# Patient Record
Sex: MALE | Race: BLACK OR AFRICAN AMERICAN | Employment: OTHER | URBAN - METROPOLITAN AREA
[De-identification: names, ages, dates, MRNs, and addresses within clinical notes are randomized per-mention and may not be internally consistent; named-entity substitution may affect disease eponyms.]

---

## 2017-10-07 ENCOUNTER — OP HISTORICAL/CONVERTED ENCOUNTER (OUTPATIENT)
Dept: OTHER | Age: 82
End: 2017-10-07

## 2018-01-13 ENCOUNTER — ED HISTORICAL/CONVERTED ENCOUNTER (OUTPATIENT)
Dept: OTHER | Age: 83
End: 2018-01-13

## 2018-07-06 ENCOUNTER — ED HISTORICAL/CONVERTED ENCOUNTER (OUTPATIENT)
Dept: OTHER | Age: 83
End: 2018-07-06

## 2018-09-24 ENCOUNTER — ED HISTORICAL/CONVERTED ENCOUNTER (OUTPATIENT)
Dept: OTHER | Age: 83
End: 2018-09-24

## 2019-10-24 ENCOUNTER — ED HISTORICAL/CONVERTED ENCOUNTER (OUTPATIENT)
Dept: OTHER | Age: 84
End: 2019-10-24

## 2021-07-16 ENCOUNTER — APPOINTMENT (OUTPATIENT)
Dept: GENERAL RADIOLOGY | Age: 86
End: 2021-07-16
Attending: EMERGENCY MEDICINE
Payer: MEDICARE

## 2021-07-16 ENCOUNTER — HOSPITAL ENCOUNTER (EMERGENCY)
Age: 86
Discharge: HOME | End: 2021-07-17
Attending: EMERGENCY MEDICINE
Payer: MEDICARE

## 2021-07-16 VITALS
BODY MASS INDEX: 23.32 KG/M2 | OXYGEN SATURATION: 99 % | RESPIRATION RATE: 19 BRPM | DIASTOLIC BLOOD PRESSURE: 78 MMHG | TEMPERATURE: 98.4 F | WEIGHT: 140 LBS | SYSTOLIC BLOOD PRESSURE: 143 MMHG | HEIGHT: 65 IN | HEART RATE: 95 BPM

## 2021-07-16 DIAGNOSIS — R79.89 PRERENAL AZOTEMIA: ICD-10-CM

## 2021-07-16 DIAGNOSIS — T67.5XXA HEAT EXHAUSTION, INITIAL ENCOUNTER: Primary | ICD-10-CM

## 2021-07-16 DIAGNOSIS — E86.0 DEHYDRATION DETERMINED BY EXAMINATION: ICD-10-CM

## 2021-07-16 LAB
ANION GAP SERPL CALC-SCNC: 4 MMOL/L (ref 5–15)
APPEARANCE UR: ABNORMAL
BASOPHILS # BLD: 0 K/UL (ref 0–0.1)
BASOPHILS NFR BLD: 0 % (ref 0–1)
BILIRUB UR QL: NEGATIVE
BUN SERPL-MCNC: 24 MG/DL (ref 6–20)
BUN/CREAT SERPL: 21 (ref 12–20)
CA-I BLD-MCNC: 9.1 MG/DL (ref 8.5–10.1)
CHLORIDE SERPL-SCNC: 109 MMOL/L (ref 97–108)
CO2 SERPL-SCNC: 24 MMOL/L (ref 21–32)
COLOR UR: ABNORMAL
CREAT SERPL-MCNC: 1.14 MG/DL (ref 0.7–1.3)
DIFFERENTIAL METHOD BLD: ABNORMAL
EOSINOPHIL # BLD: 0 K/UL (ref 0–0.4)
EOSINOPHIL NFR BLD: 1 % (ref 0–7)
ERYTHROCYTE [DISTWIDTH] IN BLOOD BY AUTOMATED COUNT: 14.6 % (ref 11.5–14.5)
GLUCOSE SERPL-MCNC: 101 MG/DL (ref 65–100)
GLUCOSE UR STRIP.AUTO-MCNC: NEGATIVE MG/DL
HCT VFR BLD AUTO: 39.3 % (ref 36.6–50.3)
HGB BLD-MCNC: 12.6 G/DL (ref 12.1–17)
HGB UR QL STRIP: NEGATIVE
IMM GRANULOCYTES # BLD AUTO: 0 K/UL (ref 0–0.04)
IMM GRANULOCYTES NFR BLD AUTO: 1 % (ref 0–0.5)
KETONES UR QL STRIP.AUTO: NEGATIVE MG/DL
LACTATE SERPL-SCNC: 1.9 MMOL/L (ref 0.4–2)
LEUKOCYTE ESTERASE UR QL STRIP.AUTO: NEGATIVE
LYMPHOCYTES # BLD: 0.9 K/UL (ref 0.8–3.5)
LYMPHOCYTES NFR BLD: 11 % (ref 12–49)
MAGNESIUM SERPL-MCNC: 2.2 MG/DL (ref 1.6–2.4)
MCH RBC QN AUTO: 31.2 PG (ref 26–34)
MCHC RBC AUTO-ENTMCNC: 32.1 G/DL (ref 30–36.5)
MCV RBC AUTO: 97.3 FL (ref 80–99)
MONOCYTES # BLD: 0.5 K/UL (ref 0–1)
MONOCYTES NFR BLD: 7 % (ref 5–13)
NEUTS SEG # BLD: 6.5 K/UL (ref 1.8–8)
NEUTS SEG NFR BLD: 80 % (ref 32–75)
NITRITE UR QL STRIP.AUTO: NEGATIVE
NRBC # BLD: 0 K/UL (ref 0–0.01)
NRBC BLD-RTO: 0 PER 100 WBC
PH UR STRIP: 5 [PH] (ref 5–8)
PLATELET # BLD AUTO: 184 K/UL (ref 150–400)
PMV BLD AUTO: 11 FL (ref 8.9–12.9)
POTASSIUM SERPL-SCNC: 4.5 MMOL/L (ref 3.5–5.1)
PROT UR STRIP-MCNC: NEGATIVE MG/DL
RBC # BLD AUTO: 4.04 M/UL (ref 4.1–5.7)
SODIUM SERPL-SCNC: 137 MMOL/L (ref 136–145)
SP GR UR REFRACTOMETRY: 1.01 (ref 1–1.03)
TROPONIN I SERPL-MCNC: <0.05 NG/ML
UROBILINOGEN UR QL STRIP.AUTO: 0.1 EU/DL (ref 0.1–1)
WBC # BLD AUTO: 8.1 K/UL (ref 4.1–11.1)

## 2021-07-16 PROCEDURE — 81003 URINALYSIS AUTO W/O SCOPE: CPT

## 2021-07-16 PROCEDURE — 99284 EMERGENCY DEPT VISIT MOD MDM: CPT

## 2021-07-16 PROCEDURE — 83735 ASSAY OF MAGNESIUM: CPT

## 2021-07-16 PROCEDURE — 74011250636 HC RX REV CODE- 250/636: Performed by: EMERGENCY MEDICINE

## 2021-07-16 PROCEDURE — 84484 ASSAY OF TROPONIN QUANT: CPT

## 2021-07-16 PROCEDURE — 85025 COMPLETE CBC W/AUTO DIFF WBC: CPT

## 2021-07-16 PROCEDURE — 83605 ASSAY OF LACTIC ACID: CPT

## 2021-07-16 PROCEDURE — 71045 X-RAY EXAM CHEST 1 VIEW: CPT

## 2021-07-16 PROCEDURE — 36415 COLL VENOUS BLD VENIPUNCTURE: CPT

## 2021-07-16 PROCEDURE — 80048 BASIC METABOLIC PNL TOTAL CA: CPT

## 2021-07-16 PROCEDURE — 87040 BLOOD CULTURE FOR BACTERIA: CPT

## 2021-07-16 RX ADMIN — SODIUM CHLORIDE 1000 ML: 9 INJECTION, SOLUTION INTRAVENOUS at 21:04

## 2021-07-17 NOTE — ED PROVIDER NOTES
EMERGENCY DEPARTMENT HISTORY AND PHYSICAL EXAM        Date: 7/16/2021  Patient Name: Pedro Hernadez    History of Presenting Illness     Chief Complaint   Patient presents with    Shortness of Breath       11:07 PM    History Provided By: Patient, Patient's Daughter and EMS    HPI: Pedro Hernadez, 80 y.o. 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  History per those sitting with him and EMS is that the assisted living where he resides as a hallway that is not as well at condition as inside the apartment met the patient and several of his girlfriends\" were sitting in the hallway talking for several hours. At one point the patient was noted to become lightheaded and unresponsive without any witnessed seizure activity. Patient was helped to the floor and EMS was called. Upon EMS arrival patient was alert but seems confused and they stated that the hallway space they were and was very very warm because the EMS started to sweat. Once the patient was moved to the air conditioned ambulance he quickly aroused return to GCS of 15 and appeared per local friends to be back to baseline. Review of systems finds patient denying any prior symptoms including negative chest pain, nausea, vomiting, delvin syncope, falls, viral illness, urinary symptoms, cough, production of purulent sputum, medication noncompliance, EtOH intoxication. History per patient and caregiver/daughter is that the patient drinks very little to no water during the day and that the seniors in his building often sits and is poorly air conditioned hot hallway which has been even warm than usual due to the heat way. Patient confirms that he has had his Covid vaccinations.     PCP: Hernan Ruano MD    Current Outpatient Medications Medication Sig Dispense Refill    lisinopril (PRINIVIL, ZESTRIL) 20 mg tablet Take 20 mg by mouth daily.  atorvastatin (LIPITOR) 10 mg tablet Take 10 mg by mouth nightly.  PATRICIO ASPIRIN PO Take 81 mg by mouth daily.  multivitamin, tx-iron-ca-min (THERA-M W/ IRON) 9 mg iron-400 mcg tab tablet Take 1 Tab by mouth daily.  insulin detemir (LEVEMIR FLEXTOUCH) 100 unit/mL (3 mL) inpn 0.35 mL by SubCUTAneous route nightly. 35 Units by SubCUTAneous route at night 45 mL 3    glipiZIDE (GLUCOTROL) 5 mg tablet Take 1 Tab by mouth every morning. 90 Tab 3    NIFEdipine XL (PROCARDIA-XL) 60 mg CR tablet Take 60 mg by mouth daily. Indications: HYPERTENSION      gabapentin (NEURONTIN) 800 mg tablet Take  by mouth three (3) times daily.  ibuprofen (MOTRIN) 600 mg tablet Take 600 mg by mouth every eight (8) hours as needed.  rosuvastatin (CRESTOR) 10 mg tablet Take 10 mg by mouth daily. Indications: HYPERCHOLESTEROLEMIA      hydrochlorothiazide (HYDRODIURIL) 25 mg tablet Take 25 mg by mouth daily. Indications: HYPERTENSION      saw palmetto 500 mg cap Take 450 mg by mouth daily.            Past History     Past Medical History:  Past Medical History:   Diagnosis Date    Anemia     Arthritis     Diabetes ([facility] [address]     Hypercholesterolemia     Hypertension     Single kidney     Cr normal    Throat cancer ([facility] [address] 2008    s/p laryngectomy       Past Surgical History:  Past Surgical History:   Procedure Laterality Date    HX LAP CHOLECYSTECTOMY  12    By Dr. Darwin Olivares states does not know reason       Family History:  Family History   Family history unknown: Yes       Social History:  Social History     Tobacco Use    Smoking status: Former Smoker     Packs/day: 1.00     Years: 57.00     Pack years: 57.00     Quit date: 2012     Years since quittin.0    Smokeless tobacco: Never Used   Substance Use Topics    Alcohol use: Yes     Alcohol/week: 17.5 standard drinks     Types: 21 Shots of liquor per week    Drug use: No       Allergies:  No Known Allergies    Review of Systems   Review of Systems   Constitutional: Positive for fatigue. Negative for appetite change, chills, diaphoresis and fever. HENT: Negative for congestion, sore throat and trouble swallowing. Eyes: Negative for visual disturbance. Respiratory: Negative for cough and shortness of breath. Cardiovascular: Negative for chest pain and palpitations. Gastrointestinal: Negative for abdominal pain, diarrhea, nausea and vomiting. Endocrine: Negative for polydipsia, polyphagia and polyuria. Genitourinary: Negative for dysuria, frequency, hematuria and urgency. Musculoskeletal: Negative for gait problem and neck pain. Skin: Negative for rash. Neurological: Positive for syncope. Negative for dizziness, light-headedness and headaches. Psychiatric/Behavioral: Negative. Physical Exam   Physical Exam  Vitals and nursing note reviewed. Constitutional:       General: He is not in acute distress. Appearance: He is well-developed. He is not ill-appearing. Comments: Well-developed thin -American male looks fatigued but interactive and smiling. Family member at bedside GCS of 15 ANO x3   HENT:      Head: Normocephalic and atraumatic. Nose: Nose normal.      Mouth/Throat:      Pharynx: No posterior oropharyngeal erythema. Comments: Oral mucosa is tacky; Trach site is patent and moist.  Eyes:      General: Vision grossly intact. Extraocular Movements: Extraocular movements intact. Conjunctiva/sclera: Conjunctivae normal.      Pupils: Pupils are equal, round, and reactive to light. Neck:      Vascular: No JVD. Cardiovascular:      Rate and Rhythm: Normal rate and regular rhythm. Pulses: Normal pulses. Carotid pulses are 2+ on the right side and 2+ on the left side.        Radial pulses are 2+ on the right side and 2+ on the left side. Femoral pulses are 2+ on the right side and 2+ on the left side. Popliteal pulses are 2+ on the right side and 2+ on the left side. Dorsalis pedis pulses are 2+ on the right side and 2+ on the left side. Posterior tibial pulses are 2+ on the right side and 2+ on the left side. Heart sounds: Normal heart sounds. Pulmonary:      Effort: Pulmonary effort is normal.      Breath sounds: Normal breath sounds and air entry. No wheezing or rhonchi. Abdominal:      General: Bowel sounds are normal.      Palpations: Abdomen is soft. Tenderness: There is no abdominal tenderness. There is no guarding or rebound. Musculoskeletal:         General: No swelling or deformity. Right shoulder: No swelling. Normal range of motion. Cervical back: Neck supple. Right lower leg: No edema. Left lower leg: No edema. Skin:     General: Skin is warm and dry. Capillary Refill: Capillary refill takes less than 2 seconds. Findings: No signs of injury or rash. Neurological:      General: No focal deficit present. Mental Status: He is alert and oriented to person, place, and time. Psychiatric:         Mood and Affect: Mood normal.         Behavior: Behavior normal. Behavior is cooperative. Diagnostic Study Results     Labs -     No results found for this or any previous visit (from the past 48 hour(s)). CXR Results  (Last 48 hours)    None          Medical Decision Making and ED Course     I have also reviewed the vital signs, available nursing notes, past medical history, past surgical history, family history and social history. Vital Signs - Reviewed the patient's vital signs. No data found. Records Reviewed: Nursing Notes and Ambulance Run Sheet as available.     Medical Decision Making/Diff Dx:  Differential diagnosis: Heat exhaustion, heat illness, heat syncope, dehydration, electrolyte abnormalities, , pneumonia, orthostatic hypotension,    Adorable 77-year-old -American male presents after prolonged heat exposure with an episode of syncope syncope/near syncope that is likely heat induced based on environmental descriptions per EMS and patient family. We will however do screening labs with x-ray and assess for other potential etiologies patient. Patient looks clinically dehydrated so we will give IV fluids as well as the fact that his urine is quite dark in color. Will disposition per results    ED course/Re-evaluation/Consultations/Other   Patient continues to be quite interactive and pleasant and does appear to feel stronger after IV fluid boluses and eating. Patient is adamant that he does not wish to be admitted so will discharge home with strong warnings related to p.o. intake and precautions to take during the current heat wave. Patient and family acknowledged understanding       Procedures     PROCEDURES:  Procedures  Mundo Harper MD  N/A    Disposition     Discharged    DISCHARGE PLAN:  1. Discharge Medication List as of 7/17/2021 12:15 AM        2. Current Discharge Medication List      CONTINUE these medications which have NOT CHANGED    Details   lisinopril (PRINIVIL, ZESTRIL) 20 mg tablet Take 20 mg by mouth daily. Associated Diagnoses: Type II diabetes mellitus, uncontrolled ([address] Essential hypertension with goal blood pressure less than 140/90      atorvastatin (LIPITOR) 10 mg tablet Take 10 mg by mouth nightly. Associated Diagnoses: Type II diabetes mellitus, uncontrolled ([address] Essential hypertension with goal blood pressure less than 140/90      PATRICIO ASPIRIN PO Take 81 mg by mouth daily. Associated Diagnoses: Type II diabetes mellitus, uncontrolled ([address] Essential hypertension with goal blood pressure less than 140/90      multivitamin, tx-iron-ca-min (THERA-M W/ IRON) 9 mg iron-400 mcg tab tablet Take 1 Tab by mouth daily.     Associated Diagnoses: Type II diabetes mellitus, uncontrolled ([facility] [address] Essential hypertension with goal blood pressure less than 140/90      insulin detemir (LEVEMIR FLEXTOUCH) 100 unit/mL (3 mL) inpn 0.35 mL by SubCUTAneous route nightly. 35 Units by SubCUTAneous route at night  Qty: 45 mL, Refills: 3    Associated Diagnoses: Type II diabetes mellitus, uncontrolled ([facility] [address] Essential hypertension with goal blood pressure less than 140/90      glipiZIDE (GLUCOTROL) 5 mg tablet Take 1 Tab by mouth every morning. Qty: 90 Tab, Refills: 3    Associated Diagnoses: [facility], [facility] Essential hypertension with goal blood pressure less than 140/90      NIFEdipine XL (PROCARDIA-XL) 60 mg CR tablet Take 60 mg by mouth daily. Indications: HYPERTENSION      gabapentin (NEURONTIN) 800 mg tablet Take  by mouth three (3) times daily. ibuprofen (MOTRIN) 600 mg tablet Take 600 mg by mouth every eight (8) hours as needed. rosuvastatin (CRESTOR) 10 mg tablet Take 10 mg by mouth daily. Indications: HYPERCHOLESTEROLEMIA      hydrochlorothiazide (HYDRODIURIL) 25 mg tablet Take 25 mg by mouth daily. Indications: HYPERTENSION      saw palmetto 500 mg cap Take 450 mg by mouth daily. 3.   Follow-up Information    None       4. Return to ED if worse     Diagnosis     Clinical impression:   1. Heat exhaustion, initial encounter    2. Dehydration determined by examination    3.  Prerenal azotemia

## 2021-07-17 NOTE — DISCHARGE INSTRUCTIONS
1. Increase your water intake to keep your urine completely clear in color. It is important to avoid blood pressure dropping too low when you take your blood pressure medications as well as your risk for fainting/falls. A fall while you are on your blood thinners could result in bleeding on your brain. 2. Do not sit outside in the hallway when temperature is above 80 degrees in order to avoid heat exhaustion,heat illness or fainting. 3. Please eat 3 meals per day to avoid becoming confused or weak during the heat which could also result in a fall and head injury. 4. Return to the ED as needed or if you become concerned.

## 2021-07-23 LAB
BACTERIA SPEC CULT: NORMAL
SPECIAL REQUESTS,SREQ: NORMAL

## 2022-01-13 ENCOUNTER — HOSPITAL ENCOUNTER (EMERGENCY)
Age: 87
Discharge: HOME | End: 2022-01-13
Attending: EMERGENCY MEDICINE
Payer: MEDICARE

## 2022-01-13 ENCOUNTER — APPOINTMENT (OUTPATIENT)
Dept: GENERAL RADIOLOGY | Age: 87
End: 2022-01-13
Attending: EMERGENCY MEDICINE
Payer: MEDICARE

## 2022-01-13 VITALS
BODY MASS INDEX: 23.32 KG/M2 | WEIGHT: 140 LBS | TEMPERATURE: 98.4 F | DIASTOLIC BLOOD PRESSURE: 72 MMHG | HEART RATE: 72 BPM | SYSTOLIC BLOOD PRESSURE: 152 MMHG | OXYGEN SATURATION: 100 % | RESPIRATION RATE: 13 BRPM | HEIGHT: 65 IN

## 2022-01-13 DIAGNOSIS — R53.83 FATIGUE, UNSPECIFIED TYPE: Primary | ICD-10-CM

## 2022-01-13 LAB
ALBUMIN SERPL-MCNC: 3.6 G/DL (ref 3.5–5)
ALBUMIN/GLOB SERPL: 0.8 {RATIO} (ref 1.1–2.2)
ALP SERPL-CCNC: 105 U/L (ref 45–117)
ALT SERPL-CCNC: 31 U/L (ref 12–78)
ANION GAP SERPL CALC-SCNC: 5 MMOL/L (ref 5–15)
APPEARANCE UR: CLEAR
AST SERPL W P-5'-P-CCNC: 28 U/L (ref 15–37)
ATRIAL RATE: 69 BPM
BACTERIA URNS QL MICRO: NEGATIVE /HPF
BASOPHILS # BLD: 0 K/UL (ref 0–0.1)
BASOPHILS NFR BLD: 0 % (ref 0–1)
BILIRUB SERPL-MCNC: 0.6 MG/DL (ref 0.2–1)
BILIRUB UR QL: NEGATIVE
BUN SERPL-MCNC: 26 MG/DL (ref 6–20)
BUN/CREAT SERPL: 20 (ref 12–20)
CA-I BLD-MCNC: 9.8 MG/DL (ref 8.5–10.1)
CALCULATED R AXIS, ECG10: -38 DEGREES
CALCULATED T AXIS, ECG11: 2 DEGREES
CHLORIDE SERPL-SCNC: 106 MMOL/L (ref 97–108)
CK SERPL-CCNC: 185 NG/ML (ref 39–308)
CO2 SERPL-SCNC: 26 MMOL/L (ref 21–32)
COLOR UR: ABNORMAL
COVID-19 RAPID TEST, COVR: NOT DETECTED
CREAT SERPL-MCNC: 1.27 MG/DL (ref 0.7–1.3)
DIAGNOSIS, 93000: NORMAL
DIFFERENTIAL METHOD BLD: ABNORMAL
EOSINOPHIL # BLD: 0 K/UL (ref 0–0.4)
EOSINOPHIL NFR BLD: 0 % (ref 0–7)
ERYTHROCYTE [DISTWIDTH] IN BLOOD BY AUTOMATED COUNT: 14.5 % (ref 11.5–14.5)
GLOBULIN SER CALC-MCNC: 4.3 G/DL (ref 2–4)
GLUCOSE SERPL-MCNC: 152 MG/DL (ref 65–100)
GLUCOSE UR STRIP.AUTO-MCNC: 50 MG/DL
HCT VFR BLD AUTO: 40.6 % (ref 36.6–50.3)
HGB BLD-MCNC: 13.1 G/DL (ref 12.1–17)
HGB UR QL STRIP: NEGATIVE
IMM GRANULOCYTES # BLD AUTO: 0.1 K/UL (ref 0–0.04)
IMM GRANULOCYTES NFR BLD AUTO: 1 % (ref 0–0.5)
KETONES UR QL STRIP.AUTO: 5 MG/DL
LEUKOCYTE ESTERASE UR QL STRIP.AUTO: NEGATIVE
LYMPHOCYTES # BLD: 1.1 K/UL (ref 0.8–3.5)
LYMPHOCYTES NFR BLD: 10 % (ref 12–49)
MCH RBC QN AUTO: 31.1 PG (ref 26–34)
MCHC RBC AUTO-ENTMCNC: 32.3 G/DL (ref 30–36.5)
MCV RBC AUTO: 96.4 FL (ref 80–99)
MONOCYTES # BLD: 0.7 K/UL (ref 0–1)
MONOCYTES NFR BLD: 7 % (ref 5–13)
MUCOUS THREADS URNS QL MICRO: ABNORMAL /LPF
NEUTS SEG # BLD: 8.4 K/UL (ref 1.8–8)
NEUTS SEG NFR BLD: 82 % (ref 32–75)
NITRITE UR QL STRIP.AUTO: NEGATIVE
NRBC # BLD: 0 K/UL (ref 0–0.01)
NRBC BLD-RTO: 0 PER 100 WBC
P-R INTERVAL, ECG05: 250 MS
PH UR STRIP: 5 [PH] (ref 5–8)
PLATELET # BLD AUTO: 163 K/UL (ref 150–400)
PMV BLD AUTO: 11.4 FL (ref 8.9–12.9)
POTASSIUM SERPL-SCNC: 4.9 MMOL/L (ref 3.5–5.1)
PROT SERPL-MCNC: 7.9 G/DL (ref 6.4–8.2)
PROT UR STRIP-MCNC: NEGATIVE MG/DL
Q-T INTERVAL, ECG07: 456 MS
QRS DURATION, ECG06: 150 MS
QTC CALCULATION (BEZET), ECG08: 478 MS
RBC # BLD AUTO: 4.21 M/UL (ref 4.1–5.7)
RBC #/AREA URNS HPF: ABNORMAL /HPF (ref 0–5)
SODIUM SERPL-SCNC: 137 MMOL/L (ref 136–145)
SP GR UR REFRACTOMETRY: 1.02 (ref 1–1.03)
SPECIMEN SOURCE: NORMAL
TROPONIN-HIGH SENSITIVITY: 73 NG/L (ref 0–76)
TROPONIN-HIGH SENSITIVITY: 79 NG/L (ref 0–76)
UROBILINOGEN UR QL STRIP.AUTO: 0.1 EU/DL (ref 0.1–1)
VENTRICULAR RATE, ECG03: 66 BPM
WBC # BLD AUTO: 10.2 K/UL (ref 4.1–11.1)
WBC URNS QL MICRO: ABNORMAL /HPF (ref 0–4)

## 2022-01-13 PROCEDURE — 87635 SARS-COV-2 COVID-19 AMP PRB: CPT

## 2022-01-13 PROCEDURE — 80053 COMPREHEN METABOLIC PANEL: CPT

## 2022-01-13 PROCEDURE — 93005 ELECTROCARDIOGRAM TRACING: CPT

## 2022-01-13 PROCEDURE — 71045 X-RAY EXAM CHEST 1 VIEW: CPT

## 2022-01-13 PROCEDURE — 36415 COLL VENOUS BLD VENIPUNCTURE: CPT

## 2022-01-13 PROCEDURE — 85025 COMPLETE CBC W/AUTO DIFF WBC: CPT

## 2022-01-13 PROCEDURE — 99285 EMERGENCY DEPT VISIT HI MDM: CPT

## 2022-01-13 PROCEDURE — 81001 URINALYSIS AUTO W/SCOPE: CPT

## 2022-01-13 PROCEDURE — 82550 ASSAY OF CK (CPK): CPT

## 2022-01-13 PROCEDURE — 84484 ASSAY OF TROPONIN QUANT: CPT

## 2022-01-13 NOTE — ED PROVIDER NOTES
EMERGENCY DEPARTMENT HISTORY AND PHYSICAL EXAM      Date: 1/13/2022  Patient Name: Soham John      History of Presenting Illness     Chief Complaint   Patient presents with    Fatigue    Hypotension       History Provided By: Patient and Patient's Daughter    HPI: Soham John, 80 y.o. male with a past medical history significant diabetes and hypertension presents to the ED with cc of weak and sitting up side in the hallway. Patient was sweaty and weak and was not able to get up. He denies any pain just overall weakness. Per EMS patient's blood pressure was noted to be low. Is that he is feeling much better now and denies any fever, chills, chest pain, shortness of breath rash, diarrhea, headache, and sweats. No treatment prior to arrival    There are no other complaints, changes, or physical findings at this time. PCP: Dori Sears MD    Current Outpatient Medications   Medication Sig Dispense Refill    lisinopril (PRINIVIL, ZESTRIL) 20 mg tablet Take 20 mg by mouth daily.  atorvastatin (LIPITOR) 10 mg tablet Take 10 mg by mouth nightly.  PATRICIO ASPIRIN PO Take 81 mg by mouth daily.  multivitamin, tx-iron-ca-min (THERA-M W/ IRON) 9 mg iron-400 mcg tab tablet Take 1 Tab by mouth daily.  insulin detemir (LEVEMIR FLEXTOUCH) 100 unit/mL (3 mL) inpn 0.35 mL by SubCUTAneous route nightly. 35 Units by SubCUTAneous route at night 45 mL 3    glipiZIDE (GLUCOTROL) 5 mg tablet Take 1 Tab by mouth every morning. 90 Tab 3    NIFEdipine XL (PROCARDIA-XL) 60 mg CR tablet Take 60 mg by mouth daily. Indications: HYPERTENSION      gabapentin (NEURONTIN) 800 mg tablet Take  by mouth three (3) times daily.  ibuprofen (MOTRIN) 600 mg tablet Take 600 mg by mouth every eight (8) hours as needed.  rosuvastatin (CRESTOR) 10 mg tablet Take 10 mg by mouth daily.     Indications: HYPERCHOLESTEROLEMIA      hydrochlorothiazide (HYDRODIURIL) 25 mg tablet Take 25 mg by mouth daily.    Indications: HYPERTENSION      saw palmetto 500 mg cap Take 450 mg by mouth daily. Past History     Past Medical History:  Past Medical History:   Diagnosis Date    Anemia     Arthritis     [facility] ([facility] [address]     Hypercholesterolemia     Hypertension     Single kidney     Cr normal    Throat cancer ([facility] [address] 2008    s/p laryngectomy       Past Surgical History:  Past Surgical History:   Procedure Laterality Date    HX LAP CHOLECYSTECTOMY  12    By Dr. Patrick Gamboa states does not know reason       Family History:  Family History   Family history unknown: Yes       Social History:  Social History     Tobacco Use    Smoking status: Former Smoker     Packs/day: 1.00     Years: 57.00     Pack years: 57.00     Quit date: 2012     Years since quittin.4    Smokeless tobacco: Never Used   Substance Use Topics    Alcohol use: Yes     Alcohol/week: 17.5 standard drinks     Types: 21 Shots of liquor per week    Drug use: No       Allergies:  No Known Allergies      Review of Systems     Review of Systems   Constitutional: Positive for fatigue. Negative for appetite change, chills and fever. HENT: Negative. Negative for congestion and sinus pain. Eyes: Negative. Negative for pain and visual disturbance. Respiratory: Negative. Negative for chest tightness and shortness of breath. Cardiovascular: Negative. Negative for chest pain. Gastrointestinal: Negative. Negative for abdominal pain, diarrhea, nausea and vomiting. Genitourinary: Negative. Negative for difficulty urinating. No discharge   Musculoskeletal: Negative. Negative for arthralgias. Skin: Negative. Negative for rash. Neurological: Positive for weakness. Negative for headaches. Hematological: Negative. Psychiatric/Behavioral: Negative. Negative for agitation. The patient is not nervous/anxious.     All other systems reviewed and are negative. Physical Exam     Physical Exam  Vitals and nursing note reviewed. Constitutional:       General: He is not in acute distress. Appearance: He is well-developed. HENT:      Head: Normocephalic and atraumatic. Nose: Nose normal.      Mouth/Throat:      Mouth: Mucous membranes are moist.      Pharynx: Oropharynx is clear. No oropharyngeal exudate. Eyes:      General:         Right eye: No discharge. Left eye: No discharge. Conjunctiva/sclera: Conjunctivae normal.      Pupils: Pupils are equal, round, and reactive to light. Cardiovascular:      Rate and Rhythm: Normal rate and regular rhythm. Chest Wall: PMI is not displaced. No thrill. Heart sounds: Normal heart sounds. No murmur heard. No friction rub. No gallop. Comments: Pacer in left upper chest  Pulmonary:      Effort: Pulmonary effort is normal. No respiratory distress. Breath sounds: Normal breath sounds. No wheezing or rales. Chest:      Chest wall: No tenderness. Abdominal:      General: Bowel sounds are normal. There is no distension. Palpations: Abdomen is soft. There is no mass. Tenderness: There is no abdominal tenderness. There is no guarding or rebound. Musculoskeletal:         General: Normal range of motion. Cervical back: Normal range of motion and neck supple. Lymphadenopathy:      Cervical: No cervical adenopathy. Skin:     General: Skin is warm and dry. Capillary Refill: Capillary refill takes less than 2 seconds. Findings: No erythema or rash. Neurological:      Mental Status: He is alert and oriented to person, place, and time. Cranial Nerves: No cranial nerve deficit.       Coordination: Coordination normal.   Psychiatric:         Mood and Affect: Mood normal.         Behavior: Behavior normal.         Lab and Diagnostic Study Results     Labs -     Recent Results (from the past 12 hour(s))   EKG, 12 LEAD, INITIAL    Collection Time: 01/13/22  3:11 PM   Result Value Ref Range    Ventricular Rate 66 BPM    Atrial Rate 69 BPM    P-R Interval 250 ms    QRS Duration 150 ms    Q-T Interval 456 ms    QTC Calculation (Bezet) 478 ms    Calculated R Axis -38 degrees    Calculated T Axis 2 degrees    Diagnosis       Atrial-paced rhythm with prolonged AV conduction  Left axis deviation  Right bundle branch block  Minimal voltage criteria for LVH, may be normal variant  Abnormal ECG  No previous ECGs available  Confirmed by Be Patricia ([id number]) on 1/13/2022 3:25:49 PM     COVID-19 RAPID TEST    Collection Time: 01/13/22  3:16 PM   Result Value Ref Range    Specimen source Nasopharyngeal      COVID-19 rapid test Not Detected Not Detected     CBC WITH AUTOMATED DIFF    Collection Time: 01/13/22  3:23 PM   Result Value Ref Range    WBC 10.2 4.1 - 11.1 K/uL    RBC 4.21 4.10 - 5.70 M/uL    HGB 13.1 12.1 - 17.0 g/dL    HCT 40.6 36.6 - 50.3 %    MCV 96.4 80.0 - 99.0 FL    MCH 31.1 26.0 - 34.0 PG    MCHC 32.3 30.0 - 36.5 g/dL    RDW 14.5 11.5 - 14.5 %    PLATELET 007 146 - 148 K/uL    MPV 11.4 8.9 - 12.9 FL    NRBC 0.0 0.0  WBC    ABSOLUTE NRBC 0.00 0.00 - 0.01 K/uL    NEUTROPHILS 82 (H) 32 - 75 %    LYMPHOCYTES 10 (L) 12 - 49 %    MONOCYTES 7 5 - 13 %    EOSINOPHILS 0 0 - 7 %    BASOPHILS 0 0 - 1 %    IMMATURE GRANULOCYTES 1 (H) 0 - 0.5 %    ABS. NEUTROPHILS 8.4 (H) 1.8 - 8.0 K/UL    ABS. LYMPHOCYTES 1.1 0.8 - 3.5 K/UL    ABS. MONOCYTES 0.7 0.0 - 1.0 K/UL    ABS. EOSINOPHILS 0.0 0.0 - 0.4 K/UL    ABS. BASOPHILS 0.0 0.0 - 0.1 K/UL    ABS. IMM.  GRANS. 0.1 (H) 0.00 - 0.04 K/UL    DF AUTOMATED     METABOLIC PANEL, COMPREHENSIVE    Collection Time: 01/13/22  3:23 PM   Result Value Ref Range    Sodium 137 136 - 145 mmol/L    Potassium 4.9 3.5 - 5.1 mmol/L    Chloride 106 97 - 108 mmol/L    CO2 26 21 - 32 mmol/L    Anion gap 5 5 - 15 mmol/L    Glucose 152 (H) 65 - 100 mg/dL    BUN 26 (H) 6 - 20 mg/dL    Creatinine 1.27 0.70 - 1.30 mg/dL    BUN/Creatinine ratio 20 12 - 20      GFR est AA >60 >60 ml/min/1.73m2    GFR est non-AA 54 (L) >60 ml/min/1.73m2    Calcium 9.8 8.5 - 10.1 mg/dL    Bilirubin, total 0.6 0.2 - 1.0 mg/dL    AST (SGOT) 28 15 - 37 U/L    ALT (SGPT) 31 12 - 78 U/L    Alk. phosphatase 105 45 - 117 U/L    Protein, total 7.9 6.4 - 8.2 g/dL    Albumin 3.6 3.5 - 5.0 g/dL    Globulin 4.3 (H) 2.0 - 4.0 g/dL    A-G Ratio 0.8 (L) 1.1 - 2.2     CK W/ REFLX CKMB    Collection Time: 01/13/22  3:23 PM   Result Value Ref Range    .0 39 - 308 ng/mL   TROPONIN-HIGH SENSITIVITY    Collection Time: 01/13/22  3:23 PM   Result Value Ref Range    Troponin-High Sensitivity 73 0 - 76 ng/L   TROPONIN-HIGH SENSITIVITY    Collection Time: 01/13/22  5:17 PM   Result Value Ref Range    Troponin-High Sensitivity 79 (HH) 0 - 76 ng/L   URINALYSIS W/MICROSCOPIC    Collection Time: 01/13/22  7:29 PM   Result Value Ref Range    Color Yellow/Straw      Appearance Clear Clear      Specific gravity 1.018 1.003 - 1.030      pH (UA) 5.0 5.0 - 8.0      Protein Negative Negative mg/dL    Glucose 50 (A) Negative mg/dL    Ketone 5 (A) Negative mg/dL    Bilirubin Negative Negative      Blood Negative Negative      Urobilinogen 0.1 0.1 - 1.0 EU/dL    Nitrites Negative Negative      Leukocyte Esterase Negative Negative      WBC 0-4 0 - 4 /hpf    RBC 5-10 0 - 5 /hpf    Bacteria Negative Negative /hpf    Mucus Trace /lpf       Radiologic Studies -   [unfilled]  CT Results  (Last 48 hours)    None        CXR Results  (Last 48 hours)               01/13/22 1514  XR CHEST PORT Final result    Impression:  The cardiomediastinal silhouette is appropriate for age, technique,   and lung expansion. Pulmonary vasculature is not congested. The lungs are   essentially clear. No effusion or pneumothorax is seen.            Narrative:  1 view comparison July 16                 Medical Decision Making and ED Course   - I am the first and primary provider for this patient AND AM THE PRIMARY PROVIDER OF RECORD. - I reviewed the vital signs, available nursing notes, past medical history, past surgical history, family history and social history. - Initial assessment performed. The patients presenting problems have been discussed, and the staff are in agreement with the care plan formulated and outlined with them. I have encouraged them to ask questions as they arise throughout their visit. Vital Signs-Reviewed the patient's vital signs. Patient Vitals for the past 12 hrs:   Temp Pulse Resp BP SpO2   01/13/22 2214 -- 72 13 (!) 152/72 100 %   01/13/22 2108 -- 80 22 (!) 153/70 99 %   01/13/22 1930 -- 91 26 (!) 193/71 100 %   01/13/22 1648 98.4 °F (36.9 °C) 66 -- (!) 145/75 100 %   01/13/22 1648 -- -- -- -- 100 %   01/13/22 1452 (!) 96.5 °F (35.8 °C) 71 18 (!) 168/80 100 %       EKG interpretation: (Preliminary): Performed at 1511, and read at 1514  Ventricular rate 66 bpm, parable 250 ms, QRS duration of 80 ms, QTC 4 and 70 ms per interpretation: Atrial paced rhythm with prolonged AV. Left axis deviation. Right bundle branch block. Abnormal EKG. Records Reviewed: Nursing Notes and Old Medical Records    The patient presents with weakness with a differential diagnosis of dehydration, electrolyte abnormality, UTI, COVID, ACS, arrhythmia. ED Course:              Provider Notes (Medical Decision Making):   80year-old male feeling weak and tired today. Labs are not conclusive for anything acute at this point time. He says he feels much better and his family member at the bedside said he looks better and would like to take him home. I have encouraged him to follow-up with the PCP in the next 24 hours. I have also explained that the risks of going home despite not having an answer for his questions and symptoms earlier today.   MDM           Consultations:       Consultations: - NONE        Procedures and Critical Care       Performed by: Anirudh Romero MD  PROCEDURES:  Procedures Disposition     Disposition: Condition stable    Discharged    Remove if not discharged  DISCHARGE PLAN:  1. Current Discharge Medication List      CONTINUE these medications which have NOT CHANGED    Details   lisinopril (PRINIVIL, ZESTRIL) 20 mg tablet Take 20 mg by mouth daily. Associated Diagnoses: Type II diabetes mellitus, uncontrolled ([facility] [address] Essential hypertension with goal blood pressure less than 140/90      atorvastatin (LIPITOR) 10 mg tablet Take 10 mg by mouth nightly. Associated Diagnoses: [facility] mellitus, [facility] Essential hypertension with goal blood pressure less than 140/90      PATRICIO ASPIRIN PO Take 81 mg by mouth daily. Associated Diagnoses: Type II diabetes mellitus, uncontrolled ([facility] Essential hypertension with goal blood pressure less than 140/90      multivitamin, tx-iron-ca-min (THERA-M W/ IRON) 9 mg iron-400 mcg tab tablet Take 1 Tab by mouth daily. Associated Diagnoses: Type II diabetes mellitus, uncontrolled ([facility] Essential hypertension with goal blood pressure less than 140/90      insulin detemir (LEVEMIR FLEXTOUCH) 100 unit/mL (3 mL) inpn 0.35 mL by SubCUTAneous route nightly. 35 Units by SubCUTAneous route at night  Qty: 45 mL, Refills: 3    Associated Diagnoses: Type II diabetes mellitus, uncontrolled ([facility] Essential hypertension with goal blood pressure less than 140/90      glipiZIDE (GLUCOTROL) 5 mg tablet Take 1 Tab by mouth every morning. Qty: 90 Tab, Refills: 3    Associated Diagnoses: Type II diabetes mellitus, uncontrolled ([facility] Essential hypertension with goal blood pressure less than 140/90      NIFEdipine XL (PROCARDIA-XL) 60 mg CR tablet Take 60 mg by mouth daily. Indications: HYPERTENSION      gabapentin (NEURONTIN) 800 mg tablet Take  by mouth three (3) times daily. ibuprofen (MOTRIN) 600 mg tablet Take 600 mg by mouth every eight (8) hours as needed.         rosuvastatin (CRESTOR) 10 mg tablet Take 10 mg by mouth daily. Indications: HYPERCHOLESTEROLEMIA      hydrochlorothiazide (HYDRODIURIL) 25 mg tablet Take 25 mg by mouth daily. Indications: HYPERTENSION      saw palmetto 500 mg cap Take 450 mg by mouth daily. 2.   Follow-up Information     Follow up With Specialties Details Why Contact Info    Cynthia Lakhani MD Internal Medicine   [address] [place] [address]  [phone]          3. Return to ED if worse   4. Current Discharge Medication List          Diagnosis     Clinical Impression:   1. Fatigue, unspecified type        Attestations:    Leatha Dai MD    Please note that this dictation was completed with [employer], the computer voice recognition software. Quite often unanticipated grammatical, syntax, homophones, and other interpretive errors are inadvertently transcribed by the computer software. Please disregard these errors. Please excuse any errors that have escaped final proofreading. Thank you.

## 2022-01-13 NOTE — ED TRIAGE NOTES
Pt sitting in hallway of apt was very weak and sweating. EMS found pt to be hypotensive with BP in 80s. Pt refused EMS transport so niece brought him to ER. BP improved during triage.

## 2022-05-02 ENCOUNTER — APPOINTMENT (OUTPATIENT)
Dept: GENERAL RADIOLOGY | Age: 87
End: 2022-05-02
Attending: EMERGENCY MEDICINE
Payer: MEDICARE

## 2022-05-02 ENCOUNTER — HOSPITAL ENCOUNTER (OUTPATIENT)
Age: 87
Setting detail: OBSERVATION
Discharge: HOME | End: 2022-05-04
Attending: EMERGENCY MEDICINE | Admitting: INTERNAL MEDICINE
Payer: MEDICARE

## 2022-05-02 ENCOUNTER — APPOINTMENT (OUTPATIENT)
Dept: CT IMAGING | Age: 87
End: 2022-05-02
Attending: EMERGENCY MEDICINE
Payer: MEDICARE

## 2022-05-02 DIAGNOSIS — R56.9 SEIZURE-LIKE ACTIVITY (HCC): Primary | ICD-10-CM

## 2022-05-02 LAB
ALBUMIN SERPL-MCNC: 3.9 G/DL (ref 3.5–5)
ALBUMIN/GLOB SERPL: 1.1 {RATIO} (ref 1.1–2.2)
ALP SERPL-CCNC: 111 U/L (ref 45–117)
ALT SERPL-CCNC: 32 U/L (ref 12–78)
ANION GAP SERPL CALC-SCNC: 4 MMOL/L (ref 5–15)
AST SERPL W P-5'-P-CCNC: 26 U/L (ref 15–37)
ATRIAL RATE: 58 BPM
ATRIAL RATE: 96 BPM
BASOPHILS # BLD: 0 K/UL (ref 0–0.1)
BASOPHILS NFR BLD: 0 % (ref 0–1)
BILIRUB SERPL-MCNC: 0.7 MG/DL (ref 0.2–1)
BNP SERPL-MCNC: 58 PG/ML
BUN SERPL-MCNC: 27 MG/DL (ref 6–20)
BUN/CREAT SERPL: 24 (ref 12–20)
CA-I BLD-MCNC: 9.4 MG/DL (ref 8.5–10.1)
CALCULATED P AXIS, ECG09: 63 DEGREES
CALCULATED R AXIS, ECG10: -20 DEGREES
CALCULATED R AXIS, ECG10: -37 DEGREES
CALCULATED T AXIS, ECG11: -3 DEGREES
CALCULATED T AXIS, ECG11: 1 DEGREES
CHLORIDE SERPL-SCNC: 107 MMOL/L (ref 97–108)
CK SERPL-CCNC: 295 U/L (ref 39–308)
CO2 SERPL-SCNC: 29 MMOL/L (ref 21–32)
CREAT SERPL-MCNC: 1.13 MG/DL (ref 0.7–1.3)
DIAGNOSIS, 93000: NORMAL
DIAGNOSIS, 93000: NORMAL
DIFFERENTIAL METHOD BLD: ABNORMAL
EOSINOPHIL # BLD: 0.1 K/UL (ref 0–0.4)
EOSINOPHIL NFR BLD: 2 % (ref 0–7)
ERYTHROCYTE [DISTWIDTH] IN BLOOD BY AUTOMATED COUNT: 13.7 % (ref 11.5–14.5)
GLOBULIN SER CALC-MCNC: 3.5 G/DL (ref 2–4)
GLUCOSE BLD STRIP.AUTO-MCNC: 100 MG/DL (ref 65–117)
GLUCOSE BLD STRIP.AUTO-MCNC: 103 MG/DL (ref 65–117)
GLUCOSE SERPL-MCNC: 129 MG/DL (ref 65–100)
HCT VFR BLD AUTO: 36.2 % (ref 36.6–50.3)
HGB BLD-MCNC: 11.9 G/DL (ref 12.1–17)
IMM GRANULOCYTES # BLD AUTO: 0 K/UL (ref 0–0.04)
IMM GRANULOCYTES NFR BLD AUTO: 0 % (ref 0–0.5)
INR PPP: 1 (ref 0.9–1.1)
LYMPHOCYTES # BLD: 2.7 K/UL (ref 0.8–3.5)
LYMPHOCYTES NFR BLD: 48 % (ref 12–49)
MCH RBC QN AUTO: 31.6 PG (ref 26–34)
MCHC RBC AUTO-ENTMCNC: 32.9 G/DL (ref 30–36.5)
MCV RBC AUTO: 96 FL (ref 80–99)
MONOCYTES # BLD: 0.6 K/UL (ref 0–1)
MONOCYTES NFR BLD: 10 % (ref 5–13)
NEUTS SEG # BLD: 2.3 K/UL (ref 1.8–8)
NEUTS SEG NFR BLD: 40 % (ref 32–75)
NRBC # BLD: 0 K/UL (ref 0–0.01)
NRBC BLD-RTO: 0 PER 100 WBC
P-R INTERVAL, ECG05: 416 MS
PERFORMED BY, TECHID: NORMAL
PERFORMED BY, TECHID: NORMAL
PLATELET # BLD AUTO: 185 K/UL (ref 150–400)
PMV BLD AUTO: 10.9 FL (ref 8.9–12.9)
POTASSIUM SERPL-SCNC: 4.5 MMOL/L (ref 3.5–5.1)
PROT SERPL-MCNC: 7.4 G/DL (ref 6.4–8.2)
PROTHROMBIN TIME: 13.3 SEC (ref 11.9–14.6)
Q-T INTERVAL, ECG07: 362 MS
Q-T INTERVAL, ECG07: 430 MS
QRS DURATION, ECG06: 142 MS
QRS DURATION, ECG06: 162 MS
QTC CALCULATION (BEZET), ECG08: 422 MS
QTC CALCULATION (BEZET), ECG08: 471 MS
RBC # BLD AUTO: 3.77 M/UL (ref 4.1–5.7)
SODIUM SERPL-SCNC: 140 MMOL/L (ref 136–145)
TROPONIN-HIGH SENSITIVITY: 12 NG/L (ref 0–76)
TSH SERPL DL<=0.05 MIU/L-ACNC: 3.09 UIU/ML (ref 0.36–3.74)
VENTRICULAR RATE, ECG03: 102 BPM
VENTRICULAR RATE, ECG03: 58 BPM
WBC # BLD AUTO: 5.7 K/UL (ref 4.1–11.1)

## 2022-05-02 PROCEDURE — 82962 GLUCOSE BLOOD TEST: CPT

## 2022-05-02 PROCEDURE — 93005 ELECTROCARDIOGRAM TRACING: CPT

## 2022-05-02 PROCEDURE — 65270000029 HC RM PRIVATE

## 2022-05-02 PROCEDURE — 84443 ASSAY THYROID STIM HORMONE: CPT

## 2022-05-02 PROCEDURE — 74011250637 HC RX REV CODE- 250/637: Performed by: INTERNAL MEDICINE

## 2022-05-02 PROCEDURE — 83036 HEMOGLOBIN GLYCOSYLATED A1C: CPT

## 2022-05-02 PROCEDURE — 36415 COLL VENOUS BLD VENIPUNCTURE: CPT

## 2022-05-02 PROCEDURE — 83880 ASSAY OF NATRIURETIC PEPTIDE: CPT

## 2022-05-02 PROCEDURE — 96374 THER/PROPH/DIAG INJ IV PUSH: CPT

## 2022-05-02 PROCEDURE — 74011250636 HC RX REV CODE- 250/636: Performed by: INTERNAL MEDICINE

## 2022-05-02 PROCEDURE — 82550 ASSAY OF CK (CPK): CPT

## 2022-05-02 PROCEDURE — 84146 ASSAY OF PROLACTIN: CPT

## 2022-05-02 PROCEDURE — 99285 EMERGENCY DEPT VISIT HI MDM: CPT

## 2022-05-02 PROCEDURE — 87086 URINE CULTURE/COLONY COUNT: CPT

## 2022-05-02 PROCEDURE — 70450 CT HEAD/BRAIN W/O DYE: CPT

## 2022-05-02 PROCEDURE — 80177 DRUG SCRN QUAN LEVETIRACETAM: CPT

## 2022-05-02 PROCEDURE — 85610 PROTHROMBIN TIME: CPT

## 2022-05-02 PROCEDURE — 80053 COMPREHEN METABOLIC PANEL: CPT

## 2022-05-02 PROCEDURE — 84484 ASSAY OF TROPONIN QUANT: CPT

## 2022-05-02 PROCEDURE — 74011250637 HC RX REV CODE- 250/637: Performed by: EMERGENCY MEDICINE

## 2022-05-02 PROCEDURE — 74011636637 HC RX REV CODE- 636/637: Performed by: INTERNAL MEDICINE

## 2022-05-02 PROCEDURE — 87040 BLOOD CULTURE FOR BACTERIA: CPT

## 2022-05-02 PROCEDURE — 71045 X-RAY EXAM CHEST 1 VIEW: CPT

## 2022-05-02 PROCEDURE — 74011250636 HC RX REV CODE- 250/636: Performed by: EMERGENCY MEDICINE

## 2022-05-02 PROCEDURE — 85025 COMPLETE CBC W/AUTO DIFF WBC: CPT

## 2022-05-02 RX ORDER — LEVETIRACETAM 500 MG/5ML
1000 INJECTION, SOLUTION, CONCENTRATE INTRAVENOUS EVERY 12 HOURS
Status: DISCONTINUED | OUTPATIENT
Start: 2022-05-02 | End: 2022-05-04

## 2022-05-02 RX ORDER — ATORVASTATIN CALCIUM 10 MG/1
10 TABLET, FILM COATED ORAL
Status: DISCONTINUED | OUTPATIENT
Start: 2022-05-02 | End: 2022-05-04 | Stop reason: HOSPADM

## 2022-05-02 RX ORDER — INSULIN GLARGINE 100 [IU]/ML
35 INJECTION, SOLUTION SUBCUTANEOUS
Status: DISCONTINUED | OUTPATIENT
Start: 2022-05-02 | End: 2022-05-04 | Stop reason: HOSPADM

## 2022-05-02 RX ORDER — ASPIRIN 81 MG/1
81 TABLET ORAL DAILY
Status: DISCONTINUED | OUTPATIENT
Start: 2022-05-03 | End: 2022-05-04 | Stop reason: HOSPADM

## 2022-05-02 RX ORDER — LISINOPRIL 20 MG/1
20 TABLET ORAL DAILY
Status: DISCONTINUED | OUTPATIENT
Start: 2022-05-03 | End: 2022-05-04 | Stop reason: HOSPADM

## 2022-05-02 RX ORDER — DEXTROSE MONOHYDRATE 100 MG/ML
0-250 INJECTION, SOLUTION INTRAVENOUS AS NEEDED
Status: DISCONTINUED | OUTPATIENT
Start: 2022-05-02 | End: 2022-05-04 | Stop reason: HOSPADM

## 2022-05-02 RX ORDER — MAGNESIUM SULFATE 100 %
4 CRYSTALS MISCELLANEOUS AS NEEDED
Status: DISCONTINUED | OUTPATIENT
Start: 2022-05-02 | End: 2022-05-04 | Stop reason: HOSPADM

## 2022-05-02 RX ORDER — ASPIRIN 325 MG
325 TABLET ORAL ONCE
Status: COMPLETED | OUTPATIENT
Start: 2022-05-02 | End: 2022-05-02

## 2022-05-02 RX ORDER — NIFEDIPINE 60 MG/1
60 TABLET, EXTENDED RELEASE ORAL DAILY
Status: DISCONTINUED | OUTPATIENT
Start: 2022-05-03 | End: 2022-05-04 | Stop reason: HOSPADM

## 2022-05-02 RX ORDER — GABAPENTIN 400 MG/1
800 CAPSULE ORAL 3 TIMES DAILY
Status: DISCONTINUED | OUTPATIENT
Start: 2022-05-02 | End: 2022-05-04 | Stop reason: HOSPADM

## 2022-05-02 RX ORDER — INSULIN LISPRO 100 [IU]/ML
INJECTION, SOLUTION INTRAVENOUS; SUBCUTANEOUS
Status: DISCONTINUED | OUTPATIENT
Start: 2022-05-02 | End: 2022-05-04 | Stop reason: HOSPADM

## 2022-05-02 RX ADMIN — ATORVASTATIN CALCIUM 10 MG: 10 TABLET, FILM COATED ORAL at 22:37

## 2022-05-02 RX ADMIN — GABAPENTIN 800 MG: 400 CAPSULE ORAL at 22:37

## 2022-05-02 RX ADMIN — SODIUM CHLORIDE 500 ML: 9 INJECTION, SOLUTION INTRAVENOUS at 23:42

## 2022-05-02 RX ADMIN — INSULIN GLARGINE 35 UNITS: 100 INJECTION, SOLUTION SUBCUTANEOUS at 22:37

## 2022-05-02 RX ADMIN — LEVETIRACETAM 1000 MG: 100 INJECTION INTRAVENOUS at 16:12

## 2022-05-02 RX ADMIN — SODIUM CHLORIDE 1000 ML: 9 INJECTION, SOLUTION INTRAVENOUS at 16:13

## 2022-05-02 RX ADMIN — ASPIRIN 325 MG ORAL TABLET 325 MG: 325 PILL ORAL at 16:12

## 2022-05-02 NOTE — ED NOTES
Please contact [address] [place] [address] [facility] at [phone] or [phone] when moved to room

## 2022-05-02 NOTE — H&P
History and Physical (Inpatient)    Patient:    Lizandro Salazar   80 y.o. Chief Complaint:   Chief Complaint   Patient presents with    Syncope         History of Present Illness: This is an 61-year-old black male with history of laryngeal cancer status post laryngectomy, diabetes, who presented with transient loss of consciousness. He was at his sister's house sitting on the kitchen chair and briefly became unresponsive. Sister called out his name and by the time she went to get a cold rag bolus for head he had fallen from the seat. There was no tonic-clonic activity or incontinence. She called the rescue squad and [employer] these rescue squad came it was back to his baseline. They thought process took about 4 minutes by the time he became transiently unresponsive and he fell. He denies any headache chest pain nausea vomiting diarrhea constipation. Denies any weakness. He recently saw his cardiologist and had his pacemaker checked. ROS: Denies chronic headache blurry vision URI symptoms. Denies any chest pain shortness of breath abdominal pain nausea vomiting diarrhea constipation. He moves his bowel. No incontinence no urinary symptoms of discharge urgency frequency no leg swelling easy bruising. Sleeps okay appetite is okay he is not losing weight. No history of seizure. History:  Past Medical History:   Diagnosis Date    Anemia     Arthritis     Diabetes ([facility] [address] [facility]     Hypercholesterolemia     Hypertension     Single kidney     Cr normal    Throat cancer ([facility] [address] [facility]     s/p laryngectomy     Past surgical history  Status post laryngectomy for throat cancer  Status post pacemaker placement    Social history single has 4 children total 1 . His sister is the caregiver. Ex-smoker does not drink alcohol. Family History   Family history unknown:  Yes       Allergies:  No Known Allergies    Current Medications:    Current Facility-Administered Medications:     levETIRAcetam (KEPPRA) injection 1,000 mg, 1,000 mg, IntraVENous, Q12H, Anne Marte MD, 1,000 mg at 05/02/22 1612    atorvastatin (LIPITOR) tablet 10 mg, 10 mg, Oral, QHS, Cecy Hill MD    . PHARMACY TO SUBSTITUTE PER PROTOCOL (Reordered from: PATRICIO ASPIRIN PO), , , Per Protocol, Cecy Hill MD    . PHARMACY TO SUBSTITUTE PER PROTOCOL (Reordered from: gabapentin (NEURONTIN) 800 mg tablet), , , Per Protocol, Cecy DAVILA MD    [START ON 5/3/2022] lisinopriL (PRINIVIL, ZESTRIL) tablet 20 mg, 20 mg, Oral, DAILY, Jihan Huynh MD    [START ON 5/3/2022] multivitamin, tx-iron-ca-min (THERA-M w/ IRON) tablet 1 Tablet, 1 Tablet, Oral, DAILY, Jihan Meza MD    [START ON 5/3/2022] NIFEdipine ER (PROCARDIA XL) tablet 60 mg, 60 mg, Oral, DAILY, Cecy Hill MD    . PHARMACY TO SUBSTITUTE PER PROTOCOL (Reordered from: insulin detemir (LEVEMIR FLEXTOUCH) 100 unit/mL (3 mL) inpn), , , Per Protocol, Cecy Hill MD    Current Outpatient Medications:     lisinopril (PRINIVIL, ZESTRIL) 20 mg tablet, Take 20 mg by mouth daily. , Disp: , Rfl:     atorvastatin (LIPITOR) 10 mg tablet, Take 10 mg by mouth nightly., Disp: , Rfl:     PATRICIO ASPIRIN PO, Take 81 mg by mouth daily. , Disp: , Rfl:     multivitamin, tx-iron-ca-min (THERA-M W/ IRON) 9 mg iron-400 mcg tab tablet, Take 1 Tab by mouth daily. , Disp: , Rfl:     insulin detemir (LEVEMIR FLEXTOUCH) 100 unit/mL (3 mL) inpn, 0.35 mL by SubCUTAneous route nightly. 35 Units by SubCUTAneous route at night, Disp: 45 mL, Rfl: 3    glipiZIDE (GLUCOTROL) 5 mg tablet, Take 1 Tab by mouth every morning., Disp: 90 Tab, Rfl: 3    NIFEdipine XL (PROCARDIA-XL) 60 mg CR tablet, Take 60 mg by mouth daily. Indications: HYPERTENSION, Disp: , Rfl:     gabapentin (NEURONTIN) 800 mg tablet, Take  by mouth three (3) times daily. , Disp: , Rfl:     ibuprofen (MOTRIN) 600 mg tablet, Take 600 mg by mouth every eight (8) hours as needed. , Disp: , Rfl:     hydrochlorothiazide (HYDRODIURIL) 25 mg tablet, Take 25 mg by mouth daily. Indications: HYPERTENSION, Disp: , Rfl:     saw palmetto 500 mg cap, Take 450 mg by mouth daily. , Disp: , Rfl:        Physical Exam:  Visit Vitals  /68   Pulse 60   Temp 97.5 °F (36.4 °C)   Resp 18   Ht 5' 5\" (1.651 m)   Wt 63.5 kg (139 lb 15.9 oz)   SpO2 99%   BMI 23.30 kg/m²     On examination he is an elderly black male comfortably no distress. HEENT normocephalic atraumatic pupils reactive anicteric sclera neck with tracheostomy hole  Lungs are clear bilaterally transmitted scattered rhonchi  Heart S1-S2 regular no murmur gallop  Abdomen soft nontender nondistended positive bowel sounds  Lower extremities without any cyanosis or edema there is bunion deformity bilaterally there is onychomycosis. CNS alert and oriented follows command moves extremities no facial droop motor strength symmetrical sensation intact. Speech at baseline with speaking device    Impression this is an 26-year-old black male with history of throat cancer status post laryngectomy status post pacemaker placement recently had pacemaker check presented with transient loss of consciousness admitted for further care. Findings/Diagnosis: Transient loss of consciousness differential diagnoses include seizure versus cardiogenic syncope versus symptomatic hypoglycemia  #2 hypertension  #3.   Diabetes        Laboratory:      Recent Results (from the past 24 hour(s))   EKG, 12 LEAD, INITIAL    Collection Time: 05/02/22  1:26 PM   Result Value Ref Range    Ventricular Rate 58 BPM    Atrial Rate 58 BPM    P-R Interval 416 ms    QRS Duration 162 ms    Q-T Interval 430 ms    QTC Calculation (Bezet) 422 ms    Calculated P Axis 63 degrees    Calculated R Axis -20 degrees    Calculated T Axis 1 degrees    Diagnosis       Atrial-paced rhythm with prolonged AV conduction with Premature atrial   complexes  Right bundle branch block  Septal infarct , age undetermined  Abnormal ECG  No previous ECGs available  Confirmed by [facility] Jeana PAINTER ([id number]) on 5/2/2022 3:43:44 PM     CBC WITH AUTOMATED DIFF    Collection Time: 05/02/22  1:35 PM   Result Value Ref Range    WBC 5.7 4.1 - 11.1 K/uL    RBC 3.77 (L) 4.10 - 5.70 M/uL    HGB 11.9 (L) 12.1 - 17.0 g/dL    HCT 36.2 (L) 36.6 - 50.3 %    MCV 96.0 80.0 - 99.0 FL    MCH 31.6 26.0 - 34.0 PG    MCHC 32.9 30.0 - 36.5 g/dL    RDW 13.7 11.5 - 14.5 %    PLATELET 340 322 - 494 K/uL    MPV 10.9 8.9 - 12.9 FL    NRBC 0.0 0.0  WBC    ABSOLUTE NRBC 0.00 0.00 - 0.01 K/uL    NEUTROPHILS 40 32 - 75 %    LYMPHOCYTES 48 12 - 49 %    MONOCYTES 10 5 - 13 %    EOSINOPHILS 2 0 - 7 %    BASOPHILS 0 0 - 1 %    IMMATURE GRANULOCYTES 0 0 - 0.5 %    ABS. NEUTROPHILS 2.3 1.8 - 8.0 K/UL    ABS. LYMPHOCYTES 2.7 0.8 - 3.5 K/UL    ABS. MONOCYTES 0.6 0.0 - 1.0 K/UL    ABS. EOSINOPHILS 0.1 0.0 - 0.4 K/UL    ABS. BASOPHILS 0.0 0.0 - 0.1 K/UL    ABS. IMM. GRANS. 0.0 0.00 - 0.04 K/UL    DF AUTOMATED     METABOLIC PANEL, COMPREHENSIVE    Collection Time: 05/02/22  1:35 PM   Result Value Ref Range    Sodium 140 136 - 145 mmol/L    Potassium 4.5 3.5 - 5.1 mmol/L    Chloride 107 97 - 108 mmol/L    CO2 29 21 - 32 mmol/L    Anion gap 4 (L) 5 - 15 mmol/L    Glucose 129 (H) 65 - 100 mg/dL    BUN 27 (H) 6 - 20 mg/dL    Creatinine 1.13 0.70 - 1.30 mg/dL    BUN/Creatinine ratio 24 (H) 12 - 20      GFR est AA >60 >60 ml/min/1.73m2    GFR est non-AA >60 >60 ml/min/1.73m2    Calcium 9.4 8.5 - 10.1 mg/dL    Bilirubin, total 0.7 0.2 - 1.0 mg/dL    AST (SGOT) 26 15 - 37 U/L    ALT (SGPT) 32 12 - 78 U/L    Alk.  phosphatase 111 45 - 117 U/L    Protein, total 7.4 6.4 - 8.2 g/dL    Albumin 3.9 3.5 - 5.0 g/dL    Globulin 3.5 2.0 - 4.0 g/dL    A-G Ratio 1.1 1.1 - 2.2     TROPONIN-HIGH SENSITIVITY    Collection Time: 05/02/22  1:35 PM   Result Value Ref Range    Troponin-High Sensitivity 12 0 - 76 ng/L   NT-PRO BNP    Collection Time: 05/02/22  1:35 PM   Result Value Ref Range    NT pro-BNP 58 <450 pg/mL   PROTHROMBIN TIME + INR    Collection Time: 05/02/22  1:35 PM   Result Value Ref Range    Prothrombin time 13.3 11.9 - 14.6 sec    INR 1.0 0.9 - 1.1     GLUCOSE, POC    Collection Time: 05/02/22  2:16 PM   Result Value Ref Range    Glucose (POC) 103 65 - 117 mg/dL    Performed by [facility]    EKG, 12 LEAD, SUBSEQUENT    Collection Time: 05/02/22  2:42 PM   Result Value Ref Range    Ventricular Rate 102 BPM    Atrial Rate 96 BPM    QRS Duration 142 ms    Q-T Interval 362 ms    QTC Calculation (Bezet) 471 ms    Calculated R Axis -37 degrees    Calculated T Axis -3 degrees    Diagnosis       Atrial-paced rhythm  Left axis deviation  Right bundle branch block  Abnormal ECG  When compared with ECG of 02-MAY-2022 13:26, (Unconfirmed)  Wide QRS rhythm has replaced Electronic atrial pacemaker  Vent. rate has increased BY  44 BPM  Confirmed by [facility], Jeana 85 ([id number]) on 5/2/2022 3:47:21 PM     CK    Collection Time: 05/02/22  3:52 PM   Result Value Ref Range     39 - 308 U/L   TSH 3RD GENERATION    Collection Time: 05/02/22  3:52 PM   Result Value Ref Range    TSH 3.09 0.36 - 3.74 uIU/mL       XR CHEST PORT   Final Result   No acute pulmonary process. CT CODE NEURO HEAD WO CONTRAST   Final Result   Mild ventriculomegaly. Chronic lacunar infarcts right basal ganglia. No intra or extra-axial hemorrhage or mass. Report has been communicated to Dr. Darius Marinelli in the emergency room. Plan of Care/Planned Procedure: Admit to the hospital.  Observe. Neurochecks. Consult neurology. EEG. Patient has been loaded with Keppra. I will withhold Glucotrol for now Accu-Chek testing. If his A1c is normal to severely low it might be because of hypoglycemia. In any case we will discontinue the Glucotrol. DVT prophylaxis.   Discussed with sister and neurologist.

## 2022-05-02 NOTE — PROGRESS NOTES
Spiritual Care Assessment/Progress Note  [facility]      NAME: Charmayne Modest      MRN: [id number]  AGE: 80 y.o.  SEX: male  Episcopal Affiliation: Zoroastrian   Language: English     5/2/2022     Total Time (in minutes): 30     Spiritual Assessment begun in [address] [place] [address] DEPT through conversation with:         [x]Patient        [] Family    [] Friend(s)        Reason for Consult: Crisis, Stemi,Initial visit     Spiritual beliefs: (Please include comment if needed)     [] Identifies with a beatriz tradition:         [] Supported by a beatriz community:            [] Claims no spiritual orientation:           [] Seeking spiritual identity:                [x] Adheres to an individual form of spirituality:           [] Not able to assess:                           Identified resources for coping:      [] Prayer                               [] Music                  [] Guided Imagery     [] Family/friends                 [] Pet visits     [] Devotional reading                         [x] Unknown     [] Other:                                              Interventions offered during this visit: (See comments for more details)    Patient Interventions: Affirmation of beatriz,Affirmation of emotions/emotional suffering,Catharsis/review of pertinent events in supportive environment,Coping skills reviewed/reinforced,Crisis, Stemi,Iconic (affirming the presence of God/Higher Power)           Plan of Care:     [] Support spiritual and/or cultural needs    [] Support AMD and/or advance care planning process      [] Support grieving process   [] Coordinate Rites and/or Rituals    [] Coordination with community clergy   [] No spiritual needs identified at this time   [] Detailed Plan of Care below (See Comments)  [] Make referral to Music Therapy  [] Make referral to Pet Therapy     [] Make referral to Addiction services  [] Make referral to [facility]  [] Make referral to Spiritual Care Partner  [] No future visits requested        [x] Contact Spiritual Care for further referrals     Comments: The purpose of the visit was in response to a STEMI alert and a spiritual assessment on the patient. The patient was not able respond verbally, but his response was through eye contact. He affirmed feeling comforted by the staff by nodding his head. He acknowledged the presence and care from the  by smiling. The  provided the ministry of presence and the comfort of spiritual care. [facility] Jeanette Castaneda.    can be reached by calling the  at [facility]  [phone]

## 2022-05-02 NOTE — PROGRESS NOTES
Reason for Admission:   Seizure                     RUR Score:   N/A                  Plan for utilizing home health: Not at this time         PCP: First and Last name:  Linda Dale MD     Name of Practice:    Are you a current patient: Yes/No:    Approximate date of last visit: February    Can you participate in a virtual visit with your PCP:                     Current Advanced Directive/Advance Care Plan: Prior      Healthcare Decision Maker:   Click here to complete AskforTask including selection of the AskforTask Relationship (ie \"Primary\")           Toyb Philippe, [phone]                  Transition of Care Plan:                    Met f/f with Pt and his Niece, Pt Niece answered the questions. Pt Niece stated that Pt lives alone but that he lives in a Senior Apartment Complex. Pt Niece stated no HH, Pt has a walker and Pt is independent with ADl. Pt Niece stated that Pt uses [employer]. Pt Niece stated that family will give Pt a ride home when he is D//C from the hospital.    CM Dispo: Home with no needs at this time.

## 2022-05-02 NOTE — CONSULTS
NEURO CONSULT      REASON FOR ADMISSION:  Acute mental status changes  History of seizures  Other medical problems      HISTORY:  Mr. Vaishnavi Theodore is 80years old with history of esophageal cancer for which he had surgery, specifically laryngectomy, no history of seizures who is consulted to neurology for having had acute mental status changes with nonresponsiveness according to his niece. His niece was in the room when I came to see the patient. The niece states that the patient had a blank stare and was not responsive verbally. Eventually patient slumped to the floor and was still nonresponsive. EMS was called and patient was brought to the ER. In the ER, patient had a head CT without contrast that is showing chronic lacunar infarcts in the basal ganglia as well as ventriculomegaly. Patient is still in the ER. He is on Keppra 500 mg p.o. twice daily.       ROS:    General:                     No fever, no chills, no sweats, no generalized weakness, no weight loss/gain,                                       No loss of appetite   Eyes:                           No blurred vision, no eye pain, no loss of vision, no double vision  ENT:                            rhinorrhea, no pharyngitis   Respiratory:               No cough, no sputum production, no SOB, no LAMB, no wheezing, no pleuritic pain   Cardiology:                No chest pain, no palpitations, no orthopnea, no PND, no edema, no syncope   Gastrointestinal:       No abdominal pain , no N/V, no diarrhea, no dysphagia, no constipation, no bleeding   Genitourinary:           frequency, no urgency, no dysuria, no hematuria, no incontinence   Muskuloskeletal :      No arthralgia, no myalgia, no back pain  Hematology:              No easy bruising, no nose or gum bleeding, no lymphadenopathy   Dermatological:         No rash, no ulceration, no pruritis, no color change / jaundice  Endocrine:                 hot flashes or polydipsia   Neurological: No headache, no dizziness, no confusion, no focal weakness, no paresthesia,                                      No Speech difficulties, no memory loss, no gait difficulty  Psychological:          No neelings of anxiety, no depression, no agitation      NEURO EXAM:    Mental status: Awake, oriented to day, month, year, not aphasic. Patient uses voicebox. Cranial nerves: Cranial nerve exam is intact    Motor exam: Motor exam is intact    Sensory exam: Sensory exam is intact    Coordination: Coordination is intact    Gait and Station: Gait was not ambulated    ASSESSMENT:  Possible seizure given patient's history of seizures. His niece is not sure whether patient has been compliant with the antiseizure medications. PLAN:  Seizure precautions  Keppra level stat  Prolactin stat   CK  Continue Keppra  EEG      ALLERGIES:    No Known Allergies    MEDS:      Current Facility-Administered Medications:     levETIRAcetam (KEPPRA) injection 1,000 mg, 1,000 mg, IntraVENous, Q12H, Anne Marte MD    sodium chloride 0.9 % bolus infusion 1,000 mL, 1,000 mL, IntraVENous, ONCE, Anne Marte MD    aspirin tablet 325 mg, 325 mg, Oral, ONCE, Anne Marte MD    Current Outpatient Medications:     lisinopril (PRINIVIL, ZESTRIL) 20 mg tablet, Take 20 mg by mouth daily. , Disp: , Rfl:     atorvastatin (LIPITOR) 10 mg tablet, Take 10 mg by mouth nightly., Disp: , Rfl:     PATRICIO ASPIRIN PO, Take 81 mg by mouth daily. , Disp: , Rfl:     multivitamin, tx-iron-ca-min (THERA-M W/ IRON) 9 mg iron-400 mcg tab tablet, Take 1 Tab by mouth daily. , Disp: , Rfl:     insulin detemir (LEVEMIR FLEXTOUCH) 100 unit/mL (3 mL) inpn, 0.35 mL by SubCUTAneous route nightly. 35 Units by SubCUTAneous route at night, Disp: 45 mL, Rfl: 3    glipiZIDE (GLUCOTROL) 5 mg tablet, Take 1 Tab by mouth every morning., Disp: 90 Tab, Rfl: 3    NIFEdipine XL (PROCARDIA-XL) 60 mg CR tablet, Take 60 mg by mouth daily.     Indications: HYPERTENSION, Disp: , Rfl:     gabapentin (NEURONTIN) 800 mg tablet, Take  by mouth three (3) times daily. , Disp: , Rfl:     ibuprofen (MOTRIN) 600 mg tablet, Take 600 mg by mouth every eight (8) hours as needed. , Disp: , Rfl:     rosuvastatin (CRESTOR) 10 mg tablet, Take 10 mg by mouth daily. Indications: HYPERCHOLESTEROLEMIA, Disp: , Rfl:     hydrochlorothiazide (HYDRODIURIL) 25 mg tablet, Take 25 mg by mouth daily. Indications: HYPERTENSION, Disp: , Rfl:     saw palmetto 500 mg cap, Take 450 mg by mouth daily. , Disp: , Rfl:     LABS:  Recent Results (from the past 24 hour(s))   CBC WITH AUTOMATED DIFF    Collection Time: 05/02/22  1:35 PM   Result Value Ref Range    WBC 5.7 4.1 - 11.1 K/uL    RBC 3.77 (L) 4.10 - 5.70 M/uL    HGB 11.9 (L) 12.1 - 17.0 g/dL    HCT 36.2 (L) 36.6 - 50.3 %    MCV 96.0 80.0 - 99.0 FL    MCH 31.6 26.0 - 34.0 PG    MCHC 32.9 30.0 - 36.5 g/dL    RDW 13.7 11.5 - 14.5 %    PLATELET 768 653 - 371 K/uL    MPV 10.9 8.9 - 12.9 FL    NRBC 0.0 0.0  WBC    ABSOLUTE NRBC 0.00 0.00 - 0.01 K/uL    NEUTROPHILS 40 32 - 75 %    LYMPHOCYTES 48 12 - 49 %    MONOCYTES 10 5 - 13 %    EOSINOPHILS 2 0 - 7 %    BASOPHILS 0 0 - 1 %    IMMATURE GRANULOCYTES 0 0 - 0.5 %    ABS. NEUTROPHILS 2.3 1.8 - 8.0 K/UL    ABS. LYMPHOCYTES 2.7 0.8 - 3.5 K/UL    ABS. MONOCYTES 0.6 0.0 - 1.0 K/UL    ABS. EOSINOPHILS 0.1 0.0 - 0.4 K/UL    ABS. BASOPHILS 0.0 0.0 - 0.1 K/UL    ABS. IMM.  GRANS. 0.0 0.00 - 0.04 K/UL    DF AUTOMATED     METABOLIC PANEL, COMPREHENSIVE    Collection Time: 05/02/22  1:35 PM   Result Value Ref Range    Sodium 140 136 - 145 mmol/L    Potassium 4.5 3.5 - 5.1 mmol/L    Chloride 107 97 - 108 mmol/L    CO2 29 21 - 32 mmol/L    Anion gap 4 (L) 5 - 15 mmol/L    Glucose 129 (H) 65 - 100 mg/dL    BUN 27 (H) 6 - 20 mg/dL    Creatinine 1.13 0.70 - 1.30 mg/dL    BUN/Creatinine ratio 24 (H) 12 - 20      GFR est AA >60 >60 ml/min/1.73m2    GFR est non-AA >60 >60 ml/min/1.73m2    Calcium 9.4 8.5 - 10.1 mg/dL Bilirubin, total 0.7 0.2 - 1.0 mg/dL    AST (SGOT) 26 15 - 37 U/L    ALT (SGPT) 32 12 - 78 U/L    Alk. phosphatase 111 45 - 117 U/L    Protein, total 7.4 6.4 - 8.2 g/dL    Albumin 3.9 3.5 - 5.0 g/dL    Globulin 3.5 2.0 - 4.0 g/dL    A-G Ratio 1.1 1.1 - 2.2     TROPONIN-HIGH SENSITIVITY    Collection Time: 05/02/22  1:35 PM   Result Value Ref Range    Troponin-High Sensitivity 12 0 - 76 ng/L   NT-PRO BNP    Collection Time: 05/02/22  1:35 PM   Result Value Ref Range    NT pro-BNP 58 <450 pg/mL   PROTHROMBIN TIME + INR    Collection Time: 05/02/22  1:35 PM   Result Value Ref Range    Prothrombin time 13.3 11.9 - 14.6 sec    INR 1.0 0.9 - 1.1     GLUCOSE, POC    Collection Time: 05/02/22  2:16 PM   Result Value Ref Range    Glucose (POC) 103 65 - 117 mg/dL    Performed by Natasha Magana        Visit Vitals  /68   Pulse 60   Temp 97.5 °F (36.4 °C)   Resp 18   Ht 5' 5\" (1.651 m)   Wt 63.5 kg (139 lb 15.9 oz)   SpO2 99%   BMI 23.30 kg/m²       Imaging:  CT CODE NEURO HEAD WO CONTRAST   Final Result   Mild ventriculomegaly. Chronic lacunar infarcts right basal ganglia. No intra or extra-axial hemorrhage or mass. Report has been communicated to Dr. Samuel Zacarias in the emergency room.                XR CHEST PORT    (Results Pending)

## 2022-05-02 NOTE — ED PROVIDER NOTES
EMERGENCY DEPARTMENT HISTORY AND PHYSICAL EXAM      Date: 5/2/2022  Patient Name: Bob Cifuentes      History of Presenting Illness     Chief Complaint   Patient presents with    Syncope       History Provided By: Patient    HPI: Bob Cifuentes, 80 y.o. male with a past medical history significant for throat CA s/p laryngectomy presents to the ED with cc of syncope. EMS called for syncope. Pt A&Ox4 at that time. EKG tachycardic, however had another ?syncope with seizure-like activity of some shaking and was altered after. EKG then read STEMI so brought in as STEMI alert. Upon arrival pt becoming more awake. Unable to communicate using device at this time but nodding head yes and no, following commands. Nodding head \"yes\" to \"do you feel okay. \" Denying focal weakness, numbness, tingling, recent illness such as vomiting, diarrhea or fever. There are no other complaints, changes, or physical findings at this time. PCP: Luciano Veras MD    Current Facility-Administered Medications   Medication Dose Route Frequency Provider Last Rate Last Admin    levETIRAcetam (KEPPRA) injection 1,000 mg  1,000 mg IntraVENous Q12H Anne aMrte MD        sodium chloride 0.9 % bolus infusion 1,000 mL  1,000 mL IntraVENous ONCE Anne Marte MD         Current Outpatient Medications   Medication Sig Dispense Refill    lisinopril (PRINIVIL, ZESTRIL) 20 mg tablet Take 20 mg by mouth daily.  atorvastatin (LIPITOR) 10 mg tablet Take 10 mg by mouth nightly.  PATRICIO ASPIRIN PO Take 81 mg by mouth daily.  multivitamin, tx-iron-ca-min (THERA-M W/ IRON) 9 mg iron-400 mcg tab tablet Take 1 Tab by mouth daily.  insulin detemir (LEVEMIR FLEXTOUCH) 100 unit/mL (3 mL) inpn 0.35 mL by SubCUTAneous route nightly. 35 Units by SubCUTAneous route at night 45 mL 3    glipiZIDE (GLUCOTROL) 5 mg tablet Take 1 Tab by mouth every morning.  90 Tab 3    NIFEdipine XL (PROCARDIA-XL) 60 mg CR tablet Take 60 mg by mouth daily.    Indications: HYPERTENSION      gabapentin (NEURONTIN) 800 mg tablet Take  by mouth three (3) times daily.  ibuprofen (MOTRIN) 600 mg tablet Take 600 mg by mouth every eight (8) hours as needed.  rosuvastatin (CRESTOR) 10 mg tablet Take 10 mg by mouth daily. Indications: HYPERCHOLESTEROLEMIA      hydrochlorothiazide (HYDRODIURIL) 25 mg tablet Take 25 mg by mouth daily. Indications: HYPERTENSION      saw palmetto 500 mg cap Take 450 mg by mouth daily. Past History     Past Medical History:  Past Medical History:   Diagnosis Date    Anemia     Arthritis     Diabetes ([facility] [address]     Hypercholesterolemia     Hypertension     Single kidney     Cr normal    Throat cancer ([facility] [address]     s/p laryngectomy       Past Surgical History:  Past Surgical History:   Procedure Laterality Date    HX LAP CHOLECYSTECTOMY  12    By Dr. Rusty Xie states does not know reason       Family History:  Family History   Family history unknown: Yes       Social History:  Social History     Tobacco Use    Smoking status: Former Smoker     Packs/day: 1.00     Years: 57.00     Pack years: 57.00     Quit date: 2012     Years since quittin.7    Smokeless tobacco: Never Used   Substance Use Topics    Alcohol use: Yes     Alcohol/week: 17.5 standard drinks     Types: 21 Shots of liquor per week    Drug use: No       Allergies:  No Known Allergies      Review of Systems   Constitutional: Negative except as in HPI. Eyes: Negative except as in HPI.  ENT: Negative except as in HPI. Cardiovascular: Negative except as in HPI. Respiratory: Negative except as in HPI. Gastrointestinal: Negative except as in HPI. Genitourinary: Negative except as in HPI. Musculoskeletal: Negative except as in HPI. Integumentary: Negative except as in HPI. Neurological: Negative except as in HPI. Psychiatric: Negative except as in HPI.   Endocrine: Negative except as in HPI. Hematologic/Lymphatic: Negative except as in HPI. Allergic/Immunologic: Negative except as in HPI. Physical Exam   Constitutional: Awake and alert, interactive, NAD  Eyes: PERRL, no injection or scleral icterus, no discharge  HEENT: NCAT, neck supple, MMM, no oropharyngeal exudates  CV: RRR, no m/r/g  Respiratory: CTAB, no r/r/w  GI: Abd soft, nondistended, nontender  : Deferred  MSK: FROM, no joint effusions or edema  Skin: No rashes  Neuro: Symmetric facies, 5/5 strength throughout. No dysmetria. SILT distally. Unable to assess speech, however symmetric palate elevation. Lab and Diagnostic Study Results     Labs -   No results found for this or any previous visit (from the past 12 hour(s)). Radiologic Studies -   [unfilled]  CT Results  (Last 48 hours)    None        CXR Results  (Last 48 hours)    None          Medical Decision Making and ED Course   - I am the first and primary provider for this patient AND AM THE PRIMARY PROVIDER OF RECORD. - I reviewed the vital signs, available nursing notes, past medical history, past surgical history, family history and social history. - Initial assessment performed. The patients presenting problems have been discussed, and the staff are in agreement with the care plan formulated and outlined with them. I have encouraged them to ask questions as they arise throughout their visit. Vital Signs-Reviewed the patient's vital signs. Patient Vitals for the past 12 hrs:   Temp Pulse Resp BP SpO2   05/02/22 1331 97.5 °F (36.4 °C) 60 18 114/68 99 %           Provider Notes (Medical Decision Making):   86M w/seizure-like activity. Seizure vs syncope. EKG no STEMI, nonischemic. CT Head no bleeding on my read, significant ex vacuo hydrocephalus suspicious for prior CVAs, suspect seizure given pt back to baseline and EKG nonischemic. Will give IVF and load w/keppra.     ED Course:       ED Course as of 05/02/22 1437   Mon May 02, 2022   1408 R basal ganglia infarct chronic per neuroradiologist Dr. Crist Opitz, seen on prior CT in 2016, no acute CVA. [YA]   12 SOC Dr. Jorgito Waite noted possible subtle L facial droop, but otherwise negative. Recommends holding off on Keppra until EEG but does recommend admission for MRI and further workup. [YA]   5822 Niece at bedside, does have subtle L facial droop  now. Pt did not eat today but has otherwise been well. Keppra on medication list, suspect seizure, will load with keppra and admit for MRI tomorrow. [YA]   1 CT CODE NEURO HEAD WO CONTRAST    IMPRESSION  Mild ventriculomegaly. Chronic lacunar infarcts right basal ganglia.     No intra or extra-axial hemorrhage or mass.     Report has been communicated to Dr. Steele in the emergency room.    [YA]   [id number] Admitted to Dr. Charu Beverly. [YA]   8202 Spoke to Dr. Ky Davila, neurologist on call, will follow. [YA]      ED Course User Index  [YA] Qing Ying MD         Consultations:     [facility] Dr. Jorgito Watie  Radiologist Dr. Mary Beverly  Neurologist Dr. Ky Davila. Disposition     Disposition: Admitted to Floor Medical Floor the case was discussed with the admitting physician Dr. Charu Beverly. Admitted      Diagnosis     Clinical Impression:   1. Seizure-like activity ([facility] [address]        Attestations:     Zaid Hunt MD

## 2022-05-02 NOTE — ED TRIAGE NOTES
Pt arrives by EMS from home. Pt niece reports syncopal episode. EMS arrived and HR went from 110 to 40s/50s in a ventricular rhythm. EMS reports no meds given. Rosanna BRIGGS requested stroke alert be called as well as EMS reported possible seizure like activity with disorientation.

## 2022-05-03 LAB
ATRIAL RATE: 105 BPM
CALCULATED R AXIS, ECG10: -55 DEGREES
CALCULATED T AXIS, ECG11: 34 DEGREES
DIAGNOSIS, 93000: NORMAL
EST. AVERAGE GLUCOSE BLD GHB EST-MCNC: 126 MG/DL
GLUCOSE BLD STRIP.AUTO-MCNC: 156 MG/DL (ref 65–117)
GLUCOSE BLD STRIP.AUTO-MCNC: 185 MG/DL (ref 65–117)
GLUCOSE BLD STRIP.AUTO-MCNC: 256 MG/DL (ref 65–117)
GLUCOSE BLD STRIP.AUTO-MCNC: 90 MG/DL (ref 65–117)
HBA1C MFR BLD: 6 % (ref 4–5.6)
PERFORMED BY, TECHID: ABNORMAL
PERFORMED BY, TECHID: NORMAL
PROLACTIN SERPL-MCNC: 20.8 NG/ML
Q-T INTERVAL, ECG07: 352 MS
QRS DURATION, ECG06: 140 MS
QTC CALCULATION (BEZET), ECG08: 476 MS
TROPONIN-HIGH SENSITIVITY: 16 NG/L (ref 0–76)
VENTRICULAR RATE, ECG03: 110 BPM

## 2022-05-03 PROCEDURE — 95816 EEG AWAKE AND DROWSY: CPT | Performed by: INTERNAL MEDICINE

## 2022-05-03 PROCEDURE — 74011636637 HC RX REV CODE- 636/637: Performed by: INTERNAL MEDICINE

## 2022-05-03 PROCEDURE — 82962 GLUCOSE BLOOD TEST: CPT

## 2022-05-03 PROCEDURE — 74011250637 HC RX REV CODE- 250/637: Performed by: INTERNAL MEDICINE

## 2022-05-03 PROCEDURE — 65270000029 HC RM PRIVATE

## 2022-05-03 PROCEDURE — 74011250636 HC RX REV CODE- 250/636: Performed by: EMERGENCY MEDICINE

## 2022-05-03 PROCEDURE — 96376 TX/PRO/DX INJ SAME DRUG ADON: CPT

## 2022-05-03 RX ADMIN — INSULIN LISPRO 2 UNITS: 100 INJECTION, SOLUTION INTRAVENOUS; SUBCUTANEOUS at 17:02

## 2022-05-03 RX ADMIN — GABAPENTIN 800 MG: 400 CAPSULE ORAL at 15:39

## 2022-05-03 RX ADMIN — LEVETIRACETAM 1000 MG: 100 INJECTION INTRAVENOUS at 15:35

## 2022-05-03 RX ADMIN — ASPIRIN 81 MG: 81 TABLET, COATED ORAL at 09:15

## 2022-05-03 RX ADMIN — LEVETIRACETAM 1000 MG: 100 INJECTION INTRAVENOUS at 02:32

## 2022-05-03 RX ADMIN — INSULIN GLARGINE 35 UNITS: 100 INJECTION, SOLUTION SUBCUTANEOUS at 21:20

## 2022-05-03 RX ADMIN — INSULIN LISPRO 2 UNITS: 100 INJECTION, SOLUTION INTRAVENOUS; SUBCUTANEOUS at 09:15

## 2022-05-03 RX ADMIN — GABAPENTIN 800 MG: 400 CAPSULE ORAL at 09:15

## 2022-05-03 RX ADMIN — NIFEDIPINE 60 MG: 60 TABLET, FILM COATED, EXTENDED RELEASE ORAL at 09:15

## 2022-05-03 RX ADMIN — GABAPENTIN 800 MG: 400 CAPSULE ORAL at 21:19

## 2022-05-03 RX ADMIN — LISINOPRIL 20 MG: 20 TABLET ORAL at 09:15

## 2022-05-03 RX ADMIN — ATORVASTATIN CALCIUM 10 MG: 10 TABLET, FILM COATED ORAL at 21:19

## 2022-05-03 RX ADMIN — MULTIPLE VITAMINS W/ MINERALS TAB 1 TABLET: TAB at 09:15

## 2022-05-03 NOTE — ED NOTES
Called Dr. Triston Emerson to inform him of pt HR changes from tachycardic to AV paced rhythm back to tachycardia. Repeated EKG. Orders received.

## 2022-05-03 NOTE — PROGRESS NOTES
Notified patient being worked up for possible CVA. Patient passed swallow screen by nsg and per chart review patient is s/p laryngectomy therefore anatomically there is no connection between pharynx and airway no concerns for aspiration unless fistula is a concern. No ST needs identified at this time.

## 2022-05-03 NOTE — ED NOTES
TRANSFER - OUT REPORT:    Verbal report given to Joel RN on Jyotsna Loges  being transferred to [facility] for routine progression of care       Report consisted of patients Situation, Background, Assessment and   Recommendations(SBAR). Information from the following report(s) SBAR, Kardex, ED Summary, [facility] and Recent Results was reviewed with the receiving nurse. Lines:   Peripheral IV 05/02/22 Posterior;Right Forearm (Active)        Opportunity for questions and clarification was provided.       Patient transported with:   Monitor  Registered Nurse

## 2022-05-03 NOTE — ED NOTES
Assumed care of pt. Pt is alert and oriented with no signs of respiratory distress. Pt is on cardiac monitor, continuous pulse ox, and BP cuff.

## 2022-05-03 NOTE — PROGRESS NOTES
CM met f/f with Pt and his Niece, discussed D/C planning, discussed Acute Rehab. Pt Niece stated that she needs to talk with her Uncle and she will get with the CM.

## 2022-05-03 NOTE — ED NOTES
Gave the patient his afternoon medications and got him cleaned up and his bed linen changed. Will continue to monitor and treat the patient.

## 2022-05-03 NOTE — PROGRESS NOTES
PROGRESS NOTE      Subjective: No further seizure. Comfortable. No distress. Visit Vitals  BP (!) 183/93   Pulse 63   Temp 97.4 °F (36.3 °C)   Resp 15   Ht 5' 5\" (1.651 m)   Wt 63.5 kg (139 lb 15.9 oz)   SpO2 100%   BMI 23.30 kg/m²       Current Facility-Administered Medications:     levETIRAcetam (KEPPRA) injection 1,000 mg, 1,000 mg, IntraVENous, Q12H, Anne Marte MD, 1,000 mg at 05/03/22 0232    atorvastatin (LIPITOR) tablet 10 mg, 10 mg, Oral, QHS, Jihan Meza MD, 10 mg at 05/02/22 2237    aspirin delayed-release tablet 81 mg, 81 mg, Oral, DAILY, Jihan Cloud MD, 81 mg at 05/03/22 0915    gabapentin (NEURONTIN) capsule 800 mg, 800 mg, Oral, TID, Chin DAVILA MD, 800 mg at 05/03/22 0915    lisinopriL (PRINIVIL, ZESTRIL) tablet 20 mg, 20 mg, Oral, DAILY, Jihan Cloud MD, 20 mg at 05/03/22 0915    multivitamin, tx-iron-ca-min (THERA-M w/ IRON) tablet 1 Tablet, 1 Tablet, Oral, DAILY, Chin DAVILA MD, 1 Tablet at 05/03/22 0915    NIFEdipine ER (PROCARDIA XL) tablet 60 mg, 60 mg, Oral, DAILY, Jihan Cloud MD, 60 mg at 05/03/22 0915    insulin glargine (LANTUS) injection 35 Units, 35 Units, SubCUTAneous, QHS, Jihan Meza MD, 35 Units at 05/02/22 2237    glucose chewable tablet 16 g, 4 Tablet, Oral, PRN, Chin DAVILA MD    dextrose 10% infusion 0-250 mL, 0-250 mL, IntraVENous, PRN, Jihan Cloud MD    glucagon (GLUCAGEN) injection 1 mg, 1 mg, IntraMUSCular, PRN, Chin Geller MD    insulin lispro (HUMALOG) injection, , SubCUTAneous, TIDAC, Chin DAVILA MD, 2 Units at 05/03/22 0915    Current Outpatient Medications:     lisinopril (PRINIVIL, ZESTRIL) 20 mg tablet, Take 20 mg by mouth daily. , Disp: , Rfl:     atorvastatin (LIPITOR) 10 mg tablet, Take 10 mg by mouth nightly., Disp: , Rfl:     PATRICIO ASPIRIN PO, Take 81 mg by mouth daily. , Disp: , Rfl:     multivitamin, tx-iron-ca-min (THERA-M W/ IRON) 9 mg iron-400 mcg tab tablet, Take 1 Tab by mouth daily. , Disp: , Rfl:     insulin detemir (LEVEMIR FLEXTOUCH) 100 unit/mL (3 mL) inpn, 0.35 mL by SubCUTAneous route nightly. 35 Units by SubCUTAneous route at night, Disp: 45 mL, Rfl: 3    glipiZIDE (GLUCOTROL) 5 mg tablet, Take 1 Tab by mouth every morning., Disp: 90 Tab, Rfl: 3    NIFEdipine XL (PROCARDIA-XL) 60 mg CR tablet, Take 60 mg by mouth daily. Indications: HYPERTENSION, Disp: , Rfl:     gabapentin (NEURONTIN) 800 mg tablet, Take  by mouth three (3) times daily. , Disp: , Rfl:     ibuprofen (MOTRIN) 600 mg tablet, Take 600 mg by mouth every eight (8) hours as needed. , Disp: , Rfl:     hydrochlorothiazide (HYDRODIURIL) 25 mg tablet, Take 25 mg by mouth daily. Indications: HYPERTENSION, Disp: , Rfl:     saw palmetto 500 mg cap, Take 450 mg by mouth daily.   , Disp: , Rfl:   Recent Results (from the past 24 hour(s))   EKG, 12 LEAD, INITIAL    Collection Time: 05/02/22  1:26 PM   Result Value Ref Range    Ventricular Rate 58 BPM    Atrial Rate 58 BPM    P-R Interval 416 ms    QRS Duration 162 ms    Q-T Interval 430 ms    QTC Calculation (Bezet) 422 ms    Calculated P Axis 63 degrees    Calculated R Axis -20 degrees    Calculated T Axis 1 degrees    Diagnosis       Atrial-paced rhythm with prolonged AV conduction with Premature atrial   complexes  Right bundle branch block  Septal infarct , age undetermined  Abnormal ECG  No previous ECGs available  Confirmed by [facility] ([id number] on 5/2/2022 3:43:44 PM     CBC WITH AUTOMATED DIFF    Collection Time: 05/02/22  1:35 PM   Result Value Ref Range    WBC 5.7 4.1 - 11.1 K/uL    RBC 3.77 (L) 4.10 - 5.70 M/uL    HGB 11.9 (L) 12.1 - 17.0 g/dL    HCT 36.2 (L) 36.6 - 50.3 %    MCV 96.0 80.0 - 99.0 FL    MCH 31.6 26.0 - 34.0 PG    MCHC 32.9 30.0 - 36.5 g/dL    RDW 13.7 11.5 - 14.5 %    PLATELET 325 119 - 730 K/uL    MPV 10.9 8.9 - 12.9 FL    NRBC 0.0 0.0  WBC    ABSOLUTE NRBC 0.00 0.00 - 0.01 K/uL    NEUTROPHILS 40 32 - 75 %    LYMPHOCYTES 48 12 - 49 %    MONOCYTES 10 5 - 13 %    EOSINOPHILS 2 0 - 7 %    BASOPHILS 0 0 - 1 %    IMMATURE GRANULOCYTES 0 0 - 0.5 %    ABS. NEUTROPHILS 2.3 1.8 - 8.0 K/UL    ABS. LYMPHOCYTES 2.7 0.8 - 3.5 K/UL    ABS. MONOCYTES 0.6 0.0 - 1.0 K/UL    ABS. EOSINOPHILS 0.1 0.0 - 0.4 K/UL    ABS. BASOPHILS 0.0 0.0 - 0.1 K/UL    ABS. IMM. GRANS. 0.0 0.00 - 0.04 K/UL    DF AUTOMATED     METABOLIC PANEL, COMPREHENSIVE    Collection Time: 05/02/22  1:35 PM   Result Value Ref Range    Sodium 140 136 - 145 mmol/L    Potassium 4.5 3.5 - 5.1 mmol/L    Chloride 107 97 - 108 mmol/L    CO2 29 21 - 32 mmol/L    Anion gap 4 (L) 5 - 15 mmol/L    Glucose 129 (H) 65 - 100 mg/dL    BUN 27 (H) 6 - 20 mg/dL    Creatinine 1.13 0.70 - 1.30 mg/dL    BUN/Creatinine ratio 24 (H) 12 - 20      GFR est AA >60 >60 ml/min/1.73m2    GFR est non-AA >60 >60 ml/min/1.73m2    Calcium 9.4 8.5 - 10.1 mg/dL    Bilirubin, total 0.7 0.2 - 1.0 mg/dL    AST (SGOT) 26 15 - 37 U/L    ALT (SGPT) 32 12 - 78 U/L    Alk.  phosphatase 111 45 - 117 U/L    Protein, total 7.4 6.4 - 8.2 g/dL    Albumin 3.9 3.5 - 5.0 g/dL    Globulin 3.5 2.0 - 4.0 g/dL    A-G Ratio 1.1 1.1 - 2.2     TROPONIN-HIGH SENSITIVITY    Collection Time: 05/02/22  1:35 PM   Result Value Ref Range    Troponin-High Sensitivity 12 0 - 76 ng/L   NT-PRO BNP    Collection Time: 05/02/22  1:35 PM   Result Value Ref Range    NT pro-BNP 58 <450 pg/mL   PROTHROMBIN TIME + INR    Collection Time: 05/02/22  1:35 PM   Result Value Ref Range    Prothrombin time 13.3 11.9 - 14.6 sec    INR 1.0 0.9 - 1.1     GLUCOSE, POC    Collection Time: 05/02/22  2:16 PM   Result Value Ref Range    Glucose (POC) 103 65 - 117 mg/dL    Performed by Rip Kaur    EKG, 12 LEAD, SUBSEQUENT    Collection Time: 05/02/22  2:42 PM   Result Value Ref Range    Ventricular Rate 102 BPM    Atrial Rate 96 BPM    QRS Duration 142 ms    Q-T Interval 362 ms    QTC Calculation (Bezet) 471 ms Calculated R Axis -37 degrees    Calculated T Axis -3 degrees    Diagnosis       Atrial-paced rhythm  Left axis deviation  Right bundle branch block  Abnormal ECG  When compared with ECG of 02-MAY-2022 13:26, (Unconfirmed)  Wide QRS rhythm has replaced Electronic atrial pacemaker  Vent. rate has increased BY  44 BPM  Confirmed by [facility] Jeana PAINTER ([id number]) on 5/2/2022 3:47:21 PM     CK    Collection Time: 05/02/22  3:52 PM   Result Value Ref Range     39 - 308 U/L   TSH 3RD GENERATION    Collection Time: 05/02/22  3:52 PM   Result Value Ref Range    TSH 3.09 0.36 - 3.74 uIU/mL   GLUCOSE, POC    Collection Time: 05/02/22  5:38 PM   Result Value Ref Range    Glucose (POC) 100 65 - 117 mg/dL    Performed by Anuja Dorantes (ED Tech)    TROPONIN-HIGH SENSITIVITY    Collection Time: 05/02/22 11:44 PM   Result Value Ref Range    Troponin-High Sensitivity 16 0 - 76 ng/L   GLUCOSE, POC    Collection Time: 05/03/22  7:48 AM   Result Value Ref Range    Glucose (POC) 185 (H) 65 - 117 mg/dL    Performed by Karuna Babe      XR CHEST PORT   Final Result   No acute pulmonary process. CT CODE NEURO HEAD WO CONTRAST   Final Result   Mild ventriculomegaly. Chronic lacunar infarcts right basal ganglia. No intra or extra-axial hemorrhage or mass. Report has been communicated to Dr. Enzo Saenz in the emergency room. HEENT: Normocephalic atraumatic anicteric sclera  Neck: Tracheostomy hole  Lungs: Scanty transmitted sound  Heart: Regular  Abdomen: Soft nontender nondistended positive bowel sounds  Lower Extremities: No edema, bunion deformity  CNS: Alert and oriented follows command moves extremities  Psych: Cooperative     Assessment: New onset seizure  Hypertension  Status post laryngectomy for laryngeal cancer  Diabetes          Plan: EEG. Discussed with neurologist.   Control blood pressure. Discharge planning in the next 24 hours.

## 2022-05-03 NOTE — ED NOTES
Took over care for this patient and got bedside report. Patient is quietly resting at the time.  Will continue to monitor and treat the patient

## 2022-05-04 ENCOUNTER — HOSPITAL ENCOUNTER (OUTPATIENT)
Age: 87
Setting detail: OBSERVATION
Discharge: HOME HEALTH | End: 2022-05-11
Attending: STUDENT IN AN ORGANIZED HEALTH CARE EDUCATION/TRAINING PROGRAM | Admitting: INTERNAL MEDICINE
Payer: MEDICARE

## 2022-05-04 VITALS
HEART RATE: 91 BPM | RESPIRATION RATE: 18 BRPM | WEIGHT: 139.99 LBS | HEIGHT: 65 IN | TEMPERATURE: 100.2 F | OXYGEN SATURATION: 94 % | SYSTOLIC BLOOD PRESSURE: 107 MMHG | BODY MASS INDEX: 23.32 KG/M2 | DIASTOLIC BLOOD PRESSURE: 57 MMHG

## 2022-05-04 DIAGNOSIS — R53.1 GENERALIZED WEAKNESS: Primary | ICD-10-CM

## 2022-05-04 PROBLEM — R40.20 LOSS OF CONSCIOUSNESS (HCC): Status: ACTIVE | Noted: 2022-05-04

## 2022-05-04 LAB
ANION GAP SERPL CALC-SCNC: 1 MMOL/L (ref 5–15)
BACTERIA SPEC CULT: NORMAL
BASOPHILS # BLD: 0 K/UL (ref 0–0.1)
BASOPHILS NFR BLD: 0 % (ref 0–1)
BUN SERPL-MCNC: 26 MG/DL (ref 6–20)
BUN/CREAT SERPL: 20 (ref 12–20)
CA-I BLD-MCNC: 8.8 MG/DL (ref 8.5–10.1)
CHLORIDE SERPL-SCNC: 108 MMOL/L (ref 97–108)
CO2 SERPL-SCNC: 28 MMOL/L (ref 21–32)
CREAT SERPL-MCNC: 1.3 MG/DL (ref 0.7–1.3)
DIFFERENTIAL METHOD BLD: ABNORMAL
EOSINOPHIL # BLD: 0 K/UL (ref 0–0.4)
EOSINOPHIL NFR BLD: 0 % (ref 0–7)
ERYTHROCYTE [DISTWIDTH] IN BLOOD BY AUTOMATED COUNT: 13.6 % (ref 11.5–14.5)
GLUCOSE BLD STRIP.AUTO-MCNC: 230 MG/DL (ref 65–117)
GLUCOSE BLD STRIP.AUTO-MCNC: 70 MG/DL (ref 65–117)
GLUCOSE SERPL-MCNC: 83 MG/DL (ref 65–100)
HCT VFR BLD AUTO: 35.2 % (ref 36.6–50.3)
HGB BLD-MCNC: 11.6 G/DL (ref 12.1–17)
IMM GRANULOCYTES # BLD AUTO: 0.1 K/UL (ref 0–0.04)
IMM GRANULOCYTES NFR BLD AUTO: 0 % (ref 0–0.5)
LYMPHOCYTES # BLD: 1.3 K/UL (ref 0.8–3.5)
LYMPHOCYTES NFR BLD: 10 % (ref 12–49)
MCH RBC QN AUTO: 31.6 PG (ref 26–34)
MCHC RBC AUTO-ENTMCNC: 33 G/DL (ref 30–36.5)
MCV RBC AUTO: 95.9 FL (ref 80–99)
MONOCYTES # BLD: 0.7 K/UL (ref 0–1)
MONOCYTES NFR BLD: 6 % (ref 5–13)
NEUTS SEG # BLD: 10.9 K/UL (ref 1.8–8)
NEUTS SEG NFR BLD: 84 % (ref 32–75)
NRBC # BLD: 0 K/UL (ref 0–0.01)
NRBC BLD-RTO: 0 PER 100 WBC
PERFORMED BY, TECHID: ABNORMAL
PERFORMED BY, TECHID: NORMAL
PLATELET # BLD AUTO: 167 K/UL (ref 150–400)
PMV BLD AUTO: 10.9 FL (ref 8.9–12.9)
POTASSIUM SERPL-SCNC: 4.3 MMOL/L (ref 3.5–5.1)
RBC # BLD AUTO: 3.67 M/UL (ref 4.1–5.7)
SODIUM SERPL-SCNC: 137 MMOL/L (ref 136–145)
SPECIAL REQUESTS,SREQ: NORMAL
WBC # BLD AUTO: 13 K/UL (ref 4.1–11.1)

## 2022-05-04 PROCEDURE — 99285 EMERGENCY DEPT VISIT HI MDM: CPT

## 2022-05-04 PROCEDURE — 36415 COLL VENOUS BLD VENIPUNCTURE: CPT

## 2022-05-04 PROCEDURE — 74011636637 HC RX REV CODE- 636/637: Performed by: INTERNAL MEDICINE

## 2022-05-04 PROCEDURE — 96376 TX/PRO/DX INJ SAME DRUG ADON: CPT

## 2022-05-04 PROCEDURE — G0378 HOSPITAL OBSERVATION PER HR: HCPCS

## 2022-05-04 PROCEDURE — 80048 BASIC METABOLIC PNL TOTAL CA: CPT

## 2022-05-04 PROCEDURE — 85025 COMPLETE CBC W/AUTO DIFF WBC: CPT

## 2022-05-04 PROCEDURE — 74011250636 HC RX REV CODE- 250/636: Performed by: EMERGENCY MEDICINE

## 2022-05-04 PROCEDURE — 74011250637 HC RX REV CODE- 250/637: Performed by: INTERNAL MEDICINE

## 2022-05-04 PROCEDURE — 65270000029 HC RM PRIVATE

## 2022-05-04 PROCEDURE — 82962 GLUCOSE BLOOD TEST: CPT

## 2022-05-04 RX ORDER — LEVETIRACETAM 1000 MG/1
1000 TABLET ORAL 2 TIMES DAILY
Qty: 60 TABLET | Refills: 1 | Status: SHIPPED | OUTPATIENT
Start: 2022-05-04 | End: 2022-06-03

## 2022-05-04 RX ORDER — LEVETIRACETAM 250 MG/1
1000 TABLET ORAL 2 TIMES DAILY
Status: DISCONTINUED | OUTPATIENT
Start: 2022-05-04 | End: 2022-05-04 | Stop reason: HOSPADM

## 2022-05-04 RX ADMIN — LEVETIRACETAM 1000 MG: 100 INJECTION INTRAVENOUS at 00:31

## 2022-05-04 RX ADMIN — LEVETIRACETAM 1000 MG: 250 TABLET, FILM COATED ORAL at 12:45

## 2022-05-04 RX ADMIN — NIFEDIPINE 60 MG: 60 TABLET, FILM COATED, EXTENDED RELEASE ORAL at 08:28

## 2022-05-04 RX ADMIN — INSULIN LISPRO 4 UNITS: 100 INJECTION, SOLUTION INTRAVENOUS; SUBCUTANEOUS at 12:45

## 2022-05-04 RX ADMIN — ASPIRIN 81 MG: 81 TABLET, COATED ORAL at 08:28

## 2022-05-04 RX ADMIN — LISINOPRIL 20 MG: 20 TABLET ORAL at 08:28

## 2022-05-04 RX ADMIN — GABAPENTIN 800 MG: 400 CAPSULE ORAL at 08:28

## 2022-05-04 RX ADMIN — MULTIPLE VITAMINS W/ MINERALS TAB 1 TABLET: TAB at 08:28

## 2022-05-04 NOTE — PROGRESS NOTES
NEURO PROGRESS NOTE        SUBJECTIVE:   Seizure  Mental status changes      EXAM:  Awake, responds verbally though minimally  Nodded when asked whether he knew where he was.   No seizures reported      ASSESSMENT/PLAN:  Current work-up and treatment continues    ALLERGIES:    No Known Allergies    MEDS:      Current Facility-Administered Medications:     levETIRAcetam (KEPPRA) injection 1,000 mg, 1,000 mg, IntraVENous, Q12H, Anne Marte MD, 1,000 mg at 05/04/22 0031    atorvastatin (LIPITOR) tablet 10 mg, 10 mg, Oral, QHS, Jihan Gill MD, 10 mg at 05/03/22 2119    aspirin delayed-release tablet 81 mg, 81 mg, Oral, DAILY, Jihan Gill MD, 81 mg at 05/03/22 0915    gabapentin (NEURONTIN) capsule 800 mg, 800 mg, Oral, TID, Francesca DAVILA MD, 800 mg at 05/03/22 2119    lisinopriL (PRINIVIL, ZESTRIL) tablet 20 mg, 20 mg, Oral, DAILY, Jihan Gill MD, 20 mg at 05/03/22 0915    multivitamin, tx-iron-ca-min (THERA-M w/ IRON) tablet 1 Tablet, 1 Tablet, Oral, DAILY, Francesca DAVILA MD, 1 Tablet at 05/03/22 0915    NIFEdipine ER (PROCARDIA XL) tablet 60 mg, 60 mg, Oral, DAILY, Jihan Gill MD, 60 mg at 05/03/22 0915    insulin glargine (LANTUS) injection 35 Units, 35 Units, SubCUTAneous, QHS, Jihan Meza MD, 35 Units at 05/03/22 2120    glucose chewable tablet 16 g, 4 Tablet, Oral, PRN, Francesca DAVILA MD    dextrose 10% infusion 0-250 mL, 0-250 mL, IntraVENous, PRN, Jihan Gill MD    glucagon (GLUCAGEN) injection 1 mg, 1 mg, IntraMUSCular, PRN, Francesca Huizar MD    insulin lispro (HUMALOG) injection, , SubCUTAneous, TIDAC, Francesca Huizar MD, 2 Units at 05/03/22 5818    LABS:  Recent Results (from the past 24 hour(s))   GLUCOSE, POC    Collection Time: 05/03/22  1:16 PM   Result Value Ref Range    Glucose (POC) 90 65 - 117 mg/dL    Performed by Calin, POC    Collection Time: 05/03/22  4:57 PM   Result Value Ref Range    Glucose (POC) 156 (H) 65 - 117 mg/dL    Performed by Durene Goodell    GLUCOSE, POC    Collection Time: 05/03/22  9:18 PM   Result Value Ref Range    Glucose (POC) 256 (H) 65 - 117 mg/dL    Performed by Kirti Cruz    GLUCOSE, POC    Collection Time: 05/04/22  7:37 AM   Result Value Ref Range    Glucose (POC) 70 65 - 117 mg/dL    Performed by [address] [place] [address]        Visit Vitals  BP (!) 150/69 (BP 1 Location: Right upper arm, BP Patient Position: At rest;Semi fowlers)   Pulse 79   Temp 98.8 °F (37.1 °C)   Resp 18   Ht 5' 5\" (1.651 m)   Wt 63.5 kg (139 lb 15.9 oz)   SpO2 95%   BMI 23.30 kg/m²       Imaging:  XR CHEST PORT   Final Result   No acute pulmonary process. CT CODE NEURO HEAD WO CONTRAST   Final Result   Mild ventriculomegaly. Chronic lacunar infarcts right basal ganglia. No intra or extra-axial hemorrhage or mass. Report has been communicated to Dr. Jamil Amaya in the emergency room.

## 2022-05-04 NOTE — PROGRESS NOTES
Problem: Pressure Injury - Risk of  Goal: *Prevention of pressure injury  Description: Document Jabier Scale and appropriate interventions in the flowsheet. Outcome: Progressing Towards Goal  Note: Pressure Injury Interventions:  Sensory Interventions: Assess changes in LOC,Keep linens dry and wrinkle-free,Minimize linen layers,Monitor skin under medical devices    Moisture Interventions: Absorbent underpads,Check for incontinence Q2 hours and as needed,Internal/External urinary devices,Minimize layers    Activity Interventions: Pressure redistribution bed/mattress(bed type),PT/OT evaluation    Mobility Interventions: PT/OT evaluation,Pressure redistribution bed/mattress (bed type)    Nutrition Interventions: Document food/fluid/supplement intake,Offer support with meals,snacks and hydration    Friction and Shear Interventions: Apply protective barrier, creams and emollients,Minimize layers                Problem: Patient Education: Go to Patient Education Activity  Goal: Patient/Family Education  Outcome: Progressing Towards Goal     Problem: Falls - Risk of  Goal: *Absence of Falls  Description: Document Mikaela Fall Risk and appropriate interventions in the flowsheet.   Outcome: Progressing Towards Goal  Note: Fall Risk Interventions:  Mobility Interventions: Bed/chair exit alarm,Patient to call before getting OOB,PT Consult for mobility concerns,PT Consult for assist device competence         Medication Interventions: Bed/chair exit alarm,Patient to call before getting OOB,Teach patient to arise slowly    Elimination Interventions: Call light in reach,Patient to call for help with toileting needs,Bed/chair exit alarm              Problem: Patient Education: Go to Patient Education Activity  Goal: Patient/Family Education  Outcome: Progressing Towards Goal

## 2022-05-04 NOTE — PROGRESS NOTES
4145: Chart reviewed. Per MD note, EEG ordered and neuro consulted. CM will f/u with patient's niece regarding discharge plan. CM will continue to follow patient and recs of medical team.     3422: Discharge summary/order noted. Per nursing note, discharge to be held until after lunch to monitor heart rate. 1100: CM met with patient for DCP in regards to previous CM note mentioning IRF. CM confirmed with patient his niece, Alanna Aparicio [phone] or [phone] helps him with decision making and will transport him home. CM asked patient if he was considering IRF/SNF placement. Patient verified with this CM he was not and wishes to discharge home self-care. 1250: CM met with patient to provide:  Medicare Outpatient Observation Notice (MOON)/ [place] Outpatient Observation Notice (Patricia Moura) provided to patient/representative with verbal explanation of the notice. Time allotted for questions regarding the notice. Patient /representative provided a completed copy of the MOON/VOON notice. Copy placed on bedside chart. 1400: CM notified of CODE 40  Virtual reviewer communicated change to CM, which reflect outpatient observation order written prior to Written discharge order. Code 44 delivered and given to patient. CM met with the patient and provided to the patient the printed information about her outpatient observation status. The patient was given the flyer entitled, \"Medicare Outpatient Observation: Information & notification. \" All questions were answered, no additional discharge needs identified at this time and patient expects to return to their (home, assisted living facility, relatives home, etc.) after discharge today. Copy provided to patient or sent secure email, secure fax , or certified mail to patient's loved one per their request.    Per nurse's contact with MD, patient may discharge today as planned. Patient to discharge home self-care.  Chelsea, patient's niece is picking her uncle up in approximately one hour. Discharge plan of care/case management plan validated with provider discharge order.     Discharge Checklist Complete

## 2022-05-04 NOTE — PROGRESS NOTES
Primary Nurse Sangita Whitten, CHUY and second RN performed a dual skin assessment on this patient. Dry skin was noted on the back but no open areas. Pt does have trach site and healed scarring on abdomen and pelvic area.

## 2022-05-04 NOTE — PROGRESS NOTES
Comprehensive Nutrition Assessment    Type and Reason for Visit: Positive nutrition screen (MST2)    Nutrition Recommendations/Plan:   Continue current diet  Monitor and record intakes and Bms and document in I/Os     Malnutrition Assessment:  Malnutrition Status:  No malnutrition (05/04/22 1248)    Context:  Acute illness     Findings of the 6 clinical characteristics of malnutrition:   Energy Intake:  No significant decrease in energy intake  Weight Loss:  No significant weight loss     Body Fat Loss:  No significant body fat loss,     Muscle Mass Loss:  No significant muscle mass loss,    Fluid Accumulation:  No significant fluid accumulation,     Strength:  Not performed     Nutrition Assessment:    (P) Admitted for seizure, d/c held for elevated HR. MST2 for ?wt loss. Intakes good, at baseline, >50% of meals. No nutrition concerns. Labs: Na 140, K 4.5, BUN 27, Creat 1.13, Gluc 129, Alb 3.9. Meds: atorvastatin, insulin glargine, insulin lispro, MVI with iron. Nutrition Related Findings:    (P) Nourished per NFPE. No n/v, d/c, or problems chewing/swallowing. No edema. Last BM 5/4. Wound Type: (P) None    Current Nutrition Intake & Therapies:  Average Meal Intake: (P) 51-75%  Average Supplement Intake: (P) None ordered  ADULT DIET Regular; 3 carb choices (45 gm/meal)    Anthropometric Measures:  Height: (P) 5' 5\" (165.1 cm)  Ideal Body Weight (IBW): (P) 136 lbs ((P) 62 kg)  Current Body Wt:  (P) 63.5 kg (139 lb 15.9 oz), (P) 102.9 % IBW.  (P) Not specified  Current BMI (kg/m2): (P) 23.3  BMI Category: (P) Normal weight (BMI 22.0-24.9) age over 72    Estimated Daily Nutrient Needs:  Energy Requirements Based On: (P) Kcal/kg  Weight Used for Energy Requirements: (P) Current  Energy (kcal/day): (P) 1588kcal (25kcal/kg)  Weight Used for Protein Requirements: (P) Current  Protein (g/day): (P) 64g (1g/kg)  Method Used for Fluid Requirements: (P) 1 ml/kcal  Fluid (ml/day): (P) 1588mL (1mL/kcal)    Nutrition Diagnosis:   (P) No nutrition diagnosis at this time     Nutrition Interventions:   Food and/or Nutrient Delivery: (P) Continue current diet  Nutrition Education/Counseling: (P) Education not indicated  Coordination of Nutrition Care: (P) Continue to monitor while inpatient    Nutrition Monitoring and Evaluation:   Behavioral-Environmental Outcomes: (P) None identified  Food/Nutrient Intake Outcomes: (P) Food and nutrient intake  Physical Signs/Symptoms Outcomes: (P) Meal time behavior,Weight    Discharge Planning:    (P) Too soon to determine    Angelo Encinas RD  Contact: [phone]

## 2022-05-04 NOTE — PROGRESS NOTES
Problem: Pressure Injury - Risk of  Goal: *Prevention of pressure injury  Description: Document Jabier Scale and appropriate interventions in the flowsheet. Outcome: Progressing Towards Goal  Note: Pressure Injury Interventions:  Sensory Interventions: Assess changes in LOC    Moisture Interventions: Absorbent underpads    Activity Interventions: PT/OT evaluation    Mobility Interventions: PT/OT evaluation    Nutrition Interventions: Document food/fluid/supplement intake,Offer support with meals,snacks and hydration                     Problem: Falls - Risk of  Goal: *Absence of Falls  Description: Document Mikaela Fall Risk and appropriate interventions in the flowsheet.   Outcome: Progressing Towards Goal  Note: Fall Risk Interventions:  Mobility Interventions: Patient to call before getting OOB,PT Consult for mobility concerns         Medication Interventions: Teach patient to arise slowly,Patient to call before getting OOB      Problem: Patient Education: Go to Patient Education Activity  Goal: Patient/Family Education  Outcome: Progressing Towards Goal

## 2022-05-04 NOTE — ED TRIAGE NOTES
Brought in by EMS Patients wife states that he was going in and out of LOC, Patient denies any complaints at this time.

## 2022-05-04 NOTE — PROGRESS NOTES
1170 - Call from tele that pt was ST at 130. Pt is asymptomatic.   0935 - Pt HR is still in the 130's. MD notified. Stated to not discharge pt yet and wait until after lunch to see if HR returns to baseline and call MD back. 1008 - Pt rate returned to 80's. Will continue to monitor. 1213 - Pt HR back in the 120's, asymptomatic.   1352 - OK to d/c per MD, aware of tachycardia.

## 2022-05-04 NOTE — DISCHARGE SUMMARY
Discharge Summary     Mariza Bond     Admit Date:   5/2/2022  1:25 PM  Discharge Date:   5/4/2022  Discharge Condition:    Improved, stable  Discharge Diagnosis  Problem List Items Addressed This Visit     None      Visit Diagnoses     Seizure-like activity ([address]    -  Primary    Relevant Medications    levETIRAcetam 1,000 mg tablet      New onset seizure   Hypertension   Status post laryngectomy for laryngeal cancer   Diabetes       Hospital Stay  Narrative of Hospital Course: See H&P for full details  This is an 51-year-old black male with history of diabetes status post laryngectomy for laryngeal cancer presented with brief loss of consciousness admitted for further care. Patient was seen by neurologist and recommendations were followed. He was loaded on Keppra no further seizure activity in the hospital.  He is doing well with blood pressure elevated but not better. He is Glucotrol was discontinued. He had his hemoglobin A1c is 6 which is good control for his diabetes today is hemodynamically stable will be discharged home to follow-up as planned. Consultants:  Neurology with      Surgeries/procedures Performed:  CT head. EEG results pending         Discharge Plan:    Home or Self Care     Hospital/Incidental Findings Requiring Follow Up:     Patient Instructions:       Diet: DIET ADULT     Activity: As tolerated  For number of days (if applicable): Other Instructions:      Provider Follow-Up:   Follow-up with me and neurologist as scheduled  Follow-up Appointments   Procedures    FOLLOW UP VISIT Appointment in: One Week     Standing Status:   Standing     Number of Occurrences:   1     Order Specific Question:   Appointment in     Answer: One Week        Significant Diagnostic Studies:     XR CHEST PORT   Final Result   No acute pulmonary process. CT CODE NEURO HEAD WO CONTRAST   Final Result   Mild ventriculomegaly. Chronic lacunar infarcts right basal ganglia. No intra or extra-axial hemorrhage or mass. Report has been communicated to Dr. Rosana Meigs in the emergency room. Recent Results (from the past 24 hour(s))   GLUCOSE, POC    Collection Time: 05/03/22  1:16 PM   Result Value Ref Range    Glucose (POC) 90 65 - 117 mg/dL    Performed by [place], POC    Collection Time: 05/03/22  4:57 PM   Result Value Ref Range    Glucose (POC) 156 (H) 65 - 117 mg/dL    Performed by [place], POC    Collection Time: 05/03/22  9:18 PM   Result Value Ref Range    Glucose (POC) 256 (H) 65 - 117 mg/dL    [facility] by [facility]    GLUCOSE, POC    Collection Time: 05/04/22  7:37 AM   Result Value Ref Range    Glucose (POC) 70 65 - 117 mg/dL    Performed by Trini Burch        Discharge Medications:  Current Discharge Medication List      START taking these medications    Details   levETIRAcetam 1,000 mg tablet Take 1 Tablet by mouth two (2) times a day for 30 days. Qty: 60 Tablet, Refills: 1  Start date: 5/4/2022, End date: 6/3/2022         CONTINUE these medications which have NOT CHANGED    Details   lisinopril (PRINIVIL, ZESTRIL) 20 mg tablet Take 20 mg by mouth daily. Associated Diagnoses: Type II diabetes mellitus, uncontrolled; Essential hypertension with goal blood pressure less than 140/90      atorvastatin (LIPITOR) 10 mg tablet Take 10 mg by mouth nightly. Associated Diagnoses: Type II diabetes mellitus, uncontrolled; Essential hypertension with goal blood pressure less than 140/90      PATRICIO ASPIRIN PO Take 81 mg by mouth daily. Associated Diagnoses: Type II diabetes mellitus, uncontrolled; Essential hypertension with goal blood pressure less than 140/90      multivitamin, tx-iron-ca-min (THERA-M W/ IRON) 9 mg iron-400 mcg tab tablet Take 1 Tab by mouth daily.     Associated Diagnoses: Type II diabetes mellitus, uncontrolled; Essential hypertension with goal blood pressure less than 140/90 insulin detemir (LEVEMIR FLEXTOUCH) 100 unit/mL (3 mL) inpn 0.35 mL by SubCUTAneous route nightly. 35 Units by SubCUTAneous route at night  Qty: 45 mL, Refills: 3    Associated Diagnoses: Type II diabetes mellitus, uncontrolled; Essential hypertension with goal blood pressure less than 140/90      NIFEdipine XL (PROCARDIA-XL) 60 mg CR tablet Take 60 mg by mouth daily. Indications: HYPERTENSION      gabapentin (NEURONTIN) 800 mg tablet Take  by mouth three (3) times daily. hydrochlorothiazide (HYDRODIURIL) 25 mg tablet Take 25 mg by mouth daily. Indications: HYPERTENSION      saw palmetto 500 mg cap Take 450 mg by mouth daily.            STOP taking these medications       glipiZIDE (GLUCOTROL) 5 mg tablet Comments:   Reason for Stopping:         ibuprofen (MOTRIN) 600 mg tablet Comments:   Reason for Stopping:                Time Spent on Discharge:

## 2022-05-04 NOTE — DISCHARGE INSTRUCTIONS
Patient Education        Seizure: Care Instructions  Overview     Seizures are caused by abnormal patterns of electrical signals in the brain. They are different for each person. Seizures can affect movement, speech, vision, or awareness. Some people have only slight shaking of a hand and do not pass out. Other people may pass out and have shaking of the whole body. Some people appear to stare into space. They are awake, but they can't respond normally. Later, they may not remember what happened. You may need tests to identify the type and cause of the seizures. A seizure may occur only once, or you may have them more than one time. Taking medicines as directed and following up with your doctor may help keep you from having more seizures. The doctor has checked you carefully, but problems can develop later. If you notice any problems or new symptoms, get medical treatment right away. Follow-up care is a key part of your treatment and safety. Be sure to make and go to all appointments, and call your doctor if you are having problems. It's also a good idea to know your test results and keep a list of the medicines you take. How can you care for yourself at home? · Be safe with medicines. Take your medicines exactly as prescribed. Call your doctor if you think you are having a problem with your medicine. · Do not do any activity that could be dangerous to you or others until your doctor says it is safe to do so. For example, do not drive a car, operate machinery, swim, or climb ladders. · Be sure that anyone treating you for any health problem knows that you have had a seizure and what medicines you are taking for it. · Identify and avoid things that may make you more likely to have a seizure. These may include lack of sleep, alcohol or drug use, stress, or not eating. · If possible, take a shower instead of a bath. Having a seizure while in a bath can increase the risk of drowning.   When should you call for help?   Call 911 anytime you think you may need emergency care. For example, call if:    · You have another seizure.     · You have new symptoms, such as trouble walking, speaking, or thinking clearly. Call your doctor now or seek immediate medical care if:    · You are not acting normally. Watch closely for changes in your health, and be sure to contact your doctor if you have any problems. Where can you learn more? Go to http://www.gray.com/  Enter M769 in the search box to learn more about \"Seizure: Care Instructions. \"  Current as of: December 13, 2021               Content Version: 13.2  © 2197-3886 [employer]. Care instructions adapted under license by [employer] (which disclaims liability or warranty for this information). If you have questions about a medical condition or this instruction, always ask your healthcare professional. [address] any warranty or liability for your use of this information.

## 2022-05-05 LAB
GLUCOSE BLD STRIP.AUTO-MCNC: 249 MG/DL (ref 65–117)
GLUCOSE BLD STRIP.AUTO-MCNC: 302 MG/DL (ref 65–117)
GLUCOSE BLD STRIP.AUTO-MCNC: 374 MG/DL (ref 65–117)
GLUCOSE BLD STRIP.AUTO-MCNC: 95 MG/DL (ref 65–117)
PERFORMED BY, TECHID: ABNORMAL
PERFORMED BY, TECHID: NORMAL

## 2022-05-05 PROCEDURE — 96372 THER/PROPH/DIAG INJ SC/IM: CPT

## 2022-05-05 PROCEDURE — 82962 GLUCOSE BLOOD TEST: CPT

## 2022-05-05 PROCEDURE — 74011636637 HC RX REV CODE- 636/637: Performed by: INTERNAL MEDICINE

## 2022-05-05 PROCEDURE — 74011250637 HC RX REV CODE- 250/637: Performed by: INTERNAL MEDICINE

## 2022-05-05 PROCEDURE — 65270000029 HC RM PRIVATE

## 2022-05-05 PROCEDURE — 74011250636 HC RX REV CODE- 250/636: Performed by: INTERNAL MEDICINE

## 2022-05-05 RX ORDER — INSULIN GLARGINE 100 [IU]/ML
35 INJECTION, SOLUTION SUBCUTANEOUS
Status: DISCONTINUED | OUTPATIENT
Start: 2022-05-05 | End: 2022-05-11 | Stop reason: HOSPADM

## 2022-05-05 RX ORDER — INSULIN LISPRO 100 [IU]/ML
INJECTION, SOLUTION INTRAVENOUS; SUBCUTANEOUS
Status: DISCONTINUED | OUTPATIENT
Start: 2022-05-05 | End: 2022-05-11 | Stop reason: HOSPADM

## 2022-05-05 RX ORDER — DEXTROSE MONOHYDRATE 100 MG/ML
0-250 INJECTION, SOLUTION INTRAVENOUS AS NEEDED
Status: DISCONTINUED | OUTPATIENT
Start: 2022-05-05 | End: 2022-05-11 | Stop reason: HOSPADM

## 2022-05-05 RX ORDER — LISINOPRIL 10 MG/1
20 TABLET ORAL DAILY
Status: DISCONTINUED | OUTPATIENT
Start: 2022-05-05 | End: 2022-05-08

## 2022-05-05 RX ORDER — GUAIFENESIN 100 MG/5ML
81 LIQUID (ML) ORAL DAILY
Status: DISCONTINUED | OUTPATIENT
Start: 2022-05-05 | End: 2022-05-06

## 2022-05-05 RX ORDER — GABAPENTIN 400 MG/1
800 CAPSULE ORAL 3 TIMES DAILY
Status: DISCONTINUED | OUTPATIENT
Start: 2022-05-05 | End: 2022-05-05 | Stop reason: SDUPTHER

## 2022-05-05 RX ORDER — DEXTROSE MONOHYDRATE 100 MG/ML
0-250 INJECTION, SOLUTION INTRAVENOUS AS NEEDED
Status: DISCONTINUED | OUTPATIENT
Start: 2022-05-05 | End: 2022-05-05 | Stop reason: SDUPTHER

## 2022-05-05 RX ORDER — LEVETIRACETAM 500 MG/1
1000 TABLET ORAL 2 TIMES DAILY
Status: DISCONTINUED | OUTPATIENT
Start: 2022-05-05 | End: 2022-05-11 | Stop reason: HOSPADM

## 2022-05-05 RX ORDER — GABAPENTIN 300 MG/1
300 CAPSULE ORAL 3 TIMES DAILY
Status: DISCONTINUED | OUTPATIENT
Start: 2022-05-05 | End: 2022-05-05

## 2022-05-05 RX ORDER — MAGNESIUM SULFATE 100 %
4 CRYSTALS MISCELLANEOUS AS NEEDED
Status: DISCONTINUED | OUTPATIENT
Start: 2022-05-05 | End: 2022-05-05 | Stop reason: SDUPTHER

## 2022-05-05 RX ORDER — NIFEDIPINE 60 MG/1
60 TABLET, EXTENDED RELEASE ORAL DAILY
Status: DISCONTINUED | OUTPATIENT
Start: 2022-05-05 | End: 2022-05-10

## 2022-05-05 RX ORDER — INSULIN LISPRO 100 [IU]/ML
INJECTION, SOLUTION INTRAVENOUS; SUBCUTANEOUS
Status: DISCONTINUED | OUTPATIENT
Start: 2022-05-05 | End: 2022-05-05 | Stop reason: SDUPTHER

## 2022-05-05 RX ORDER — HEPARIN SODIUM 5000 [USP'U]/ML
5000 INJECTION, SOLUTION INTRAVENOUS; SUBCUTANEOUS EVERY 12 HOURS
Status: DISCONTINUED | OUTPATIENT
Start: 2022-05-05 | End: 2022-05-11 | Stop reason: HOSPADM

## 2022-05-05 RX ORDER — MAGNESIUM SULFATE 100 %
4 CRYSTALS MISCELLANEOUS AS NEEDED
Status: DISCONTINUED | OUTPATIENT
Start: 2022-05-05 | End: 2022-05-11 | Stop reason: HOSPADM

## 2022-05-05 RX ORDER — LEVETIRACETAM 500 MG/5ML
1000 INJECTION, SOLUTION, CONCENTRATE INTRAVENOUS EVERY 12 HOURS
Status: DISCONTINUED | OUTPATIENT
Start: 2022-05-05 | End: 2022-05-05

## 2022-05-05 RX ORDER — ATORVASTATIN CALCIUM 10 MG/1
10 TABLET, FILM COATED ORAL
Status: DISCONTINUED | OUTPATIENT
Start: 2022-05-05 | End: 2022-05-11 | Stop reason: HOSPADM

## 2022-05-05 RX ADMIN — HEPARIN SODIUM 5000 UNITS: 5000 INJECTION INTRAVENOUS; SUBCUTANEOUS at 12:57

## 2022-05-05 RX ADMIN — SODIUM CHLORIDE 500 ML: 9 INJECTION, SOLUTION INTRAVENOUS at 10:27

## 2022-05-05 RX ADMIN — INSULIN LISPRO 4 UNITS: 100 INJECTION, SOLUTION INTRAVENOUS; SUBCUTANEOUS at 17:50

## 2022-05-05 RX ADMIN — INSULIN LISPRO 10 UNITS: 100 INJECTION, SOLUTION INTRAVENOUS; SUBCUTANEOUS at 12:57

## 2022-05-05 RX ADMIN — LEVETIRACETAM 1000 MG: 500 TABLET, FILM COATED ORAL at 22:48

## 2022-05-05 RX ADMIN — ASPIRIN 81 MG 81 MG: 81 TABLET ORAL at 10:28

## 2022-05-05 RX ADMIN — NIFEDIPINE 60 MG: 60 TABLET, FILM COATED, EXTENDED RELEASE ORAL at 10:28

## 2022-05-05 RX ADMIN — HEPARIN SODIUM 5000 UNITS: 5000 INJECTION INTRAVENOUS; SUBCUTANEOUS at 22:48

## 2022-05-05 RX ADMIN — Medication 1 TABLET: at 10:27

## 2022-05-05 RX ADMIN — ATORVASTATIN CALCIUM 10 MG: 10 TABLET, FILM COATED ORAL at 22:48

## 2022-05-05 RX ADMIN — LEVETIRACETAM 1000 MG: 500 TABLET, FILM COATED ORAL at 10:28

## 2022-05-05 RX ADMIN — INSULIN GLARGINE 35 UNITS: 100 INJECTION, SOLUTION SUBCUTANEOUS at 22:47

## 2022-05-05 NOTE — PROGRESS NOTES
Reason for Admission:  Generalized Weakness                     RUR Score:  12%                   Plan for utilizing home health:Declined          PCP: First and Last name:  Duong Wu MD     Name of Practice:    Are you a current patient: Yes/No:    Approximate date of last visit:    Can you participate in a virtual visit with your PCP:                     Current Advanced Directive/Advance Care Plan: Full Code      Healthcare Decision Maker:   Click here to complete [address] including selection of the Healthcare Decision Maker Relationship (ie \"Primary\")             Primary Decision Maker: Leann Muniz - Other Relative - [phone]                  Transition of Care Plan:                    CM discussed discharge planning with patient and caregiver at bedside. They request patient go to [facility] at discharge. MD came in during assessmen and agreed with family choice. Referral sent.

## 2022-05-05 NOTE — H&P
History and Physical (Inpatient)    Patient:    Jyotsna Martines   80 y.o. Chief Complaint:   Weakness difficulty walking      History of Present Illness: This is an 77-year-old black male recently discharged from the hospital following seizure episode discharged same day got home had difficulty walking feeling weak had to be brought back. There is no delvin pain no further seizure nausea vomiting. ROS: Significant for weakness. History:  Past Medical History:   Diagnosis Date    Anemia     Arthritis     Diabetes [facility]     Hypercholesterolemia     Hypertension     Single kidney     Cr normal    Throat cancer ([facility] [address]     s/p laryngectomy       Past surgical history  Status post laryngectomy for throat cancer. Status post pacemaker placement. Social history. Single with 4 children. 1 . Sister is the caregiver. Is a neck smoker but does not drink. Social Connections:     Frequency of Communication with Friends and Family: Not on file    Frequency of Social Gatherings with Friends and Family: Not on file    Attends Shinto Services: Not on file    Active Member of Clubs or Organizations: Not on file    Attends Club or Organization Meetings: Not on file    Marital Status: Not on file     Family History   Family history unknown:  Yes       Allergies:  No Known Allergies    Current Medications:    Current Facility-Administered Medications:     insulin glargine (LANTUS) injection 35 Units, 35 Units, SubCUTAneous, QHS, Jihan Meza MD    gabapentin (NEURONTIN) capsule 800 mg, 800 mg, Oral, TID, Cecy DAVILA MD    atorvastatin (LIPITOR) tablet 10 mg, 10 mg, Oral, QHS, Jihan Meza MD    aspirin chewable tablet 81 mg, 81 mg, Oral, DAILY, Jihan Huynh MD, 81 mg at 22 1028    [Held by provider] lisinopriL (PRINIVIL, ZESTRIL) tablet 20 mg, 20 mg, Oral, DAILY, Cecy Hill MD    multivitamin, tx-iron-ca-min (THERA-M w/ IRON) tablet 1 Tablet, 1 Tablet, Oral, DAILY, Kevin DAVILA MD, 1 Tablet at 05/05/22 1027    NIFEdipine ER (PROCARDIA XL) tablet 60 mg, 60 mg, Oral, DAILY, Adonisnita Im, Jihan DAVILA MD, 60 mg at 05/05/22 1028    insulin lispro (HUMALOG) injection, , SubCUTAneous, TIDAC, Mojgan Im, Jihan DAVILA MD    glucose chewable tablet 16 g, 4 Tablet, Oral, PRN, Kevin Vizcaino MD    glucagon (GLUCAGEN) injection 1 mg, 1 mg, IntraMUSCular, PRN, Kevin DAVILA MD    dextrose 10% infusion 0-250 mL, 0-250 mL, IntraVENous, PRN, Kevin Vizcaino MD    levETIRAcetam (KEPPRA) tablet 1,000 mg, 1,000 mg, Oral, BID, Kevin DAVILA MD, 1,000 mg at 05/05/22 1028       Physical Exam:  Visit Vitals  BP 99/60 (BP 1 Location: Left upper arm)   Pulse 67   Temp 98.4 °F (36.9 °C)   Resp 18   Ht 5' 5\" (1.651 m)   Wt 63.5 kg (139 lb 15.9 oz)   SpO2 100%   BMI 23.30 kg/m²     Examination is an elderly black male lying in bed comfortably no respiratory distress. Looks weak. HEENT normocephalic atraumatic, anicteric sclera  Neck tracheostomy hole in place  Lungs no coarse crackles or wheeze  Heart S1-S2 regular  Abdomen soft nontender nondistended positive bowel sounds  Lower extremities without any cyanosis or edema  CNS alert and oriented follows command, moves extremities against gravity. Motor strength symmetrical  Psych cooperative    Findings/Diagnosis: Weakness ambulatory dysfunction  #2 recent new onset seizure  #3.   Diabetes  #4 hypertension  #5 status post laryngectomy for laryngeal cancer      Laboratory:      Recent Results (from the past 24 hour(s))   GLUCOSE, POC    Collection Time: 05/04/22 12:15 PM   Result Value Ref Range    Glucose (POC) 230 (H) 65 - 117 mg/dL    Performed by Lala Rosenberg    CBC WITH AUTOMATED DIFF    Collection Time: 05/04/22 10:00 PM   Result Value Ref Range    WBC 13.0 (H) 4.1 - 11.1 K/uL    RBC 3.67 (L) 4.10 - 5.70 M/uL    HGB 11.6 (L) 12.1 - 17.0 g/dL    HCT 35.2 (L) 36.6 - 50.3 %    MCV 95.9 80.0 - 99.0 FL    MCH 31.6 26.0 - 34.0 PG    MCHC 33.0 30.0 - 36.5 g/dL    RDW 13.6 11.5 - 14.5 %    PLATELET 230 260 - 254 K/uL    MPV 10.9 8.9 - 12.9 FL    NRBC 0.0 0.0  WBC    ABSOLUTE NRBC 0.00 0.00 - 0.01 K/uL    NEUTROPHILS 84 (H) 32 - 75 %    LYMPHOCYTES 10 (L) 12 - 49 %    MONOCYTES 6 5 - 13 %    EOSINOPHILS 0 0 - 7 %    BASOPHILS 0 0 - 1 %    IMMATURE GRANULOCYTES 0 0 - 0.5 %    ABS. NEUTROPHILS 10.9 (H) 1.8 - 8.0 K/UL    ABS. LYMPHOCYTES 1.3 0.8 - 3.5 K/UL    ABS. MONOCYTES 0.7 0.0 - 1.0 K/UL    ABS. EOSINOPHILS 0.0 0.0 - 0.4 K/UL    ABS. BASOPHILS 0.0 0.0 - 0.1 K/UL    ABS. IMM. GRANS. 0.1 (H) 0.00 - 0.04 K/UL    DF AUTOMATED     METABOLIC PANEL, BASIC    Collection Time: 05/04/22 10:00 PM   Result Value Ref Range    Sodium 137 136 - 145 mmol/L    Potassium 4.3 3.5 - 5.1 mmol/L    Chloride 108 97 - 108 mmol/L    CO2 28 21 - 32 mmol/L    Anion gap 1 (L) 5 - 15 mmol/L    Glucose 83 65 - 100 mg/dL    BUN 26 (H) 6 - 20 mg/dL    Creatinine 1.30 0.70 - 1.30 mg/dL    BUN/Creatinine ratio 20 12 - 20      GFR est AA >60 >60 ml/min/1.73m2    GFR est non-AA 52 (L) >60 ml/min/1.73m2    Calcium 8.8 8.5 - 10.1 mg/dL   GLUCOSE, POC    Collection Time: 05/05/22  7:41 AM   Result Value Ref Range    Glucose (POC) 95 65 - 117 mg/dL    Performed by Kishor Alvarenga        No orders to display       Plan of Care/Planned Procedure: Plan admit to the hospital.  Family wants to go to rehab. We will get physical therapy outpatient therapy. Give IV fluids. Diet and continuation of his routine medications. Discussed at length with caregiver and  . DVT prophylaxis. Accu-Chek testing.

## 2022-05-05 NOTE — ED PROVIDER NOTES
EMERGENCY DEPARTMENT HISTORY AND PHYSICAL EXAM      Date: 5/4/2022  Patient Name: Ella Montelongo    History of Presenting Illness     Chief Complaint   Patient presents with    Other     Patient denies of any complants. Wife states he was having LOC       History Provided By: Patient and Patient's niece    HPI: Ella Montelongo, 80 y.o. male with a past medical history significant Laryngeal carcinoma, newly diagnosed seizure disorder, diabetes presents to the ED with cc of generalized weakness, possible seizures again this afternoon. Patient was admitted 3 days ago, for generalized weakness near syncopal episodes, was worked up with possible seizure disorder, initiated on Keppra, discharged home today. On arrival home patient's niece and caretaker noted that patient severely weak, could not get out of ambulance stretcher, difficulty ambulating which is not at patient's baseline. Additionally felt the patient had another 2 \"seizures\" which consisted of staring off into space for several seconds at a time. Currently patient awake alert, at baseline. Patient unable to ambulate due to generalized weakness. There are no other complaints, changes, or physical findings at this time.     PCP: Chloe Pretty MD    Current Facility-Administered Medications on File Prior to Encounter   Medication Dose Route Frequency Provider Last Rate Last Admin    [DISCONTINUED] levETIRAcetam (KEPPRA) tablet 1,000 mg  1,000 mg Oral BID Chloe DAVILA MD   1,000 mg at 05/04/22 1245    [DISCONTINUED] levETIRAcetam (KEPPRA) injection 1,000 mg  1,000 mg IntraVENous Q12H Anne Marte MD   1,000 mg at 05/04/22 0031    [DISCONTINUED] atorvastatin (LIPITOR) tablet 10 mg  10 mg Oral QHS Chloe DAVILA MD   10 mg at 05/03/22 2119    [DISCONTINUED] aspirin delayed-release tablet 81 mg  81 mg Oral DAILY Chloe DAVILA MD   81 mg at 05/04/22 0828    [DISCONTINUED] gabapentin (NEURONTIN) capsule 800 mg  800 mg Oral TID Jaskaran DAVILA MD   800 mg at 05/04/22 4674    [DISCONTINUED] lisinopriL (PRINIVIL, ZESTRIL) tablet 20 mg  20 mg Oral DAILY Jaskaran DAVILA MD   20 mg at 05/04/22 0828    [DISCONTINUED] multivitamin, tx-iron-ca-min (THERA-M w/ IRON) tablet 1 Tablet  1 Tablet Oral DAILY Jaskaran Fagan MD   1 Tablet at 05/04/22 0828    [DISCONTINUED] NIFEdipine ER (PROCARDIA XL) tablet 60 mg  60 mg Oral DAILY Jaskaran DAVILA MD   60 mg at 05/04/22 0828    [DISCONTINUED] insulin glargine (LANTUS) injection 35 Units  35 Units SubCUTAneous QHS Jaskaran DAVILA MD   35 Units at 05/03/22 2120    [DISCONTINUED] glucose chewable tablet 16 g  4 Tablet Oral PRN Jaskaran Fagan MD        [DISCONTINUED] dextrose 10% infusion 0-250 mL  0-250 mL IntraVENous PRN Jaskaran Fagan MD        [DISCONTINUED] glucagon (GLUCAGEN) injection 1 mg  1 mg IntraMUSCular PRN Jaskaran Fagan MD        [DISCONTINUED] insulin lispro (HUMALOG) injection   SubCUTAneous TIDAC Jaskaran DAVILA MD   4 Units at 05/04/22 1245     Current Outpatient Medications on File Prior to Encounter   Medication Sig Dispense Refill    levETIRAcetam 1,000 mg tablet Take 1 Tablet by mouth two (2) times a day for 30 days. 60 Tablet 1    lisinopril (PRINIVIL, ZESTRIL) 20 mg tablet Take 20 mg by mouth daily.  atorvastatin (LIPITOR) 10 mg tablet Take 10 mg by mouth nightly.  PATRICIO ASPIRIN PO Take 81 mg by mouth daily.  multivitamin, tx-iron-ca-min (THERA-M W/ IRON) 9 mg iron-400 mcg tab tablet Take 1 Tab by mouth daily.  insulin detemir (LEVEMIR FLEXTOUCH) 100 unit/mL (3 mL) inpn 0.35 mL by SubCUTAneous route nightly. 35 Units by SubCUTAneous route at night 45 mL 3    [DISCONTINUED] glipiZIDE (GLUCOTROL) 5 mg tablet Take 1 Tab by mouth every morning. 90 Tab 3    NIFEdipine XL (PROCARDIA-XL) 60 mg CR tablet Take 60 mg by mouth daily.     Indications: HYPERTENSION      gabapentin (NEURONTIN) 800 mg tablet Take  by mouth three (3) times daily.  hydrochlorothiazide (HYDRODIURIL) 25 mg tablet Take 25 mg by mouth daily. Indications: HYPERTENSION      saw palmetto 500 mg cap Take 450 mg by mouth daily.  [DISCONTINUED] ibuprofen (MOTRIN) 600 mg tablet Take 600 mg by mouth every eight (8) hours as needed. Past History     Past Medical History:  Past Medical History:   Diagnosis Date    Anemia     Arthritis     Diabetes ([address] [address]     Hypercholesterolemia     Hypertension     Single kidney     Cr normal    Throat cancer ([address] 2008    s/p laryngectomy       Past Surgical History:  Past Surgical History:   Procedure Laterality Date    HX LAP CHOLECYSTECTOMY  12    By Dr. Chetan Wade states does not know reason       Family History:  Family History   Family history unknown: Yes       Social History:  Social History     Tobacco Use    Smoking status: Former Smoker     Packs/day: 1.00     Years: 57.00     Pack years: 57.00     Quit date: 2012     Years since quittin.7    Smokeless tobacco: Never Used   Substance Use Topics    Alcohol use: Yes     Alcohol/week: 17.5 standard drinks     Types: 21 Shots of liquor per week    Drug use: No       Allergies:  No Known Allergies      Review of Systems     Review of Systems   Constitutional: Positive for fatigue. Negative for activity change and appetite change. HENT: Negative for congestion. Respiratory: Negative for cough and shortness of breath. Cardiovascular: Negative for chest pain. Musculoskeletal: Negative for back pain. Neurological: Positive for weakness and light-headedness. Negative for dizziness and seizures. Psychiatric/Behavioral: Negative for agitation and confusion. Physical Exam     Physical Exam  Vitals and nursing note reviewed. Constitutional:       Appearance: Normal appearance. He is normal weight.    HENT:      Head: Normocephalic and atraumatic. Nose: Nose normal.   Eyes:      Extraocular Movements: Extraocular movements intact. Pupils: Pupils are equal, round, and reactive to light. Cardiovascular:      Rate and Rhythm: Normal rate and regular rhythm. Heart sounds: Normal heart sounds. Pulmonary:      Effort: Pulmonary effort is normal.   Abdominal:      General: Abdomen is flat. Palpations: Abdomen is soft. Musculoskeletal:         General: No swelling or tenderness. Normal range of motion. Cervical back: Neck supple. Skin:     General: Skin is warm and dry. Neurological:      General: No focal deficit present. Mental Status: He is alert and oriented to person, place, and time. Motor: Weakness present. Comments: General weakness, no focal weakness in any extremity noted. Diagnostic Study Results     Labs -     Recent Results (from the past 12 hour(s))   GLUCOSE, POC    Collection Time: 05/04/22 12:15 PM   Result Value Ref Range    Glucose (POC) 230 (H) 65 - 117 mg/dL    Performed by Trini Burch        Radiologic Studies -   @lastxrresult@  CT Results  (Last 48 hours)    None        CXR Results  (Last 48 hours)    None            Medical Decision Making   I am the first provider for this patient. I reviewed the vital signs, available nursing notes, past medical history, past surgical history, family history and social history. Vital Signs-Reviewed the patient's vital signs. Patient Vitals for the past 12 hrs:   Temp Pulse Resp BP SpO2   05/04/22 1851 98.4 °F (36.9 °C) 75 20 130/71 96 %       Records Reviewed: Nursing Notes and Old Medical Records    The patient presents with generalized weakness with a differential diagnosis of deconditioning, electrolyte abnormality, seizure disorder.       Provider Notes (Medical Decision Making):     MDM     70-year-old male, history of diabetes, laryngeal carcinoma, newly diagnosed seizure disorder presents emergency department from home after being discharged today. Family concerned that patient is having significant generalized weakness, difficulty ambulating, which is new from baseline. On my exam patient awake alert, no distress, nonfocal neurological exam, patient having difficulty standing up and ambulating due to generalized weakness. Will recall patient's PMD Dr. Adele Mann and discuss case. ED Course:   Initial assessment performed. The patients presenting problems have been discussed, and they are in agreement with the care plan formulated and outlined with them. I have encouraged them to ask questions as they arise throughout their visit. ED Course as of 05/04/22 2047   Wed May 04, 2022   1953 Spoke with Dr. Kei Christianson, states that patient able to ambulate, did not feel the patient required inpatient rehab, can set patient up with outpatient rehab at home. Will recontact patient's niece and discuss options with her. [PZ]   2047 Discussed with patient's niece, concerned that patient having severe difficulty ambulating, cannot care for self at home, would prefer the patient be referred to inpatient rehab. I recontacted Dr. Kei Christianson agrees to admit patient for rehab evaluation and placement [PZ]      ED Course User Index  [PZ] Louise Price MD         PROCEDURES  Procedures         PLAN:  1. Current Discharge Medication List        2. Follow-up Information    None       Return to ED if worse     Diagnosis     Clinical Impression:   1.  Generalized weakness

## 2022-05-05 NOTE — CONSULTS
Consult    NAME: Ibeth Hernandez   :  1935   MRN:  [id number]     Date/Time:  2022 2:26 PM    Patient PCP: Francesca Huizar MD  ________________________________________________________________________     Assessment:   Primary cardiologist: [facility]  Hayley Cifuentes M.D.)    PROBLEM LIST:  1. Patient presents for evaluation of \"loss of consciousness\"  2. Status post permanent pacemaker ([address])  3. Hypertension  4. Dyslipidemia  5. Type 2 diabetes    6. Former smoker  7. History of throat cancer  8. Status post laryngectomy for throat cancer  9. Seizure disorder  10. Leukocytosis  11. Anemia        []        High complexity decision making was performed        Subjective:   CHIEF COMPLAINT:     HISTORY OF PRESENT ILLNESS:     Is a 51-year-old -American male with no known coronary disease presents for evaluation of possible seizure. The patient recently became established with our practice. Previously he was seen by another area cardiologist.  There is no history of previous myocardial infarction, or congestive heart failure. The patient does have a history of permanent pacemaker implantation. The patient became established with our office 1 week ago. He also had his [address] pacemaker interrogated. It was found to be functioning properly, and his battery life is expected to last over 10 years. The patient apparently was treated at this facility recently for seizure activity. He was treated and released. Subsequently he was readmitted. Because of the question of chest pain cardiology is consulted. At the time of my evaluation the patient denies any cardiovascular complaints. Specifically, he denies chest pain, pressure or tightness. Further, there is no shortness of breath or dyspnea on exertion. His coronary disease risk factors include hypertension, dyslipidemia, diabetes, and former smoker.   There is no family history of premature coronary artery dise      Past Medical History:   Diagnosis Date    Anemia     Arthritis     [facility]     Hypercholesterolemia     Hypertension     Single kidney     Cr normal    Throat cancer ([facility] 2008    s/p laryngectomy      Past Surgical History:   Procedure Laterality Date    HX LAP CHOLECYSTECTOMY  12    By Dr. Adarsh Alves states does not know reason     No Known Allergies   Meds:  See below  Social History     Tobacco Use    Smoking status: Former Smoker     Packs/day: 1.00     Years: 57.00     Pack years: 57.00     Quit date: 2012     Years since quittin.7    Smokeless tobacco: Never Used   Substance Use Topics    Alcohol use: Yes     Alcohol/week: 17.5 standard drinks     Types: 21 Shots of liquor per week      Family History   Family history unknown: Yes       REVIEW OF SYSTEMS:    As above, otherwise incomplete database. Objective:      Physical Exam:    Last 24hrs VS reviewed since prior progress note. Most recent are:    Visit Vitals  BP 99/60 (BP 1 Location: Left upper arm)   Pulse 67   Temp 98.4 °F (36.9 °C)   Resp 18   Ht 5' 5\" (1.651 m)   Wt 63.5 kg (139 lb 15.9 oz)   SpO2 100%   BMI 23.30 kg/m²     No intake or output data in the 24 hours ending 22 1426     General Appearance: Well developed, in no acute respiratory distress. Ears/Nose/Mouth/Throat: Atraumatic, normocephalic, PERRL. Neck: Supple. Without thyromegaly, or lymphadenopathy. Status post laryngectomy. Chest: Lungs clear to auscultation bilaterally. Cardiovascular: JVP is not elevated, there is a left subclavian pacing device noted, PMI is not palpable, normal intensity S1 and S2, without S3. Abdomen: Soft, non-tender, non-tender, bowel sounds present. Extremities: No edema bilaterally. Skin: Warm and dry. Neuro: CN II-XII grossly intact, without gross motor/sensory deficit.     Data:      Telemetry:    EKG:  []  No new EKG for review  No orders to display        Prior to Admission medications    Medication Sig Start Date End Date Taking? Authorizing Provider   SITagliptin (Januvia) 25 mg tablet Take 25 mg by mouth daily. Yes Provider, Historical   levETIRAcetam 1,000 mg tablet Take 1 Tablet by mouth two (2) times a day for 30 days. 5/4/22 6/3/22  Justus Castanon MD   lisinopril (PRINIVIL, ZESTRIL) 20 mg tablet Take 20 mg by mouth daily. Provider, Historical   atorvastatin (LIPITOR) 10 mg tablet Take 10 mg by mouth nightly. Provider, Historical   PATRICIO ASPIRIN PO Take 81 mg by mouth daily. Provider, Historical   multivitamin, tx-iron-ca-min (THERA-M W/ IRON) 9 mg iron-400 mcg tab tablet Take 1 Tab by mouth daily. Provider, Historical   insulin detemir (LEVEMIR FLEXTOUCH) 100 unit/mL (3 mL) inpn 0.35 mL by SubCUTAneous route nightly. 35 Units by SubCUTAneous route at night  Patient taking differently: 10 Units by SubCUTAneous route nightly. 35 Units by SubCUTAneous route at night 6/17/16   Kandi Mcginnis MD   NIFEdipine XL (PROCARDIA-XL) 60 mg CR tablet Take 60 mg by mouth daily. Indications: HYPERTENSION    Provider, Historical   hydrochlorothiazide (HYDRODIURIL) 25 mg tablet Take 25 mg by mouth daily. Indications: HYPERTENSION    Provider, Historical   saw palmetto 500 mg cap Take 450 mg by mouth daily.       Provider, Historical       Recent Results (from the past 24 hour(s))   CBC WITH AUTOMATED DIFF    Collection Time: 05/04/22 10:00 PM   Result Value Ref Range    WBC 13.0 (H) 4.1 - 11.1 K/uL    RBC 3.67 (L) 4.10 - 5.70 M/uL    HGB 11.6 (L) 12.1 - 17.0 g/dL    HCT 35.2 (L) 36.6 - 50.3 %    MCV 95.9 80.0 - 99.0 FL    MCH 31.6 26.0 - 34.0 PG    MCHC 33.0 30.0 - 36.5 g/dL    RDW 13.6 11.5 - 14.5 %    PLATELET 175 631 - 881 K/uL    MPV 10.9 8.9 - 12.9 FL    NRBC 0.0 0.0  WBC    ABSOLUTE NRBC 0.00 0.00 - 0.01 K/uL    NEUTROPHILS 84 (H) 32 - 75 %    LYMPHOCYTES 10 (L) 12 - 49 %    MONOCYTES 6 5 - 13 %    EOSINOPHILS 0 0 - 7 %    BASOPHILS 0 0 - 1 %    IMMATURE GRANULOCYTES 0 0 - 0.5 %    ABS. NEUTROPHILS 10.9 (H) 1.8 - 8.0 K/UL    ABS. LYMPHOCYTES 1.3 0.8 - 3.5 K/UL    ABS. MONOCYTES 0.7 0.0 - 1.0 K/UL    ABS. EOSINOPHILS 0.0 0.0 - 0.4 K/UL    ABS. BASOPHILS 0.0 0.0 - 0.1 K/UL    ABS. IMM. GRANS. 0.1 (H) 0.00 - 0.04 K/UL    DF AUTOMATED     METABOLIC PANEL, BASIC    Collection Time: 05/04/22 10:00 PM   Result Value Ref Range    Sodium 137 136 - 145 mmol/L    Potassium 4.3 3.5 - 5.1 mmol/L    Chloride 108 97 - 108 mmol/L    CO2 28 21 - 32 mmol/L    Anion gap 1 (L) 5 - 15 mmol/L    Glucose 83 65 - 100 mg/dL    BUN 26 (H) 6 - 20 mg/dL    Creatinine 1.30 0.70 - 1.30 mg/dL    BUN/Creatinine ratio 20 12 - 20      GFR est AA >60 >60 ml/min/1.73m2    GFR est non-AA 52 (L) >60 ml/min/1.73m2    Calcium 8.8 8.5 - 10.1 mg/dL   GLUCOSE, POC    Collection Time: 05/05/22  7:41 AM   Result Value Ref Range    Glucose (POC) 95 65 - 117 mg/dL    Performed by Tammy Nam, POC    Collection Time: 05/05/22 11:14 AM   Result Value Ref Range    Glucose (POC) 374 (H) 65 - 117 mg/dL    Performed by [address] [place] [address]:   1. Continue telemetry monitor  2. Continue to monitor serum electrolytes, renal function  3. Obtain complete 2D echocardiogram  4. Continue current cardiovascular medications including aspirin, atorvastatin, heparin, insulin, and nifedipine  5.   Further recommendations depending on above results, and clinical course   Aimee Alcocer MD

## 2022-05-05 NOTE — ED NOTES
TRANSFER - OUT REPORT:    Verbal report given to [address] [place] [address] on Ana Laura Olguin  being transferred to [facility] routine progression of care       Report consisted of patients Situation, Background, Assessment and   Recommendations(SBAR). Information from the following report(s) SBAR, ED Summary and MAR was reviewed with the receiving nurse. Lines:   Peripheral IV 05/04/22 Anterior;Left;Proximal Forearm (Active)        Opportunity for questions and clarification was provided.       Patient transported with:   [employer]

## 2022-05-05 NOTE — ROUTINE PROCESS
Patient admit skin assessment done by Jus Robin. And Eliazar OMER. The patient has laryngeal cancer and no trach but small hole and the device to help him talk. He is A & 0 x 4 not c/o pain and able to take food and water by mouth. His sugar at 10p was 83. He does have a small healed scab on his right knee.

## 2022-05-06 LAB
GLUCOSE BLD STRIP.AUTO-MCNC: 161 MG/DL (ref 65–117)
GLUCOSE BLD STRIP.AUTO-MCNC: 216 MG/DL (ref 65–117)
GLUCOSE BLD STRIP.AUTO-MCNC: 317 MG/DL (ref 65–117)
GLUCOSE BLD STRIP.AUTO-MCNC: 81 MG/DL (ref 65–117)
LEVETIRACETAM SERPL-MCNC: 41.3 UG/ML (ref 10–40)
PERFORMED BY, TECHID: ABNORMAL
PERFORMED BY, TECHID: NORMAL

## 2022-05-06 PROCEDURE — 96372 THER/PROPH/DIAG INJ SC/IM: CPT

## 2022-05-06 PROCEDURE — 97530 THERAPEUTIC ACTIVITIES: CPT

## 2022-05-06 PROCEDURE — 74011636637 HC RX REV CODE- 636/637: Performed by: INTERNAL MEDICINE

## 2022-05-06 PROCEDURE — 74011250636 HC RX REV CODE- 250/636: Performed by: INTERNAL MEDICINE

## 2022-05-06 PROCEDURE — 74011250637 HC RX REV CODE- 250/637: Performed by: INTERNAL MEDICINE

## 2022-05-06 PROCEDURE — 96375 TX/PRO/DX INJ NEW DRUG ADDON: CPT

## 2022-05-06 PROCEDURE — 97161 PT EVAL LOW COMPLEX 20 MIN: CPT

## 2022-05-06 PROCEDURE — 65270000029 HC RM PRIVATE

## 2022-05-06 PROCEDURE — 96374 THER/PROPH/DIAG INJ IV PUSH: CPT

## 2022-05-06 PROCEDURE — 97165 OT EVAL LOW COMPLEX 30 MIN: CPT

## 2022-05-06 PROCEDURE — 82962 GLUCOSE BLOOD TEST: CPT

## 2022-05-06 RX ORDER — ACETAMINOPHEN 325 MG/1
650 TABLET ORAL
Status: DISCONTINUED | OUTPATIENT
Start: 2022-05-06 | End: 2022-05-11 | Stop reason: HOSPADM

## 2022-05-06 RX ADMIN — HEPARIN SODIUM 5000 UNITS: 5000 INJECTION INTRAVENOUS; SUBCUTANEOUS at 11:09

## 2022-05-06 RX ADMIN — NIFEDIPINE 60 MG: 60 TABLET, FILM COATED, EXTENDED RELEASE ORAL at 09:53

## 2022-05-06 RX ADMIN — METHYLPREDNISOLONE SODIUM SUCCINATE 40 MG: 40 INJECTION, POWDER, FOR SOLUTION INTRAMUSCULAR; INTRAVENOUS at 21:56

## 2022-05-06 RX ADMIN — INSULIN LISPRO 4 UNITS: 100 INJECTION, SOLUTION INTRAVENOUS; SUBCUTANEOUS at 17:07

## 2022-05-06 RX ADMIN — ACETAMINOPHEN 650 MG: 325 TABLET ORAL at 17:07

## 2022-05-06 RX ADMIN — ACETAMINOPHEN 650 MG: 325 TABLET ORAL at 11:10

## 2022-05-06 RX ADMIN — LEVETIRACETAM 1000 MG: 500 TABLET, FILM COATED ORAL at 09:53

## 2022-05-06 RX ADMIN — Medication 1 TABLET: at 09:53

## 2022-05-06 RX ADMIN — INSULIN LISPRO 2 UNITS: 100 INJECTION, SOLUTION INTRAVENOUS; SUBCUTANEOUS at 11:48

## 2022-05-06 RX ADMIN — HEPARIN SODIUM 5000 UNITS: 5000 INJECTION INTRAVENOUS; SUBCUTANEOUS at 23:58

## 2022-05-06 RX ADMIN — ACETAMINOPHEN 650 MG: 325 TABLET ORAL at 22:02

## 2022-05-06 RX ADMIN — LEVETIRACETAM 1000 MG: 500 TABLET, FILM COATED ORAL at 20:49

## 2022-05-06 RX ADMIN — ATORVASTATIN CALCIUM 10 MG: 10 TABLET, FILM COATED ORAL at 20:49

## 2022-05-06 RX ADMIN — INSULIN GLARGINE 35 UNITS: 100 INJECTION, SOLUTION SUBCUTANEOUS at 20:48

## 2022-05-06 NOTE — PROGRESS NOTES
Progress Note      5/6/2022 6:21 AM  NAME: Yuly Loera   MRN:  [id number]   Admit Diagnosis: Generalized weakness [R53.1]      Problem List:   1. Syncope  2. Status post permanent pacemaker ([address])  3. Hypertension  4. Dyslipidemia  5. Type 2 diabetes     6. Former smoker  7. History of throat cancer  8. Status post laryngectomy for throat cancer  9. Seizure disorder  10. Leukocytosis  11. Anemia       Subjective: The patient is seen and examined in room 580. There were no acute cardiovascular events reported overnight. Currently, he denies any cardiovascular complaints. Specifically, he denies any chest pain, pressure or tightness. The patient had his permanent pacemaker interrogated in our office on April 27, 2022. The device was found to be functioning properly. The battery life is expected to be 10-1/2 years. Medications Personally Reviewed:    Current Facility-Administered Medications   Medication Dose Route Frequency    insulin glargine (LANTUS) injection 35 Units  35 Units SubCUTAneous QHS    atorvastatin (LIPITOR) tablet 10 mg  10 mg Oral QHS    aspirin chewable tablet 81 mg  81 mg Oral DAILY    [Held by provider] lisinopriL (PRINIVIL, ZESTRIL) tablet 20 mg  20 mg Oral DAILY    multivitamin, tx-iron-ca-min (THERA-M w/ IRON) tablet 1 Tablet  1 Tablet Oral DAILY    NIFEdipine ER (PROCARDIA XL) tablet 60 mg  60 mg Oral DAILY    levETIRAcetam (KEPPRA) tablet 1,000 mg  1,000 mg Oral BID    heparin (porcine) injection 5,000 Units  5,000 Units SubCUTAneous Q12H    glucose chewable tablet 16 g  4 Tablet Oral PRN    dextrose 10% infusion 0-250 mL  0-250 mL IntraVENous PRN    glucagon (GLUCAGEN) injection 1 mg  1 mg IntraMUSCular PRN    insulin lispro (HUMALOG) injection   SubCUTAneous TIDAC           Objective:      Physical Exam:  Essentially unchanged  Last 24hrs VS reviewed since prior progress note.  Most recent are:    Visit Vitals  BP (!) 104/54   Pulse 82   Temp 98.5 °F (36.9 °C)   Resp 20   Ht 5' 5\" (1.651 m)   Wt 63.5 kg (139 lb 15.9 oz)   SpO2 98%   BMI 23.30 kg/m²     No intake or output data in the 24 hours ending 05/06/22 0621       Data Review    Telemetry: Not applicable    EKG:   []  No new EKG for review    Lab Data Personally Reviewed:    Recent Labs     05/04/22  2200   WBC 13.0*   HGB 11.6*   HCT 35.2*        No results for input(s): INR, PTP, APTT, INREXT in the last 72 hours. Recent Labs     05/04/22  2200      K 4.3      CO2 28   BUN 26*   CREA 1.30   GLU 83   CA 8.8     No results for input(s): CPK, CKNDX, TROIQ in the last 72 hours. No lab exists for component: CPKMB  Lab Results   Component Value Date/Time    Cholesterol, total 126 09/09/2016 10:38 AM    HDL Cholesterol 70 09/09/2016 10:38 AM    LDL, calculated 45 09/09/2016 10:38 AM    Triglyceride 56 09/09/2016 10:38 AM       No results for input(s): AP, TBIL, TP, ALB, GLOB, GGT, AML, LPSE in the last 72 hours. No lab exists for component: SGOT, GPT, AMYP, HLPSE  No results for input(s): PH, PCO2, PO2 in the last 72 hours. Assessment/Plan:   1. From cardiovascular standpoint the patient may be discharged home  2. Continue to monitor serum electrolytes, and renal function  3. Continue current cardiovascular medications including aspirin, atorvastatin, heparin, insulin, and nifedipine  4. No further cardiac testing indicated at this time  5. Will sign off, but remain available as needed    6.   The patient is to follow-up in our office within 1 to 2 weeks after discharge     Isela Dailey MD

## 2022-05-06 NOTE — PROGRESS NOTES
PHYSICAL THERAPY EVALUATION  Patient: Chetna Mckay (10 y.o. male)  Date: 5/6/2022  Primary Diagnosis: Generalized weakness [R53.1]        Precautions: falls       ASSESSMENT  This is a 79 y/o male who was recently discharged from the hospital following seizure episode discharged same day, got home had difficulty walking with c/o weakness had to be brought back , admitted on 5/6/22 for generalized weakness. Pt with PMH of Anemia, Arthritis, Diabetes ([facility] [address] Hypercholesterolemia, Hypertension, Single kidney, Throat cancer ([facility] [address] 2008,    s/p laryngectomy, uses electrolarynx device for speech. Pt received semi-supine in bed , agreeable for PT/OT eval/tx . Pt is A& O x 4 ,  per pt report , he lives alone in a apartment on 1st floor story home with 0 steps to enter ,   was IND with ADL's and mod I for functional transfers/mobility with SPC prior to admission . Other DME owned: Rw, shower chair     Based on the objective data described below, the patient presents with c/o pain at R wrist - 8/10 ,  decreased strength , 3+/5 grossly in  b/l LE , balance deficits , generalized weakness , decreased activity tolerance and increased need for assist with functional mobility ( needs SBA for rolling from side to side  , CGA for supine >sit transfers with additional time, fair  static sitting balance , Cynthia for sit <>stand and bed<>chair , good  static  standing balance with support, is able to ambulate - 36' with RW, Cynthia with narrow LATOSHA,  slow malena , and decreased step length  b/l Le ) . Pt would benefit from continued skilled PT services to address current impairments and improve overall IND and safety with  functional transfers/mobility. Recommend d/c to IRF when medically appropriate . Current Level of Function Impacting Discharge (mobility/balance): Pt requires Cynthia for transfers/mobility    Functional Outcome Measure:   The patient scored 18 on the AM PAC basic mobility outcome measure which is indicative of medium complexity. Other factors to consider for discharge: severity of deficits, time since onset, lives alone          PLAN :  Recommendations and Planned Interventions: bed mobility training, transfer training, gait training, therapeutic exercises, neuromuscular re-education, patient and family training/education, and therapeutic activities      Frequency/Duration: Patient will be followed by physical therapy:  3-5x/week to address goals. Recommendation for discharge: (in order for the patient to meet his/her long term goals)  [address]     This discharge recommendation:  Has been made in collaboration with the attending provider and/or case management    IF patient discharges home will need the following DME: none         SUBJECTIVE:   Patient stated  My R wrist hurts     OBJECTIVE DATA SUMMARY:   HISTORY:    Past Medical History:   Diagnosis Date    Anemia     Arthritis     Diabetes ([facility] [address]     Hypercholesterolemia     Hypertension     Single kidney     Cr normal    Throat cancer ([facility] [address] 2008    s/p laryngectomy     Past Surgical History:   Procedure Laterality Date    HX LAP CHOLECYSTECTOMY  12-19-12    By Dr. Ciera Acuna states does not know reason       Personal factors and/or comorbidities impacting plan of care:     Home Situation  Home Environment: Apartment  # Steps to Enter: 0  Wheelchair Ramp: No  One/Two Story Residence: One story  Living Alone: Yes  Support Systems: Other Family Member(s)  Patient Expects to be Discharged to[de-identified] Rehab hospital/unit acute  Current DME Used/Available at Home: [address] [facility], straight,Walker, rolling,Shower chair    PLOF: Pt IND for ADLS/IADLS, mod I for mobility with SPC prior to admission. EXAMINATION/PRESENTATION/DECISION MAKING:   Critical Behavior:  Neurologic State: Alert  Orientation Level: Oriented X4  Cognition: Follows commands     Hearing:   Auditory  Auditory Impairment: None  Skin: intact where exposed    Range Of Motion:  AROM: Generally decreased, functional (limited sh flex bilaterallyl)    Strength:    Strength: Generally decreased, functional     Tone & Sensation:   Tone: Normal    Sensation: Intact    Coordination:  Coordination: Generally decreased, functional    Functional Mobility:  Bed Mobility:  Rolling: Stand-by assistance  Supine to Sit: Contact guard assistance; Additional time     Scooting: Stand-by assistance  Transfers:  Sit to Stand: Minimum assistance  Stand to Sit: Minimum assistance        Bed to Chair: Minimum assistance    Balance:   Sitting: Impaired; With support  Sitting - Static: Fair (occasional)  Sitting - Dynamic: Fair (occasional)  Standing: Impaired; With support  Standing - Static: Good;Constant support  Standing - Dynamic : Fair;Constant support  Ambulation/Gait Training:  Distance (ft): 40 Feet (ft)  Assistive Device: Walker, rolling;Gait belt  Ambulation - Level of Assistance: Minimal assistance    Base of Support: Narrowed     Speed/Alma: Slow  Step Length: Right shortened;Left shortened    Functional Measure:    [address] [facility] [address] [address] AM-PAC 6 Clicks         Basic Mobility Inpatient Short Form  How much difficulty does the patient currently have. .. Unable A Lot A Little None   1. Turning over in bed (including adjusting bedclothes, sheets and blankets)? [] 1   [] 2   [x] 3   [] 4   2. Sitting down on and standing up from a chair with arms ( e.g., wheelchair, bedside commode, etc.)   [] 1   [] 2   [x] 3   [] 4   3. Moving from lying on back to sitting on the side of the bed? [] 1   [] 2   [x] 3   [] 4          How much help from another person does the patient currently need. .. Total A Lot A Little None   4. Moving to and from a bed to a chair (including a wheelchair)? [] 1   [] 2   [x] 3   [] 4   5. Need to walk in hospital room? [] 1   [] 2   [x] 3   [] 4   6. Climbing 3-5 steps with a railing?    [] 1   [] 2   [x] 3   [] 4   © 2007, Trustees of [facility] [employer], under license to [employer]. All rights reserved     Score:  Initial: 18 Most Recent: X (Date: -5/6/22- )   Interpretation of Tool:  Represents activities that are increasingly more difficult (i.e. Bed mobility, Transfers, Gait). Score 24 23 22-20 19-15 14-10 9-7 6   Modifier CH CI CJ CK CL CM CN          Physical Therapy Evaluation Charge Determination   History Examination Presentation Decision-Making   MEDIUM  Complexity : 1-2 comorbidities / personal factors will impact the outcome/ POC  MEDIUM Complexity : 3 Standardized tests and measures addressing body structure, function, activity limitation and / or participation in recreation  LOW Complexity : Stable, uncomplicated  Other Functional Measure [facility] 6 medium complexity      Based on the above components, the patient evaluation is determined to be of the following complexity level: LOW     Pain Rating:  Pain R wrist - 8/10    Activity Tolerance:   Fair  Please refer to the flowsheet for vital signs taken during this treatment. After treatment patient left in no apparent distress:   Sitting in chair and Call bell within reach    COMMUNICATION/EDUCATION:   The patients plan of care was discussed with: Occupational therapist and Registered nurse. Fall prevention education was provided and the patient/caregiver indicated understanding. and Patient/family agree to work toward stated goals and plan of care. Problem: Mobility Impaired (Adult and Pediatric)  Goal: *Acute Goals and Plan of Care (Insert Text)  Description: Pt will be I with LE HEP in 7 days. Pt will perform bed mobility with mod I in 7 days. Pt will perform transfers with mod I in 7 days. Pt will amb >200 feet with LRAD safely with mod I in 7 days. Outcome: Not Met     PT/OT session occurred together for increased pt's safety and maximum functional outcome.      Thank you for this referral.  Yvon Gutierrez   Time Calculation: 27 mins

## 2022-05-06 NOTE — PROCEDURES
[facility]  EEG    Name:  Jia Astorga  MR#:  [id number]  :  1935  ACCOUNT #:  [de-identified]  DATE OF SERVICE:    DIAGNOSIS:  Possible seizures. DESCRIPTION:  Recording is done digitally on computer. Electrodes are placed in a 10-20 international system. Initial recording is equipment calibration followed by biocalibration. Initial montages are bipolar montages. TECHNIQUE:  Tracing started with the patient having a background frequency of 7-8 Hz. As the recording continues, the patient is hooked up to an EKG machine that shows a heart rate of 78. The patient is exposed to photic stimulation without any abnormality. Hyperventilation was not done. There is intermittent eye motion artifact as well as muscle artifact. IMPRESSION:  This is a borderline normal EEG, acceptable.       Jose De Jesus Terrazas MD      [id number]  D:  2022 14:10  T:  2022 17:53  JOB #:  [id number]

## 2022-05-06 NOTE — CONSULTS
Rehab Consult    Patient: Charmayne Modest MRN: [id number]  SSN: [id number]    YOB: 1935  Age: 80 y.o. Sex: male      Consulting provider: Dr. Sarah Linton  Reason for consult: Evaluate for rehab needs     CC: Difficulty walking, weakness    Subjective: This is a 80 y.o. male who has a past medical history including laryngeal carcinoma, arthritis, diabetes, hyperlipidemia, hypertension. He was recently diagnosed with new onset seizures and started on [address] after Neurology evaluation, subsequently discharged home. He returned to the ED within 1 day with generalized weakness, difficulty ambulating, and inability to care for himself. Cardiology following patient and endorses permanent pacemaker recently interrogated and functioning properly. He has been cleared from a cardiology standpoint for discharge. He was referred to inpatient rehab due to his ongoing neurological deficits and failure to transition home. Therapy recommending IPR. Functional History -   Baseline:patient baseline function is Independent with ADLs, Mod I for transfers and ambulation. Utilizes Rolling walker at baseline    Current: functioning at a min assist for transfers, ADLs, and mobility. Able to ambulate 36' with Rolling walker.      Living situation: Patient lives alone in first floor apartment    Past Medical History:   Diagnosis Date    Anemia     Arthritis     [facility] ([facility] [address]     Hypercholesterolemia     Hypertension     Single kidney     Cr normal    Throat cancer ([facility] [address] 2008    s/p laryngectomy     Past Surgical History:   Procedure Laterality Date    HX LAP CHOLECYSTECTOMY  12-19-12    By Dr. Fartun Langston states does not know reason      Family History   Family history unknown: Yes     Social History     Tobacco Use    Smoking status: Former Smoker     Packs/day: 1.00     Years: 57.00     Pack years: 57.00     Quit date: 7/20/2012     Years since quittin.8    Smokeless tobacco: Never Used   Substance Use Topics    Alcohol use: Yes     Alcohol/week: 17.5 standard drinks     Types: 21 Shots of liquor per week      Current Facility-Administered Medications   Medication Dose Route Frequency Provider Last Rate Last Admin    acetaminophen (TYLENOL) tablet 650 mg  650 mg Oral Q6H PRN Fabien DAVILA MD   650 mg at 22 1110    insulin glargine (LANTUS) injection 35 Units  35 Units SubCUTAneous QHS Fabien DAVILA MD   35 Units at 22 2247    atorvastatin (LIPITOR) tablet 10 mg  10 mg Oral QHS Fabien DAVILA MD   10 mg at 22 2248    lisinopriL (PRINIVIL, ZESTRIL) tablet 20 mg  20 mg Oral DAILY Fabien Langford MD        multivitamin, tx-iron-ca-min (THERA-M w/ IRON) tablet 1 Tablet  1 Tablet Oral DAILY Fabien Langford MD   1 Tablet at 22 0953    NIFEdipine ER (PROCARDIA XL) tablet 60 mg  60 mg Oral DAILY Fabien DAVILA MD   60 mg at 22 0953    levETIRAcetam (KEPPRA) tablet 1,000 mg  1,000 mg Oral BID Fabien DAVILA MD   1,000 mg at 22 0953    heparin (porcine) injection 5,000 Units  5,000 Units SubCUTAneous Q12H Fabien DAVILA MD   5,000 Units at 22 1109    glucose chewable tablet 16 g  4 Tablet Oral PRN Fabien Langford MD        dextrose 10% infusion 0-250 mL  0-250 mL IntraVENous PRN Fabien Langford MD        glucagon (GLUCAGEN) injection 1 mg  1 mg IntraMUSCular PRN Fabien Langford MD        insulin lispro (HUMALOG) injection   SubCUTAneous TIDAC Fabien Langford MD   2 Units at 22 1148        No Known Allergies    Review of Systems:  Positive for fatigue. Otherwise a complete review of systems was negative except as noted above in HPI.      Objective:     Vitals:    22 0734 22 1516 22 1950 22 0952   BP: 99/60 125/61 (!) 104/54 (!) 152/74   Pulse: 67 67 82 86   Resp: 18 18 20 20   Temp: 98.4 °F (36.9 °C) 98.5 °F (36.9 °C) 98.5 °F (36.9 °C) 99.4 °F (37.4 °C)   SpO2: 100% 97% 98% 98%   Weight:       Height:            Physical Exam:  General: NAD, pleasant and cooperative   HEENT: anicteric, utilizing electroylarynx  CV: RRR, no murmurs, 2+ pulses   Respiratory: clear and aerating well, no increased WOB  Abdomen: nondistended, nontender  Extremities: no peripheral edema   Musculoskeletal: moving extremities at least antigravity  Neuro: alert, normal speech, CN 2-12 intact, sensation intact to light touch     Recent Results (from the past 24 hour(s))   GLUCOSE, POC    Collection Time: 05/05/22  3:18 PM   Result Value Ref Range    Glucose (POC) 249 (H) 65 - 117 mg/dL    Performed by Milena Polanco    GLUCOSE, POC    Collection Time: 05/05/22  8:13 PM   Result Value Ref Range    Glucose (POC) 302 (H) 65 - 117 mg/dL    Performed by Juliette Boast    GLUCOSE, POC    Collection Time: 05/06/22  7:21 AM   Result Value Ref Range    Glucose (POC) 81 65 - 117 mg/dL    Performed by Brit Cardenas    GLUCOSE, POC    Collection Time: 05/06/22 11:06 AM   Result Value Ref Range    Glucose (POC) 161 (H) 65 - 117 mg/dL    Performed by Ori Patrick         Impression/Plan:     Diagnoses:     New onset seizures  Metabolic encephalopathy  Diabetes  Htn  Solitary kidney  Laryngeal carcinoma, hx laryngectomy  Chronic lacunar infarct R basal ganglia      This patient would benefit from participation in an intensive inpatient rehabilitation program. He will need close physician monitoring ongoing seizure activity, new antiepileptics. This patient can likely tolerate 3 hours of therapy per day. Discussed with patient: Patient still contemplating home vs IPR. Will need follow up. Barriers to rehab: none    [ x ] Will need submission for insurance authorization for IPR.        Signed By: Ellen Samuels NP     May 6, 2022    Physical Medicine and Rehabilitation        Attending addendum:   I affirm that I was present for and performed all components of this face-to-face encounter including interval history, physical exam, and medical decision making. I have reviewed the information documented above by Eriberto Daugherty NP, and I have confirmed the accuracy of the information (with any exceptions noted below).

## 2022-05-06 NOTE — PROGRESS NOTES
Patient to discharge to [facility] when medically stable per therapy recommendation. Family agrees. Rehab consult in  for [facility] MD Dr. Denise Caba to evaluate.

## 2022-05-06 NOTE — PROGRESS NOTES
OCCUPATIONAL THERAPY EVALUATION  Patient: Derrick Reyes (44 y.o. male)  Date: 5/6/2022  Primary Diagnosis: Generalized weakness [R53.1]        Precautions: fall risk       ASSESSMENT  Pt is an 79 y/o M with PMH of laryngeal carcinoma s/p laryngenctomy, seizure disorder, and DM presenting to [facility] with c/o genearlized weakness and possible seizure activity. Of note, pt was admitted to the hospital 3 days ago and treated for generalized weakness near syncopal episodes, was worked up with possible seizure disorder, initiated on Keppra, and discharged home. On arrival home however patient's niece and caretaker noted that pt was severely weak, could not get out of ambulance stretcher and had difficulty ambulating which is not at patient's baseline. Pt currently admitted 5/4/22 and being treated for weakness/ambulatory dysfunction, new onset seizure, DM, HTN. Pt received semi-supine in bed upon arrival, AXO x4 (use of electrolarynx during session), and agreeable to OT/PT evaluations at this time. Per pt report, pt lives alone in a one-story apartment home in a senior living complex with 0 TOMER, was Mod I-IND with ADLs and ambulatory with a SPC at [facility]. DME: SPC, RW, shower chair. Based on current observations, pt presents with deficits in generalized strength/AROM, bed mobility, static/dynamic sitting balance, static/dynamic standing balance, functional activity tolerance, and c/o R wrist pain impacting overall performance of ADLs and functional transfers/mobility. Pt currently requires SBA rolling, CGA supine>sit and min A donning sock to R foot crossing LE over opposite knee with minor LOB noted leaning L while reaching anteriorly for foot. Sit><stand completed to/from bed>commode>chair with gt belt, RW and min A for balance/safety (see PT note for gait details) with continued min A for balance during bathroom mobility.  Basic grooming (face washing) performed s/p setup while seated in chair with intact coordination observed at end of evaluation. Overall, pt tolerates session fair with c/o R wrist pain (FACES 8/10, RN made aware). Pt is motivated to return to OF and would benefit from continued skilled OT services to address current impairments and improve IND and safety with self cares and functional transfers/mobility during acute hospital stay and at next level of care. Current OT d/c recommendation IRF once medically appropriate. Other factors to consider for discharge: family/social support, DME, time since onset, severity of deficits, decline from functional baseline     Patient will benefit from skilled therapy intervention to address the above noted impairments. PLAN :  Recommendations and Planned Interventions: self care training, functional mobility training, therapeutic exercise, balance training, therapeutic activities, endurance activities and patient education    Frequency/Duration: Patient will be followed by occupational therapy:  3-5x/week to address goals. Recommendation for discharge: (in order for the patient to meet his/her long term goals)  [address] [facility]     This discharge recommendation:  Has been made in collaboration with the attending provider and/or case management       SUBJECTIVE:   Patient stated I am independent at home.     OBJECTIVE DATA SUMMARY:   HISTORY:   Past Medical History:   Diagnosis Date    Anemia     Arthritis     Diabetes ([facility] [address]     Hypercholesterolemia     Hypertension     Single kidney     Cr normal    Throat cancer ([facility] [address] 2008    s/p laryngectomy     Past Surgical History:   Procedure Laterality Date    HX LAP CHOLECYSTECTOMY  12-19-12    By Dr. Larissa Hall states does not know reason       Expanded or extensive additional review of patient history:     Home Situation  Home Environment: Apartment  # Steps to Enter: 0  Wheelchair Ramp: No  One/Two Story Residence: One story  Living Alone: Yes  Support Systems: Other Family Member(s)  Patient Expects to be Discharged to[de-identified] Rehab hospital/unit acute  Current DME Used/Available at Home: [place] , straight,Walker, rolling,Shower chair      EXAMINATION OF PERFORMANCE DEFICITS:  Cognitive/Behavioral Status:  Neurologic State: Alert  Orientation Level: Oriented X4  Cognition: Follows commands            Hearing: Auditory  Auditory Impairment: None      Range of Motion:  AROM: Generally decreased, functional (limited sh flex bilaterallyl)                         Strength  Strength: Generally decreased, functional                Coordination:  Coordination: Generally decreased, functional  Fine Motor Skills-Upper: Left Intact; Right Intact    Gross Motor Skills-Upper: Left Intact; Right Intact    Tone & Sensation:  Tone: Normal  Sensation: Intact                      Balance:  Sitting: Impaired; With support  Sitting - Static: Fair (occasional)  Sitting - Dynamic: Fair (occasional)  Standing: Impaired; With support  Standing - Static: Good;Constant support  Standing - Dynamic : Fair;Constant support    Functional Mobility and Transfers for ADLs:  Bed Mobility:  Rolling: Stand-by assistance  Supine to Sit: Contact guard assistance; Additional time  Scooting: Stand-by assistance    Transfers:  Sit to Stand: Minimum assistance  Stand to Sit: Minimum assistance  Bed to Chair: Minimum assistance  Bathroom Mobility: Minimum assistance  Toilet Transfer : Minimum assistance    ADL Intervention and task modifications:       Grooming  Grooming Assistance: Set-up; Supervision  Position Performed: Seated in chair  Washing Face: Set-up; Supervision                   Lower Body Dressing Assistance  Socks: Minimum assistance  Leg Crossed Method Used: Yes  Position Performed: Seated edge of bed              Therapeutic Exercise:  Pt would benefit from UE HEP to improve overall UE AROM/strength and can be further educated in next treatment session.      Functional Measure:    [facility] \"6 Clicks\"                                                       Daily Activity Inpatient Short Form  How much help from another person does the patient currently need. .. Total; A Lot A Little None   1. Putting on and taking off regular lower body clothing? []  1 []  2 [x]  3 []  4   2. Bathing (including washing, rinsing, drying)? []  1 []  2 [x]  3 []  4   3. Toileting, which includes using toilet, bedpan or urinal? [] 1 []  2 [x]  3 []  4   4. Putting on and taking off regular upper body clothing? []  1 []  2 [x]  3 []  4   5. Taking care of personal grooming such as brushing teeth? []  1 []  2 [x]  3 []  4   6. Eating meals? []  1 []  2 []  3 [x]  4   © , Trustees of [address] [place] [address] [address] [address], under license to [employer]. All rights reserved     Score:      Interpretation of Tool:  Represents clinically-significant functional categories (i.e. Activities of daily living). Percentage of Impairment CH    0%   CI    1-19% CJ    20-39% CK    40-59% CL    60-79% CM    80-99% CN     100%   [facility]  Score 6-24 24 23 20-22 15-19 10-14 7-9 6         Occupational Therapy Evaluation Charge Determination   History Examination Decision-Making   LOW Complexity : Brief history review  LOW Complexity : 1-3 performance deficits relating to physical, cognitive , or psychosocial skils that result in activity limitations and / or participation restrictions  MEDIUM Complexity : Patient may present with comorbidities that affect occupational performnce.  Miniml to moderate modification of tasks or assistance (eg, physical or verbal ) with assesment(s) is necessary to enable patient to complete evaluation       Based on the above components, the patient evaluation is determined to be of the following complexity level: LOW   Pain Ratin/10 FACES R wrist (RN made aware)    Activity Tolerance:   Fair    After treatment patient left in no apparent distress:    Sitting in chair and Call bell within reach    COMMUNICATION/EDUCATION:   The patients plan of care was discussed with: Physical therapist and Registered nurse. Patient/family have participated as able in goal setting and plan of care. and Patient/family agree to work toward stated goals and plan of care. This patients plan of care is appropriate for delegation to [facility].     OT/PT sessions occurred together for increased patient and clinician safety     Thank you for this referral.  Spencer Truong  Time Calculation: 28 mins   Problem: Self Care Deficits Care Plan (Adult)  Goal: *Acute Goals and Plan of Care (Insert Text)  Description: Pt stated goal 'to get stronger'  Pt will be IND sup <> sit in prep for EOB ADLs  Pt will be Mod I grooming standing sink side LRAD  Pt will be IND UB dressing sitting EOB/long sit   Pt will be Mod I LE dressing sitting EOB/long sit  Pt will be Mod I sit <>  prep for toileting LRAD  Pt will be Mod I toileting/toilet transfer/cloth mgmt LRAD  Pt will be IND following UE HEP in prep for self care tasks      Outcome: Not Met

## 2022-05-06 NOTE — PROGRESS NOTES
PROGRESS NOTE      Subjective: Eventful night. Laying in bed reports wrist pain no fever chills no chest pain or shortness of breath. Appetite is good he ate all his meals. PT still has not seen him yet.      Visit Vitals  BP (!) 152/74   Pulse 86   Temp 99.4 °F (37.4 °C)   Resp 20   Ht 5' 5\" (1.651 m)   Wt 63.5 kg (139 lb 15.9 oz)   SpO2 98%   BMI 23.30 kg/m²       Current Facility-Administered Medications:     insulin glargine (LANTUS) injection 35 Units, 35 Units, SubCUTAneous, QHS, Jihan Meza MD, 35 Units at 05/05/22 2247    atorvastatin (LIPITOR) tablet 10 mg, 10 mg, Oral, QHS, Jihan Meza MD, 10 mg at 05/05/22 2248    [Held by provider] lisinopriL (PRINIVIL, ZESTRIL) tablet 20 mg, 20 mg, Oral, DAILY, Jihan Corea MD    multivitamin, tx-iron-ca-min (THERA-M w/ IRON) tablet 1 Tablet, 1 Tablet, Oral, DAILY, Linda DAVILA MD, 1 Tablet at 05/06/22 0953    NIFEdipine ER (PROCARDIA XL) tablet 60 mg, 60 mg, Oral, DAILY, Jihan Corea MD, 60 mg at 05/06/22 0953    levETIRAcetam (KEPPRA) tablet 1,000 mg, 1,000 mg, Oral, BID, Linda DAVILA MD, 1,000 mg at 05/06/22 0953    heparin (porcine) injection 5,000 Units, 5,000 Units, SubCUTAneous, Q12H, Linda DAVILA MD, 5,000 Units at 05/05/22 2248    glucose chewable tablet 16 g, 4 Tablet, Oral, PRN, Linda DAVILA MD    dextrose 10% infusion 0-250 mL, 0-250 mL, IntraVENous, PRN, Jihan Corea MD    glucagon (GLUCAGEN) injection 1 mg, 1 mg, IntraMUSCular, PRN, Linda Dale MD    insulin lispro (HUMALOG) injection, , SubCUTAneous, TIDAC, Linda DAVILA MD, 4 Units at 05/05/22 1750  Recent Results (from the past 24 hour(s))   GLUCOSE, POC    Collection Time: 05/05/22 11:14 AM   Result Value Ref Range    Glucose (POC) 374 (H) 65 - 117 mg/dL    Performed by Tammy Nam, POC    Collection Time: 05/05/22  3:18 PM   Result Value Ref Range    Glucose (POC) 249 (H) 65 - 117 mg/dL    Performed by Елена Medicine, POC    Collection Time: 05/05/22  8:13 PM   Result Value Ref Range    Glucose (POC) 302 (H) 65 - 117 mg/dL    Performed by Maria Esther Cook    GLUCOSE, POC    Collection Time: 05/06/22  7:21 AM   Result Value Ref Range    Glucose (POC) 81 65 - 117 mg/dL    Performed by Radha Gómez      No orders to display             HEENT: Normocephalic atraumatic anicteric sclera  Neck: Tracheostomy hole  Lungs: Clear no wheeze  Heart: Regular chest with a pacemaker  Abdomen: Soft nontender nondistended positive bowel sounds  Lower Extremities:   CNS: Alert and oriented follows command moves extremities. Psych: Cooperative. Assessment:     Weakness ambulatory dysfunction   #2 recent new onset seizure   #3. 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 I told her she can get another pacemaker interrogation especially since he was recently interrogated as outpatient. Anyway get physical therapy. I spoke with case management today, PT still has not seen the patient. She wanted consultation from the rehab doctor which is fine with me if that is necessary to qualify him for [facility] transfer. Reinstate the lisinopril as blood pressure is recovered from the transiently low blood pressure. Stable for discharge to [facility] when bed is available.   Tylenol for aches and pain

## 2022-05-07 LAB
GLUCOSE BLD STRIP.AUTO-MCNC: 174 MG/DL (ref 65–117)
GLUCOSE BLD STRIP.AUTO-MCNC: 197 MG/DL (ref 65–117)
GLUCOSE BLD STRIP.AUTO-MCNC: 220 MG/DL (ref 65–117)
GLUCOSE BLD STRIP.AUTO-MCNC: 242 MG/DL (ref 65–117)
PERFORMED BY, TECHID: ABNORMAL

## 2022-05-07 PROCEDURE — 97110 THERAPEUTIC EXERCISES: CPT

## 2022-05-07 PROCEDURE — 74011250637 HC RX REV CODE- 250/637: Performed by: INTERNAL MEDICINE

## 2022-05-07 PROCEDURE — 74011636637 HC RX REV CODE- 636/637: Performed by: INTERNAL MEDICINE

## 2022-05-07 PROCEDURE — 97116 GAIT TRAINING THERAPY: CPT

## 2022-05-07 PROCEDURE — 65270000029 HC RM PRIVATE

## 2022-05-07 PROCEDURE — 96372 THER/PROPH/DIAG INJ SC/IM: CPT

## 2022-05-07 PROCEDURE — 74011250636 HC RX REV CODE- 250/636: Performed by: INTERNAL MEDICINE

## 2022-05-07 PROCEDURE — 82962 GLUCOSE BLOOD TEST: CPT

## 2022-05-07 RX ORDER — DICLOFENAC SODIUM 10 MG/G
2 GEL TOPICAL 4 TIMES DAILY
Status: DISCONTINUED | OUTPATIENT
Start: 2022-05-07 | End: 2022-05-11 | Stop reason: HOSPADM

## 2022-05-07 RX ADMIN — INSULIN LISPRO 4 UNITS: 100 INJECTION, SOLUTION INTRAVENOUS; SUBCUTANEOUS at 09:47

## 2022-05-07 RX ADMIN — DICLOFENAC SODIUM 2 G: 10 GEL TOPICAL at 22:30

## 2022-05-07 RX ADMIN — LISINOPRIL 20 MG: 10 TABLET ORAL at 09:47

## 2022-05-07 RX ADMIN — NIFEDIPINE 60 MG: 60 TABLET, FILM COATED, EXTENDED RELEASE ORAL at 09:47

## 2022-05-07 RX ADMIN — LEVETIRACETAM 1000 MG: 500 TABLET, FILM COATED ORAL at 09:47

## 2022-05-07 RX ADMIN — HEPARIN SODIUM 5000 UNITS: 5000 INJECTION INTRAVENOUS; SUBCUTANEOUS at 16:31

## 2022-05-07 RX ADMIN — DICLOFENAC SODIUM 2 G: 10 GEL TOPICAL at 13:37

## 2022-05-07 RX ADMIN — INSULIN LISPRO 2 UNITS: 100 INJECTION, SOLUTION INTRAVENOUS; SUBCUTANEOUS at 12:58

## 2022-05-07 RX ADMIN — Medication 1 TABLET: at 09:00

## 2022-05-07 RX ADMIN — ACETAMINOPHEN 650 MG: 325 TABLET ORAL at 16:30

## 2022-05-07 RX ADMIN — ACETAMINOPHEN 650 MG: 325 TABLET ORAL at 22:29

## 2022-05-07 RX ADMIN — ATORVASTATIN CALCIUM 10 MG: 10 TABLET, FILM COATED ORAL at 22:30

## 2022-05-07 RX ADMIN — INSULIN LISPRO 4 UNITS: 100 INJECTION, SOLUTION INTRAVENOUS; SUBCUTANEOUS at 16:30

## 2022-05-07 RX ADMIN — LEVETIRACETAM 1000 MG: 500 TABLET, FILM COATED ORAL at 22:29

## 2022-05-07 RX ADMIN — INSULIN GLARGINE 35 UNITS: 100 INJECTION, SOLUTION SUBCUTANEOUS at 22:29

## 2022-05-07 NOTE — PROGRESS NOTES
Problem: Falls - Risk of  Goal: *Absence of Falls  Description: Document Lyn Davies Fall Risk and appropriate interventions in the flowsheet. 5/6/2022 2325 by Nahomy Smith RN  Outcome: Progressing Towards Goal  Note: Fall Risk Interventions:  Mobility Interventions: OT consult for ADLs,Patient to call before getting OOB,PT Consult for mobility concerns,PT Consult for assist device competence         Medication Interventions: Teach patient to arise slowly,Patient to call before getting OOB    Elimination Interventions: Call light in reach    History of Falls Interventions: Door open when patient unattended,Bed/chair exit alarm      5/6/2022 2320 by Nahomy Smith RN  Outcome: Progressing Towards Goal  Note: Fall Risk Interventions:  Mobility Interventions: OT consult for ADLs,Patient to call before getting OOB,PT Consult for mobility concerns,PT Consult for assist device competence         Medication Interventions: Teach patient to arise slowly,Patient to call before getting OOB    Elimination Interventions: Call light in reach    History of Falls Interventions: Door open when patient unattended,Bed/chair exit alarm         Problem: Patient Education: Go to Patient Education Activity  Goal: Patient/Family Education  5/6/2022 2325 by Nahomy Smith RN  Outcome: Progressing Towards Goal  5/6/2022 2320 by Nahomy Smith RN  Outcome: Progressing Towards Goal     Problem: Pressure Injury - Risk of  Goal: *Prevention of pressure injury  Description: Document Jabier Scale and appropriate interventions in the flowsheet. 5/6/2022 2325 by Nahomy Smith RN  Outcome: Progressing Towards Goal  Note: Pressure Injury Interventions:  Sensory Interventions: Assess changes in LOC,Assess need for specialty bed,Keep linens dry and wrinkle-free,Maintain/enhance activity level,Minimize linen layers,Monitor skin under medical devices,Turn and reposition approx.  every two hours (pillows and wedges if needed)    Moisture Interventions: Maintain skin hydration (lotion/cream),Minimize layers,Moisture barrier    Activity Interventions: Assess need for specialty bed,Increase time out of bed,PT/OT evaluation    Mobility Interventions: Assess need for specialty bed,HOB 30 degrees or less,PT/OT evaluation,Turn and reposition approx. every two hours(pillow and wedges)    Nutrition Interventions: Document food/fluid/supplement intake,Discuss nutritional consult with provider,Offer support with meals,snacks and hydration    Friction and Shear Interventions: Apply protective barrier, creams and emollients,HOB 30 degrees or less             5/6/2022 2320 by Mell Paula RN  Outcome: Progressing Towards Goal  Note: Pressure Injury Interventions:  Sensory Interventions: Assess changes in LOC,Assess need for specialty bed,Keep linens dry and wrinkle-free,Maintain/enhance activity level,Minimize linen layers,Monitor skin under medical devices,Turn and reposition approx. every two hours (pillows and wedges if needed)    Moisture Interventions: Maintain skin hydration (lotion/cream),Minimize layers,Moisture barrier    Activity Interventions: Assess need for specialty bed,Increase time out of bed,PT/OT evaluation    Mobility Interventions: Assess need for specialty bed,HOB 30 degrees or less,PT/OT evaluation,Turn and reposition approx.  every two hours(pillow and wedges)    Nutrition Interventions: Document food/fluid/supplement intake,Discuss nutritional consult with provider,Offer support with meals,snacks and hydration    Friction and Shear Interventions: Apply protective barrier, creams and emollients,HOB 30 degrees or less                Problem: Patient Education: Go to Patient Education Activity  Goal: Patient/Family Education  5/6/2022 2325 by Mell Paula RN  Outcome: Progressing Towards Goal  5/6/2022 2320 by Mell Paula RN  Outcome: Progressing Towards Goal     Problem: Aspiration - Risk of  Goal: *Absence of aspiration  5/6/2022 2325 by Teetee Briggs RN  Outcome: Progressing Towards Goal  5/6/2022 2320 by Teetee Briggs RN  Outcome: Progressing Towards Goal     Problem: Patient Education: Go to Patient Education Activity  Goal: Patient/Family Education  5/6/2022 2325 by Teetee Briggs RN  Outcome: Progressing Towards Goal  5/6/2022 2320 by Teetee Briggs RN  Outcome: Progressing Towards Goal     Problem: Patient Education: Go to Patient Education Activity  Goal: Patient/Family Education  5/6/2022 2325 by Teetee Briggs RN  Outcome: Progressing Towards Goal  5/6/2022 2320 by Teetee Briggs RN  Outcome: Progressing Towards Goal     Problem: Patient Education: Go to Patient Education Activity  Goal: Patient/Family Education  5/6/2022 2325 by Teetee Briggs RN  Outcome: Progressing Towards Goal  5/6/2022 2320 by Teetee Briggs RN  Outcome: Progressing Towards Goal

## 2022-05-07 NOTE — PROGRESS NOTES
PROGRESS NOTE      Subjective: Right wrist pain yesterday needing IV Solu-Medrol. Feeling better today. No shortness of breath no fever chills. Blood sugar marginally elevated appetite is okay.      Visit Vitals  /71 (BP 1 Location: Right upper arm, BP Patient Position: At rest;Lying right side)   Pulse 86   Temp 100.1 °F (37.8 °C)   Resp 18   Ht 5' 5\" (1.651 m)   Wt 63.5 kg (139 lb 15.9 oz)   SpO2 96%   BMI 23.30 kg/m²       Current Facility-Administered Medications:     diclofenac (VOLTAREN) 1 % topical gel 2 g, 2 g, Topical, QID, Jihan Falcon MD    acetaminophen (TYLENOL) tablet 650 mg, 650 mg, Oral, Q6H PRN, Gisel DAVILA MD, 650 mg at 05/06/22 2202    insulin glargine (LANTUS) injection 35 Units, 35 Units, SubCUTAneous, QHS, Jihan Meza MD, 35 Units at 05/06/22 2048    atorvastatin (LIPITOR) tablet 10 mg, 10 mg, Oral, QHS, Jihan Meza MD, 10 mg at 05/06/22 2049    lisinopriL (PRINIVIL, ZESTRIL) tablet 20 mg, 20 mg, Oral, DAILY, Jihan Falcon MD    multivitamin, tx-iron-ca-min (THERA-M w/ IRON) tablet 1 Tablet, 1 Tablet, Oral, DAILY, Gisel DAVILA MD, 1 Tablet at 05/06/22 0953    NIFEdipine ER (PROCARDIA XL) tablet 60 mg, 60 mg, Oral, DAILY, Jihan Falcon MD, 60 mg at 05/06/22 0953    levETIRAcetam (KEPPRA) tablet 1,000 mg, 1,000 mg, Oral, BID, Jihan Falcon MD, 1,000 mg at 05/06/22 2049    heparin (porcine) injection 5,000 Units, 5,000 Units, SubCUTAneous, Q12H, Gisel DAVILA MD, 5,000 Units at 05/06/22 2358    glucose chewable tablet 16 g, 4 Tablet, Oral, PRN, Gisel DAVILA MD    dextrose 10% infusion 0-250 mL, 0-250 mL, IntraVENous, PRN, Jihan Falcon MD    glucagon (GLUCAGEN) injection 1 mg, 1 mg, IntraMUSCular, PRN, Gisel South MD    insulin lispro (HUMALOG) injection, , SubCUTAneous, TIDAC, Gisel South MD, 4 Units at 05/06/22 1707  Recent Results (from the past 24 hour(s))   GLUCOSE, POC    Collection Time: 05/06/22  7:21 AM   Result Value Ref Range    Glucose (POC) 81 65 - 117 mg/dL    Performed by Floyd Muscatine    GLUCOSE, POC    Collection Time: 05/06/22 11:06 AM   Result Value Ref Range    Glucose (POC) 161 (H) 65 - 117 mg/dL    Performed by Floyd Mus[place]ine    GLUCOSE, POC    Collection Time: 05/06/22  4:58 PM   Result Value Ref Range    Glucose (POC) 216 (H) 65 - 117 mg/dL    Performed by Gerard Marie    GLUCOSE, POC    Collection Time: 05/06/22  7:31 PM   Result Value Ref Range    Glucose (POC) 317 (H) 65 - 117 mg/dL    Performed by Elicia Mosher      No orders to display             HEENT: Normocephalic atraumatic anicteric sclera  Neck: No distended neck vein  Lungs: Clear bilaterally no wheeze  Heart: Regular  Abdomen: Soft nontender nondistended positive bowel sounds  Lower Extremities: Stiff right wrist with prominent ganglion cyst and bony prominence  CNS: Alert and oriented   psych: Cooperative     Assessment:   Weakness ambulatory dysfunction   #2 recent new onset seizure   #3. 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 Right wrist pain       Plan: Voltaren gel to right wrist.  Awaiting discharge to rehab place. Continue current care. EEG on last admission shows borderline normal EEG.

## 2022-05-07 NOTE — PROGRESS NOTES
Patient c/o increased pain in RUE, pageporter MD, unable to connect, will continue to monitor and try again. Next dose of Tylenol earliest can be given 10pm.     MD returned call, verbal order for Solmedrol IV given.

## 2022-05-07 NOTE — PROGRESS NOTES
PHYSICAL THERAPY TREATMENT  Patient: Cipriano Burton (67 y.o. male)  Date: 5/7/2022  Diagnosis: Generalized weakness [R53.1] <principal problem not specified>       Precautions:    Chart, physical therapy assessment, plan of care and goals were reviewed. ASSESSMENT  Patient continues with skilled PT services and is progressing towards goals. Pt seated in recliner upon entry and agreeable to session. Performed STS from recliner with CGA and RW for balance upon standing. Increased ambulation distance with RW and CGA, gait is slow but no LOB or knee buckling noted. 2/2 pain in right hand and having trouble gripping RW, pt held RW on R side between fourth and fifth digit (able to navigate RW without assistance). Pt completed seated therex, see details below. Pt left sitting in recliner with call bell in reach and needs met, RN notified of pain. Rec d/c to IRF once medically appropriate. .     Current Level of Function Impacting Discharge (mobility/balance): CGA for safety    Other factors to consider for discharge: PLOF, time since onset, severity of deficits        PLAN :  Patient continues to benefit from skilled intervention to address the above impairments. Continue treatment per established plan of care. to address goals.     Recommendation for discharge: (in order for the patient to meet his/her long term goals)  [address]     This discharge recommendation:  Has been made in collaboration with the attending provider and/or case management    IF patient discharges home will need the following DME: to be determined (TBD)       SUBJECTIVE:   Patient stated my hand hurts    OBJECTIVE DATA SUMMARY:   Critical Behavior:  Neurologic State: Alert  Orientation Level: Oriented X4,Appropriate for age  Cognition: Follows commands    Functional Mobility Training:  Transfers:  Sit to Stand: Contact guard assistance  Stand to Sit: Contact guard assistance  Bed to Chair: Contact guard assistance    Balance:  Sitting: Intact; With support  Sitting - Static: Good (unsupported)  Sitting - Dynamic: Good (unsupported)  Standing: Impaired; With support  Standing - Static: Constant support;Good  Standing - Dynamic : Constant support; Fair    Ambulation/Gait Training:  Distance (ft): 50 Feet (ft)  Assistive Device: Gait belt;Walker, rolling  Ambulation - Level of Assistance: Contact guard assistance    Therapeutic Exercises:   1 x 20 AP  1 x 20 LAQ  1 x 20 Marches  1 x 20 Hip ab/ad  Educated on glute sets, AP, and hip ab/ad in long sit position    Pain Ratin/10 R hand    Activity Tolerance:   Good and requires rest breaks  Please refer to the flowsheet for vital signs taken during this treatment. After treatment patient left in no apparent distress:   Sitting in chair and Call bell within reach    COMMUNICATION/COLLABORATION:   The patients plan of care was discussed with: Registered nurse and Certified nursing assistant/patient care technician. Problem: Mobility Impaired (Adult and Pediatric)  Goal: *Acute Goals and Plan of Care (Insert Text)  Description: Pt will be I with LE HEP in 7 days. Pt will perform bed mobility with mod I in 7 days. Pt will perform transfers with mod I in 7 days. Pt will amb >200 feet with LRAD safely with mod I in 7 days.    Outcome: Progressing Towards Goal       Trey Moreno PTA   Time Calculation: 26 mins

## 2022-05-07 NOTE — PROGRESS NOTES
Problem: Falls - Risk of  Goal: *Absence of Falls  Description: Document Gayle Pride Fall Risk and appropriate interventions in the flowsheet. Outcome: Progressing Towards Goal  Note: Fall Risk Interventions:  Mobility Interventions: OT consult for ADLs,Patient to call before getting OOB,PT Consult for mobility concerns,PT Consult for assist device competence         Medication Interventions: Teach patient to arise slowly,Patient to call before getting OOB    Elimination Interventions: Call light in reach    History of Falls Interventions: Door open when patient unattended,Bed/chair exit alarm         Problem: Patient Education: Go to Patient Education Activity  Goal: Patient/Family Education  Outcome: Progressing Towards Goal     Problem: Pressure Injury - Risk of  Goal: *Prevention of pressure injury  Description: Document Jabier Scale and appropriate interventions in the flowsheet. Outcome: Progressing Towards Goal  Note: Pressure Injury Interventions:  Sensory Interventions: Assess changes in LOC,Assess need for specialty bed,Keep linens dry and wrinkle-free,Maintain/enhance activity level,Minimize linen layers,Monitor skin under medical devices,Turn and reposition approx. every two hours (pillows and wedges if needed)    Moisture Interventions: Maintain skin hydration (lotion/cream),Minimize layers,Moisture barrier    Activity Interventions: Assess need for specialty bed,Increase time out of bed,PT/OT evaluation    Mobility Interventions: Assess need for specialty bed,HOB 30 degrees or less,PT/OT evaluation,Turn and reposition approx.  every two hours(pillow and wedges)    Nutrition Interventions: Document food/fluid/supplement intake,Discuss nutritional consult with provider,Offer support with meals,snacks and hydration    Friction and Shear Interventions: Apply protective barrier, creams and emollients,HOB 30 degrees or less                Problem: Patient Education: Go to Patient Education Activity  Goal: Patient/Family Education  Outcome: Progressing Towards Goal     Problem: Aspiration - Risk of  Goal: *Absence of aspiration  Outcome: Progressing Towards Goal     Problem: Patient Education: Go to Patient Education Activity  Goal: Patient/Family Education  Outcome: Progressing Towards Goal     Problem: Patient Education: Go to Patient Education Activity  Goal: Patient/Family Education  Outcome: Progressing Towards Goal     Problem: Patient Education: Go to Patient Education Activity  Goal: Patient/Family Education  Outcome: Progressing Towards Goal

## 2022-05-07 NOTE — PROGRESS NOTES
Problem: Falls - Risk of  Goal: *Absence of Falls  Description: Document Clayton Bellamy Fall Risk and appropriate interventions in the flowsheet. Outcome: Progressing Towards Goal  Note: Fall Risk Interventions:  Mobility Interventions: OT consult for ADLs         Medication Interventions: Patient to call before getting OOB,Bed/chair exit alarm    Elimination Interventions: Call light in reach    History of Falls Interventions: Door open when patient unattended,Bed/chair exit alarm         Problem: Pressure Injury - Risk of  Goal: *Prevention of pressure injury  Description: Document Jabier Scale and appropriate interventions in the flowsheet. Outcome: Progressing Towards Goal  Note: Pressure Injury Interventions:  Sensory Interventions: Assess changes in LOC,Assess need for specialty bed,Keep linens dry and wrinkle-free,Maintain/enhance activity level,Minimize linen layers,Monitor skin under medical devices,Turn and reposition approx.  every two hours (pillows and wedges if needed)    Moisture Interventions: Absorbent underpads,Check for incontinence Q2 hours and as needed    Activity Interventions: PT/OT evaluation    Mobility Interventions: PT/OT evaluation    Nutrition Interventions: Document food/fluid/supplement intake    Friction and Shear Interventions: Apply protective barrier, creams and emollients

## 2022-05-08 ENCOUNTER — APPOINTMENT (OUTPATIENT)
Dept: GENERAL RADIOLOGY | Age: 87
End: 2022-05-08
Attending: INTERNAL MEDICINE
Payer: MEDICARE

## 2022-05-08 LAB
GLUCOSE BLD STRIP.AUTO-MCNC: 152 MG/DL (ref 65–117)
GLUCOSE BLD STRIP.AUTO-MCNC: 180 MG/DL (ref 65–117)
GLUCOSE BLD STRIP.AUTO-MCNC: 75 MG/DL (ref 65–117)
PERFORMED BY, TECHID: ABNORMAL
PERFORMED BY, TECHID: ABNORMAL
PERFORMED BY, TECHID: NORMAL

## 2022-05-08 PROCEDURE — 74011250637 HC RX REV CODE- 250/637: Performed by: INTERNAL MEDICINE

## 2022-05-08 PROCEDURE — 73100 X-RAY EXAM OF WRIST: CPT

## 2022-05-08 PROCEDURE — 96372 THER/PROPH/DIAG INJ SC/IM: CPT

## 2022-05-08 PROCEDURE — 74011636637 HC RX REV CODE- 636/637: Performed by: INTERNAL MEDICINE

## 2022-05-08 PROCEDURE — 74011250636 HC RX REV CODE- 250/636: Performed by: INTERNAL MEDICINE

## 2022-05-08 PROCEDURE — 82962 GLUCOSE BLOOD TEST: CPT

## 2022-05-08 PROCEDURE — 74011000250 HC RX REV CODE- 250: Performed by: ORTHOPAEDIC SURGERY

## 2022-05-08 PROCEDURE — 65270000029 HC RM PRIVATE

## 2022-05-08 RX ORDER — LIDOCAINE 4 G/100G
1 PATCH TOPICAL EVERY 24 HOURS
Status: DISCONTINUED | OUTPATIENT
Start: 2022-05-08 | End: 2022-05-11 | Stop reason: HOSPADM

## 2022-05-08 RX ORDER — LISINOPRIL 10 MG/1
10 TABLET ORAL DAILY
Status: DISCONTINUED | OUTPATIENT
Start: 2022-05-09 | End: 2022-05-11 | Stop reason: HOSPADM

## 2022-05-08 RX ADMIN — LEVETIRACETAM 1000 MG: 500 TABLET, FILM COATED ORAL at 09:45

## 2022-05-08 RX ADMIN — NIFEDIPINE 60 MG: 60 TABLET, FILM COATED, EXTENDED RELEASE ORAL at 09:45

## 2022-05-08 RX ADMIN — ATORVASTATIN CALCIUM 10 MG: 10 TABLET, FILM COATED ORAL at 22:24

## 2022-05-08 RX ADMIN — LEVETIRACETAM 1000 MG: 500 TABLET, FILM COATED ORAL at 22:23

## 2022-05-08 RX ADMIN — HEPARIN SODIUM 5000 UNITS: 5000 INJECTION INTRAVENOUS; SUBCUTANEOUS at 05:52

## 2022-05-08 RX ADMIN — Medication 1 TABLET: at 09:45

## 2022-05-08 RX ADMIN — INSULIN GLARGINE 35 UNITS: 100 INJECTION, SOLUTION SUBCUTANEOUS at 22:24

## 2022-05-08 RX ADMIN — HEPARIN SODIUM 5000 UNITS: 5000 INJECTION INTRAVENOUS; SUBCUTANEOUS at 18:06

## 2022-05-08 NOTE — CONSULTS
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 Chronic right wrist pain with swelling with some recent exacerbation patient feels there is loss of function in the hand. Patient has severely restricted motion with pain and swelling, digits have good functional range of motion without any significant arthritis. Patient's x-rays reveal severe radiocarpal midcarpal arthritis with collapse. Recommendation  Wrist cock-up splint, patient already has topical Voltaren gel. Patient cannot use NSAIDs since he is only one kidney. Patient is also diabetic and oral steroids will need monitoring. Patient can also be given topical Lidoderm patch at the wrist along with the wrist splint. Warm K pad's can be used to alleviate the symptoms. Occupational Therapy has been ordered for mobilization of fingers and tendon gliding exercises, intrinsic stretches for improving ADLs. I have discussed with patient if this does not help patient can come to the office and get ultrasound-guided cortisone injection. I am not certain how much the injection will help since arthritis is rather severe. If the pain is persistent or progressive and causing activities of daily living issue and quality of life issues and refractory to these treatments then the option of complete wrist fusion and placing the wrist and slightly more functional dorsiflexed position was discussed with the patient. All questions answered.     Patient can follow-up on outpatient basis with Dr. Chris Irene by calling [phone]          Subjective   This is an 80-year-old black male with history of laryngeal cancer status post laryngectomy, diabetes, who presented with transient loss of consciousness. He was at his sister's house sitting on the kitchen chair and briefly became unresponsive. Sister called out his name and by the time she went to get a cold rag bolus for head he had fallen from the seat. There was no tonic-clonic activity or incontinence. She called the rescue squad and [employer] these rescue squad came it was back to his baseline. They thought process took about 4 minutes by the time he became transiently unresponsive and he fell. He denies any headache chest pain nausea vomiting diarrhea constipation. Denies any weakness. He recently saw his cardiologist and had his pacemaker checked. Right wrist pain is chronic with some recent increase. Patient has not done any treatment for this  Past Medical History:   Diagnosis Date    Anemia     Arthritis     Diabetes ([facility]     Hypercholesterolemia     Hypertension     Single kidney     Cr normal    Throat cancer ([facility] [facility]     s/p laryngectomy      Past Surgical History:   Procedure Laterality Date    HX LAP CHOLECYSTECTOMY  12    By Dr. Weber Ear states does not know reason     Family History   Family history unknown: Yes      Social History     Tobacco Use    Smoking status: Former Smoker     Packs/day: 1.00     Years: 57.00     Pack years: 57.00     Quit date: 2012     Years since quittin.8    Smokeless tobacco: Never Used   Substance Use Topics    Alcohol use:  Yes     Alcohol/week: 17.5 standard drinks     Types: 21 Shots of liquor per week       Current Facility-Administered Medications   Medication Dose Route Frequency Provider Last Rate Last Admin    [START ON 2022] lisinopriL (PRINIVIL, ZESTRIL) tablet 10 mg  10 mg Oral DAILY Fabien Langford MD        diclofenac (VOLTAREN) 1 % topical gel 2 g  2 g Topical QID Fabien DAVILA MD   2 g at 22 2230    acetaminophen (TYLENOL) tablet 650 mg  650 mg Oral Q6H PRN Gunnar DAVILA MD   650 mg at 05/07/22 2229    insulin glargine (LANTUS) injection 35 Units  35 Units SubCUTAneous QHS Gunnar DAVILA MD   35 Units at 05/07/22 2229    atorvastatin (LIPITOR) tablet 10 mg  10 mg Oral QHS Gunnar DAVILA MD   10 mg at 05/07/22 2230    multivitamin, tx-iron-ca-min (THERA-M w/ IRON) tablet 1 Tablet  1 Tablet Oral DAILY Gunnar DAVILA MD   1 Tablet at 05/08/22 0945    NIFEdipine ER (PROCARDIA XL) tablet 60 mg  60 mg Oral DAILY Gunnar DAVILA MD   60 mg at 05/08/22 0945    levETIRAcetam (KEPPRA) tablet 1,000 mg  1,000 mg Oral BID Gunnar DAVILA MD   1,000 mg at 05/08/22 0945    heparin (porcine) injection 5,000 Units  5,000 Units SubCUTAneous Q12H Gunnar DAVLIA MD   5,000 Units at 05/08/22 1806    glucose chewable tablet 16 g  4 Tablet Oral PRN Gunnar Lance MD        dextrose 10% infusion 0-250 mL  0-250 mL IntraVENous PRN Gunnar Lance MD        glucagon (GLUCAGEN) injection 1 mg  1 mg IntraMUSCular PRN Gunnar Lance MD        insulin lispro (HUMALOG) injection   SubCUTAneous TIDAC Gunnar Lance MD   4 Units at 05/07/22 1630        Review of Systems   Constitutional: Negative. HENT: Positive for voice change. Respiratory: Negative. Cardiovascular: Negative. Musculoskeletal: Positive for arthralgias. Neurological: Negative for numbness. Psychiatric/Behavioral: Negative. Objective     Vital signs for last 24 hours:  Visit Vitals  /66   Pulse 69   Temp 98.2 °F (36.8 °C)   Resp 19   Ht 5' 5\" (1.651 m)   Wt 63.5 kg (139 lb 15.9 oz)   SpO2 96%   BMI 23.30 kg/m²       Intake/Output this shift:  Current Shift: No intake/output data recorded.   Last 3 Shifts: 05/07 0701 - 05/08 1900  In: 1380 [P.O.:1380]  Out: 1550 [Urine:1550]    Data Review:   Recent Results (from the past 24 hour(s))   GLUCOSE, POC    Collection Time: 05/07/22  8:28 PM Result Value Ref Range    Glucose (POC) 197 (H) 65 - 117 mg/dL    Performed by Javon SPIVEY, POC    Collection Time: 05/08/22  7:48 AM   Result Value Ref Range    Glucose (POC) 75 65 - 117 mg/dL    Performed by Sean Uriarte, POC    Collection Time: 05/08/22  5:39 PM   Result Value Ref Range    Glucose (POC) 152 (H) 65 - 117 mg/dL    Performed by Debra Patino        Physical Exam  Vitals reviewed. Constitutional:       Appearance: Normal appearance. HENT:      Head: Normocephalic and atraumatic. Neck:      Comments: Tracheostomy in place  Cardiovascular:      Rate and Rhythm: Normal rate and regular rhythm. Pulmonary:      Effort: Pulmonary effort is normal. No respiratory distress. Musculoskeletal:         General: Swelling, tenderness and deformity present. Comments: Right shoulder elbow are nontender. Right wrist has bony swelling mostly dorsal radial side with tenderness. Wrist range of motion is -10 degrees of extension to flexion of from the 20 degrees. Supination pronation is normal.  Distal finger motion is decreased with terminal flexion although passively full flexion can be obtained easily. Wrist appears to be in the slightly palmar flexed nonfunctional position secondary to arthritis. Sensation the hand is normal there is good capillary refill. Tinel's sign is negative. Skin:     General: Skin is warm and dry. Neurological:      General: No focal deficit present. Mental Status: He is alert and oriented to person, place, and time. Mental status is at baseline. Psychiatric:         Mood and Affect: Mood normal.         Behavior: Behavior normal.         Thought Content:  Thought content normal.         Judgment: Judgment normal.

## 2022-05-08 NOTE — PROGRESS NOTES
Chart Reviewed for discharge:  [facility] states that there is no bed available today. Possibly tomorrow. CM will continue to follow.

## 2022-05-08 NOTE — PROGRESS NOTES
PROGRESS NOTE      Subjective: Still has right wrist pain. He received 1 dose of Solu-Medrol that helped a little bit he is on Voltaren gel.   Is not able to eat well with his right wrist.     Visit Vitals  BP (!) 113/57 (BP 1 Location: Left upper arm, BP Patient Position: At rest;Sitting)   Pulse 69   Temp 98.4 °F (36.9 °C)   Resp 20   Ht 5' 5\" (1.651 m)   Wt 63.5 kg (139 lb 15.9 oz)   SpO2 99%   BMI 23.30 kg/m²       Current Facility-Administered Medications:     diclofenac (VOLTAREN) 1 % topical gel 2 g, 2 g, Topical, QID, Jihan Callejas MD, 2 g at 05/07/22 2230    acetaminophen (TYLENOL) tablet 650 mg, 650 mg, Oral, Q6H PRN, Bill DAVILA MD, 650 mg at 05/07/22 2229    insulin glargine (LANTUS) injection 35 Units, 35 Units, SubCUTAneous, QHS, Jihan Meza MD, 35 Units at 05/07/22 2229    atorvastatin (LIPITOR) tablet 10 mg, 10 mg, Oral, QHS, Jihan Meza MD, 10 mg at 05/07/22 2230    lisinopriL (PRINIVIL, ZESTRIL) tablet 20 mg, 20 mg, Oral, DAILY, Jihan Callejas MD, 20 mg at 05/07/22 0947    multivitamin, tx-iron-ca-min (THERA-M w/ IRON) tablet 1 Tablet, 1 Tablet, Oral, DAILY, Bill DAVILA MD, 1 Tablet at 05/07/22 0900    NIFEdipine ER (PROCARDIA XL) tablet 60 mg, 60 mg, Oral, DAILY, Jihan Callejas MD, 60 mg at 05/07/22 0947    levETIRAcetam (KEPPRA) tablet 1,000 mg, 1,000 mg, Oral, BID, Jihan Callejas MD, 1,000 mg at 05/07/22 2229    heparin (porcine) injection 5,000 Units, 5,000 Units, SubCUTAneous, Q12H, Bill DAVILA MD, 5,000 Units at 05/08/22 0552    glucose chewable tablet 16 g, 4 Tablet, Oral, PRN, Bill DAVILA MD    dextrose 10% infusion 0-250 mL, 0-250 mL, IntraVENous, PRN, Jihan Callejas MD    glucagon (GLUCAGEN) injection 1 mg, 1 mg, IntraMUSCular, PRN, Bill Sanford MD    insulin lispro (HUMALOG) injection, , SubCUTAneous, TIDAC, Bill Sanford MD, 4 Units at 05/07/22 1630  Recent Results (from the past 24 hour(s))   GLUCOSE, POC    Collection Time: 05/07/22 11:40 AM   Result Value Ref Range    Glucose (POC) 174 (H) 65 - 117 mg/dL    Performed by Jaquelin Luevano    GLUCOSE, POC    Collection Time: 05/07/22  4:13 PM   Result Value Ref Range    Glucose (POC) 220 (H) 65 - 117 mg/dL    Performed by Israel Nava Final, POC    Collection Time: 05/07/22  8:28 PM   Result Value Ref Range    Glucose (POC) 197 (H) 65 - 117 mg/dL    Performed by Park Cameron    GLUCOSE, POC    Collection Time: 05/08/22  7:48 AM   Result Value Ref Range    Glucose (POC) 75 65 - 117 mg/dL    Performed by Ken BRITT      XR WRIST RT AP/LAT    (Results Pending)             HEENT: Normocephalic atraumatic anicteric sclera   neck: No distended neck vein. Tracheostomy hole. Lungs: Clear no wheeze  Heart: Regular  Abdomen: Soft nontender nondistended positive bowel sounds  Lower Extremities: Without edema. Right wrist is stiff. Bony sclerosis and ganglion cysts. Very limited range of motion and tender to touch. CNS: Alert and oriented follows command. Psych: Cooperative. Assessment: #1 ambulatory dysfunction. #2 right wrist pain/stiffness/ganglion cyst.  #3 recent seizure  #4 hypertension  #5 history of laryngeal cancer status post laryngectomy  #6 generalized weakness  #7 diabetes. Plan: We will get an x-ray of the wrist.  Consult orthopedics. Decrease lisinopril dose. Continue physical therapy.   Discharge planning for rehab

## 2022-05-08 NOTE — PROGRESS NOTES
Problem: Falls - Risk of  Goal: *Absence of Falls  Description: Document Nissa Hartman Fall Risk and appropriate interventions in the flowsheet. Outcome: Progressing Towards Goal  Note: Fall Risk Interventions:  Mobility Interventions: Bed/chair exit alarm         Medication Interventions: Bed/chair exit alarm    Elimination Interventions: Call light in reach,Bed/chair exit alarm    History of Falls Interventions: Bed/chair exit alarm,Door open when patient unattended         Problem: Patient Education: Go to Patient Education Activity  Goal: Patient/Family Education  Outcome: Progressing Towards Goal     Problem: Pressure Injury - Risk of  Goal: *Prevention of pressure injury  Description: Document Jabier Scale and appropriate interventions in the flowsheet.   Outcome: Progressing Towards Goal  Note: Pressure Injury Interventions:  Sensory Interventions: Assess changes in LOC,Assess need for specialty bed    Moisture Interventions: Absorbent underpads,Check for incontinence Q2 hours and as needed    Activity Interventions: PT/OT evaluation    Mobility Interventions: PT/OT evaluation,Float heels    Nutrition Interventions: Document food/fluid/supplement intake,Offer support with meals,snacks and hydration    Friction and Shear Interventions: Apply protective barrier, creams and emollients,Minimize layers                Problem: Patient Education: Go to Patient Education Activity  Goal: Patient/Family Education  Outcome: Progressing Towards Goal     Problem: Aspiration - Risk of  Goal: *Absence of aspiration  Outcome: Progressing Towards Goal     Problem: Patient Education: Go to Patient Education Activity  Goal: Patient/Family Education  Outcome: Progressing Towards Goal     Problem: Patient Education: Go to Patient Education Activity  Goal: Patient/Family Education  Outcome: Progressing Towards Goal     Problem: Patient Education: Go to Patient Education Activity  Goal: Patient/Family Education  Outcome: Progressing Towards Goal

## 2022-05-09 LAB
BACTERIA SPEC CULT: NORMAL
GLUCOSE BLD STRIP.AUTO-MCNC: 161 MG/DL (ref 65–117)
GLUCOSE BLD STRIP.AUTO-MCNC: 192 MG/DL (ref 65–117)
GLUCOSE BLD STRIP.AUTO-MCNC: 208 MG/DL (ref 65–117)
GLUCOSE BLD STRIP.AUTO-MCNC: 68 MG/DL (ref 65–117)
PERFORMED BY, TECHID: ABNORMAL
PERFORMED BY, TECHID: NORMAL
SPECIAL REQUESTS,SREQ: NORMAL

## 2022-05-09 PROCEDURE — 74011250637 HC RX REV CODE- 250/637: Performed by: INTERNAL MEDICINE

## 2022-05-09 PROCEDURE — 65270000029 HC RM PRIVATE

## 2022-05-09 PROCEDURE — 82962 GLUCOSE BLOOD TEST: CPT

## 2022-05-09 PROCEDURE — 74011636637 HC RX REV CODE- 636/637: Performed by: INTERNAL MEDICINE

## 2022-05-09 PROCEDURE — 74011250636 HC RX REV CODE- 250/636: Performed by: INTERNAL MEDICINE

## 2022-05-09 PROCEDURE — 97116 GAIT TRAINING THERAPY: CPT

## 2022-05-09 PROCEDURE — 97530 THERAPEUTIC ACTIVITIES: CPT

## 2022-05-09 PROCEDURE — 96372 THER/PROPH/DIAG INJ SC/IM: CPT

## 2022-05-09 PROCEDURE — 74011000250 HC RX REV CODE- 250: Performed by: ORTHOPAEDIC SURGERY

## 2022-05-09 PROCEDURE — 97110 THERAPEUTIC EXERCISES: CPT

## 2022-05-09 RX ORDER — DICLOFENAC SODIUM 10 MG/G
2 GEL TOPICAL 2 TIMES DAILY
Qty: 2 EACH | Refills: 0 | Status: SHIPPED | OUTPATIENT
Start: 2022-05-09 | End: 2022-06-08

## 2022-05-09 RX ORDER — LISINOPRIL 10 MG/1
10 TABLET ORAL DAILY
Qty: 30 TABLET | Refills: 0 | Status: SHIPPED | OUTPATIENT
Start: 2022-05-10 | End: 2022-06-09

## 2022-05-09 RX ADMIN — LEVETIRACETAM 1000 MG: 500 TABLET, FILM COATED ORAL at 09:57

## 2022-05-09 RX ADMIN — NIFEDIPINE 60 MG: 60 TABLET, FILM COATED, EXTENDED RELEASE ORAL at 09:57

## 2022-05-09 RX ADMIN — INSULIN GLARGINE 35 UNITS: 100 INJECTION, SOLUTION SUBCUTANEOUS at 21:38

## 2022-05-09 RX ADMIN — LEVETIRACETAM 1000 MG: 500 TABLET, FILM COATED ORAL at 21:38

## 2022-05-09 RX ADMIN — HEPARIN SODIUM 5000 UNITS: 5000 INJECTION INTRAVENOUS; SUBCUTANEOUS at 05:57

## 2022-05-09 RX ADMIN — HEPARIN SODIUM 5000 UNITS: 5000 INJECTION INTRAVENOUS; SUBCUTANEOUS at 23:45

## 2022-05-09 RX ADMIN — LISINOPRIL 10 MG: 10 TABLET ORAL at 09:57

## 2022-05-09 RX ADMIN — HEPARIN SODIUM 5000 UNITS: 5000 INJECTION INTRAVENOUS; SUBCUTANEOUS at 15:57

## 2022-05-09 RX ADMIN — ATORVASTATIN CALCIUM 10 MG: 10 TABLET, FILM COATED ORAL at 21:38

## 2022-05-09 RX ADMIN — INSULIN LISPRO 2 UNITS: 100 INJECTION, SOLUTION INTRAVENOUS; SUBCUTANEOUS at 18:26

## 2022-05-09 NOTE — DISCHARGE SUMMARY
Discharge Summary     Neal Mcbride     Admit Date:   5/4/2022  6:34 PM  Discharge Date:   5/9/2022  Discharge Condition:    Improved, stable  Discharge Diagnosis  Problem List Items Addressed This Visit        Other    Generalized weakness - Primary      #1 ambulatory dysfunction. #2 right wrist pain/stiffness/ganglion cyst.   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 Hospital Stay  Narrative of Hospital Course: For full details. Briefly this is an 54-year-old black male who recently was discharged from the hospital following a seizure or loss of consciousness present to the hospital same day because he could not walk. He felt weak and then report also right wrist pain. He was readmitted. He was started on physical therapy. Family wanted rehab. He complains of stiffness in his right arm needed orthopedic consult. He did get some topical NSAIDs and intravenous Solu-Medrol shot. He was seen by physical therapy. He will be discharged to rehab once bed is available. He did have tacky arrhythmia needing cardiology consultation. No further investigation was done. His pacemaker was recently probed/interrogated on 4/27 before he came in which was fine. His blood pressure medications were adjusted because of mild hypotension at one point. He did received IV fluids. Vital stable  HEENT normocephalic atraumatic anicteric sclera  Neck without any distended neck vein  Lungs are clear bilaterally no wheeze  Heart S1-S2 regular  Abdomen soft nontender nondistended positive bowel sounds  Right wrist stiff tender to touch  Lower extremities without any cyanosis or edema  CNS alert and oriented x3 moves extremities  Psych cooperative    Impression stable for discharge to rehab.      Consultants:  Consultant cardiologist  Consultants orthopedics     Surgeries/procedures Performed:  X-ray of the wrist Discharge Plan:    Rehab Facility     Hospital/Incidental Findings Requiring Follow Up:     Patient Instructions:       Diet: DIET ADULT     Activity: As tolerated  For number of days (if applicable): Other Instructions:      Provider Follow-Up:     Follow-up Appointments   Procedures    FOLLOW UP VISIT Appointment in: One Week     Standing Status:   Standing     Number of Occurrences:   1     Order Specific Question:   Appointment in     Answer: One Week        Significant Diagnostic Studies:     XR WRIST RT AP/LAT   Final Result   Chronic findings as above. Recent Results (from the past 24 hour(s))   GLUCOSE, POC    Collection Time: 05/08/22  5:39 PM   Result Value Ref Range    Glucose (POC) 152 (H) 65 - 117 mg/dL    Performed by Anju Mclaughlin    GLUCOSE, POC    Collection Time: 05/08/22 10:13 PM   Result Value Ref Range    Glucose (POC) 180 (H) 65 - 117 mg/dL    Performed by Karen Alvarado    GLUCOSE, POC    Collection Time: 05/09/22  8:07 AM   Result Value Ref Range    Glucose (POC) 68 65 - 117 mg/dL    Performed by Teretha Dollar        Discharge Medications:  Current Discharge Medication List      START taking these medications    Details   diclofenac (VOLTAREN) 1 % gel Apply 2 g to affected area two (2) times a day for 30 days. Qty: 2 Each, Refills: 0  Start date: 5/9/2022, End date: 6/8/2022         CONTINUE these medications which have CHANGED    Details   lisinopriL (PRINIVIL, ZESTRIL) 10 mg tablet Take 1 Tablet by mouth daily for 30 days. Qty: 30 Tablet, Refills: 0  Start date: 5/10/2022, End date: 6/9/2022         CONTINUE these medications which have NOT CHANGED    Details   SITagliptin (Januvia) 25 mg tablet Take 25 mg by mouth daily. levETIRAcetam 1,000 mg tablet Take 1 Tablet by mouth two (2) times a day for 30 days. Qty: 60 Tablet, Refills: 1  Start date: 5/4/2022, End date: 6/3/2022      atorvastatin (LIPITOR) 10 mg tablet Take 10 mg by mouth nightly.     Associated Diagnoses: Type II diabetes mellitus, uncontrolled; Essential hypertension with goal blood pressure less than 140/90      PATRICIO ASPIRIN PO Take 81 mg by mouth daily. Associated Diagnoses: Type II diabetes mellitus, uncontrolled; Essential hypertension with goal blood pressure less than 140/90      multivitamin, tx-iron-ca-min (THERA-M W/ IRON) 9 mg iron-400 mcg tab tablet Take 1 Tab by mouth daily. Associated Diagnoses: Type II diabetes mellitus, uncontrolled; Essential hypertension with goal blood pressure less than 140/90      insulin detemir (LEVEMIR FLEXTOUCH) 100 unit/mL (3 mL) inpn 0.35 mL by SubCUTAneous route nightly. 35 Units by SubCUTAneous route at night  Qty: 45 mL, Refills: 3    Associated Diagnoses: Type II diabetes mellitus, uncontrolled; Essential hypertension with goal blood pressure less than 140/90      NIFEdipine XL (PROCARDIA-XL) 60 mg CR tablet Take 60 mg by mouth daily. Indications: HYPERTENSION      saw palmetto 500 mg cap Take 450 mg by mouth daily. STOP taking these medications       glipiZIDE (GLUCOTROL) 5 mg tablet Comments:   Reason for Stopping:         hydrochlorothiazide (HYDRODIURIL) 25 mg tablet Comments:   Reason for Stopping:         ibuprofen (MOTRIN) 600 mg tablet Comments:   Reason for Stopping:                Time Spent on Discharge: Discussed with niece who is always present in the room most of the time regarding care. Discharge when bed is available.

## 2022-05-09 NOTE — PROGRESS NOTES
CM reviewed chart. Patient's discharge disposition is to go to [facility]. His discharge is pending insurance authorization and bed availability. CM team will continue to follow.

## 2022-05-09 NOTE — PROGRESS NOTES
PHYSICAL THERAPY TREATMENT  Patient: Aida Stern (89 y.o. male)  Date: 5/9/2022  Diagnosis: Generalized weakness [R53.1] <principal problem not specified>       Precautions:    Chart, physical therapy assessment, plan of care and goals were reviewed. ASSESSMENT  Patient continues with skilled PT services and is progressing towards goals. Patient supine in bed upon approach and agreed to therapy today. Patient was SBA for bed mobility, supine<> sit and scooting to EOB. Patient had good unsupported sitting balance while sitting EOB. Patient then performed STS to RW at [facility] and ambulated in hallway at [facility] for 450 feet with RW. Patient demonstrated slow but step step through gait pattern but decreased foot clearance( toes just barely clearing the ground). Patient had no LOB or knee buckling with gait. Patient then sat EOB and performed seated TE (see details below). Patient returned to supine in bed at [facility]. Patient left supine in bed with call bell within reach and all needs meet. Current Level of Function Impacting Discharge (mobility/balance): impaired balance    Other factors to consider for discharge: PLOF, assistance at home, acute medical state, level of deficits. PLAN :  Patient continues to benefit from skilled intervention to address the above impairments. Continue treatment per established plan of care. to address goals.     Recommendation for discharge: (in order for the patient to meet his/her long term goals)  [address]     This discharge recommendation:  Has been made in collaboration with the attending provider and/or case management    IF patient discharges home will need the following DME: rolling walker       SUBJECTIVE:   Patient nonverbal throughout session, communicated via head nods    OBJECTIVE DATA SUMMARY:   Critical Behavior:  Neurologic State: Alert  Orientation Level: Oriented X4  Cognition: Follows commands     Functional Mobility Training:  Bed Mobility:  Rolling: Stand-by assistance  Supine to Sit: Stand-by assistance  Sit to Supine: Stand-by assistance  Scooting: Stand-by assistance  Transfers:  Sit to Stand: Stand-by assistance  Stand to Sit: Stand-by assistance  Balance:  Sitting: Intact  Sitting - Static: Good (unsupported)  Sitting - Dynamic: Good (unsupported)  Standing: Impaired; With support  Standing - Static: Constant support;Good  Standing - Dynamic : Constant support; Fair  Ambulation/Gait Training:  Distance (ft): 450 Feet (ft)  Assistive Device: Gait belt;Walker, rolling  Ambulation - Level of Assistance: Stand-by assistance;Contact guard assistance  Base of Support: Narrowed  Speed/Alma: Slow    Therapeutic Exercises:   1x20 AP  1x20 LAQ  1x20 seated marches  1x20 hip ADD/ABD  Pain Ratin-7/10 pain in Rt hand    Activity Tolerance:   Good  Please refer to the flowsheet for vital signs taken during this treatment. After treatment patient left in no apparent distress:   Supine in bed and Call bell within reach    COMMUNICATION/COLLABORATION:   The patients plan of care was discussed with: Registered nurse. Problem: Mobility Impaired (Adult and Pediatric)  Goal: *Acute Goals and Plan of Care (Insert Text)  Description: Pt will be I with LE HEP in 7 days. Pt will perform bed mobility with mod I in 7 days. Pt will perform transfers with mod I in 7 days. Pt will amb >200 feet with LRAD safely with mod I in 7 days.    Outcome: Progressing Towards Goal       Valeria Mosher, PTA   Time Calculation: 38 mins

## 2022-05-09 NOTE — PROGRESS NOTES
OCCUPATIONAL THERAPY TREATMENT  Patient: Derrick Reyes (28 y.o. male)  Date: 5/9/2022  Diagnosis: Generalized weakness [R53.1] <principal problem not specified>       Precautions: fall risk    Chart, occupational therapy assessment, plan of care, and goals were reviewed. ASSESSMENT  Patient continues with skilled OT services and is progressing towards goals. Pt received sitting EOB upon arrival w/ niece at bedside (niece remained in room w/ pt's permission). and agreeable to OT tx. Pt eating upon arrival, pt reports he is R handed, however, eating w/ L hand d/t pain in wrist. OT provided pt w/ foam piece for built up handle and pt able to use R hand to bring food mouth. OT left to allow pt to complete breakfast and later returned to complete treatment session. OT returned and pt was in supine upon arrival. He req'd SBA and prolonged time to complete sup>sit transfer. Spoke w/ niece who reports pt has difficultly writing d/t wrist pain as well; OT placed foam piece on pen and pt able to sign name w/out increase in pain. He reports pain as 7/10. Pt demo'd good sitting balance while completing R hand exs (see chart below). OT educated pt to complete exs 3x/day and hold each stretch for 5 seconds; pt verbalized understanding. Pt declined OOB activity despite max encouragement. He returned to supine w/ SBA and was left w/ all needs/call bell in reach. Will continue to progress. D/c recommendation: IRF      Other factors to consider for discharge: time since onset, severity of deficits          PLAN :  Patient continues to benefit from skilled intervention to address the above impairments. Continue treatment per established plan of care. to address goals.       Recommendation for discharge: (in order for the patient to meet his/her long term goals)  [address]     This discharge recommendation:  Has been made in collaboration with the attending provider and/or case management    IF patient discharges home will need the following DME: TBD at next placement        SUBJECTIVE:   Patient did not have speaker for trach, however, able to mouth few words this date. OBJECTIVE DATA SUMMARY:   Cognitive/Behavioral Status:  Neurologic State: Alert  Orientation Level: Oriented X4                Functional Mobility and Transfers for ADLs:  Bed Mobility:       Transfers:             Balance:  Sitting - Static: Good (unsupported)  Sitting - Dynamic: Fair (occasional)    ADL Intervention:  Feeding  Food to Mouth: Supervision                                       Therapeutic Exercises:   Exercise Sets Reps AROM AAROM PROM Self PROM Comments   Thumb extension 1 10 [x] [] [] [] Hold 5 seconds   Isolated finger flexion 1 10 [x] [] [] [] Hold 5 seconds   Wrist ext 1 10 [x] [] [] [] Hold 5 seconds       Pain:  7/10; pt reports pain did not increase w/ activity. RN informed of pain. Activity Tolerance:   Good  Please refer to the flowsheet for vital signs taken during this treatment. After treatment patient left in no apparent distress:   Supine in bed, Call bell within reach, Bed / chair alarm activated and Side rails x 3    COMMUNICATION/COLLABORATION:   The patients plan of care was discussed with: Registered nurse.      Vanessa Lepe  56:77-37:32: 11 minutes  11:53-12:16: 23 minutes   Total time: 34 minutes     Problem: Self Care Deficits Care Plan (Adult)  Goal: *Acute Goals and Plan of Care (Insert Text)  Description: Pt stated goal 'to get stronger'  Pt will be IND sup <> sit in prep for EOB ADLs  Pt will be Mod I grooming standing sink side LRAD  Pt will be IND UB dressing sitting EOB/long sit   Pt will be Mod I LE dressing sitting EOB/long sit  Pt will be Mod I sit <>  prep for toileting LRAD  Pt will be Mod I toileting/toilet transfer/cloth mgmt LRAD  Pt will be IND following UE HEP in prep for self care tasks      Outcome: Progressing Towards Goal

## 2022-05-09 NOTE — PROGRESS NOTES
Problem: Falls - Risk of  Goal: *Absence of Falls  Description: Document Marisol Alba Fall Risk and appropriate interventions in the flowsheet.   Outcome: Progressing Towards Goal  Note: Fall Risk Interventions:  Mobility Interventions: Bed/chair exit alarm         Medication Interventions: Bed/chair exit alarm    Elimination Interventions: Call light in reach    History of Falls Interventions: Bed/chair exit alarm         Problem: Aspiration - Risk of  Goal: *Absence of aspiration  Outcome: Progressing Towards Goal

## 2022-05-09 NOTE — PROGRESS NOTES
Problem: Falls - Risk of  Goal: *Absence of Falls  Description: Document Heather Locket Fall Risk and appropriate interventions in the flowsheet. Outcome: Progressing Towards Goal  Note: Fall Risk Interventions:  Mobility Interventions: Bed/chair exit alarm         Medication Interventions: Bed/chair exit alarm    Elimination Interventions: Call light in reach,Bed/chair exit alarm    History of Falls Interventions: Bed/chair exit alarm         Problem: Patient Education: Go to Patient Education Activity  Goal: Patient/Family Education  Outcome: Progressing Towards Goal     Problem: Pressure Injury - Risk of  Goal: *Prevention of pressure injury  Description: Document Jabier Scale and appropriate interventions in the flowsheet.   Outcome: Progressing Towards Goal  Note: Pressure Injury Interventions:  Sensory Interventions: Assess changes in LOC,Minimize linen layers,Keep linens dry and wrinkle-free    Moisture Interventions: Minimize layers,Apply protective barrier, creams and emollients,Absorbent underpads    Activity Interventions: Assess need for specialty bed    Mobility Interventions: Assess need for specialty bed,Float heels    Nutrition Interventions: Offer support with meals,snacks and hydration    Friction and Shear Interventions: Apply protective barrier, creams and emollients,Minimize layers                Problem: Patient Education: Go to Patient Education Activity  Goal: Patient/Family Education  Outcome: Progressing Towards Goal     Problem: Aspiration - Risk of  Goal: *Absence of aspiration  Outcome: Progressing Towards Goal     Problem: Patient Education: Go to Patient Education Activity  Goal: Patient/Family Education  Outcome: Progressing Towards Goal     Problem: Patient Education: Go to Patient Education Activity  Goal: Patient/Family Education  Outcome: Progressing Towards Goal     Problem: Patient Education: Go to Patient Education Activity  Goal: Patient/Family Education  Outcome: Progressing Towards Goal

## 2022-05-09 NOTE — PROGRESS NOTES
Problem: Falls - Risk of  Goal: *Absence of Falls  Description: Document Cindia Neigh Fall Risk and appropriate interventions in the flowsheet. Outcome: Progressing Towards Goal  Note: Fall Risk Interventions:  Mobility Interventions: Bed/chair exit alarm         Medication Interventions: Bed/chair exit alarm    Elimination Interventions: Call light in reach    History of Falls Interventions: Bed/chair exit alarm         Problem: Pressure Injury - Risk of  Goal: *Prevention of pressure injury  Description: Document Jabier Scale and appropriate interventions in the flowsheet.   Outcome: Progressing Towards Goal  Note: Pressure Injury Interventions:  Sensory Interventions: Assess changes in LOC,Minimize linen layers,Keep linens dry and wrinkle-free    Moisture Interventions: Minimize layers,Apply protective barrier, creams and emollients,Absorbent underpads    Activity Interventions: Assess need for specialty bed    Mobility Interventions: Assess need for specialty bed,Float heels    Nutrition Interventions: Offer support with meals,snacks and hydration    Friction and Shear Interventions: Apply protective barrier, creams and emollients,Minimize layers

## 2022-05-10 LAB
GLUCOSE BLD STRIP.AUTO-MCNC: 160 MG/DL (ref 65–117)
GLUCOSE BLD STRIP.AUTO-MCNC: 165 MG/DL (ref 65–117)
GLUCOSE BLD STRIP.AUTO-MCNC: 166 MG/DL (ref 65–117)
GLUCOSE BLD STRIP.AUTO-MCNC: 94 MG/DL (ref 65–117)
PERFORMED BY, TECHID: ABNORMAL
PERFORMED BY, TECHID: NORMAL

## 2022-05-10 PROCEDURE — 74011000250 HC RX REV CODE- 250: Performed by: ORTHOPAEDIC SURGERY

## 2022-05-10 PROCEDURE — 97530 THERAPEUTIC ACTIVITIES: CPT

## 2022-05-10 PROCEDURE — 96372 THER/PROPH/DIAG INJ SC/IM: CPT

## 2022-05-10 PROCEDURE — 74011250636 HC RX REV CODE- 250/636: Performed by: INTERNAL MEDICINE

## 2022-05-10 PROCEDURE — G0378 HOSPITAL OBSERVATION PER HR: HCPCS

## 2022-05-10 PROCEDURE — 82962 GLUCOSE BLOOD TEST: CPT

## 2022-05-10 PROCEDURE — 74011636637 HC RX REV CODE- 636/637: Performed by: INTERNAL MEDICINE

## 2022-05-10 PROCEDURE — 74011250637 HC RX REV CODE- 250/637: Performed by: INTERNAL MEDICINE

## 2022-05-10 RX ORDER — NIFEDIPINE 30 MG/1
30 TABLET, EXTENDED RELEASE ORAL DAILY
Status: DISCONTINUED | OUTPATIENT
Start: 2022-05-11 | End: 2022-05-11

## 2022-05-10 RX ADMIN — DICLOFENAC SODIUM 2 G: 10 GEL TOPICAL at 17:53

## 2022-05-10 RX ADMIN — HEPARIN SODIUM 5000 UNITS: 5000 INJECTION INTRAVENOUS; SUBCUTANEOUS at 23:20

## 2022-05-10 RX ADMIN — INSULIN GLARGINE 35 UNITS: 100 INJECTION, SOLUTION SUBCUTANEOUS at 20:53

## 2022-05-10 RX ADMIN — DICLOFENAC SODIUM 2 G: 10 GEL TOPICAL at 14:05

## 2022-05-10 RX ADMIN — LEVETIRACETAM 1000 MG: 500 TABLET, FILM COATED ORAL at 20:53

## 2022-05-10 RX ADMIN — LISINOPRIL 10 MG: 10 TABLET ORAL at 09:38

## 2022-05-10 RX ADMIN — HEPARIN SODIUM 5000 UNITS: 5000 INJECTION INTRAVENOUS; SUBCUTANEOUS at 12:33

## 2022-05-10 RX ADMIN — LEVETIRACETAM 1000 MG: 500 TABLET, FILM COATED ORAL at 09:38

## 2022-05-10 RX ADMIN — INSULIN LISPRO 2 UNITS: 100 INJECTION, SOLUTION INTRAVENOUS; SUBCUTANEOUS at 16:30

## 2022-05-10 RX ADMIN — NIFEDIPINE 60 MG: 60 TABLET, FILM COATED, EXTENDED RELEASE ORAL at 09:38

## 2022-05-10 RX ADMIN — Medication 1 TABLET: at 09:38

## 2022-05-10 RX ADMIN — DICLOFENAC SODIUM 2 G: 10 GEL TOPICAL at 09:41

## 2022-05-10 RX ADMIN — DICLOFENAC SODIUM 2 G: 10 GEL TOPICAL at 21:00

## 2022-05-10 RX ADMIN — INSULIN LISPRO 2 UNITS: 100 INJECTION, SOLUTION INTRAVENOUS; SUBCUTANEOUS at 12:33

## 2022-05-10 RX ADMIN — ATORVASTATIN CALCIUM 10 MG: 10 TABLET, FILM COATED ORAL at 20:53

## 2022-05-10 NOTE — PROGRESS NOTES
OCCUPATIONAL THERAPY TREATMENT  Patient: Jyotsna Martines (65 y.o. male)  Date: 5/10/2022  Diagnosis: Generalized weakness [R53.1] <principal problem not specified>      Precautions:    Chart, occupational therapy assessment, plan of care, and goals were reviewed. ASSESSMENT  Patient continues with skilled OT services and is progressing towards goals. Upon SHEEHAN arrival, pt semi supine in bed and agreeable to tx session. Pt able to verbalize and communicate when using electrolarynx during tx session. Pt completed bed mobility with SBA. Pt completed sit>stand from EOB with CGA using RW for balance upon standing. Pt completed bed>chair transfer with CGA using RW. Pt completed sit>stand again for completion of bowel hygiene, able to be completed with Rebeca for thoroughness. Pt returned to seated position. Pt completed sit>stand again with CGA using RW for balance upon standing. Pt ambulated in hallway with CGA in preparation for household mobility. Pt then returned to recliner in room and completed laryngectomy care with setup A. Pt left sitting in recliner with call bell within reach and needs met. Pt very motivated at this time and noted with improvement in overall mobility and balance. Will continue to follow pt throughout remainder of stay and progress towards OT goals. Recommending IRF at discharge when medically appropriate. Other factors to consider for discharge: time since onset, severity of deficits          PLAN :  Patient continues to benefit from skilled intervention to address the above impairments. Continue treatment per established plan of care. to address goals.     Recommendation for discharge: (in order for the patient to meet his/her long term goals)  [address] [facility],[address]     This discharge recommendation:  Has been made in collaboration with the attending provider and/or case management    IF patient discharges home will need the following DME: TBD       SUBJECTIVE: Patient stated I haven't been in the chair today.     OBJECTIVE DATA SUMMARY:   Cognitive/Behavioral Status:  Neurologic State: Alert  Orientation Level: Oriented X4  Cognition: Follows commands    Functional Mobility and Transfers for ADLs:  Bed Mobility:  Rolling: Stand-by assistance  Supine to Sit: Stand-by assistance  Scooting: Stand-by assistance    Transfers:  Sit to Stand: Contact guard assistance     Bed to Chair: Contact guard assistance    Balance:  Sitting: Intact; Without support  Sitting - Static: Good (unsupported)  Sitting - Dynamic: Good (unsupported)  Standing: Impaired; With support  Standing - Static: Constant support;Good  Standing - Dynamic : Constant support; Fair    ADL Intervention:  Grooming  Grooming Assistance:  (laryngectomy care setup A)    Toileting  Bowel Hygiene: Minimum assistance    Pain:  2/10 R wrist pain    Activity Tolerance:   Fair and requires rest breaks  Please refer to the flowsheet for vital signs taken during this treatment. After treatment patient left in no apparent distress:   Sitting in chair and Call bell within reach    COMMUNICATION/COLLABORATION:   The patients plan of care was discussed with: Registered nurse.      SISSY Raymundo  Time Calculation: 28 mins    Problem: Self Care Deficits Care Plan (Adult)  Goal: *Acute Goals and Plan of Care (Insert Text)  Description: Pt stated goal 'to get stronger'  Pt will be IND sup <> sit in prep for EOB ADLs  Pt will be Mod I grooming standing sink side LRAD  Pt will be IND UB dressing sitting EOB/long sit   Pt will be Mod I LE dressing sitting EOB/long sit  Pt will be Mod I sit <>  prep for toileting LRAD  Pt will be Mod I toileting/toilet transfer/cloth mgmt LRAD  Pt will be IND following UE HEP in prep for self care tasks      Outcome: Progressing Towards Goal

## 2022-05-10 NOTE — PROGRESS NOTES
PROGRESS NOTE      Subjective: Eating. Occasional raspy cough. Uneventful night. Wrist still painful. But better. No fever chills. No seizure.      Visit Vitals  BP (P) 127/67 (BP 1 Location: Right upper arm)   Pulse (P) 66   Temp (P) 98.2 °F (36.8 °C)   Resp (P) 18   Ht 5' 5\" (1.651 m)   Wt 63.5 kg (139 lb 15.9 oz)   SpO2 (P) 97%   BMI 23.30 kg/m²       Current Facility-Administered Medications:     [START ON 5/11/2022] NIFEdipine ER (PROCARDIA XL) tablet 30 mg, 30 mg, Oral, DAILY, Jihan Meza MD    lisinopriL (PRINIVIL, ZESTRIL) tablet 10 mg, 10 mg, Oral, DAILY, Donnita Im, Jihan DAVILA MD, 10 mg at 05/10/22 0938    lidocaine 4 % patch 1 Patch, 1 Patch, TransDERmal, Q24H, Manolo Prescott MD, 1 Patch at 05/09/22 2142    diclofenac (VOLTAREN) 1 % topical gel 2 g, 2 g, Topical, QID, Beverlyta Im, Jihan DAVILA MD, 2 g at 05/10/22 0941    acetaminophen (TYLENOL) tablet 650 mg, 650 mg, Oral, Q6H PRN, Doris DAVILA MD, 650 mg at 05/07/22 2229    insulin glargine (LANTUS) injection 35 Units, 35 Units, SubCUTAneous, QHS, Doris DAVILA MD, 35 Units at 05/09/22 2138    atorvastatin (LIPITOR) tablet 10 mg, 10 mg, Oral, QHS, Jihan Meza MD, 10 mg at 05/09/22 2138    multivitamin, tx-iron-ca-min (THERA-M w/ IRON) tablet 1 Tablet, 1 Tablet, Oral, DAILY, Doris DAVILA MD, 1 Tablet at 05/10/22 0938    levETIRAcetam (KEPPRA) tablet 1,000 mg, 1,000 mg, Oral, BID, Doris DAVILA MD, 1,000 mg at 05/10/22 0938    heparin (porcine) injection 5,000 Units, 5,000 Units, SubCUTAneous, Q12H, Doris DAVILA MD, 5,000 Units at 05/09/22 2345    glucose chewable tablet 16 g, 4 Tablet, Oral, PRN, Doris DAVILA MD    dextrose 10% infusion 0-250 mL, 0-250 mL, IntraVENous, PRN, Donnita Im, Jihan DAVILA MD    glucagon (GLUCAGEN) injection 1 mg, 1 mg, IntraMUSCular, PRN, Doris DAVILA MD    insulin lispro (HUMALOG) injection, , SubCUTAneous, TIDAC, Calli Stoll MD, 2 Units at 05/09/22 1826  Recent Results (from the past 24 hour(s))   GLUCOSE, POC    Collection Time: 05/09/22 11:27 AM   Result Value Ref Range    Glucose (POC) 208 (H) 65 - 117 mg/dL    Performed by Mechelle Quintana    GLUCOSE, POC    Collection Time: 05/09/22  3:47 PM   Result Value Ref Range    Glucose (POC) 161 (H) 65 - 117 mg/dL    Performed by Mechelle Quintana    GLUCOSE, POC    Collection Time: 05/09/22  8:00 PM   Result Value Ref Range    Glucose (POC) 192 (H) 65 - 117 mg/dL    Performed by Mechelle Quintana    GLUCOSE, POC    Collection Time: 05/10/22  8:00 AM   Result Value Ref Range    Glucose (POC) 94 65 - 117 mg/dL    Performed by MORIAH JUAREZ      XR WRIST RT AP/LAT   Final Result   Chronic findings as above. HEENT: Normocephalic atraumatic. Neck: No distended neck vein. Dry inspissated secretion at the tracheostomy site. Lungs: Generally clear no wheeze  Heart: Regular  Abdomen: Soft nontender nondistended positive bowel sounds  Lower Extremities: No cyanosis or edema right wrist stiff tender no redness bony prominence. CNS: Alert and oriented follows command moves extremities  Psych: Cooperative     Assessment:     #1 ambulatory dysfunction. #2 right wrist pain/stiffness/ganglion cyst.   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 Plan: We will consult respiratory for tracheostomy care. Stable for discharge to rehab when bed is available.

## 2022-05-11 VITALS
WEIGHT: 139.99 LBS | HEIGHT: 65 IN | DIASTOLIC BLOOD PRESSURE: 77 MMHG | SYSTOLIC BLOOD PRESSURE: 157 MMHG | RESPIRATION RATE: 18 BRPM | BODY MASS INDEX: 23.32 KG/M2 | TEMPERATURE: 98.2 F | OXYGEN SATURATION: 99 % | HEART RATE: 87 BPM

## 2022-05-11 LAB
GLUCOSE BLD STRIP.AUTO-MCNC: 125 MG/DL (ref 65–117)
GLUCOSE BLD STRIP.AUTO-MCNC: 134 MG/DL (ref 65–117)
GLUCOSE BLD STRIP.AUTO-MCNC: 243 MG/DL (ref 65–117)
GLUCOSE BLD STRIP.AUTO-MCNC: 63 MG/DL (ref 65–117)
PERFORMED BY, TECHID: ABNORMAL

## 2022-05-11 PROCEDURE — 97530 THERAPEUTIC ACTIVITIES: CPT

## 2022-05-11 PROCEDURE — 82962 GLUCOSE BLOOD TEST: CPT

## 2022-05-11 PROCEDURE — G0378 HOSPITAL OBSERVATION PER HR: HCPCS

## 2022-05-11 PROCEDURE — 74011250636 HC RX REV CODE- 250/636: Performed by: INTERNAL MEDICINE

## 2022-05-11 PROCEDURE — 96372 THER/PROPH/DIAG INJ SC/IM: CPT

## 2022-05-11 PROCEDURE — 74011250637 HC RX REV CODE- 250/637: Performed by: INTERNAL MEDICINE

## 2022-05-11 RX ORDER — NIFEDIPINE 60 MG/1
60 TABLET, EXTENDED RELEASE ORAL DAILY
Status: DISCONTINUED | OUTPATIENT
Start: 2022-05-12 | End: 2022-05-11 | Stop reason: HOSPADM

## 2022-05-11 RX ADMIN — Medication 1 TABLET: at 08:29

## 2022-05-11 RX ADMIN — LISINOPRIL 10 MG: 10 TABLET ORAL at 08:29

## 2022-05-11 RX ADMIN — NIFEDIPINE 30 MG: 30 TABLET, FILM COATED, EXTENDED RELEASE ORAL at 08:29

## 2022-05-11 RX ADMIN — HEPARIN SODIUM 5000 UNITS: 5000 INJECTION INTRAVENOUS; SUBCUTANEOUS at 11:59

## 2022-05-11 RX ADMIN — DICLOFENAC SODIUM 2 G: 10 GEL TOPICAL at 13:00

## 2022-05-11 RX ADMIN — DICLOFENAC SODIUM 2 G: 10 GEL TOPICAL at 09:00

## 2022-05-11 RX ADMIN — LEVETIRACETAM 1000 MG: 500 TABLET, FILM COATED ORAL at 08:29

## 2022-05-11 NOTE — PROGRESS NOTES
CM met with patient at bedside to discuss home health choice as guidance from rehab physician recommends pursuing home health company instead. Patient is agreeable with home health with no preference in choice. CM asked patient whom would be picking him up from the hospital.  He states his niece, but she has a procedure today. Patient stated it will most likely be tomorrow before she can pick him up. CM asked if he needed another ride, patient stated yes. CM informed patient that we could get him a rider upon discharge. CM sent referral to [facility] and has been accepted. CM call attending to receive verbal order to modify discharge order from rehab to home health. Attending would prefer CM to get clarity/confirm the plan with the niece, once that is completed he is okay with DC order being modified. CM called niece Aubrey Kawasaki [phone] x2, no answer to telephone, unable to leave message due to voicemail box being full. CM team will attempt to contact sena at a later time.

## 2022-05-11 NOTE — PROGRESS NOTES
OCCUPATIONAL THERAPY TREATMENT  Patient: Amaury Lemus (39 y.o. male)  Date: 5/11/2022  Diagnosis: Generalized weakness [R53.1] <principal problem not specified>       Precautions:    Chart, occupational therapy assessment, plan of care, and goals were reviewed. ASSESSMENT  Patient continues with skilled OT services and is progressing towards goals. Upon SHEEHAN arrival, pt sitting at EOB and agreeable to tx session. Pt noted with drainage, deep suction and laryngectomy care completed by RN prior to mobility. Pt verbalized and communicated throughout tx session using electrolarynx. Pt completed sit>stand from EOB with CGA using RW for balance upon standing. Pt ambulated to bathroom and completed transfer onto toilet with CGA. Pt completed seated bowel hygiene, however completed sit>stand with CGA and required [address] for thoroughness. Pt able to stand for significant time for completion of bowel hygiene. Pt completed standing oral hygiene and hand hygiene at sink with CGA, no LOB noted. Pt ambulated to recliner in room and completed transfer with CGA. Pt completed transfer into recliner and donning of clean gown completed with Rebeca. Pt left sitting in recliner with call bell within reach and needs met, RN notified of pt current positioning. Will continue to follow pt throughout remainder of stay and progress towards OT goals. Recommending IRF at discharge when medically appropriate. Other factors to consider for discharge: time since onset, severity of deficits          PLAN :  Patient continues to benefit from skilled intervention to address the above impairments. Continue treatment per established plan of care. to address goals.     Recommendation for discharge: (in order for the patient to meet his/her long term goals)  [address] [facility],[address]     This discharge recommendation:  Has been made in collaboration with the attending provider and/or case management    IF patient discharges home will need the following DME: TBD       SUBJECTIVE:   Patient stated I don't have any teeth.     OBJECTIVE DATA SUMMARY:   Cognitive/Behavioral Status:  Neurologic State: Alert  Orientation Level: Oriented X4  Cognition: Follows commands    Functional Mobility and Transfers for ADLs:    Transfers:  Sit to Stand: Contact guard assistance  Functional Transfers  Bathroom Mobility: Contact guard assistance  Toilet Transfer : Contact guard assistance  Bed to Chair: Contact guard assistance    Balance:  Sitting: Intact; Without support  Sitting - Static: Good (unsupported)  Sitting - Dynamic: Good (unsupported)  Standing: Impaired; With support  Standing - Static: Constant support;Good  Standing - Dynamic : Constant support; Fair    ADL Intervention:  Grooming  Position Performed: Standing  Washing Face: Contact guard assistance  Brushing Teeth: Contact guard assistance    Upper Body [address]: Minimum  assistance    Toileting  Bowel Hygiene: Moderate assistance    Pain:  1/10 R wrist pain    Activity Tolerance:   Fair  Please refer to the flowsheet for vital signs taken during this treatment. After treatment patient left in no apparent distress:   Sitting in chair and Call bell within reach    COMMUNICATION/COLLABORATION:   The patients plan of care was discussed with: Registered nurse.      SISSY Raymundo  Time Calculation: 40 mins    Problem: Self Care Deficits Care Plan (Adult)  Goal: *Acute Goals and Plan of Care (Insert Text)  Description: Pt stated goal 'to get stronger'  Pt will be IND sup <> sit in prep for EOB ADLs  Pt will be Mod I grooming standing sink side LRAD  Pt will be IND UB dressing sitting EOB/long sit   Pt will be Mod I LE dressing sitting EOB/long sit  Pt will be Mod I sit <>  prep for toileting LRAD  Pt will be Mod I toileting/toilet transfer/cloth mgmt LRAD  Pt will be IND following UE HEP in prep for self care tasks      Outcome: Progressing Towards Goal

## 2022-05-11 NOTE — PROGRESS NOTES
Updated therapy notes reviewed. At this point he is functioning at too high of a level to qualify for inpatient rehab based on his insurance carrier's criteria. Would recommend pursuing discharge to home with home health therapies.

## 2022-05-11 NOTE — PROGRESS NOTES
PROGRESS NOTE      Subjective: Sitting in the chair. No seizure. No complaint. Is comfortable no shortness of breath. Pain manageable. He is got a Lidoderm patch on it.      Visit Vitals  BP (!) 157/77   Pulse 87   Temp 98.2 °F (36.8 °C)   Resp 18   Ht 5' 5\" (1.651 m)   Wt 63.5 kg (139 lb 15.9 oz)   SpO2 99%   BMI 23.30 kg/m²       Current Facility-Administered Medications:     [START ON 5/12/2022] NIFEdipine ER (PROCARDIA XL) tablet 60 mg, 60 mg, Oral, DAILY, Jihan Meza MD    lisinopriL (PRINIVIL, ZESTRIL) tablet 10 mg, 10 mg, Oral, DAILY, Jihan Unger MD, 10 mg at 05/11/22 0829    lidocaine 4 % patch 1 Patch, 1 Patch, TransDERmal, Q24H, Daina Carson MD, 1 Patch at 05/10/22 2054    diclofenac (VOLTAREN) 1 % topical gel 2 g, 2 g, Topical, QID, Jihan Unger MD, 2 g at 05/11/22 1300    acetaminophen (TYLENOL) tablet 650 mg, 650 mg, Oral, Q6H PRN, Koki DAVILA MD, 650 mg at 05/07/22 2229    insulin glargine (LANTUS) injection 35 Units, 35 Units, SubCUTAneous, QHS, Jihan Meza MD, 35 Units at 05/10/22 2053    atorvastatin (LIPITOR) tablet 10 mg, 10 mg, Oral, QHS, Jihan Meza MD, 10 mg at 05/10/22 2053    multivitamin, tx-iron-ca-min (THERA-M w/ IRON) tablet 1 Tablet, 1 Tablet, Oral, DAILY, Koki DAVILA MD, 1 Tablet at 05/11/22 0829    levETIRAcetam (KEPPRA) tablet 1,000 mg, 1,000 mg, Oral, BID, Jihan Unger MD, 1,000 mg at 05/11/22 0829    heparin (porcine) injection 5,000 Units, 5,000 Units, SubCUTAneous, Q12H, Koki DAVILA MD, 5,000 Units at 05/11/22 1159    glucose chewable tablet 16 g, 4 Tablet, Oral, PRN, Koki DAVILA MD    dextrose 10% infusion 0-250 mL, 0-250 mL, IntraVENous, PRN, Jihan Unger MD    glucagon (GLUCAGEN) injection 1 mg, 1 mg, IntraMUSCular, PRN, Koki Mclaughlin MD    insulin lispro (HUMALOG) injection, , SubCUTAneous, TIDAC, Koki Mclaughlin MD, 2 Units at 05/10/22 1630  Recent Results (from the past 24 hour(s))   GLUCOSE, POC    Collection Time: 05/10/22  4:00 PM   Result Value Ref Range    Glucose (POC) 160 (H) 65 - 117 mg/dL    Performed by Kishor Alvarenga    GLUCOSE, POC    Collection Time: 05/10/22  8:17 PM   Result Value Ref Range    Glucose (POC) 166 (H) 65 - 117 mg/dL    Performed by Leandro Emmanuel    GLUCOSE, POC    Collection Time: 05/11/22  8:11 AM   Result Value Ref Range    Glucose (POC) 63 (L) 65 - 117 mg/dL    Performed by Kishor Alvarenga    GLUCOSE, POC    Collection Time: 05/11/22 10:04 AM   Result Value Ref Range    Glucose (POC) 243 (H) 65 - 117 mg/dL    Performed by [address] [place] [address], POC    Collection Time: 05/11/22 11:51 AM   Result Value Ref Range    Glucose (POC) 134 (H) 65 - 117 mg/dL    Performed by Kishor Alvarenga    GLUCOSE, POC    Collection Time: 05/11/22 11:58 AM   Result Value Ref Range    Glucose (POC) 125 (H) 65 - 117 mg/dL    Performed by Ludy aHgan      XR WRIST RT AP/LAT   Final Result   Chronic findings as above. HEENT: Normocephalic atraumatic anicteric sclera. Neck: No distended neck veins. Tracheostomy   Lungs: Clear bilaterally  Heart: Regular   abdomen: Soft positive bowel sounds, nontender nondistended. lower Extremities: No cyanosis or edema. CNS: Alert and oriented follows commands moves extremities. Psych: Cooperative. Assessment:      #1 ambulatory dysfunction. #2 right wrist pain/stiffness/ganglion cyst.   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 Plan: I made attempt to call the niece to no avail. Discussed with case management. Patient will go home with home health once the niece can be reached and agreeable with plan. Continue current care. Adjust blood pressure medication again. Continue physical therapy.

## 2022-05-11 NOTE — ADT AUTH CERT NOTES
PREVIOUSLY DENIED FOR INPATIENT DOWNGRADED TO OBSERVATION   REF #[id number]   DATES OF SERVICE 5/4 STILL INHOUSE     PLEASE Ron Gatica FOR OBSERVATION  PHONE # [phone] FAX # [phone]    05/10/22 1235  INITIAL PHYSICIAN ORDER: OBSERVATION/OUTPATIENT IN A BED OBSERVATION; Medical; No; Generalized Weakness  (ADMISSION ORDERS)  ONE TIME        Authorizing Provider: Madison Whitlock MD    User who entered the order: Trinity Guajardo       Question Answer Comment   Patient Class: OBSERVATION    Type of Bed Medical    Cardiac Monitoring Required?  No    Reason for Observation Generalized Weakness    Admitting Diagnosis Generalized weakness    Admitting Physician Umu Romero    Attending Physician Umu Romero        05/10/22 1217

## 2022-05-11 NOTE — PROGRESS NOTES
CM called Autumn Hood [phone], she is agreeable with patient discharging today with home health. CM called Autumn Juarez [phone], she is agreeable to patient discharging today with home health as well. Patient already has a rolling walker at home. [facility] CM notified attending, telephone order received to modify discharge order to home with home health. Discharge plan of care/case management plan validated with provider discharge order.

## 2022-05-11 NOTE — ADT AUTH CERT NOTES
PREVIOUSLY DENIED FOR INPATIENT DOWNGRADED TO OBSERVATION  IP REF #[id number]   DATES OF SERVICE 5/4 STILL INHOUSE     PLEASE FAX FORM OR CALL BACK AUTHORIZATION FOR OBSERVATION  PHONE # [phone] FAX # [phone]

## 2022-05-11 NOTE — PROGRESS NOTES
Problem: Falls - Risk of  Goal: *Absence of Falls  Description: Document Jimy Mariscal Fall Risk and appropriate interventions in the flowsheet. Outcome: Progressing Towards Goal  Note: Fall Risk Interventions:  Mobility Interventions: OT consult for ADLs,PT Consult for mobility concerns         Medication Interventions: Patient to call before getting OOB    Elimination Interventions: Bed/chair exit alarm,Call light in reach    History of Falls Interventions: Bed/chair exit alarm         Problem: Patient Education: Go to Patient Education Activity  Goal: Patient/Family Education  Outcome: Progressing Towards Goal     Problem: Pressure Injury - Risk of  Goal: *Prevention of pressure injury  Description: Document Jabier Scale and appropriate interventions in the flowsheet.   Outcome: Progressing Towards Goal  Note: Pressure Injury Interventions:  Sensory Interventions: Discuss PT/OT consult with provider,Keep linens dry and wrinkle-free    Moisture Interventions: Check for incontinence Q2 hours and as needed,Offer toileting Q_hr    Activity Interventions: Increase time out of bed    Mobility Interventions: PT/OT evaluation    Nutrition Interventions: Document food/fluid/supplement intake    Friction and Shear Interventions: Minimize layers

## 2022-05-31 ENCOUNTER — OFFICE VISIT (OUTPATIENT)
Dept: ENT CLINIC | Age: 87
End: 2022-05-31
Payer: MEDICARE

## 2022-05-31 VITALS
DIASTOLIC BLOOD PRESSURE: 78 MMHG | SYSTOLIC BLOOD PRESSURE: 130 MMHG | BODY MASS INDEX: 22.49 KG/M2 | OXYGEN SATURATION: 97 % | HEART RATE: 68 BPM | WEIGHT: 135 LBS | RESPIRATION RATE: 18 BRPM | HEIGHT: 65 IN

## 2022-05-31 DIAGNOSIS — Z90.02 HISTORY OF LARYNGECTOMY: Primary | ICD-10-CM

## 2022-05-31 DIAGNOSIS — H61.23 BILATERAL IMPACTED CERUMEN: ICD-10-CM

## 2022-05-31 DIAGNOSIS — Z85.21 HISTORY OF MALIGNANT NEOPLASM OF LARYNX: ICD-10-CM

## 2022-05-31 PROCEDURE — G8427 DOCREV CUR MEDS BY ELIG CLIN: HCPCS | Performed by: OTOLARYNGOLOGY

## 2022-05-31 PROCEDURE — G8536 NO DOC ELDER MAL SCRN: HCPCS | Performed by: OTOLARYNGOLOGY

## 2022-05-31 PROCEDURE — 69210 REMOVE IMPACTED EAR WAX UNI: CPT | Performed by: OTOLARYNGOLOGY

## 2022-05-31 PROCEDURE — 99204 OFFICE O/P NEW MOD 45 MIN: CPT | Performed by: OTOLARYNGOLOGY

## 2022-05-31 PROCEDURE — G8510 SCR DEP NEG, NO PLAN REQD: HCPCS | Performed by: OTOLARYNGOLOGY

## 2022-05-31 PROCEDURE — G8420 CALC BMI NORM PARAMETERS: HCPCS | Performed by: OTOLARYNGOLOGY

## 2022-05-31 PROCEDURE — 1123F ACP DISCUSS/DSCN MKR DOCD: CPT | Performed by: OTOLARYNGOLOGY

## 2022-05-31 PROCEDURE — 1101F PT FALLS ASSESS-DOCD LE1/YR: CPT | Performed by: OTOLARYNGOLOGY

## 2022-05-31 RX ORDER — CLOPIDOGREL BISULFATE 75 MG/1
TABLET ORAL
COMMUNITY
Start: 2022-05-03

## 2022-05-31 NOTE — LETTER
5/31/2022    Patient: Radha Camacho   YOB: 1935   Date of Visit: 5/31/2022     Jose F Larkin MD  [address] [place] [address]  Via Fax: [phone]    Dear Jose F Larkin MD,      Thank you for referring Mr. German Brito to [facility] [address] for evaluation. My notes for this consultation are attached. If you have questions, please do not hesitate to call me. I look forward to following your patient along with you.       Sincerely,    Robert Flood MD

## 2022-05-31 NOTE — PROGRESS NOTES
Subjective:    Sue Johnson   80 y.o.   1935     New Patient Visit    Location -throat, neck    Quality -laryngectomy    Severity -moderate    Duration -years    Timing -chronic    Context -patient has been seen in this practice before. He had a laryngectomy over 15 years ago. Done at [facility]. Has been doing well since that time no issues. He does not use any method of artificial speech. He swallows fine. There is some concern for hearing loss. Modifying Features -none    Associated symptoms/signs -as above      Review of Systems  Review of Systems   Constitutional: Negative for chills and fever. HENT: Positive for hearing loss. Negative for ear pain, nosebleeds and tinnitus. Eyes: Negative for blurred vision and double vision. Respiratory: Negative for cough, sputum production and shortness of breath. Cardiovascular: Negative for chest pain and palpitations. Gastrointestinal: Negative for heartburn, nausea and vomiting. Musculoskeletal: Positive for joint pain and myalgias. Negative for neck pain. Skin: Negative. Neurological: Negative for dizziness, speech change, weakness and headaches. Endo/Heme/Allergies: Negative for environmental allergies. Does not bruise/bleed easily. Psychiatric/Behavioral: Negative for memory loss. The patient does not have insomnia.           Past Medical History:   Diagnosis Date    Anemia     Arthritis     [facility] [address]     Hypercholesterolemia     Hypertension     Single kidney     Cr normal    Throat cancer ([facility] [address] 2008    s/p laryngectomy     Past Surgical History:   Procedure Laterality Date    HX LAP CHOLECYSTECTOMY  12-19-12    By Dr. [facility] states does not know reason      Family History   Family history unknown: Yes     Social History     Tobacco Use    Smoking status: Former Smoker     Packs/day: 1.00     Years: 57.00     Pack years: 57.00     Quit date: 7/20/2012     Years since quittin.8    Smokeless tobacco: Never Used   Substance Use Topics    Alcohol use: Yes     Alcohol/week: 17.5 standard drinks     Types: 21 Shots of liquor per week      Prior to Admission medications    Medication Sig Start Date End Date Taking? Authorizing Provider   clopidogreL (PLAVIX) 75 mg tab  5/3/22   Provider, Historical   lisinopriL (PRINIVIL, ZESTRIL) 10 mg tablet Take 1 Tablet by mouth daily for 30 days. 5/10/22 6/9/22  Robbin Cramer MD   diclofenac (VOLTAREN) 1 % gel Apply 2 g to affected area two (2) times a day for 30 days. 22  Robbin Cramer MD   SITagliptin (Januvia) 25 mg tablet Take 25 mg by mouth daily. Provider, Historical   levETIRAcetam 1,000 mg tablet Take 1 Tablet by mouth two (2) times a day for 30 days. 5/4/22 6/3/22  Robbin Cramer MD   atorvastatin (LIPITOR) 10 mg tablet Take 10 mg by mouth nightly. Provider, Historical   PATRICIO ASPIRIN PO Take 81 mg by mouth daily. Provider, Historical   multivitamin, tx-iron-ca-min (THERA-M W/ IRON) 9 mg iron-400 mcg tab tablet Take 1 Tab by mouth daily. Provider, Historical   insulin detemir (LEVEMIR FLEXTOUCH) 100 unit/mL (3 mL) inpn 0.35 mL by SubCUTAneous route nightly. 35 Units by SubCUTAneous route at night  Patient taking differently: 10 Units by SubCUTAneous route nightly. 35 Units by SubCUTAneous route at night 16   Sara Mcginnis MD   NIFEdipine XL (PROCARDIA-XL) 60 mg CR tablet Take 60 mg by mouth daily. Indications: HYPERTENSION    Provider, Historical   saw palmetto 500 mg cap Take 450 mg by mouth daily. Provider, Historical        No Known Allergies      Objective:     Visit Vitals  /78 (BP 1 Location: Left upper arm, BP Patient Position: Sitting, BP Cuff Size: Adult)   Pulse 68   Resp 18   Ht 5' 5\" (1.651 m)   Wt 135 lb (61.2 kg)   SpO2 97%   BMI 22.47 kg/m²        Physical Exam  Vitals reviewed. Constitutional:       General: He is awake.       Appearance: Normal appearance. He is normal weight. HENT:      Head: Normocephalic and atraumatic. Jaw: There is normal jaw occlusion. No trismus, tenderness or malocclusion. Salivary Glands: Right salivary gland is not diffusely enlarged or tender. Left salivary gland is not diffusely enlarged or tender. Right Ear: Tympanic membrane, ear canal and external ear normal. Decreased hearing noted. There is impacted cerumen. Left Ear: Tympanic membrane, ear canal and external ear normal. Decreased hearing noted. There is impacted cerumen. Ears:      Nobles exam findings: does not lateralize. Right Rinne: AC > BC. Left Rinne: AC > BC. Nose: No septal deviation, mucosal edema or rhinorrhea. Right Turbinates: Not enlarged, swollen or pale. Left Turbinates: Not enlarged, swollen or pale. Right Sinus: No maxillary sinus tenderness or frontal sinus tenderness. Left Sinus: No maxillary sinus tenderness or frontal sinus tenderness. Mouth/Throat:      Lips: Pink. Mouth: Mucous membranes are moist. No oral lesions. Dentition: Has dentures. No gum lesions. Tongue: No lesions. Palate: No mass and lesions. Pharynx: Oropharynx is clear. Uvula midline. Tonsils: No tonsillar exudate. 0 on the right. 0 on the left. Eyes:      General: Vision grossly intact. Extraocular Movements: Extraocular movements intact. Right eye: No nystagmus. Left eye: No nystagmus. Pupils: Pupils are equal, round, and reactive to light. Neck:      Thyroid: No thyroid mass, thyromegaly or thyroid tenderness. Trachea: Tracheostomy present. No tracheal tenderness. Comments: Postsurgical changes to his neck  Well-healed, widely patent laryngectomy stoma  Healthy-appearing trachea mucosa  Cardiovascular:      Rate and Rhythm: Normal rate and regular rhythm. Pulmonary:      Effort: Pulmonary effort is normal.      Breath sounds: Normal breath sounds.  No stridor. No wheezing. Musculoskeletal:         General: Normal range of motion. Cervical back: Normal range of motion. No edema or erythema. Lymphadenopathy:      Cervical: No cervical adenopathy. Skin:     General: Skin is warm and dry. Neurological:      General: No focal deficit present. Mental Status: He is alert and oriented to person, place, and time. Mental status is at baseline. Cranial Nerves: Cranial nerves are intact. Coordination: Romberg sign negative. Gait: Gait is intact. Psychiatric:         Mood and Affect: Mood normal.         Behavior: Behavior normal. Behavior is cooperative. Procedure - Removal Impacted Cerumen    Indications: cerumen impaction    Ears are examined under microscope/headlight.  bilateral ears are cleaned using otologic curette, suction, and/or alligator forceps. Assessment/Plan:     Encounter Diagnoses   Name Primary?  History of laryngectomy Yes    Bilateral impacted cerumen     History of malignant neoplasm of larynx        Patient is over 15 years out from laryngectomy. He has a nicely healed stoma. No evidence of disease. He has bilateral cerumen impactions, cleaned today. Recommend annual follow-up. Orders Placed This Encounter    REMOVE IMPACTED EAR WAX    clopidogreL (PLAVIX) 75 mg tab     Follow-up and Dispositions    · Return in about 1 year (around 5/31/2023). Thank you for referring this patient,    Lopez De Luna MD, [address] [facility]    [address]  [place] [id number] . [address]  [place], [place] [address]

## 2022-07-27 ENCOUNTER — HOSPITAL ENCOUNTER (EMERGENCY)
Age: 87
Discharge: HOME | End: 2022-07-28
Attending: STUDENT IN AN ORGANIZED HEALTH CARE EDUCATION/TRAINING PROGRAM
Payer: MEDICARE

## 2022-07-27 DIAGNOSIS — R53.1 GENERALIZED WEAKNESS: ICD-10-CM

## 2022-07-27 DIAGNOSIS — E86.0 DEHYDRATION: Primary | ICD-10-CM

## 2022-07-27 LAB
ALBUMIN SERPL-MCNC: 3.6 G/DL (ref 3.5–5)
ALBUMIN/GLOB SERPL: 0.9 {RATIO} (ref 1.1–2.2)
ALP SERPL-CCNC: 118 U/L (ref 45–117)
ALT SERPL-CCNC: 34 U/L (ref 12–78)
ANION GAP SERPL CALC-SCNC: 4 MMOL/L (ref 5–15)
AST SERPL W P-5'-P-CCNC: 39 U/L (ref 15–37)
BASOPHILS # BLD: 0 K/UL (ref 0–0.1)
BASOPHILS NFR BLD: 0 % (ref 0–1)
BILIRUB SERPL-MCNC: 0.6 MG/DL (ref 0.2–1)
BUN SERPL-MCNC: 35 MG/DL (ref 6–20)
BUN/CREAT SERPL: 20 (ref 12–20)
CA-I BLD-MCNC: 9.5 MG/DL (ref 8.5–10.1)
CHLORIDE SERPL-SCNC: 104 MMOL/L (ref 97–108)
CO2 SERPL-SCNC: 28 MMOL/L (ref 21–32)
CREAT SERPL-MCNC: 1.79 MG/DL (ref 0.7–1.3)
DIFFERENTIAL METHOD BLD: ABNORMAL
EOSINOPHIL # BLD: 0.1 K/UL (ref 0–0.4)
EOSINOPHIL NFR BLD: 2 % (ref 0–7)
ERYTHROCYTE [DISTWIDTH] IN BLOOD BY AUTOMATED COUNT: 14.3 % (ref 11.5–14.5)
GLOBULIN SER CALC-MCNC: 4 G/DL (ref 2–4)
GLUCOSE SERPL-MCNC: 184 MG/DL (ref 65–100)
HCT VFR BLD AUTO: 37.5 % (ref 36.6–50.3)
HGB BLD-MCNC: 12.2 G/DL (ref 12.1–17)
IMM GRANULOCYTES # BLD AUTO: 0 K/UL (ref 0–0.04)
IMM GRANULOCYTES NFR BLD AUTO: 0 % (ref 0–0.5)
LYMPHOCYTES # BLD: 1.2 K/UL (ref 0.8–3.5)
LYMPHOCYTES NFR BLD: 15 % (ref 12–49)
MCH RBC QN AUTO: 30.9 PG (ref 26–34)
MCHC RBC AUTO-ENTMCNC: 32.5 G/DL (ref 30–36.5)
MCV RBC AUTO: 94.9 FL (ref 80–99)
MONOCYTES # BLD: 0.7 K/UL (ref 0–1)
MONOCYTES NFR BLD: 10 % (ref 5–13)
NEUTS SEG # BLD: 5.4 K/UL (ref 1.8–8)
NEUTS SEG NFR BLD: 73 % (ref 32–75)
NRBC # BLD: 0 K/UL (ref 0–0.01)
NRBC BLD-RTO: 0 PER 100 WBC
PLATELET # BLD AUTO: 168 K/UL (ref 150–400)
PMV BLD AUTO: 11.6 FL (ref 8.9–12.9)
POTASSIUM SERPL-SCNC: 5.1 MMOL/L (ref 3.5–5.1)
PROT SERPL-MCNC: 7.6 G/DL (ref 6.4–8.2)
RBC # BLD AUTO: 3.95 M/UL (ref 4.1–5.7)
SODIUM SERPL-SCNC: 136 MMOL/L (ref 136–145)
WBC # BLD AUTO: 7.5 K/UL (ref 4.1–11.1)

## 2022-07-27 PROCEDURE — 82565 ASSAY OF CREATININE: CPT

## 2022-07-27 PROCEDURE — 74011250636 HC RX REV CODE- 250/636: Performed by: STUDENT IN AN ORGANIZED HEALTH CARE EDUCATION/TRAINING PROGRAM

## 2022-07-27 PROCEDURE — 96361 HYDRATE IV INFUSION ADD-ON: CPT

## 2022-07-27 PROCEDURE — 81001 URINALYSIS AUTO W/SCOPE: CPT

## 2022-07-27 PROCEDURE — 99284 EMERGENCY DEPT VISIT MOD MDM: CPT

## 2022-07-27 PROCEDURE — 80053 COMPREHEN METABOLIC PANEL: CPT

## 2022-07-27 PROCEDURE — 96360 HYDRATION IV INFUSION INIT: CPT

## 2022-07-27 PROCEDURE — 85025 COMPLETE CBC W/AUTO DIFF WBC: CPT

## 2022-07-27 PROCEDURE — 93005 ELECTROCARDIOGRAM TRACING: CPT

## 2022-07-27 PROCEDURE — 36415 COLL VENOUS BLD VENIPUNCTURE: CPT

## 2022-07-27 RX ADMIN — SODIUM CHLORIDE 500 ML: 9 INJECTION, SOLUTION INTRAVENOUS at 21:10

## 2022-07-27 RX ADMIN — SODIUM CHLORIDE 1000 ML: 9 INJECTION, SOLUTION INTRAVENOUS at 22:24

## 2022-07-28 VITALS
OXYGEN SATURATION: 96 % | TEMPERATURE: 98.1 F | RESPIRATION RATE: 16 BRPM | WEIGHT: 135 LBS | BODY MASS INDEX: 22.49 KG/M2 | HEART RATE: 100 BPM | SYSTOLIC BLOOD PRESSURE: 148 MMHG | DIASTOLIC BLOOD PRESSURE: 103 MMHG | HEIGHT: 65 IN

## 2022-07-28 LAB
APPEARANCE UR: CLEAR
ATRIAL RATE: 70 BPM
BACTERIA URNS QL MICRO: NEGATIVE /HPF
BILIRUB UR QL: NEGATIVE
CALCULATED R AXIS, ECG10: -47 DEGREES
COLOR UR: YELLOW
CREAT SERPL-MCNC: 1.36 MG/DL (ref 0.7–1.3)
DIAGNOSIS, 93000: NORMAL
GLUCOSE UR STRIP.AUTO-MCNC: NORMAL MG/DL
HGB UR QL STRIP: NEGATIVE
KETONES UR QL STRIP.AUTO: NEGATIVE MG/DL
LEUKOCYTE ESTERASE UR QL STRIP.AUTO: NEGATIVE
MUCOUS THREADS URNS QL MICRO: ABNORMAL /LPF
NITRITE UR QL STRIP.AUTO: NEGATIVE
PH UR STRIP: 5 [PH] (ref 5–8)
PROT UR STRIP-MCNC: NEGATIVE MG/DL
Q-T INTERVAL, ECG07: 338 MS
QRS DURATION, ECG06: 138 MS
QTC CALCULATION (BEZET), ECG08: 465 MS
RBC #/AREA URNS HPF: ABNORMAL /HPF (ref 0–5)
SP GR UR REFRACTOMETRY: 1.01 (ref 1–1.03)
UA: UC IF INDICATED,UAUC: ABNORMAL
UROBILINOGEN UR QL STRIP.AUTO: NORMAL EU/DL (ref 0.1–1)
VENTRICULAR RATE, ECG03: 114 BPM
WBC URNS QL MICRO: ABNORMAL /HPF (ref 0–4)

## 2022-07-28 NOTE — DISCHARGE INSTRUCTIONS
Mr. Garcia Hence,  We saw you in the ER for generalized weakness. Your work-up revealed that he have dehydration. He did get IV fluids and your kidney functions has improved. You need to follow-up with your primary care doctor for reevaluation. If you have any concerning symptoms, please go to the nearest emergency department. Thank you! Thank you for allowing me to care for you in the emergency department. It is my goal to provide you with excellent care. If you have not received excellent quality care, please ask to speak to the nurse manager. Please fill out the survey that will come to you by mail or email since we listen to your feedback! Below you will find a list of your tests from today's visit. Should you have any questions, please do not hesitate to call the emergency department. Labs  Recent Results (from the past 12 hour(s))   CBC WITH AUTOMATED DIFF    Collection Time: 07/27/22  9:07 PM   Result Value Ref Range    WBC 7.5 4.1 - 11.1 K/uL    RBC 3.95 (L) 4.10 - 5.70 M/uL    HGB 12.2 12.1 - 17.0 g/dL    HCT 37.5 36.6 - 50.3 %    MCV 94.9 80.0 - 99.0 FL    MCH 30.9 26.0 - 34.0 PG    MCHC 32.5 30.0 - 36.5 g/dL    RDW 14.3 11.5 - 14.5 %    PLATELET 433 845 - 474 K/uL    MPV 11.6 8.9 - 12.9 FL    NRBC 0.0 0.0  WBC    ABSOLUTE NRBC 0.00 0.00 - 0.01 K/uL    NEUTROPHILS 73 32 - 75 %    LYMPHOCYTES 15 12 - 49 %    MONOCYTES 10 5 - 13 %    EOSINOPHILS 2 0 - 7 %    BASOPHILS 0 0 - 1 %    IMMATURE GRANULOCYTES 0 0 - 0.5 %    ABS. NEUTROPHILS 5.4 1.8 - 8.0 K/UL    ABS. LYMPHOCYTES 1.2 0.8 - 3.5 K/UL    ABS. MONOCYTES 0.7 0.0 - 1.0 K/UL    ABS. EOSINOPHILS 0.1 0.0 - 0.4 K/UL    ABS. BASOPHILS 0.0 0.0 - 0.1 K/UL    ABS. IMM.  GRANS. 0.0 0.00 - 0.04 K/UL    DF AUTOMATED     METABOLIC PANEL, COMPREHENSIVE    Collection Time: 07/27/22  9:07 PM   Result Value Ref Range    Sodium 136 136 - 145 mmol/L    Potassium 5.1 3.5 - 5.1 mmol/L    Chloride 104 97 - 108 mmol/L    CO2 28 21 - 32 mmol/L    Anion gap 4 (L) 5 - 15 mmol/L    Glucose 184 (H) 65 - 100 mg/dL    BUN 35 (H) 6 - 20 mg/dL    Creatinine 1.79 (H) 0.70 - 1.30 mg/dL    BUN/Creatinine ratio 20 12 - 20      GFR est AA 44 (L) >60 ml/min/1.73m2    GFR est non-AA 36 (L) >60 ml/min/1.73m2    Calcium 9.5 8.5 - 10.1 mg/dL    Bilirubin, total 0.6 0.2 - 1.0 mg/dL    AST (SGOT) 39 (H) 15 - 37 U/L    ALT (SGPT) 34 12 - 78 U/L    Alk. phosphatase 118 (H) 45 - 117 U/L    Protein, total 7.6 6.4 - 8.2 g/dL    Albumin 3.6 3.5 - 5.0 g/dL    Globulin 4.0 2.0 - 4.0 g/dL    A-G Ratio 0.9 (L) 1.1 - 2.2     CREATININE AND GFR    Collection Time: 07/27/22 11:42 PM   Result Value Ref Range    Creatinine 1.36 (H) 0.70 - 1.30 mg/dL    GFR est AA >60 >60 ml/min/1.73m2    GFR est non-AA 50 (L) >60 ml/min/1.73m2   URINALYSIS W/ REFLEX CULTURE    Collection Time: 07/27/22 11:55 PM    Specimen: Urine   Result Value Ref Range    Color Yellow      Appearance Clear Clear      Specific gravity 1.015 1.003 - 1.030      pH (UA) 5.0 5.0 - 8.0      Protein Negative Negative mg/dL    Glucose Normal (A) Negative mg/dL    Ketone Negative Negative mg/dL    Bilirubin Negative Negative      Blood Negative Negative      Urobilinogen Normal 0.1 - 1.0 EU/dL    Nitrites Negative Negative      Leukocyte Esterase Negative Negative      WBC 0-4 0 - 4 /hpf    RBC 0-5 0 - 5 /hpf    Bacteria Negative Negative /hpf    UA:UC IF INDICATED Culture not indicated by UA result Culture not indicated by UA result      Mucus 1+ (A) Negative /lpf       Radiologic Studies  No orders to display     CT Results  (Last 48 hours)      None          CXR Results  (Last 48 hours)      None          ------------------------------------------------------------------------------------------------------------  The exam and treatment you received in the Emergency Department were for an urgent problem and are not intended as complete care.  It is important that you follow-up with a doctor, nurse practitioner, or physician assistant to:  (1) confirm your diagnosis,  (2) re-evaluation of changes in your illness and treatment, and  (3) for ongoing care. Please take your discharge instructions with you when you go to your follow-up appointment. If you have any problem arranging a follow-up appointment, contact the Emergency Department. If your symptoms become worse or you do not improve as expected and you are unable to reach your health care provider, please return to the Emergency Department. We are available 24 hours a day. If a prescription has been provided, please have it filled as soon as possible to prevent a delay in treatment. If you have any questions or reservations about taking the medication due to side effects or interactions with other medications, please call your primary care provider or contact the ER.

## 2022-07-28 NOTE — ED TRIAGE NOTES
Pt arrived via Ambulance with concerns of generalized weakness. Per EMS, pt was sitting outside with neighbors when he went unresponsive and exhibited seizure-like activity. Pt denies seizure activity. Pt did not think he needed medical care but agreed to be assessed. Pt denies CP and SOB. Pt denies any pain at this time. Pt has a pace maker. Pt has a voice box. Pt is alert and oriented x4. Pt is cooperative with care at this time.

## 2022-07-28 NOTE — ED PROVIDER NOTES
[facility]  EMERGENCY DEPARTMENT ENCOUNTER NOTE        Date: 7/27/2022  Patient Name: Shane Angel      History of Presenting Illness     Chief Complaint   Patient presents with    Lethargy         History Provided By: Patient    HPI: Oralee Call, 80 y.o. male with PMH of DM, HTN, HLD, throat cancer status post laryngectomy who was brought in by friend for concerns for generalized weakness. Uses a voicebox for communication. He is denying any complaints. He feels at his normal self. His denying chest pain, shortness of breath, abdominal pain, nausea, vomiting, lower extremity swelling or pain. Denied any change in his bowel, dietary, or urinary habits. Post that he is compliant with his medications and does not have any complaints at this point. There are no other complaints, changes, or physical findings at this time. PCP: Enzo Haile MD    Current Outpatient Medications   Medication Sig Dispense Refill    clopidogreL (PLAVIX) 75 mg tab       SITagliptin (Januvia) 25 mg tablet Take 25 mg by mouth daily.  atorvastatin (LIPITOR) 10 mg tablet Take 10 mg by mouth nightly.  PATRICIO ASPIRIN PO Take 81 mg by mouth daily.  multivitamin, tx-iron-ca-min (THERA-M W/ IRON) 9 mg iron-400 mcg tab tablet Take 1 Tab by mouth daily.  insulin detemir (LEVEMIR FLEXTOUCH) 100 unit/mL (3 mL) inpn 0.35 mL by SubCUTAneous route nightly. 35 Units by SubCUTAneous route at night (Patient taking differently: 10 Units by SubCUTAneous route nightly. 35 Units by SubCUTAneous route at night) 45 mL 3    NIFEdipine XL (PROCARDIA-XL) 60 mg CR tablet Take 60 mg by mouth daily. Indications: HYPERTENSION      saw palmetto 500 mg cap Take 450 mg by mouth daily.            Past History     Past Medical History:  Past Medical History:   Diagnosis Date    Anemia     Arthritis     Diabetes ([address]     Hypercholesterolemia     Hypertension     Single kidney Cr normal    Throat cancer [facility]) 2008    s/p laryngectomy       Past Surgical History:  Past Surgical History:   Procedure Laterality Date    HX LAP CHOLECYSTECTOMY  12-19-12    By Dr. Aileen Tian states does not know reason       Family History:  Family History   Family history unknown: Yes       Social History:  Social History     Tobacco Use    Smoking status: Former     Packs/day: 1.00     Years: 57.00     Pack years: 57.00     Types: Cigarettes     Quit date: 7/20/2012     Years since quitting: 10.0    Smokeless tobacco: Never   Substance Use Topics    Alcohol use: Yes     Alcohol/week: 17.5 standard drinks     Types: 21 Shots of liquor per week    Drug use: No       Allergies:  No Known Allergies      Review of Systems     Review of Systems    A 10 point review of system was performed and was negative except as noted above in HPI    Physical Exam     Physical Exam  Vitals and nursing note reviewed. Constitutional:       General: He is not in acute distress. Appearance: He is not ill-appearing, toxic-appearing or diaphoretic. HENT:      Head: Normocephalic and atraumatic. Cardiovascular:      Rate and Rhythm: Normal rate and regular rhythm. Heart sounds: Normal heart sounds. Pulmonary:      Effort: Pulmonary effort is normal.      Breath sounds: Normal breath sounds. Abdominal:      Palpations: Abdomen is soft. Tenderness: There is no abdominal tenderness. Musculoskeletal:      Cervical back: Normal range of motion and neck supple. Right lower leg: No tenderness. No edema. Left lower leg: No tenderness. No edema. Skin:     General: Skin is warm and dry. Neurological:      Mental Status: He is alert and oriented to person, place, and time.        Lab and Diagnostic Study Results     Labs -     Recent Results (from the past 12 hour(s))   CBC WITH AUTOMATED DIFF    Collection Time: 07/27/22  9:07 PM   Result Value Ref Range WBC 7.5 4.1 - 11.1 K/uL    RBC 3.95 (L) 4.10 - 5.70 M/uL    HGB 12.2 12.1 - 17.0 g/dL    HCT 37.5 36.6 - 50.3 %    MCV 94.9 80.0 - 99.0 FL    MCH 30.9 26.0 - 34.0 PG    MCHC 32.5 30.0 - 36.5 g/dL    RDW 14.3 11.5 - 14.5 %    PLATELET 639 301 - 219 K/uL    MPV 11.6 8.9 - 12.9 FL    NRBC 0.0 0.0  WBC    ABSOLUTE NRBC 0.00 0.00 - 0.01 K/uL    NEUTROPHILS 73 32 - 75 %    LYMPHOCYTES 15 12 - 49 %    MONOCYTES 10 5 - 13 %    EOSINOPHILS 2 0 - 7 %    BASOPHILS 0 0 - 1 %    IMMATURE GRANULOCYTES 0 0 - 0.5 %    ABS. NEUTROPHILS 5.4 1.8 - 8.0 K/UL    ABS. LYMPHOCYTES 1.2 0.8 - 3.5 K/UL    ABS. MONOCYTES 0.7 0.0 - 1.0 K/UL    ABS. EOSINOPHILS 0.1 0.0 - 0.4 K/UL    ABS. BASOPHILS 0.0 0.0 - 0.1 K/UL    ABS. IMM. GRANS. 0.0 0.00 - 0.04 K/UL    DF AUTOMATED     METABOLIC PANEL, COMPREHENSIVE    Collection Time: 07/27/22  9:07 PM   Result Value Ref Range    Sodium 136 136 - 145 mmol/L    Potassium 5.1 3.5 - 5.1 mmol/L    Chloride 104 97 - 108 mmol/L    CO2 28 21 - 32 mmol/L    Anion gap 4 (L) 5 - 15 mmol/L    Glucose 184 (H) 65 - 100 mg/dL    BUN 35 (H) 6 - 20 mg/dL    Creatinine 1.79 (H) 0.70 - 1.30 mg/dL    BUN/Creatinine ratio 20 12 - 20      GFR est AA 44 (L) >60 ml/min/1.73m2    GFR est non-AA 36 (L) >60 ml/min/1.73m2    Calcium 9.5 8.5 - 10.1 mg/dL    Bilirubin, total 0.6 0.2 - 1.0 mg/dL    AST (SGOT) 39 (H) 15 - 37 U/L    ALT (SGPT) 34 12 - 78 U/L    Alk.  phosphatase 118 (H) 45 - 117 U/L    Protein, total 7.6 6.4 - 8.2 g/dL    Albumin 3.6 3.5 - 5.0 g/dL    Globulin 4.0 2.0 - 4.0 g/dL    A-G Ratio 0.9 (L) 1.1 - 2.2     CREATININE AND GFR    Collection Time: 07/27/22 11:42 PM   Result Value Ref Range    Creatinine 1.36 (H) 0.70 - 1.30 mg/dL    GFR est AA >60 >60 ml/min/1.73m2    GFR est non-AA 50 (L) >60 ml/min/1.73m2   URINALYSIS W/ REFLEX CULTURE    Collection Time: 07/27/22 11:55 PM    Specimen: Urine   Result Value Ref Range    Color Yellow      Appearance Clear Clear      Specific gravity 1.015 1.003 - 1.030 pH (UA) 5.0 5.0 - 8.0      Protein Negative Negative mg/dL    Glucose Normal (A) Negative mg/dL    Ketone Negative Negative mg/dL    Bilirubin Negative Negative      Blood Negative Negative      Urobilinogen Normal 0.1 - 1.0 EU/dL    Nitrites Negative Negative      Leukocyte Esterase Negative Negative      WBC 0-4 0 - 4 /hpf    RBC 0-5 0 - 5 /hpf    Bacteria Negative Negative /hpf    UA:UC IF INDICATED Culture not indicated by UA result Culture not indicated by UA result      Mucus 1+ (A) Negative /lpf       Radiologic Studies -   [unfilled]  CT Results  (Last 48 hours)      None          CXR Results  (Last 48 hours)      None            Medical Decision Making and ED Course   - I am the first and primary provider for this patient AND AM THE PRIMARY PROVIDER OF RECORD. - I reviewed the vital signs, available nursing notes, past medical history, past surgical history, family history and social history. - Initial assessment performed. The patients presenting problems have been discussed, and the staff are in agreement with the care plan formulated and outlined with them. I have encouraged them to ask questions as they arise throughout their visit. Vital Signs-Reviewed the patient's vital signs. Patient Vitals for the past 24 hrs:   Temp Pulse Resp BP SpO2   07/28/22 0049 -- 100 -- (!) 148/103 96 %   07/27/22 2347 -- 97 -- (!) 151/127 --   07/27/22 2056 98.1 °F (36.7 °C) -- -- -- --   07/27/22 2050 -- (!) 114 16 138/89 97 %       Records Reviewed: Nursing Notes    Provider Notes (Medical Decision Making):     ED Course as of 07/28/22 0153   Wed Jul 27, 2022 2110 Pleasant gentleman with past medical history as above presents to the ED by friends with concern for neurolyse weakness. Patient himself is denying any symptoms and report that he is in his normal health. No changes in his medications, bowel, dietary, or urinary habits. I did note that he is tachycardic however in evaluation.   Possibly dehydrated. Will get CBC, chemistry, urinalysis and give 500 cc of normal saline and reassess. [AA]   2115 EKG was done at 8:51 PM interpreted by me as wide-complex rhythm at a rate of 114, MO indeterminate, QRS prolonged, QTC appears within normal, left axis deviation, right bundle branch block, possible left anterior fascicular block, no significant ST elevation or depression meeting criteria, pacer spikes appreciated, no signs of dysrhythmia. No significant changes from an EKG obtained on 5/2/2022 [AA]   2227 Creatinine(!): 1.79  Give additional IV fluids and reassess [AA]   2227 HGB: 12.2 [AA]   2227 Sodium: 136 [AA]   2227 Potassium: 5.1 [AA]   Thu Jul 28, 2022 0044 Creatinine(!): 1.36  Improved [AA]   0100 Patient feels better. Kidney functions is improved significantly. He was educated with his significant other on his evaluation, work-up, clinical impression and disposition. I stressed on the importance of staying hydrated. He will follow-up with his primary care doctor next week to get reevaluated and get his kidney functions rechecked. Anticipatory guidance return precaution discussed with patient. [AA]      ED Course User Index  [AA] Harjit Funk MD       Diagnosis     Clinical Impression:   1. Dehydration    2. Generalized weakness          Disposition     Disposition: Condition improved  DC- Adult Discharges: All of the diagnostic tests were reviewed and questions answered. Diagnosis, care plan and treatment options were discussed. The patient understands the instructions and will follow up as directed. The patients results have been reviewed with them. They have been counseled regarding their diagnosis. The patient and spouse/SO verbally convey understanding and agreement of the signs, symptoms, diagnosis, treatment and prognosis and additionally agrees to follow up as recommended with their PCP in 24 - 48 hours.   They also agree with the care-plan and convey that all of their questions have been answered. I have also put together some discharge instructions for them that include: 1) educational information regarding their diagnosis, 2) how to care for their diagnosis at home, as well a 3) list of reasons why they would want to return to the ED prior to their follow-up appointment, should their condition change. Discharged      DISCHARGE PLAN:  1. Follow-up Information       Follow up With Specialties Details Why [address]    [address] [facility] EMERGENCY DEPT Emergency Medicine Go to  As needed, If symptoms worsen [address] [facility] Ji Gannon MD Internal Medicine Physician Schedule an appointment as soon as possible for a visit on 8/1/2022 For reevaluation, Discuss your visit to the  [address] [place] [address] [phone]            2. Return to ED if worse   3. Discharge Medication List as of 7/28/2022  1:33 AM            Attestations: Samreen Burgess MD    Please note that this dictation was completed with [employer], the computer voice recognition software. Quite often unanticipated grammatical, syntax, homophones, and other interpretive errors are inadvertently transcribed by the computer software. Please disregard these errors. Please excuse any errors that have escaped final proofreading. Thank you.

## 2022-07-28 NOTE — ED NOTES
Oemga Pinzon called and left a phone number of [phone] for updates of if patient needs help getting home

## 2022-08-14 ENCOUNTER — HOSPITAL ENCOUNTER (EMERGENCY)
Age: 87
Discharge: HOME | End: 2022-08-14
Attending: EMERGENCY MEDICINE
Payer: MEDICARE

## 2022-08-14 ENCOUNTER — APPOINTMENT (OUTPATIENT)
Dept: CT IMAGING | Age: 87
End: 2022-08-14
Attending: EMERGENCY MEDICINE
Payer: MEDICARE

## 2022-08-14 VITALS
TEMPERATURE: 97.7 F | RESPIRATION RATE: 25 BRPM | HEIGHT: 65 IN | BODY MASS INDEX: 22.49 KG/M2 | SYSTOLIC BLOOD PRESSURE: 164 MMHG | HEART RATE: 123 BPM | WEIGHT: 135 LBS | OXYGEN SATURATION: 100 % | DIASTOLIC BLOOD PRESSURE: 98 MMHG

## 2022-08-14 DIAGNOSIS — R56.9 SEIZURE (HCC): Primary | ICD-10-CM

## 2022-08-14 LAB
ALBUMIN SERPL-MCNC: 3.7 G/DL (ref 3.5–5)
ALBUMIN/GLOB SERPL: 1.1 {RATIO} (ref 1.1–2.2)
ALP SERPL-CCNC: 107 U/L (ref 45–117)
ALT SERPL-CCNC: 33 U/L (ref 12–78)
ANION GAP SERPL CALC-SCNC: 5 MMOL/L (ref 5–15)
AST SERPL W P-5'-P-CCNC: 32 U/L (ref 15–37)
BASOPHILS # BLD: 0 K/UL (ref 0–0.1)
BASOPHILS NFR BLD: 1 % (ref 0–1)
BILIRUB SERPL-MCNC: 0.8 MG/DL (ref 0.2–1)
BUN SERPL-MCNC: 26 MG/DL (ref 6–20)
BUN/CREAT SERPL: 21 (ref 12–20)
CA-I BLD-MCNC: 9.7 MG/DL (ref 8.5–10.1)
CHLORIDE SERPL-SCNC: 108 MMOL/L (ref 97–108)
CO2 SERPL-SCNC: 25 MMOL/L (ref 21–32)
CREAT SERPL-MCNC: 1.22 MG/DL (ref 0.7–1.3)
DIFFERENTIAL METHOD BLD: ABNORMAL
EOSINOPHIL # BLD: 0.1 K/UL (ref 0–0.4)
EOSINOPHIL NFR BLD: 1 % (ref 0–7)
ERYTHROCYTE [DISTWIDTH] IN BLOOD BY AUTOMATED COUNT: 14.1 % (ref 11.5–14.5)
GLOBULIN SER CALC-MCNC: 3.4 G/DL (ref 2–4)
GLUCOSE SERPL-MCNC: 137 MG/DL (ref 65–100)
HCT VFR BLD AUTO: 39.2 % (ref 36.6–50.3)
HGB BLD-MCNC: 12.6 G/DL (ref 12.1–17)
IMM GRANULOCYTES # BLD AUTO: 0 K/UL (ref 0–0.04)
IMM GRANULOCYTES NFR BLD AUTO: 0 % (ref 0–0.5)
LYMPHOCYTES # BLD: 1.5 K/UL (ref 0.8–3.5)
LYMPHOCYTES NFR BLD: 24 % (ref 12–49)
MAGNESIUM SERPL-MCNC: 2.2 MG/DL (ref 1.6–2.4)
MCH RBC QN AUTO: 31 PG (ref 26–34)
MCHC RBC AUTO-ENTMCNC: 32.1 G/DL (ref 30–36.5)
MCV RBC AUTO: 96.6 FL (ref 80–99)
MONOCYTES # BLD: 0.7 K/UL (ref 0–1)
MONOCYTES NFR BLD: 12 % (ref 5–13)
NEUTS SEG # BLD: 4 K/UL (ref 1.8–8)
NEUTS SEG NFR BLD: 62 % (ref 32–75)
NRBC # BLD: 0 K/UL (ref 0–0.01)
NRBC BLD-RTO: 0 PER 100 WBC
PLATELET # BLD AUTO: 154 K/UL (ref 150–400)
PMV BLD AUTO: 11.6 FL (ref 8.9–12.9)
POTASSIUM SERPL-SCNC: 5 MMOL/L (ref 3.5–5.1)
PROT SERPL-MCNC: 7.1 G/DL (ref 6.4–8.2)
RBC # BLD AUTO: 4.06 M/UL (ref 4.1–5.7)
SODIUM SERPL-SCNC: 138 MMOL/L (ref 136–145)
TROPONIN-HIGH SENSITIVITY: 15 NG/L (ref 0–76)
WBC # BLD AUTO: 6.4 K/UL (ref 4.1–11.1)

## 2022-08-14 PROCEDURE — 99284 EMERGENCY DEPT VISIT MOD MDM: CPT

## 2022-08-14 PROCEDURE — 80053 COMPREHEN METABOLIC PANEL: CPT

## 2022-08-14 PROCEDURE — 93005 ELECTROCARDIOGRAM TRACING: CPT

## 2022-08-14 PROCEDURE — 96374 THER/PROPH/DIAG INJ IV PUSH: CPT

## 2022-08-14 PROCEDURE — 85025 COMPLETE CBC W/AUTO DIFF WBC: CPT

## 2022-08-14 PROCEDURE — 80177 DRUG SCRN QUAN LEVETIRACETAM: CPT

## 2022-08-14 PROCEDURE — 84484 ASSAY OF TROPONIN QUANT: CPT

## 2022-08-14 PROCEDURE — 83735 ASSAY OF MAGNESIUM: CPT

## 2022-08-14 PROCEDURE — 36415 COLL VENOUS BLD VENIPUNCTURE: CPT

## 2022-08-14 PROCEDURE — 70450 CT HEAD/BRAIN W/O DYE: CPT

## 2022-08-14 PROCEDURE — 74011250636 HC RX REV CODE- 250/636: Performed by: EMERGENCY MEDICINE

## 2022-08-14 RX ORDER — LEVETIRACETAM 500 MG/5ML
1000 INJECTION, SOLUTION, CONCENTRATE INTRAVENOUS ONCE
Status: COMPLETED | OUTPATIENT
Start: 2022-08-14 | End: 2022-08-14

## 2022-08-14 RX ORDER — PROPOFOL 10 MG/ML
INJECTION, EMULSION INTRAVENOUS
Status: DISCONTINUED
Start: 2022-08-14 | End: 2022-08-15 | Stop reason: HOSPADM

## 2022-08-14 RX ADMIN — LEVETIRACETAM 1000 MG: 100 INJECTION, SOLUTION INTRAVENOUS at 21:30

## 2022-08-14 RX ADMIN — SODIUM CHLORIDE 1000 ML: 9 INJECTION, SOLUTION INTRAVENOUS at 19:53

## 2022-08-14 NOTE — ED PROVIDER NOTES
EMERGENCY DEPARTMENT HISTORY AND PHYSICAL EXAM      Date: 8/14/2022  Patient Name: Jhon Villalobos    History of Presenting Illness     Chief Complaint   Patient presents with    Seizure       History Provided By: Patient    HPI: Jhon Villalobos, 80 y.o. male with a significant past medical history of seizures presents to the ED with being found unresponsive in the hallway today. EMS states they were called for the same. EMS states bystanders were sternally rubbing the patient when they arrived. Patient denies any somatic complaints at this time, alert and oriented. Patient states he takes his medications as prescribed. There are no other complaints, changes, or physical findings at this time. PCP: Jonathan Olivas MD    No current facility-administered medications on file prior to encounter. Current Outpatient Medications on File Prior to Encounter   Medication Sig Dispense Refill    clopidogreL (PLAVIX) 75 mg tab       SITagliptin (Januvia) 25 mg tablet Take 25 mg by mouth daily.  atorvastatin (LIPITOR) 10 mg tablet Take 10 mg by mouth nightly.  PATRICIO ASPIRIN PO Take 81 mg by mouth daily.  multivitamin, tx-iron-ca-min (THERA-M W/ IRON) 9 mg iron-400 mcg tab tablet Take 1 Tab by mouth daily.  insulin detemir (LEVEMIR FLEXTOUCH) 100 unit/mL (3 mL) inpn 0.35 mL by SubCUTAneous route nightly. 35 Units by SubCUTAneous route at night (Patient taking differently: 10 Units by SubCUTAneous route nightly. 35 Units by SubCUTAneous route at night) 45 mL 3    NIFEdipine XL (PROCARDIA-XL) 60 mg CR tablet Take 60 mg by mouth daily. Indications: HYPERTENSION      saw palmetto 500 mg cap Take 450 mg by mouth daily.            Past History     Past Medical History:  Past Medical History:   Diagnosis Date    Anemia     Arthritis     [address] [address]     Hypercholesterolemia     Hypertension     Single kidney     Cr normal    Throat cancer ([facility] [address] 2008    s/p laryngectomy       Past Surgical History:  Past Surgical History:   Procedure Laterality Date    HX LAP CHOLECYSTECTOMY  12-19-12    By Dr. Lizz Dai states does not know reason       Family History:  Family History   Family history unknown: Yes       Social History:  Social History     Tobacco Use    Smoking status: Former     Packs/day: 1.00     Years: 57.00     Pack years: 57.00     Types: Cigarettes     Quit date: 7/20/2012     Years since quitting: 10.0    Smokeless tobacco: Never   Substance Use Topics    Alcohol use: Yes     Alcohol/week: 17.5 standard drinks     Types: 21 Shots of liquor per week    Drug use: No       Allergies:  No Known Allergies    Review of Systems   Review of Systems  Review of Systems   Constitutional: Negative for chills and fever. HENT: Negative for sinus pressure and sinus pain. Eyes: Negative for photophobia and redness. Respiratory: Negative for shortness of breath and wheezing. Cardiovascular: Negative for chest pain and palpitations. Gastrointestinal: Negative for abdominal pain and nausea. Genitourinary: Negative for flank pain and hematuria. Musculoskeletal: Negative for arthralgias and gait problem. Skin: Negative for color change and pallor. Neurological: Negative for dizziness and weakness. Physical Exam   Physical Exam  Physical Exam  Constitutional:       General: No acute distress. Appearance: Normal appearance. Not toxic-appearing. HENT:      Head: Normocephalic and atraumatic. Nose: Nose normal.      Mouth/Throat:      Mouth: Mucous membranes are moist.   Eyes:      Extraocular Movements: Extraocular movements intact. Pupils: Pupils are equal, round, and reactive to light. Cardiovascular:      Rate and Rhythm: Tachycardic     Pulses: Normal pulses.    Pulmonary:      Effort: Pulmonary effort is normal.      Breath sounds: No stridor, clear to auscultation bilaterally  Abdominal:      General: Abdomen is flat. There is no distension. Musculoskeletal:         General: Normal range of motion. Cervical back: Normal range of motion and neck supple. Skin:     General: Skin is warm and dry. Capillary Refill: Capillary refill takes less than 2 seconds. Neurological:      General: No focal deficit present. Cranial nerves II through XII intact. Mental Status: Alert and oriented to person, place, and time. Psychiatric:         Mood and Affect: Mood normal.         Behavior: Behavior normal.     Lab and Diagnostic Study Results   Labs -     Recent Results (from the past 12 hour(s))   CBC WITH AUTOMATED DIFF    Collection Time: 08/14/22  4:03 PM   Result Value Ref Range    WBC 6.4 4.1 - 11.1 K/uL    RBC 4.06 (L) 4.10 - 5.70 M/uL    HGB 12.6 12.1 - 17.0 g/dL    HCT 39.2 36.6 - 50.3 %    MCV 96.6 80.0 - 99.0 FL    MCH 31.0 26.0 - 34.0 PG    MCHC 32.1 30.0 - 36.5 g/dL    RDW 14.1 11.5 - 14.5 %    PLATELET 786 714 - 750 K/uL    MPV 11.6 8.9 - 12.9 FL    NRBC 0.0 0.0  WBC    ABSOLUTE NRBC 0.00 0.00 - 0.01 K/uL    NEUTROPHILS 62 32 - 75 %    LYMPHOCYTES 24 12 - 49 %    MONOCYTES 12 5 - 13 %    EOSINOPHILS 1 0 - 7 %    BASOPHILS 1 0 - 1 %    IMMATURE GRANULOCYTES 0 0 - 0.5 %    ABS. NEUTROPHILS 4.0 1.8 - 8.0 K/UL    ABS. LYMPHOCYTES 1.5 0.8 - 3.5 K/UL    ABS. MONOCYTES 0.7 0.0 - 1.0 K/UL    ABS. EOSINOPHILS 0.1 0.0 - 0.4 K/UL    ABS. BASOPHILS 0.0 0.0 - 0.1 K/UL    ABS. IMM.  GRANS. 0.0 0.00 - 0.04 K/UL    DF AUTOMATED     METABOLIC PANEL, COMPREHENSIVE    Collection Time: 08/14/22  4:03 PM   Result Value Ref Range    Sodium 138 136 - 145 mmol/L    Potassium 5.0 3.5 - 5.1 mmol/L    Chloride 108 97 - 108 mmol/L    CO2 25 21 - 32 mmol/L    Anion gap 5 5 - 15 mmol/L    Glucose 137 (H) 65 - 100 mg/dL    BUN 26 (H) 6 - 20 mg/dL    Creatinine 1.22 0.70 - 1.30 mg/dL    BUN/Creatinine ratio 21 (H) 12 - 20      GFR est AA >60 >60 ml/min/1.73m2    GFR est non-AA 56 (L) >60 ml/min/1.73m2    Calcium 9.7 8.5 - 10.1 mg/dL    Bilirubin, total 0.8 0.2 - 1.0 mg/dL    AST (SGOT) 32 15 - 37 U/L    ALT (SGPT) 33 12 - 78 U/L    Alk. phosphatase 107 45 - 117 U/L    Protein, total 7.1 6.4 - 8.2 g/dL    Albumin 3.7 3.5 - 5.0 g/dL    Globulin 3.4 2.0 - 4.0 g/dL    A-G Ratio 1.1 1.1 - 2.2     TROPONIN-HIGH SENSITIVITY    Collection Time: 08/14/22  4:03 PM   Result Value Ref Range    Troponin-High Sensitivity 15 0 - 76 ng/L   MAGNESIUM    Collection Time: 08/14/22  4:30 PM   Result Value Ref Range    Magnesium 2.2 1.6 - 2.4 mg/dL       Radiologic Studies -   @lastxrresult@  CT Results  (Last 48 hours)                 08/14/22 1655  CT HEAD WO CONT Final result    Impression:  1. No acute intracranial abnormality. 2.  Stable ventriculomegaly. 3.  Chronic right basal ganglia infarct. Narrative:  EXAM: CT HEAD WO CONT       INDICATION: previously unresponsive       COMPARISON: PET/CT from 5/20/2022. CONTRAST: None. TECHNIQUE: Unenhanced CT of the head was performed using 5 mm images. Brain and   bone windows were generated. Coronal and sagittal reformats. CT dose reduction   was achieved through use of a standardized protocol tailored for this   examination and automatic exposure control for dose modulation. FINDINGS:   The ventricles and sulci are enlarged, slightly out of proportion to generalized   volume loss. Ventriculomegaly stable. Scattered subcortical and periventricular   white matter hypodensities likely represent chronic microangiopathic changes. Old right basal ganglia infarct. . There is no intracranial hemorrhage,   extra-axial collection, or mass effect. The basilar cisterns are open. No CT   evidence of acute infarct. The bone windows demonstrate no abnormalities. The visualized portions of the   paranasal sinuses and mastoid air cells are clear.                  CXR Results  (Last 48 hours)      None            Medical Decision Making and ED Course   - I am the first provider for this patient. I reviewed the vital signs, available nursing notes, past medical history, past surgical history, family history and social history. - Initial assessment performed. The patients presenting problems have been discussed, and they are in agreement with the care plan formulated and outlined with them. I have encouraged them to ask questions as they arise throughout their visit. Vital Signs-Reviewed the patient's vital signs. Patient Vitals for the past 12 hrs:   Temp Pulse Resp BP SpO2   08/14/22 2103 -- (!) 127 27 -- 99 %   08/14/22 2102 -- (!) 126 25 -- 100 %   08/14/22 2100 -- (!) 123 26 (!) 128/94 100 %   08/14/22 2035 -- (!) 140 -- -- 98 %   08/14/22 1954 -- (!) 127 21 (!) 158/115 100 %   08/14/22 1751 -- (!) 121 22 -- 100 %   08/14/22 1651 -- -- -- 108/76 --   08/14/22 1621 -- (!) 116 20 110/78 97 %   08/14/22 1601 -- (!) 117 17 130/85 100 %   08/14/22 1551 97.7 °F (36.5 °C) (!) 120 22 (!) 146/100 95 %         ED Course:   ED Course as of 08/14/22 2108   Sun Aug 14, 2022   2100 Patient with presentation consistent with seizure. Continue tachycardia, noticed in prior visits as well. Patient ambulated in the department without difficulty, states he feels better and is ready for discharge home. Discussed my concern given his tachycardia, but he states he already has follow-up scheduled and he and his wife present for able to discuss return precautions as well. [CS]      ED Course User Index  [CS] Saulo Voss MD       Disposition   Disposition: DC- Adult Discharges: All of the diagnostic tests were reviewed and questions answered. Diagnosis, care plan and treatment options were discussed. The patient understands the instructions and will follow up as directed. The patients results have been reviewed with them. They have been counseled regarding their diagnosis.   The patient and spouse/SO verbally convey understanding and agreement of the signs, symptoms, diagnosis, treatment and prognosis and additionally agrees to follow up as recommended with their PCP in 24 - 48 hours. They also agree with the care-plan and convey that all of their questions have been answered. I have also put together some discharge instructions for them that include: 1) educational information regarding their diagnosis, 2) how to care for their diagnosis at home, as well a 3) list of reasons why they would want to return to the ED prior to their follow-up appointment, should their condition change. Discharged    DISCHARGE PLAN:  1. Current Discharge Medication List        CONTINUE these medications which have NOT CHANGED    Details   clopidogreL (PLAVIX) 75 mg tab       SITagliptin (Januvia) 25 mg tablet Take 25 mg by mouth daily. atorvastatin (LIPITOR) 10 mg tablet Take 10 mg by mouth nightly. Associated Diagnoses: Type II diabetes mellitus, uncontrolled; Essential hypertension with goal blood pressure less than 140/90      PATRICIO ASPIRIN PO Take 81 mg by mouth daily. Associated Diagnoses: Type II diabetes mellitus, uncontrolled; Essential hypertension with goal blood pressure less than 140/90      multivitamin, tx-iron-ca-min (THERA-M W/ IRON) 9 mg iron-400 mcg tab tablet Take 1 Tab by mouth daily. Associated Diagnoses: Type II diabetes mellitus, uncontrolled; Essential hypertension with goal blood pressure less than 140/90      insulin detemir (LEVEMIR FLEXTOUCH) 100 unit/mL (3 mL) inpn 0.35 mL by SubCUTAneous route nightly. 35 Units by SubCUTAneous route at night  Qty: 45 mL, Refills: 3    Associated Diagnoses: Type II diabetes mellitus, uncontrolled; Essential hypertension with goal blood pressure less than 140/90      NIFEdipine XL (PROCARDIA-XL) 60 mg CR tablet Take 60 mg by mouth daily. Indications: HYPERTENSION      saw palmetto 500 mg cap Take 450 mg by mouth daily.              2.   Follow-up Information       Follow up With Specialties Details Why Contact Info    Yue Velazco MD Internal Medicine Physician In 2 days  [address] [facility]  [phone]            3. Return to ED if worse   4. Current Discharge Medication List            Diagnosis/Clinical Impression     Clinical Impression:   1. Seizure [facility])        Attestations: Kenisha Salazar MD, am the primary clinician of record. Please note that this dictation was completed with [employer], the computer voice recognition software. Quite often unanticipated grammatical, syntax, homophones, and other interpretive errors are inadvertently transcribed by the computer software. Please disregard these errors. Please excuse any errors that have escaped final proofreading. Thank you.

## 2022-08-14 NOTE — ED TRIAGE NOTES
Pt was in his apartment [place], was found unresponsive in hallway in chair. Bystanders were sternal rubbing patient as EMS arrived. Pt arrives to ED A&Ox 4. Muscle rigidity noted.      Hx DM and seizures

## 2022-08-15 LAB
ATRIAL RATE: 115 BPM
CALCULATED R AXIS, ECG10: -46 DEGREES
CALCULATED T AXIS, ECG11: -9 DEGREES
DIAGNOSIS, 93000: NORMAL
Q-T INTERVAL, ECG07: 364 MS
QRS DURATION, ECG06: 136 MS
QTC CALCULATION (BEZET), ECG08: 507 MS
VENTRICULAR RATE, ECG03: 117 BPM

## 2022-08-15 NOTE — DISCHARGE INSTRUCTIONS
Thank you! Thank you for allowing me to care for you in the emergency department. It is my goal to provide you with excellent care. If you have not received excellent quality care, please ask to speak to the nurse manager. Please fill out the survey that will come to you by mail or email since we listen to your feedback! Below you will find a list of your tests from today's visit. Should you have any questions, please do not hesitate to call the emergency department. Labs  Recent Results (from the past 12 hour(s))   CBC WITH AUTOMATED DIFF    Collection Time: 08/14/22  4:03 PM   Result Value Ref Range    WBC 6.4 4.1 - 11.1 K/uL    RBC 4.06 (L) 4.10 - 5.70 M/uL    HGB 12.6 12.1 - 17.0 g/dL    HCT 39.2 36.6 - 50.3 %    MCV 96.6 80.0 - 99.0 FL    MCH 31.0 26.0 - 34.0 PG    MCHC 32.1 30.0 - 36.5 g/dL    RDW 14.1 11.5 - 14.5 %    PLATELET 093 509 - 276 K/uL    MPV 11.6 8.9 - 12.9 FL    NRBC 0.0 0.0  WBC    ABSOLUTE NRBC 0.00 0.00 - 0.01 K/uL    NEUTROPHILS 62 32 - 75 %    LYMPHOCYTES 24 12 - 49 %    MONOCYTES 12 5 - 13 %    EOSINOPHILS 1 0 - 7 %    BASOPHILS 1 0 - 1 %    IMMATURE GRANULOCYTES 0 0 - 0.5 %    ABS. NEUTROPHILS 4.0 1.8 - 8.0 K/UL    ABS. LYMPHOCYTES 1.5 0.8 - 3.5 K/UL    ABS. MONOCYTES 0.7 0.0 - 1.0 K/UL    ABS. EOSINOPHILS 0.1 0.0 - 0.4 K/UL    ABS. BASOPHILS 0.0 0.0 - 0.1 K/UL    ABS. IMM.  GRANS. 0.0 0.00 - 0.04 K/UL    DF AUTOMATED     METABOLIC PANEL, COMPREHENSIVE    Collection Time: 08/14/22  4:03 PM   Result Value Ref Range    Sodium 138 136 - 145 mmol/L    Potassium 5.0 3.5 - 5.1 mmol/L    Chloride 108 97 - 108 mmol/L    CO2 25 21 - 32 mmol/L    Anion gap 5 5 - 15 mmol/L    Glucose 137 (H) 65 - 100 mg/dL    BUN 26 (H) 6 - 20 mg/dL    Creatinine 1.22 0.70 - 1.30 mg/dL    BUN/Creatinine ratio 21 (H) 12 - 20      GFR est AA >60 >60 ml/min/1.73m2    GFR est non-AA 56 (L) >60 ml/min/1.73m2    Calcium 9.7 8.5 - 10.1 mg/dL    Bilirubin, total 0.8 0.2 - 1.0 mg/dL    AST (SGOT) 32 15 - 37 U/L ALT (SGPT) 33 12 - 78 U/L    Alk. phosphatase 107 45 - 117 U/L    Protein, total 7.1 6.4 - 8.2 g/dL    Albumin 3.7 3.5 - 5.0 g/dL    Globulin 3.4 2.0 - 4.0 g/dL    A-G Ratio 1.1 1.1 - 2.2     TROPONIN-HIGH SENSITIVITY    Collection Time: 08/14/22  4:03 PM   Result Value Ref Range    Troponin-High Sensitivity 15 0 - 76 ng/L   MAGNESIUM    Collection Time: 08/14/22  4:30 PM   Result Value Ref Range    Magnesium 2.2 1.6 - 2.4 mg/dL       Radiologic Studies  CT HEAD WO CONT   Final Result   1. No acute intracranial abnormality. 2.  Stable ventriculomegaly. 3.  Chronic right basal ganglia infarct. CT Results  (Last 48 hours)                 08/14/22 1655  CT HEAD WO CONT Final result    Impression:  1. No acute intracranial abnormality. 2.  Stable ventriculomegaly. 3.  Chronic right basal ganglia infarct. Narrative:  EXAM: CT HEAD WO CONT       INDICATION: previously unresponsive       COMPARISON: PET/CT from 5/20/2022. CONTRAST: None. TECHNIQUE: Unenhanced CT of the head was performed using 5 mm images. Brain and   bone windows were generated. Coronal and sagittal reformats. CT dose reduction   was achieved through use of a standardized protocol tailored for this   examination and automatic exposure control for dose modulation. FINDINGS:   The ventricles and sulci are enlarged, slightly out of proportion to generalized   volume loss. Ventriculomegaly stable. Scattered subcortical and periventricular   white matter hypodensities likely represent chronic microangiopathic changes. Old right basal ganglia infarct. . There is no intracranial hemorrhage,   extra-axial collection, or mass effect. The basilar cisterns are open. No CT   evidence of acute infarct. The bone windows demonstrate no abnormalities. The visualized portions of the   paranasal sinuses and mastoid air cells are clear.                  CXR Results  (Last 48 hours)      None ------------------------------------------------------------------------------------------------------------  The exam and treatment you received in the Emergency Department were for an urgent problem and are not intended as complete care. It is important that you follow-up with a doctor, nurse practitioner, or physician assistant to:  (1) confirm your diagnosis,  (2) re-evaluation of changes in your illness and treatment, and  (3) for ongoing care. Please take your discharge instructions with you when you go to your follow-up appointment. If you have any problem arranging a follow-up appointment, contact the Emergency Department. If your symptoms become worse or you do not improve as expected and you are unable to reach your health care provider, please return to the Emergency Department. We are available 24 hours a day. If a prescription has been provided, please have it filled as soon as possible to prevent a delay in treatment. If you have any questions or reservations about taking the medication due to side effects or interactions with other medications, please call your primary care provider or contact the ER.

## 2022-08-18 LAB — LEVETIRACETAM SERPL-MCNC: 49.6 UG/ML (ref 10–40)

## 2022-08-23 ENCOUNTER — HOSPITAL ENCOUNTER (EMERGENCY)
Age: 87
Discharge: HOME | End: 2022-08-23
Attending: EMERGENCY MEDICINE
Payer: MEDICARE

## 2022-08-23 VITALS
BODY MASS INDEX: 18.28 KG/M2 | WEIGHT: 135 LBS | RESPIRATION RATE: 18 BRPM | DIASTOLIC BLOOD PRESSURE: 92 MMHG | SYSTOLIC BLOOD PRESSURE: 150 MMHG | OXYGEN SATURATION: 98 % | HEART RATE: 92 BPM | TEMPERATURE: 98.2 F | HEIGHT: 72 IN

## 2022-08-23 DIAGNOSIS — R42 POSTURAL DIZZINESS WITH PRESYNCOPE: ICD-10-CM

## 2022-08-23 DIAGNOSIS — R55 POSTURAL DIZZINESS WITH PRESYNCOPE: ICD-10-CM

## 2022-08-23 DIAGNOSIS — E86.0 DEHYDRATION: Primary | ICD-10-CM

## 2022-08-23 LAB
ALBUMIN SERPL-MCNC: 3.3 G/DL (ref 3.5–5)
ALBUMIN/GLOB SERPL: 0.9 {RATIO} (ref 1.1–2.2)
ALP SERPL-CCNC: 114 U/L (ref 45–117)
ALT SERPL-CCNC: 39 U/L (ref 12–78)
ANION GAP SERPL CALC-SCNC: 8 MMOL/L (ref 5–15)
AST SERPL W P-5'-P-CCNC: 30 U/L (ref 15–37)
BASOPHILS # BLD: 0 K/UL (ref 0–0.1)
BASOPHILS NFR BLD: 0 % (ref 0–1)
BILIRUB SERPL-MCNC: 0.6 MG/DL (ref 0.2–1)
BUN SERPL-MCNC: 28 MG/DL (ref 6–20)
BUN/CREAT SERPL: 25 (ref 12–20)
CA-I BLD-MCNC: 9.1 MG/DL (ref 8.5–10.1)
CHLORIDE SERPL-SCNC: 106 MMOL/L (ref 97–108)
CO2 SERPL-SCNC: 24 MMOL/L (ref 21–32)
CREAT SERPL-MCNC: 1.13 MG/DL (ref 0.7–1.3)
DIFFERENTIAL METHOD BLD: ABNORMAL
EOSINOPHIL # BLD: 0.1 K/UL (ref 0–0.4)
EOSINOPHIL NFR BLD: 1 % (ref 0–7)
ERYTHROCYTE [DISTWIDTH] IN BLOOD BY AUTOMATED COUNT: 14.4 % (ref 11.5–14.5)
ETHANOL SERPL-MCNC: <10 MG/DL
GLOBULIN SER CALC-MCNC: 3.8 G/DL (ref 2–4)
GLUCOSE SERPL-MCNC: 122 MG/DL (ref 65–100)
HCT VFR BLD AUTO: 34.4 % (ref 36.6–50.3)
HGB BLD-MCNC: 10.9 G/DL (ref 12.1–17)
IMM GRANULOCYTES # BLD AUTO: 0 K/UL (ref 0–0.04)
IMM GRANULOCYTES NFR BLD AUTO: 0 % (ref 0–0.5)
LYMPHOCYTES # BLD: 1.1 K/UL (ref 0.8–3.5)
LYMPHOCYTES NFR BLD: 22 % (ref 12–49)
MCH RBC QN AUTO: 29.9 PG (ref 26–34)
MCHC RBC AUTO-ENTMCNC: 31.7 G/DL (ref 30–36.5)
MCV RBC AUTO: 94.5 FL (ref 80–99)
MONOCYTES # BLD: 0.5 K/UL (ref 0–1)
MONOCYTES NFR BLD: 10 % (ref 5–13)
NEUTS SEG # BLD: 3.3 K/UL (ref 1.8–8)
NEUTS SEG NFR BLD: 67 % (ref 32–75)
NRBC # BLD: 0 K/UL (ref 0–0.01)
NRBC BLD-RTO: 0 PER 100 WBC
PLATELET # BLD AUTO: 162 K/UL (ref 150–400)
PMV BLD AUTO: 11.3 FL (ref 8.9–12.9)
POTASSIUM SERPL-SCNC: 4.4 MMOL/L (ref 3.5–5.1)
PROT SERPL-MCNC: 7.1 G/DL (ref 6.4–8.2)
RBC # BLD AUTO: 3.64 M/UL (ref 4.1–5.7)
SODIUM SERPL-SCNC: 138 MMOL/L (ref 136–145)
TROPONIN-HIGH SENSITIVITY: 14 NG/L (ref 0–76)
WBC # BLD AUTO: 5.1 K/UL (ref 4.1–11.1)

## 2022-08-23 PROCEDURE — 99284 EMERGENCY DEPT VISIT MOD MDM: CPT

## 2022-08-23 PROCEDURE — 36415 COLL VENOUS BLD VENIPUNCTURE: CPT

## 2022-08-23 PROCEDURE — 85025 COMPLETE CBC W/AUTO DIFF WBC: CPT

## 2022-08-23 PROCEDURE — 82077 ASSAY SPEC XCP UR&BREATH IA: CPT

## 2022-08-23 PROCEDURE — 93005 ELECTROCARDIOGRAM TRACING: CPT

## 2022-08-23 PROCEDURE — 84484 ASSAY OF TROPONIN QUANT: CPT

## 2022-08-23 PROCEDURE — 80053 COMPREHEN METABOLIC PANEL: CPT

## 2022-08-23 PROCEDURE — 74011250636 HC RX REV CODE- 250/636: Performed by: EMERGENCY MEDICINE

## 2022-08-23 RX ADMIN — SODIUM CHLORIDE 1000 ML: 9 INJECTION, SOLUTION INTRAVENOUS at 14:08

## 2022-08-23 NOTE — ED TRIAGE NOTES
Near syncope at home. Hypotensive on EMS arrival. Pt is alert and nonverbal at triage. Trach noted. Accucheck 115.

## 2022-08-23 NOTE — ED PROVIDER NOTES
EMERGENCY DEPARTMENT HISTORY AND PHYSICAL EXAM      Date: 8/23/2022  Patient Name: Austin Zelaya  Patient Age and Sex: 80 y.o. male     History of Presenting Illness     Chief Complaint   Patient presents with    Hypotension       History Provided By: Patient, ems    HPI: Austin Zelaya is an 71-year-old male with a history of hypertension, diabetes, CAD, dehydration, throat cancer with trach in place, presenting for near syncope. According to EMS and patient, patient called due to near syncope. Felt lightheaded like he was going to pass out. When EMS arrived, they noted his systolic blood pressure to be in the 60s. Once they got him up and moving his blood pressures did improve to systolics 484Q. Glucose was 115. Patient states that he feels better now laying in the bed. States he does not drink enough water. Had breakfast but no lunch. Denies any infectious triggers such as diarrhea, urination issues, cold, fever. Denies any chest pain or shortness of breath associated with this. There are no other complaints, changes, or physical findings at this time. PCP: Saima Dahl MD    No current facility-administered medications on file prior to encounter. Current Outpatient Medications on File Prior to Encounter   Medication Sig Dispense Refill    clopidogreL (PLAVIX) 75 mg tab       SITagliptin (Januvia) 25 mg tablet Take 25 mg by mouth daily.  atorvastatin (LIPITOR) 10 mg tablet Take 10 mg by mouth nightly.  PATRICIO ASPIRIN PO Take 81 mg by mouth daily.  multivitamin, tx-iron-ca-min (THERA-M W/ IRON) 9 mg iron-400 mcg tab tablet Take 1 Tab by mouth daily.  insulin detemir (LEVEMIR FLEXTOUCH) 100 unit/mL (3 mL) inpn 0.35 mL by SubCUTAneous route nightly. 35 Units by SubCUTAneous route at night (Patient taking differently: 10 Units by SubCUTAneous route nightly.  35 Units by SubCUTAneous route at night) 45 mL 3    NIFEdipine XL (PROCARDIA-XL) 60 mg CR tablet Take 60 mg by mouth daily. Indications: HYPERTENSION      saw palmetto 500 mg cap Take 450 mg by mouth daily. Past History     Past Medical History:  Past Medical History:   Diagnosis Date    Anemia     Arthritis     Diabetes ([facility] [address]     Hypercholesterolemia     Hypertension     Single kidney     Cr normal    Throat cancer ([facility] [address] 2008    s/p laryngectomy       Past Surgical History:  Past Surgical History:   Procedure Laterality Date    HX LAP CHOLECYSTECTOMY  12-19-12    By Dr. Ed Dodd states does not know reason       Family History:  Family History   Family history unknown: Yes       Social History:  Social History     Tobacco Use    Smoking status: Former     Packs/day: 1.00     Years: 57.00     Pack years: 57.00     Types: Cigarettes     Quit date: 7/20/2012     Years since quitting: 10.0    Smokeless tobacco: Never   Substance Use Topics    Alcohol use: Yes     Alcohol/week: 17.5 standard drinks     Types: 21 Shots of liquor per week    Drug use: No       Allergies:  No Known Allergies      Review of Systems   Review of Systems   Constitutional:  Negative for chills and fever. Respiratory:  Negative for cough and shortness of breath. Cardiovascular:  Negative for chest pain. Gastrointestinal:  Negative for abdominal pain, constipation, diarrhea, nausea and vomiting. Genitourinary:  Negative for dysuria, frequency and hematuria. Neurological:  Positive for light-headedness. Negative for seizures, syncope, weakness and numbness. All other systems reviewed and are negative. Physical Exam   Physical Exam  Vitals and nursing note reviewed. Constitutional:       General: He is not in acute distress. Appearance: He is well-developed. He is not ill-appearing or diaphoretic. Comments: Thin cachectic male. Answering my questions by nodding his head or shaking his head.   Trach in place   HENT:      Head: Normocephalic and atraumatic. Nose: Nose normal.      Mouth/Throat:      Mouth: Mucous membranes are dry. Eyes:      Extraocular Movements: Extraocular movements intact. Conjunctiva/sclera: Conjunctivae normal.   Cardiovascular:      Rate and Rhythm: Normal rate and regular rhythm. Pulmonary:      Effort: Pulmonary effort is normal. No respiratory distress. Breath sounds: Normal breath sounds. Abdominal:      General: There is no distension. Palpations: Abdomen is soft. Tenderness: There is no abdominal tenderness. Musculoskeletal:         General: Normal range of motion. Cervical back: Normal range of motion and neck supple. Skin:     General: Skin is warm and dry. Neurological:      General: No focal deficit present. Mental Status: He is alert and oriented to person, place, and time. Mental status is at baseline. Psychiatric:         Mood and Affect: Mood normal.        Diagnostic Study Results     Labs -     Recent Results (from the past 12 hour(s))   CBC WITH AUTOMATED DIFF    Collection Time: 08/23/22  2:13 PM   Result Value Ref Range    WBC 5.1 4.1 - 11.1 K/uL    RBC 3.64 (L) 4.10 - 5.70 M/uL    HGB 10.9 (L) 12.1 - 17.0 g/dL    HCT 34.4 (L) 36.6 - 50.3 %    MCV 94.5 80.0 - 99.0 FL    MCH 29.9 26.0 - 34.0 PG    MCHC 31.7 30.0 - 36.5 g/dL    RDW 14.4 11.5 - 14.5 %    PLATELET 191 373 - 377 K/uL    MPV 11.3 8.9 - 12.9 FL    NRBC 0.0 0.0  WBC    ABSOLUTE NRBC 0.00 0.00 - 0.01 K/uL    NEUTROPHILS 67 32 - 75 %    LYMPHOCYTES 22 12 - 49 %    MONOCYTES 10 5 - 13 %    EOSINOPHILS 1 0 - 7 %    BASOPHILS 0 0 - 1 %    IMMATURE GRANULOCYTES 0 0 - 0.5 %    ABS. NEUTROPHILS 3.3 1.8 - 8.0 K/UL    ABS. LYMPHOCYTES 1.1 0.8 - 3.5 K/UL    ABS. MONOCYTES 0.5 0.0 - 1.0 K/UL    ABS. EOSINOPHILS 0.1 0.0 - 0.4 K/UL    ABS. BASOPHILS 0.0 0.0 - 0.1 K/UL    ABS. IMM.  GRANS. 0.0 0.00 - 0.04 K/UL    DF AUTOMATED     METABOLIC PANEL, COMPREHENSIVE    Collection Time: 08/23/22  2:13 PM   Result Value Ref Range    Sodium 138 136 - 145 mmol/L    Potassium 4.4 3.5 - 5.1 mmol/L    Chloride 106 97 - 108 mmol/L    CO2 24 21 - 32 mmol/L    Anion gap 8 5 - 15 mmol/L    Glucose 122 (H) 65 - 100 mg/dL    BUN 28 (H) 6 - 20 mg/dL    Creatinine 1.13 0.70 - 1.30 mg/dL    BUN/Creatinine ratio 25 (H) 12 - 20      GFR est AA >60 >60 ml/min/1.73m2    GFR est non-AA >60 >60 ml/min/1.73m2    Calcium 9.1 8.5 - 10.1 mg/dL    Bilirubin, total 0.6 0.2 - 1.0 mg/dL    AST (SGOT) 30 15 - 37 U/L    ALT (SGPT) 39 12 - 78 U/L    Alk. phosphatase 114 45 - 117 U/L    Protein, total 7.1 6.4 - 8.2 g/dL    Albumin 3.3 (L) 3.5 - 5.0 g/dL    Globulin 3.8 2.0 - 4.0 g/dL    A-G Ratio 0.9 (L) 1.1 - 2.2     ETHYL ALCOHOL    Collection Time: 08/23/22  2:13 PM   Result Value Ref Range    ALCOHOL(ETHYL),SERUM <10 <10 mg/dL   TROPONIN-HIGH SENSITIVITY    Collection Time: 08/23/22  2:13 PM   Result Value Ref Range    Troponin-High Sensitivity 14 0 - 76 ng/L       Radiologic Studies -   No orders to display     CT Results  (Last 48 hours)      None          CXR Results  (Last 48 hours)      None              Medical Decision Making   I am the first provider for this patient. I reviewed the vital signs, available nursing notes, past medical history, past surgical history, family history and social history. Vital Signs-Reviewed the patient's vital signs. Patient Vitals for the past 12 hrs:   Temp Pulse Resp BP SpO2   08/23/22 1416 98.2 °F (36.8 °C) 92 -- -- --   08/23/22 1353 -- (!) 112 18 (!) 150/92 98 %       Records Reviewed: Nursing Notes and Old Medical Records    Provider Notes (Medical Decision Making):   Pt presents with lightheadedness. DDx: dehydration, anemia, electrolyte abnormality, infection, orthostatic hypotension, medication side effect. Will get orthostatics, labs and EKG PRN. ED Course:   Initial assessment performed.  The patients presenting problems have been discussed, and they are in agreement with the care plan formulated and outlined with them. I have encouraged them to ask questions as they arise throughout their visit. Critical Care Time:   0    Disposition:  Discharge Note:  The patient has been re-evaluated and is ready for discharge. Reviewed available results with patient. Counseled patient on diagnosis and care plan. Patient has expressed understanding, and all questions have been answered. Patient agrees with plan and agrees to follow up as recommended, or to return to the ED if their symptoms worsen. Discharge instructions have been provided and explained to the patient, along with reasons to return to the ED. PLAN:  Discharge Medication List as of 8/23/2022  3:50 PM        2. Follow-up Information       Follow up With Specialties Details Why Contact Info    Deepa Adrian MD Internal Medicine Physician  As needed [address] [facility]  [phone]            3. Return to ED if worse     Diagnosis     Clinical Impression:   1. Dehydration    2. Postural dizziness with presyncope        Attestations:  Vincent Manriquez M.D., am the primary clinician of record. Please note that this dictation was completed with [employer], the computer voice recognition software. Quite often unanticipated grammatical, syntax, homophones, and other interpretive errors are inadvertently transcribed by the computer software. Please disregard these errors. Please excuse any errors that have escaped final proofreading. Thank you.

## 2022-08-23 NOTE — DISCHARGE INSTRUCTIONS
Thank you! Thank you for allowing me to care for you in the emergency department. It is my goal to provide you with excellent care. If you have not received excellent quality care, please ask to speak to the nurse manager. Please fill out the survey that will come to you by mail or email since we listen to your feedback! Below you will find a list of your tests from today's visit. Should you have any questions, please do not hesitate to call the emergency department. Labs  Recent Results (from the past 12 hour(s))   CBC WITH AUTOMATED DIFF    Collection Time: 08/23/22  2:13 PM   Result Value Ref Range    WBC 5.1 4.1 - 11.1 K/uL    RBC 3.64 (L) 4.10 - 5.70 M/uL    HGB 10.9 (L) 12.1 - 17.0 g/dL    HCT 34.4 (L) 36.6 - 50.3 %    MCV 94.5 80.0 - 99.0 FL    MCH 29.9 26.0 - 34.0 PG    MCHC 31.7 30.0 - 36.5 g/dL    RDW 14.4 11.5 - 14.5 %    PLATELET 449 050 - 476 K/uL    MPV 11.3 8.9 - 12.9 FL    NRBC 0.0 0.0  WBC    ABSOLUTE NRBC 0.00 0.00 - 0.01 K/uL    NEUTROPHILS 67 32 - 75 %    LYMPHOCYTES 22 12 - 49 %    MONOCYTES 10 5 - 13 %    EOSINOPHILS 1 0 - 7 %    BASOPHILS 0 0 - 1 %    IMMATURE GRANULOCYTES 0 0 - 0.5 %    ABS. NEUTROPHILS 3.3 1.8 - 8.0 K/UL    ABS. LYMPHOCYTES 1.1 0.8 - 3.5 K/UL    ABS. MONOCYTES 0.5 0.0 - 1.0 K/UL    ABS. EOSINOPHILS 0.1 0.0 - 0.4 K/UL    ABS. BASOPHILS 0.0 0.0 - 0.1 K/UL    ABS. IMM.  GRANS. 0.0 0.00 - 0.04 K/UL    DF AUTOMATED     METABOLIC PANEL, COMPREHENSIVE    Collection Time: 08/23/22  2:13 PM   Result Value Ref Range    Sodium 138 136 - 145 mmol/L    Potassium 4.4 3.5 - 5.1 mmol/L    Chloride 106 97 - 108 mmol/L    CO2 24 21 - 32 mmol/L    Anion gap 8 5 - 15 mmol/L    Glucose 122 (H) 65 - 100 mg/dL    BUN 28 (H) 6 - 20 mg/dL    Creatinine 1.13 0.70 - 1.30 mg/dL    BUN/Creatinine ratio 25 (H) 12 - 20      GFR est AA >60 >60 ml/min/1.73m2    GFR est non-AA >60 >60 ml/min/1.73m2    Calcium 9.1 8.5 - 10.1 mg/dL    Bilirubin, total 0.6 0.2 - 1.0 mg/dL    AST (SGOT) 30 15 - 37 U/L    ALT (SGPT) 39 12 - 78 U/L    Alk. phosphatase 114 45 - 117 U/L    Protein, total 7.1 6.4 - 8.2 g/dL    Albumin 3.3 (L) 3.5 - 5.0 g/dL    Globulin 3.8 2.0 - 4.0 g/dL    A-G Ratio 0.9 (L) 1.1 - 2.2     ETHYL ALCOHOL    Collection Time: 08/23/22  2:13 PM   Result Value Ref Range    ALCOHOL(ETHYL),SERUM <10 <10 mg/dL   TROPONIN-HIGH SENSITIVITY    Collection Time: 08/23/22  2:13 PM   Result Value Ref Range    Troponin-High Sensitivity 14 0 - 76 ng/L       Radiologic Studies  No orders to display     CT Results  (Last 48 hours)      None          CXR Results  (Last 48 hours)      None          ------------------------------------------------------------------------------------------------------------  The exam and treatment you received in the Emergency Department were for an urgent problem and are not intended as complete care. It is important that you follow-up with a doctor, nurse practitioner, or physician assistant to:  (1) confirm your diagnosis,  (2) re-evaluation of changes in your illness and treatment, and  (3) for ongoing care. Please take your discharge instructions with you when you go to your follow-up appointment. If you have any problem arranging a follow-up appointment, contact the Emergency Department. If your symptoms become worse or you do not improve as expected and you are unable to reach your health care provider, please return to the Emergency Department. We are available 24 hours a day. If a prescription has been provided, please have it filled as soon as possible to prevent a delay in treatment. If you have any questions or reservations about taking the medication due to side effects or interactions with other medications, please call your primary care provider or contact the ER.

## 2022-08-24 LAB
ATRIAL RATE: 59 BPM
CALCULATED R AXIS, ECG10: -41 DEGREES
DIAGNOSIS, 93000: NORMAL
Q-T INTERVAL, ECG07: 368 MS
QRS DURATION, ECG06: 128 MS
QTC CALCULATION (BEZET), ECG08: 495 MS
VENTRICULAR RATE, ECG03: 109 BPM

## 2022-12-21 ENCOUNTER — APPOINTMENT (OUTPATIENT)
Dept: GENERAL RADIOLOGY | Age: 87
End: 2022-12-21
Attending: STUDENT IN AN ORGANIZED HEALTH CARE EDUCATION/TRAINING PROGRAM
Payer: MEDICARE

## 2022-12-21 ENCOUNTER — APPOINTMENT (OUTPATIENT)
Dept: CT IMAGING | Age: 87
End: 2022-12-21
Attending: STUDENT IN AN ORGANIZED HEALTH CARE EDUCATION/TRAINING PROGRAM
Payer: MEDICARE

## 2022-12-21 ENCOUNTER — HOSPITAL ENCOUNTER (INPATIENT)
Age: 87
LOS: 17 days | Discharge: NURSING, REHAB OR LONG-TERM CARE | End: 2023-01-07
Attending: STUDENT IN AN ORGANIZED HEALTH CARE EDUCATION/TRAINING PROGRAM | Admitting: INTERNAL MEDICINE
Payer: MEDICARE

## 2022-12-21 DIAGNOSIS — J95.03 TRACHEOSTOMY MECHANICAL COMPLICATION (HCC): ICD-10-CM

## 2022-12-21 DIAGNOSIS — R13.19 OTHER DYSPHAGIA: Primary | ICD-10-CM

## 2022-12-21 DIAGNOSIS — Z90.02 H/O LARYNGECTOMY: ICD-10-CM

## 2022-12-21 DIAGNOSIS — R13.14 PHARYNGOESOPHAGEAL DYSPHAGIA: ICD-10-CM

## 2022-12-21 PROBLEM — R13.10 DYSPHAGIA: Status: ACTIVE | Noted: 2022-12-21

## 2022-12-21 LAB
ANION GAP SERPL CALC-SCNC: 6 MMOL/L (ref 5–15)
BASOPHILS # BLD: 0 K/UL (ref 0–0.1)
BASOPHILS NFR BLD: 0 % (ref 0–1)
BUN SERPL-MCNC: 36 MG/DL (ref 6–20)
BUN/CREAT SERPL: 25 (ref 12–20)
CA-I BLD-MCNC: 9.9 MG/DL (ref 8.5–10.1)
CHLORIDE SERPL-SCNC: 106 MMOL/L (ref 97–108)
CO2 SERPL-SCNC: 26 MMOL/L (ref 21–32)
CREAT SERPL-MCNC: 1.45 MG/DL (ref 0.7–1.3)
DIFFERENTIAL METHOD BLD: ABNORMAL
EOSINOPHIL # BLD: 0 K/UL (ref 0–0.4)
EOSINOPHIL NFR BLD: 0 % (ref 0–7)
ERYTHROCYTE [DISTWIDTH] IN BLOOD BY AUTOMATED COUNT: 13.4 % (ref 11.5–14.5)
GLUCOSE SERPL-MCNC: 156 MG/DL (ref 65–100)
HCT VFR BLD AUTO: 39.5 % (ref 36.6–50.3)
HGB BLD-MCNC: 13 G/DL (ref 12.1–17)
IMM GRANULOCYTES # BLD AUTO: 0 K/UL (ref 0–0.04)
IMM GRANULOCYTES NFR BLD AUTO: 0 % (ref 0–0.5)
LYMPHOCYTES # BLD: 1.2 K/UL (ref 0.8–3.5)
LYMPHOCYTES NFR BLD: 12 % (ref 12–49)
MCH RBC QN AUTO: 31.2 PG (ref 26–34)
MCHC RBC AUTO-ENTMCNC: 32.9 G/DL (ref 30–36.5)
MCV RBC AUTO: 94.7 FL (ref 80–99)
MONOCYTES # BLD: 0.8 K/UL (ref 0–1)
MONOCYTES NFR BLD: 8 % (ref 5–13)
NEUTS SEG # BLD: 7.9 K/UL (ref 1.8–8)
NEUTS SEG NFR BLD: 80 % (ref 32–75)
NRBC # BLD: 0 K/UL (ref 0–0.01)
NRBC BLD-RTO: 0 PER 100 WBC
PLATELET # BLD AUTO: 181 K/UL (ref 150–400)
PMV BLD AUTO: 10.7 FL (ref 8.9–12.9)
POTASSIUM SERPL-SCNC: 4.8 MMOL/L (ref 3.5–5.1)
RBC # BLD AUTO: 4.17 M/UL (ref 4.1–5.7)
SODIUM SERPL-SCNC: 138 MMOL/L (ref 136–145)
WBC # BLD AUTO: 10 K/UL (ref 4.1–11.1)

## 2022-12-21 PROCEDURE — 36415 COLL VENOUS BLD VENIPUNCTURE: CPT

## 2022-12-21 PROCEDURE — 65270000029 HC RM PRIVATE

## 2022-12-21 PROCEDURE — 99285 EMERGENCY DEPT VISIT HI MDM: CPT

## 2022-12-21 PROCEDURE — 70490 CT SOFT TISSUE NECK W/O DYE: CPT

## 2022-12-21 PROCEDURE — 85025 COMPLETE CBC W/AUTO DIFF WBC: CPT

## 2022-12-21 PROCEDURE — 80048 BASIC METABOLIC PNL TOTAL CA: CPT

## 2022-12-21 PROCEDURE — 71045 X-RAY EXAM CHEST 1 VIEW: CPT

## 2022-12-21 RX ORDER — SODIUM CHLORIDE, SODIUM LACTATE, POTASSIUM CHLORIDE, CALCIUM CHLORIDE 600; 310; 30; 20 MG/100ML; MG/100ML; MG/100ML; MG/100ML
100 INJECTION, SOLUTION INTRAVENOUS CONTINUOUS
Status: DISCONTINUED | OUTPATIENT
Start: 2022-12-21 | End: 2022-12-22

## 2022-12-22 ENCOUNTER — APPOINTMENT (OUTPATIENT)
Dept: ENDOSCOPY | Age: 87
End: 2022-12-22
Attending: INTERNAL MEDICINE
Payer: MEDICARE

## 2022-12-22 ENCOUNTER — ANESTHESIA EVENT (OUTPATIENT)
Dept: ENDOSCOPY | Age: 87
DRG: 393 | End: 2022-12-22
Payer: MEDICARE

## 2022-12-22 ENCOUNTER — ANESTHESIA EVENT (OUTPATIENT)
Dept: MEDSURG UNIT | Age: 87
End: 2022-12-22
Payer: MEDICARE

## 2022-12-22 ENCOUNTER — ANESTHESIA (OUTPATIENT)
Dept: MEDSURG UNIT | Age: 87
End: 2022-12-22
Payer: MEDICARE

## 2022-12-22 LAB
ATRIAL RATE: 119 BPM
CALCULATED R AXIS, ECG10: -66 DEGREES
CALCULATED T AXIS, ECG11: 52 DEGREES
DIAGNOSIS, 93000: NORMAL
GLUCOSE BLD STRIP.AUTO-MCNC: 114 MG/DL (ref 65–100)
PERFORMED BY, TECHID: ABNORMAL
Q-T INTERVAL, ECG07: 338 MS
QRS DURATION, ECG06: 132 MS
QTC CALCULATION (BEZET), ECG08: 507 MS
TROPONIN-HIGH SENSITIVITY: 27 NG/L (ref 0–76)
TROPONIN-HIGH SENSITIVITY: 38 NG/L (ref 0–76)
VENTRICULAR RATE, ECG03: 135 BPM

## 2022-12-22 PROCEDURE — 74011250636 HC RX REV CODE- 250/636: Performed by: STUDENT IN AN ORGANIZED HEALTH CARE EDUCATION/TRAINING PROGRAM

## 2022-12-22 PROCEDURE — 93005 ELECTROCARDIOGRAM TRACING: CPT

## 2022-12-22 PROCEDURE — 84484 ASSAY OF TROPONIN QUANT: CPT

## 2022-12-22 PROCEDURE — 82962 GLUCOSE BLOOD TEST: CPT

## 2022-12-22 PROCEDURE — 74011250636 HC RX REV CODE- 250/636: Performed by: INTERNAL MEDICINE

## 2022-12-22 PROCEDURE — 36415 COLL VENOUS BLD VENIPUNCTURE: CPT

## 2022-12-22 PROCEDURE — 65270000029 HC RM PRIVATE

## 2022-12-22 PROCEDURE — 74011250637 HC RX REV CODE- 250/637: Performed by: INTERNAL MEDICINE

## 2022-12-22 PROCEDURE — 74011000250 HC RX REV CODE- 250: Performed by: INTERNAL MEDICINE

## 2022-12-22 RX ORDER — INSULIN GLARGINE 100 [IU]/ML
20 INJECTION, SOLUTION SUBCUTANEOUS
Status: DISCONTINUED | OUTPATIENT
Start: 2022-12-22 | End: 2022-12-25

## 2022-12-22 RX ORDER — METOPROLOL TARTRATE 5 MG/5ML
5 INJECTION INTRAVENOUS
Status: DISCONTINUED | OUTPATIENT
Start: 2022-12-22 | End: 2022-12-28

## 2022-12-22 RX ORDER — NIFEDIPINE 60 MG/1
60 TABLET, EXTENDED RELEASE ORAL DAILY
Status: DISCONTINUED | OUTPATIENT
Start: 2022-12-22 | End: 2022-12-23

## 2022-12-22 RX ORDER — SODIUM CHLORIDE 450 MG/100ML
25 INJECTION, SOLUTION INTRAVENOUS CONTINUOUS
Status: DISCONTINUED | OUTPATIENT
Start: 2022-12-22 | End: 2022-12-31

## 2022-12-22 RX ORDER — GUAIFENESIN 100 MG/5ML
81 LIQUID (ML) ORAL DAILY
Status: DISCONTINUED | OUTPATIENT
Start: 2022-12-22 | End: 2023-01-07 | Stop reason: HOSPADM

## 2022-12-22 RX ORDER — CLOPIDOGREL BISULFATE 75 MG/1
75 TABLET ORAL DAILY
Status: DISCONTINUED | OUTPATIENT
Start: 2022-12-23 | End: 2023-01-07 | Stop reason: HOSPADM

## 2022-12-22 RX ORDER — ATORVASTATIN CALCIUM 10 MG/1
10 TABLET, FILM COATED ORAL
Status: DISCONTINUED | OUTPATIENT
Start: 2022-12-22 | End: 2023-01-07 | Stop reason: HOSPADM

## 2022-12-22 RX ORDER — ALOGLIPTIN 12.5 MG/1
12.5 TABLET, FILM COATED ORAL DAILY
Status: DISCONTINUED | OUTPATIENT
Start: 2022-12-22 | End: 2023-01-07 | Stop reason: HOSPADM

## 2022-12-22 RX ADMIN — ATORVASTATIN CALCIUM 10 MG: 10 TABLET, FILM COATED ORAL at 22:33

## 2022-12-22 RX ADMIN — NITROGLYCERIN 1 INCH: 20 OINTMENT TOPICAL at 18:25

## 2022-12-22 RX ADMIN — SODIUM CHLORIDE 100 ML/HR: 4.5 INJECTION, SOLUTION INTRAVENOUS at 11:57

## 2022-12-22 RX ADMIN — SODIUM CHLORIDE, POTASSIUM CHLORIDE, SODIUM LACTATE AND CALCIUM CHLORIDE 100 ML/HR: 600; 310; 30; 20 INJECTION, SOLUTION INTRAVENOUS at 00:50

## 2022-12-22 RX ADMIN — NITROGLYCERIN 1 INCH: 20 OINTMENT TOPICAL at 13:45

## 2022-12-22 RX ADMIN — SODIUM CHLORIDE 500 ML: 9 INJECTION, SOLUTION INTRAVENOUS at 21:00

## 2022-12-22 NOTE — ANESTHESIA PREPROCEDURE EVALUATION
Relevant Problems   NEUROLOGY   (+) Seizure (HCC)      CARDIOVASCULAR   (+) Hypertension      ENDOCRINE   (+) Arthritis   (+) Diabetes mellitus (HCC)       Anesthetic History   No history of anesthetic complications            Review of Systems / Medical History  Patient summary reviewed, nursing notes reviewed and pertinent labs reviewed    Pulmonary                Comments: S/p laryngectomy with associated tracheostomy   Neuro/Psych   Within defined limits           Cardiovascular    Hypertension          Hyperlipidemia         GI/Hepatic/Renal         Renal disease (Creatinine 1.45): CRI      Comments: Severe Dysphagia    Suspected TE Fistula vs supraglottic mechanical obstruction vs neuromuscular dysfunction.  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 ENDOSCOPY  Providers: Amy Bhagat MD      Medical History  Hypertension  Diabetes ([facility] [address]  Arthritis  Hypercholesterolemia  Single kidney  Throat cancer ([facility] [address]  Anemia           Physical Exam    Airway    TM Distance: 4 - 6 cm  Neck ROM: normal range of motion   Mouth opening: Normal  Tracheostomy present   Cardiovascular    Rhythm: regular  Rate: normal         Dental         Pulmonary  Breath sounds clear to auscultation               Abdominal  GI exam deferred       Other Findings            Anesthetic Plan    ASA: 3  Anesthesia type: general    Monitoring Plan: Continuous noninvasive hemodynamic monitoring      Induction: Intravenous      CONSENT CURRENTLY PENDING 12/22/2022 @ 1015 am

## 2022-12-22 NOTE — ROUTINE PROCESS
TRANSFER - OUT REPORT:    Verbal report given to [address] [place] [address] on [facility] 27  being transferred to W) for routine progression of care       Report consisted of patients Situation, Background, Assessment and   Recommendations(SBAR). Information from the following report(s) ED Summary was reviewed with the receiving nurse. Opportunity for questions and clarification was provided.       Patient transported with:   Monitor  Tech

## 2022-12-22 NOTE — CONSULTS
Consult    Patient: Valeria Peralta MRN: [id number]  SSN: [id number]    YOB: 1935  Age: 80 y.o. Sex: male      Subjective:      Valeria Peralta is a 80 y.o. 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 acutely started, ac yesterday has been unable to eat or drink as everything,  he eats or drink comes out of his mouth and up his nose. Difficult to swallowing but is apparent when he drinks that he is not. CT of the neck chest CT in the emergency room without contrast showed no signs of fissure, patient has no ability to eating drinking today. GI Consultation placed,   He denies any cough or shortness of breath. Past Medical History:   Diagnosis Date    Anemia     Arthritis     Diabetes ([facility] [address]     Hypercholesterolemia     Hypertension     Single kidney     Cr normal    Throat cancer ([facility] [address] 2008    s/p laryngectomy     Past Surgical History:   Procedure Laterality Date    HX LAP CHOLECYSTECTOMY  12-19-12    By Dr. Kanika Allen states does not know reason      Family History   Family history unknown: Yes     Social History     Tobacco Use    Smoking status: Former     Packs/day: 1.00     Years: 57.00     Pack years: 57.00     Types: Cigarettes     Quit date: 7/20/2012     Years since quitting: 10.4    Smokeless tobacco: Never   Substance Use Topics    Alcohol use:  Yes     Alcohol/week: 17.5 standard drinks     Types: 21 Shots of liquor per week      Current Facility-Administered Medications   Medication Dose Route Frequency Provider Last Rate Last Admin    nitroglycerin (NITROBID) 2 % ointment 1 Inch  1 Inch Topical Q6H Marley Connors MD        0.45% sodium chloride infusion  100 mL/hr IntraVENous CONTINUOUS Juany Sherman MD        atorvastatin (LIPITOR) tablet 10 mg  10 mg Oral QHS Juany Sherman MD [START ON 12/23/2022] clopidogreL (PLAVIX) tablet 75 mg  75 mg Oral DAILY Russ Olivares MD        multivitamin, tx-iron-ca-min (THERA-M w/ IRON) tablet 1 Tablet  1 Tablet Oral DAILY Russ Olivares MD        NIFEdipine ER (PROCARDIA XL) tablet 60 mg  60 mg Oral DAILY Russ Olivares MD        SITagliptin phosphate (JANUVIA) tablet tab 25 mg  25 mg Oral DAILY Russ Olivares MD        insulin glargine (LANTUS) injection 20 Units  20 Units SubCUTAneous QHS Russ Olivares MD        aspirin chewable tablet 81 mg  81 mg Oral DAILY Russ Olviares MD            No Known Allergies    Review of Systems:  Review of Systems   Unable to perform ROS: Medical condition      Objective:     Vitals:    12/22/22 0548 12/22/22 0624 12/22/22 0649 12/22/22 0757   BP: (!) 146/71 100/76 106/74 (!) 161/78   Pulse: 82  70 91   Resp: 20   18   Temp: 98.5 °F (36.9 °C)      SpO2: 98%  98% 97%   Weight:            Physical Exam:  Physical Exam  Constitutional:       Appearance: He is ill-appearing. HENT:      Head: Atraumatic. Comments:  patient uses a artificial speaker     Mouth/Throat:      Mouth: Mucous membranes are dry. Eyes:      Extraocular Movements: Extraocular movements intact. Neck:      Comments: Colostomy, deformity,  Cardiovascular:      Rate and Rhythm: Normal rate. Pulmonary:      Effort: Pulmonary effort is normal.   Abdominal:      General: Abdomen is flat. Musculoskeletal:      Cervical back: Normal range of motion. Skin:     General: Skin is warm. Neurological:      General: No focal deficit present.    Psychiatric:         Mood and Affect: Mood normal.        Recent Results (from the past 24 hour(s))   CBC WITH AUTOMATED DIFF    Collection Time: 12/21/22  9:46 PM   Result Value Ref Range    WBC 10.0 4.1 - 11.1 K/uL    RBC 4.17 4.10 - 5.70 M/uL    HGB 13.0 12.1 - 17.0 g/dL    HCT 39.5 36.6 - 50.3 %    MCV 94.7 80.0 - 99.0 FL    MCH 31.2 26.0 - 34.0 PG    MCHC 32.9 30.0 - 36.5 g/dL    RDW 13.4 11.5 - 14.5 %    PLATELET 826 166 - 529 K/uL    MPV 10.7 8.9 - 12.9 FL    NRBC 0.0 0.0  WBC    ABSOLUTE NRBC 0.00 0.00 - 0.01 K/uL    NEUTROPHILS 80 (H) 32 - 75 %    LYMPHOCYTES 12 12 - 49 %    MONOCYTES 8 5 - 13 %    EOSINOPHILS 0 0 - 7 %    BASOPHILS 0 0 - 1 %    IMMATURE GRANULOCYTES 0 0 - 0.5 %    ABS. NEUTROPHILS 7.9 1.8 - 8.0 K/UL    ABS. LYMPHOCYTES 1.2 0.8 - 3.5 K/UL    ABS. MONOCYTES 0.8 0.0 - 1.0 K/UL    ABS. EOSINOPHILS 0.0 0.0 - 0.4 K/UL    ABS. BASOPHILS 0.0 0.0 - 0.1 K/UL    ABS. IMM. GRANS. 0.0 0.00 - 0.04 K/UL    DF AUTOMATED     METABOLIC PANEL, BASIC    Collection Time: 12/21/22  9:46 PM   Result Value Ref Range    Sodium 138 136 - 145 mmol/L    Potassium 4.8 3.5 - 5.1 mmol/L    Chloride 106 97 - 108 mmol/L    CO2 26 21 - 32 mmol/L    Anion gap 6 5 - 15 mmol/L    Glucose 156 (H) 65 - 100 mg/dL    BUN 36 (H) 6 - 20 mg/dL    Creatinine 1.45 (H) 0.70 - 1.30 mg/dL    BUN/Creatinine ratio 25 (H) 12 - 20      eGFR 47 (L) >60 ml/min/1.73m2    Calcium 9.9 8.5 - 10.1 mg/dL        CT NECK CHEST WO CONT   Final Result   1. Status post total laryngectomy and tracheostomy creation. No definite   tracheoesophageal fistula on this Limited exam. Nasopharyngeal reflux of   contrast material and fluid/debris. Consider a dedicated fluoroscopic swallow   study with water-soluble contrast for more sensitive evaluation for subtle   fistula. 2.  Emphysema with scattered pleural parenchymal scarring and calcified   granulomas. Several calcified right hilar lymph nodes. XR CHEST PORT   Final Result      No acute findings.              Assessment:     Hospital Problems  Date Reviewed: 12/22/2022            Codes Class Noted POA    Dysphagia ICD-10-CM: R13.10  ICD-9-CM: 787.20  12/21/2022 Unknown       S/p  tracheostomy,  No Fistula or fissure, noted on CT,  Onset dysphagia, rule out esophageal obstruction  Patient also had a tachycardia, may be due to the SVT or a flutter,had  twelve-lead EKG  Patient history of atrial fibrillation, no pacemaker,  Plan:   Continue monitoring  in the Emergency room,  Cardiology consultation was  Patient is n.p.o. aspiration precautions  iv hydrations  ENT consultation needed  Speech therapy for the modified barium swallow test if EGD unremarkable  We will follow the patient to see if EGD is needed,  Signed By: Maria R Alicea MD     December 22, 2022         Thank you for allowing me to participate in this patients care  Cc Referring Physician   Andrea Marino MD

## 2022-12-22 NOTE — PROGRESS NOTES
Two-person skin assessment performed with Jony Byrne LPN. There are two scabbed/ slightly opened areas, one on each knee. Skin on feet is dry, skin is otherwise unremarkable.

## 2022-12-22 NOTE — ED TRIAGE NOTES
Patient comes from home, patient has a trach and says that when he went to eat dinner his food is coming out of his nose and trach.

## 2022-12-22 NOTE — PROGRESS NOTES
Reason for Admission:  Dysphasia                     RUR Score:   9%  Plan for utilizing home health:  None @ this time/uses no DME/self care to trach/has speaker. PCP: First and Last name:  Chivo Bender MD     Name of Practice:    Are you a current patient: Yes/No: Yes   Approximate date of last visit: Seen in October. Can you participate in a virtual visit with your PCP: No                    Current Advanced Directive/Advance Care Plan: Prior      Healthcare Decision Maker:              Primary Decision Maker: Leann Muniz - Other Relative - [phone]    Secondary Decision Maker: Chelsea Arroyo ( Call neice 1st) - Other Relative - [phone]                  Transition of Care Plan:                    D/C Plan is home alone & niece ( Antajaynarcisa) to transport. Send Rxs to [facility] [address] upon discharge. Pt difficult to understand, even with speaker. Per sister ( Mehdi Zimmer) staff to call & speak with niece who is local first. CM spoke with niece - assessment completed.

## 2022-12-22 NOTE — PROGRESS NOTES
Patient reported to ENDO room 4. Patient's heart rate was 130-140s. EKG ordered and completed. Consult made for cardiologist. Patient was cleaned up and changed. Patient's belongings were placed with the patient. Patient transferred to Trauma room 1. EGD not completed at this time.

## 2022-12-22 NOTE — ED PROVIDER NOTES
EMERGENCY DEPARTMENT HISTORY AND PHYSICAL EXAM      Date: 12/21/2022  Patient Name: Domenica Daugherty    History of Presenting Illness     Chief Complaint   Patient presents with    Aspiration       History Provided By: Patient and niece    HPI: Domenica Daugherty, 80 y.o. 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 According to the niece who is at the bedside the patient, since yesterday has been unable to eat or drink as everything he eats or drink comes out of his mouth and up his nose. Talking to the patient he is saying that he is swallowing but is apparent when he drinks that he is not. He denies any cough or shortness of breath. The niece also denies patient having any of these symptoms. There has been no fever or chills. Per EMS on triage there was fluid and food coming out of his trach. There are no other complaints, changes, or physical findings at this time. Past History     Past Medical History:  Past Medical History:   Diagnosis Date    Anemia     Arthritis     Diabetes ([facility] [address]     Hypercholesterolemia     Hypertension     Single kidney     Cr normal    Throat cancer ([facility] [address] 2008    s/p laryngectomy       Past Surgical History:  Past Surgical History:   Procedure Laterality Date    HX LAP CHOLECYSTECTOMY  12-19-12    By Dr. Jenise Robbins states does not know reason       Family History:  Family History   Family history unknown: Yes       Social History:  Social History     Tobacco Use    Smoking status: Former     Packs/day: 1.00     Years: 57.00     Pack years: 57.00     Types: Cigarettes     Quit date: 7/20/2012     Years since quitting: 10.4    Smokeless tobacco: Never   Substance Use Topics    Alcohol use:  Yes     Alcohol/week: 17.5 standard drinks     Types: 21 Shots of liquor per week    Drug use: No       Allergies:  No Known Allergies    PCP: Trang Abdi MD    No current facility-administered medications on file prior to encounter. Current Outpatient Medications on File Prior to Encounter   Medication Sig Dispense Refill    clopidogreL (PLAVIX) 75 mg tab       SITagliptin (Januvia) 25 mg tablet Take 25 mg by mouth daily. atorvastatin (LIPITOR) 10 mg tablet Take 10 mg by mouth nightly. PATRICIO ASPIRIN PO Take 81 mg by mouth daily. multivitamin, tx-iron-ca-min (THERA-M W/ IRON) 9 mg iron-400 mcg tab tablet Take 1 Tab by mouth daily. insulin detemir (LEVEMIR FLEXTOUCH) 100 unit/mL (3 mL) inpn 0.35 mL by SubCUTAneous route nightly. 35 Units by SubCUTAneous route at night (Patient taking differently: 10 Units by SubCUTAneous route nightly. 35 Units by SubCUTAneous route at night) 45 mL 3    NIFEdipine XL (PROCARDIA-XL) 60 mg CR tablet Take 60 mg by mouth daily. Indications: HYPERTENSION      saw palmetto 500 mg cap Take 450 mg by mouth daily. Review of Systems   Review of Systems  A 10 point review of system was performed and was negative except as noted above in HPI    Physical Exam   Physical Exam  Vitals and nursing note reviewed. Constitutional:       General: He is not in acute distress. Appearance: Normal appearance. HENT:      Head: Normocephalic. Mouth/Throat:      Comments: Patient had a few particles at the nose. I gave the patient a cup of water and every time he drinks that he is able to drink it but clearly has a lot of difficulty swallowing it and it comes up through the nose. The patient is reluctant to show me his mouth but when open it is clear that he was unable to swallow any of the fluid as it collects in his mouth and he is spits it out when instructed. There is not appear to be any mechanical blockage in the mouth and I am able to see. There is no neck soft tissue swelling or pain. Eyes:      Conjunctiva/sclera: Conjunctivae normal.   Neck:      Comments:  There is an open trach that appears clear and devoid of food particles. Cardiovascular:      Rate and Rhythm: Normal rate. Pulses: Normal pulses. Pulmonary:      Effort: Pulmonary effort is normal. No respiratory distress. Breath sounds: Normal breath sounds. No wheezing. Chest:      Chest wall: No tenderness. Abdominal:      General: Abdomen is flat. There is no distension. Tenderness: There is no abdominal tenderness. Musculoskeletal:         General: No deformity. Cervical back: Neck supple. Skin:     General: Skin is warm. Neurological:      General: No focal deficit present. Mental Status: He is alert. Psychiatric:         Mood and Affect: Mood normal.       Lab and Diagnostic Study Results   Labs -     Recent Results (from the past 12 hour(s))   CBC WITH AUTOMATED DIFF    Collection Time: 12/21/22  9:46 PM   Result Value Ref Range    WBC 10.0 4.1 - 11.1 K/uL    RBC 4.17 4.10 - 5.70 M/uL    HGB 13.0 12.1 - 17.0 g/dL    HCT 39.5 36.6 - 50.3 %    MCV 94.7 80.0 - 99.0 FL    MCH 31.2 26.0 - 34.0 PG    MCHC 32.9 30.0 - 36.5 g/dL    RDW 13.4 11.5 - 14.5 %    PLATELET 236 461 - 237 K/uL    MPV 10.7 8.9 - 12.9 FL    NRBC 0.0 0.0  WBC    ABSOLUTE NRBC 0.00 0.00 - 0.01 K/uL    NEUTROPHILS 80 (H) 32 - 75 %    LYMPHOCYTES 12 12 - 49 %    MONOCYTES 8 5 - 13 %    EOSINOPHILS 0 0 - 7 %    BASOPHILS 0 0 - 1 %    IMMATURE GRANULOCYTES 0 0 - 0.5 %    ABS. NEUTROPHILS 7.9 1.8 - 8.0 K/UL    ABS. LYMPHOCYTES 1.2 0.8 - 3.5 K/UL    ABS. MONOCYTES 0.8 0.0 - 1.0 K/UL    ABS. EOSINOPHILS 0.0 0.0 - 0.4 K/UL    ABS. BASOPHILS 0.0 0.0 - 0.1 K/UL    ABS. IMM.  GRANS. 0.0 0.00 - 0.04 K/UL    DF AUTOMATED     METABOLIC PANEL, BASIC    Collection Time: 12/21/22  9:46 PM   Result Value Ref Range    Sodium 138 136 - 145 mmol/L    Potassium 4.8 3.5 - 5.1 mmol/L    Chloride 106 97 - 108 mmol/L    CO2 26 21 - 32 mmol/L    Anion gap 6 5 - 15 mmol/L    Glucose 156 (H) 65 - 100 mg/dL    BUN 36 (H) 6 - 20 mg/dL Creatinine 1.45 (H) 0.70 - 1.30 mg/dL    BUN/Creatinine ratio 25 (H) 12 - 20      eGFR 47 (L) >60 ml/min/1.73m2    Calcium 9.9 8.5 - 10.1 mg/dL       Radiologic Studies -   @lastxrresult@  CT Results  (Last 48 hours)                 12/21/22 2047  CT NECK CHEST WO CONT Final result    Impression:  1. Status post total laryngectomy and tracheostomy creation. No definite   tracheoesophageal fistula on this Limited exam. Nasopharyngeal reflux of   contrast material and fluid/debris. Consider a dedicated fluoroscopic swallow   study with water-soluble contrast for more sensitive evaluation for subtle   fistula. 2.  Emphysema with scattered pleural parenchymal scarring and calcified   granulomas. Several calcified right hilar lymph nodes. Narrative:  EXAM: CT NECK CHEST WO CONT       INDICATION: eval tracheoesophageal fistula, was eating and had beans come out of   trach        COMPARISON: Chest radiograph 12/21/2022       TECHNIQUE: Helical CT of the neck and chest with coronal and sagittal reformats. Absence of intravenous contrast limits evaluation of the mucosa, vasculature and   solid structures. Suspected oral contrast administration. CT dose reduction was   achieved through the use of a standardized protocol tailored for this   examination and automatic exposure control for dose modulation. IV CONTRAST: None. FINDINGS:   NASOPHARYNX: Normal.   SUPRAHYOID NECK: Normal oropharynx, oral cavity, parapharyngeal space, and   retropharyngeal space. INFRAHYOID NECK: Status post laryngectomy. Nasopharyngeal reflux of contrast   material and fluid/debris. No definite tracheoesophageal fistula identified on   this limited exam.   PAROTID GLANDS: Normal.   SUBMANDIBULAR GLANDS: Normal.   THYROID GLAND: Normal. No nodule. LYMPH NODES: None enlarged by CT size criteria . THYROID: Left hemithyroidectomy. Several hypodense right lobe nodules measuring   up to 12 mm.    MEDIASTINUM: No mass or lymphadenopathy. RAFIQ: Several calcified right hilar nodes. THORACIC AORTA: No aneurysm. Atherosclerosis. MAIN PULMONARY ARTERY: Normal in caliber. HEART: Cardiomegaly. Coronary artery calcifications. Trace pericardial effusion. Left pectoral pacemaker. .   ESOPHAGUS: No wall thickening or dilatation. TRACHEA/BRONCHI: Tracheostomy. Trace secretions in the trachea. Diffuse   atelectasis. Den Ra PLEURA: No effusion or pneumothorax. LUNGS: Emphysema. Scattered regions of partially calcified pleural parenchymal   scarring, most pronounced in the apices. No suspicious nodule, mass, or airspace   disease. Multiple scattered bilateral calcified granulomas. UPPER ABDOMEN: Cholecystectomy. BONES: No acute fracture nor aggressive osseous lesion. Degenerative changes. Several chronic right rib deformities. CXR Results  (Last 48 hours)                 12/21/22 1953  XR CHEST PORT Final result    Impression:      No acute findings. Narrative:  EXAM:  XR CHEST PORT       INDICATION: aspiration       COMPARISON: Chest radiograph 5/2/2022       TECHNIQUE: Upright portable chest AP view       FINDINGS:        Cardiac mediastinal silhouette is upper limits of normal in size. Left pectoral   pacemaker. Lungs and pleural spaces are grossly clear. Medical Decision Making and ED Course   Differential Diagnosis & Medical Decision Making Provider Note:   This is an 80-year-old male presenting to the ED for difficulty swallowing. Differential diagnosis includes aspiration, tracheoesophageal fistula, esophageal obstruction, achalasia, dysphagia. Will obtain CT neck and chest with attempted swallow of contrast to evaluate for potential tracheoesophageal fistula as there was conflicting reports on whether or not food was coming out of his trach. Believe this would also be helpful to evaluate for any foreign body or aspiration. Will obtain basic lab work as well.     - I am the first provider for this patient. I reviewed the vital signs, available nursing notes, past medical history, past surgical history, family history and social history. The patients presenting problems have been discussed, and they are in agreement with the care plan formulated and outlined with them. I have encouraged them to ask questions as they arise throughout their visit. Vital Signs-Reviewed the patient's vital signs. Patient Vitals for the past 12 hrs:   Temp Pulse Resp BP SpO2   12/21/22 1910 98.2 °F (36.8 °C) 92 18 139/82 99 %       ED Course:   Labs returned unremarkable. CT of the neck shows no definite tracheoesophageal fistula with limited exam.  But there is nasopharyngeal reflux of contrast material and fluid/debris. They are recommending a swallow study. I believe given the patient is not able to swallow and is at very high risk of aspiration he requires admission to the hospital.  I discussed the case with Dr. Silva Crespo, Dr. Susan Blanco. The patient will be admitted, placed n.p.o. with continuous fluids and will have GI evaluation tomorrow. Do not believe there to be any emergent issue with the trach or ENT emergency as this appears to be more of a GI issue. Therefore I do not believe the patient warrants transfer at this time. He is also stable to be admitted to the hospital and receive further evaluation from GI in the morning. N.p.o. order and fluid orders placed. Procedures   Performed by: Deidre Mojica MD  Procedures      Disposition   Disposition: Admitted to Floor Medical Floor the case was discussed with the admitting physician     Diagnosis/Clinical Impression     Clinical Impression:   1. Other dysphagia        Attestations: Ruthie Glaser MD, am the primary clinician of record. Please note that this dictation was completed with [employer], the [employer] voice recognition software.   Quite often unanticipated grammatical, syntax, homophones, and other interpretive errors are inadvertently transcribed by the computer software. Please disregard these errors. Please excuse any errors that have escaped final proofreading. Thank you.

## 2022-12-22 NOTE — H&P
[facility]  HISTORY AND PHYSICAL    Name:  Eda Aldana  MR#:  [id number]  :  1935  ACCOUNT #:  [de-identified]  ADMIT DATE:  2022    Dr. Adriana Hutchins dictating history and physical for Dr. Lety Barriga. CHIEF COMPLAINT:  Unable to tolerate p.o. for last 2 days. HISTORY OF PRESENT ILLNESS:  The patient is an 80-year-old  male with history of laryngeal cancer, status post laryngectomy and tracheostomy long time ago; history of single kidney; hypertension; diabetes; and problem with irregular heart rate, status post pacemaker placement, admitted from ER with dysphagia. The patient has a problem with communication due to total laryngectomy, hard to understand. History obtained to some extent from the ER record and the patient. The patient claimed that he is having trouble swallowing for the last 2-3 days. When he tried to swallow, food comes up from the nose, regurged in nose and mouth. Denies any choking, shortness of breath, wheezing, chest pain, nausea or vomit. The patient denies having trouble swallowing before. The patient also denies any chest pain, palpitation, dizziness. Workup done in the ER revealed in ER he was afebrile. Initial blood pressure in the ER was 139/82, O2 saturation 99%. Chem profile done from the ER revealed BUN of 36, creatinine 1.45, lytes unremarkable, glucose of 156. CBC revealed WBC count of 10.0 with some shift to left. Hemoglobin of 13.0, hematocrit 39.5, with platelet count of 686,599. CT scan of neck without contrast, done as part of evaluation for possible fistula, revealed status post laryngectomy and tracheostomy. No definite tracheoesophageal fistula on limited exam.  Evidence of emphysema changes and pleural parenchymal scarring that was unremarkable. Chest x-ray done revealed no acute finding. MEDICATIONS:  At the time of hospitalization, unknown to the patient. As per the home list of medication, revealed:  1. Aspirin 81 mg daily. 2.  Plavix 75 mg daily. 3.  Januvia 25 mg daily. 4.  Lipitor 10 mg daily. 5.  Multivitamin one tablet daily. 6.  Levemir insulin 35 units daily. 7.  Nifedipine 60 mg daily. ALLERGIES:  DENIES ANY ALLERGY TO MEDICATIONS. PAST MEDICAL HISTORY:  1. History of hypertension, on antihypertensive medication. 2.  History of diabetes, on insulin therapy. 3.  History of hyperlipidemia, on low cholesterol diet and Lipitor therapy. 4.  History of laryngeal cancer, status post laryngectomy and tracheostomy long time ago. 5.  History of nephrectomy, unable to give detail. 6.  History of problem with irregular heart rate, had status post pacemaker placement and claimed that he has been seeing cardiologist, details are unable to be obtained. 7.  History of rheumatoid arthritis, on no specific medication. 8.  History of anemia. SOCIAL HISTORY:  History of smoking, smoking in the past, none lately. No history of alcohol use. The patient lives by himself and is reasonably active. FAMILY HISTORY:  Unable to obtain. REVIEW OF SYSTEMS:  Unable to obtain due to the patient's condition, problem with communication. PHYSICAL EXAMINATION:  GENERAL:  Revealed elderly  male, appears younger for his age, in no acute distress. VITAL SIGNS:  He is afebrile. Blood pressure at the time of examination was 90/61. Monitor revealed tachycardia with a heart rate of 139. HEENT:  Revealed pallor plus. No icterus. NECK:  Supple. There is a tracheostomy in place. No evidence of lymphadenopathy or thyroid masses. LUNGS:  Revealed equal air entry on both sides. No rales or rhonchi. HEART:  Irregular, tachycardic with a heart rate of 139. Unable to appreciate gallop or murmur. ABDOMEN:  Scaphoid, soft, nontender. Bowel sounds active. EXTREMITIES:  No pedal edema. Peripheral pulses are decreased. No calf tenderness. CNS:  The patient is awake, alert.   Cranial nerve exam II through XII unremarkable. No obvious focal weakness noted; however, exam is limited due to the patient's condition and cooperation. IMPRESSION:  1. Dysphagia, etiology unclear. 2.  Tachyarrhythmia, possible supraventricular tachycardia. 3.  Hypotension. 4.  History of diabetes, hypertension, osteoarthritis. 5.  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  6.  History of arrhythmia, status post pacemaker placement, details unknown. 7.  History of nephrectomy and single kidney. PLAN:  Admit to medical service. IV fluid for hydration. We will keep the patient n.p.o. for now. Obtain GI consultation for consideration of EGD. We will also obtain cardiology consult for tachyarrhythmia. We will titrate and continue the patient's home medications. Since the patient is n.p.o., we will place the patient on sliding insulin coverage.       Matthew Valencia MD      [id number]  D:  12/22/2022 12:50  T:  12/22/2022 14:48  JOB #:  [id number]  CC:  Shanna Arana MD

## 2022-12-22 NOTE — CONSULTS
Consult    NAME: Thiago Garrison   :  1935   MRN:  [id number]     Date/Time:  2022 1:03 PM    Patient PCP: Rubi Person MD  ________________________________________________________________________     Assessment:   Primary cardiologist: [facility]  Isaias Kline MD)    PROBLEM LIST:  1. The patient presents for evaluation of new onset dysphagia  2. History of throat cancer  3. Status post tracheostomy   4. Status post laryngectomy  5. Wide-complex tachycardia  6. Status post permanent pacemaker ([address])  7. Hypertension  8. Dyslipidemia  9. Type 2 diabetes  10. Former smoker    6. Chronic obstructive pulmonary disease (COPD-emphysema)  12. History of seizure disorder  13. Status post nephrectomy        []        High complexity decision making was performed        Subjective:   CHIEF COMPLAINT:     HISTORY OF PRESENT ILLNESS:     Tommye Homans, I is an 80-year-old -American male with no known coronary artery disease. The patient became established with our practice approximately 10 months ago. Previously he was seen by another area cardiologist after receiving a permanent pacemaker. There is no history of previous myocardial infarction, or congestive heart failure. He was last seen in our office approximately 3 months ago and was without any cardiovascular complaints. Apparently he was in his usual state of health until the day prior to admission. According to the emergency department notes he began to have difficulty swallowing food or drinks. His niece therefore had the patient present to the emergency department. In the emergency department his twelve-lead EKG revealed wide-complex tachycardia (135 bpm) with a right bundle branch block. He is being considered for endoscopic gastrointestinal procedure. Cardiology consultation is therefore requested. The patient is examined in trauma-1.   He denies any cardiovascular complaints. Specifically, he denies any chest pain, pressure or tightness further, he denies shortness of breath or difficulty breathing. There is no awareness of rapid heart rate, palpitations or missed beats. Past Medical History:   Diagnosis Date    Anemia     Arthritis     Diabetes ([facility] [address]     Hypercholesterolemia     Hypertension     Single kidney     Cr normal    Throat cancer ([facility] [address] 2008    s/p laryngectomy      Past Surgical History:   Procedure Laterality Date    HX LAP CHOLECYSTECTOMY  12-19-12    By Dr. Son Do states does not know reason     No Known Allergies   Meds:  See below  Social History     Tobacco Use    Smoking status: Former     Packs/day: 1.00     Years: 57.00     Pack years: 57.00     Types: Cigarettes     Quit date: 7/20/2012     Years since quitting: 10.4    Smokeless tobacco: Never   Substance Use Topics    Alcohol use: Yes     Alcohol/week: 17.5 standard drinks     Types: 21 Shots of liquor per week      Family History   Family history unknown: Yes       REVIEW OF SYSTEMS:     As above, otherwise noncontributory. Objective:      Physical Exam:    Last 24hrs VS reviewed since prior progress note. Most recent are:    Visit Vitals  /75   Pulse 77   Temp 98.5 °F (36.9 °C)   Resp 18   Wt 61.2 kg (135 lb)   SpO2 97%   BMI 18.31 kg/m²     No intake or output data in the 24 hours ending 12/22/22 1303     General Appearance: Well developed, in no acute respiratory distress. Ears/Nose/Mouth/Throat: Atraumatic, normocephalic, PERRL. Neck: Supple. Status post tracheostomy. Chest: Lungs clear to auscultation bilaterally. Cardiovascular: JVP is not elevated, PMI is not palpable. There is a left subclavicular pacing device noted. Normal intensity S1 and S2, without S3. Abdomen: Soft, non-tender, bowel sounds are active. No organomegaly. Extremities: No edema bilaterally. Skin: Warm and dry.   Neuro: CN II-XII grossly intact, without gross motor/sensory deficit. Data:      Telemetry:    EKG:  []  No new EKG for review  CT NECK CHEST WO CONT   Final Result   1. Status post total laryngectomy and tracheostomy creation. No definite   tracheoesophageal fistula on this Limited exam. Nasopharyngeal reflux of   contrast material and fluid/debris. Consider a dedicated fluoroscopic swallow   study with water-soluble contrast for more sensitive evaluation for subtle   fistula. 2.  Emphysema with scattered pleural parenchymal scarring and calcified   granulomas. Several calcified right hilar lymph nodes. XR CHEST PORT   Final Result      No acute findings. Prior to Admission medications    Medication Sig Start Date End Date Taking? Authorizing Provider   clopidogreL (PLAVIX) 75 mg tab  5/3/22   Provider, Historical   SITagliptin (Januvia) 25 mg tablet Take 25 mg by mouth daily. Provider, Historical   atorvastatin (LIPITOR) 10 mg tablet Take 10 mg by mouth nightly. Provider, Historical   PATRICIO ASPIRIN PO Take 81 mg by mouth daily. Provider, Historical   multivitamin, tx-iron-ca-min (THERA-M W/ IRON) 9 mg iron-400 mcg tab tablet Take 1 Tab by mouth daily. Provider, Historical   insulin detemir (LEVEMIR FLEXTOUCH) 100 unit/mL (3 mL) inpn 0.35 mL by SubCUTAneous route nightly. 35 Units by SubCUTAneous route at night  Patient taking differently: 10 Units by SubCUTAneous route nightly. 35 Units by SubCUTAneous route at night 6/17/16   Gela Mcginnis MD   NIFEdipine XL (PROCARDIA-XL) 60 mg CR tablet Take 60 mg by mouth daily. Indications: HYPERTENSION    Provider, Historical   saw palmetto 500 mg cap Take 450 mg by mouth daily.       Provider, Historical       Recent Results (from the past 24 hour(s))   CBC WITH AUTOMATED DIFF    Collection Time: 12/21/22  9:46 PM   Result Value Ref Range    WBC 10.0 4.1 - 11.1 K/uL    RBC 4.17 4.10 - 5.70 M/uL    HGB 13.0 12.1 - 17.0 g/dL    HCT 39.5 36.6 - 50.3 %    MCV 94.7 80.0 - 99.0 FL    MCH 31.2 26.0 - 34.0 PG    MCHC 32.9 30.0 - 36.5 g/dL    RDW 13.4 11.5 - 14.5 %    PLATELET 160 681 - 130 K/uL    MPV 10.7 8.9 - 12.9 FL    NRBC 0.0 0.0  WBC    ABSOLUTE NRBC 0.00 0.00 - 0.01 K/uL    NEUTROPHILS 80 (H) 32 - 75 %    LYMPHOCYTES 12 12 - 49 %    MONOCYTES 8 5 - 13 %    EOSINOPHILS 0 0 - 7 %    BASOPHILS 0 0 - 1 %    IMMATURE GRANULOCYTES 0 0 - 0.5 %    ABS. NEUTROPHILS 7.9 1.8 - 8.0 K/UL    ABS. LYMPHOCYTES 1.2 0.8 - 3.5 K/UL    ABS. MONOCYTES 0.8 0.0 - 1.0 K/UL    ABS. EOSINOPHILS 0.0 0.0 - 0.4 K/UL    ABS. BASOPHILS 0.0 0.0 - 0.1 K/UL    ABS. IMM. GRANS. 0.0 0.00 - 0.04 K/UL    DF AUTOMATED     METABOLIC PANEL, BASIC    Collection Time: 12/21/22  9:46 PM   Result Value Ref Range    Sodium 138 136 - 145 mmol/L    Potassium 4.8 3.5 - 5.1 mmol/L    Chloride 106 97 - 108 mmol/L    CO2 26 21 - 32 mmol/L    Anion gap 6 5 - 15 mmol/L    Glucose 156 (H) 65 - 100 mg/dL    BUN 36 (H) 6 - 20 mg/dL    Creatinine 1.45 (H) 0.70 - 1.30 mg/dL    BUN/Creatinine ratio 25 (H) 12 - 20      eGFR 47 (L) >60 ml/min/1.73m2    Calcium 9.9 8.5 - 10.1 mg/dL          Plan:   1. Admit to telemetry monitored bed  2. Obtain serial cardiac enzymes  3. Monitor serum electrolytes, renal function  4. Monitor fluid balance, daily weights  5. Continue current cardiovascular medications including aspirin, atorvastatin, clopidogrel, insulin, nifedipine, and Sitagliptin    6.   Add beta-blocker for rhythm/rate control (may give as needed intravenous metoprolol)   Michael Fang MD

## 2022-12-23 ENCOUNTER — ANESTHESIA (OUTPATIENT)
Dept: ENDOSCOPY | Age: 87
DRG: 393 | End: 2022-12-23
Payer: MEDICARE

## 2022-12-23 ENCOUNTER — APPOINTMENT (OUTPATIENT)
Dept: ENDOSCOPY | Age: 87
End: 2022-12-23
Attending: INTERNAL MEDICINE
Payer: MEDICARE

## 2022-12-23 LAB
BASOPHILS # BLD: 0 K/UL (ref 0–0.1)
BASOPHILS NFR BLD: 0 % (ref 0–1)
DIFFERENTIAL METHOD BLD: ABNORMAL
EOSINOPHIL # BLD: 0 K/UL (ref 0–0.4)
EOSINOPHIL NFR BLD: 0 % (ref 0–7)
ERYTHROCYTE [DISTWIDTH] IN BLOOD BY AUTOMATED COUNT: 13.2 % (ref 11.5–14.5)
GLUCOSE BLD STRIP.AUTO-MCNC: 100 MG/DL (ref 65–100)
GLUCOSE BLD STRIP.AUTO-MCNC: 113 MG/DL (ref 65–100)
HCT VFR BLD AUTO: 37.6 % (ref 36.6–50.3)
HGB BLD-MCNC: 12.3 G/DL (ref 12.1–17)
IMM GRANULOCYTES # BLD AUTO: 0 K/UL (ref 0–0.04)
IMM GRANULOCYTES NFR BLD AUTO: 0 % (ref 0–0.5)
LYMPHOCYTES # BLD: 0.6 K/UL (ref 0.8–3.5)
LYMPHOCYTES NFR BLD: 5 % (ref 12–49)
MCH RBC QN AUTO: 31.4 PG (ref 26–34)
MCHC RBC AUTO-ENTMCNC: 32.7 G/DL (ref 30–36.5)
MCV RBC AUTO: 95.9 FL (ref 80–99)
MONOCYTES # BLD: 0.8 K/UL (ref 0–1)
MONOCYTES NFR BLD: 7 % (ref 5–13)
NEUTS SEG # BLD: 11.3 K/UL (ref 1.8–8)
NEUTS SEG NFR BLD: 88 % (ref 32–75)
NRBC # BLD: 0 K/UL (ref 0–0.01)
NRBC BLD-RTO: 0 PER 100 WBC
PERFORMED BY, TECHID: ABNORMAL
PERFORMED BY, TECHID: NORMAL
PLATELET # BLD AUTO: 152 K/UL (ref 150–400)
PMV BLD AUTO: 11.3 FL (ref 8.9–12.9)
RBC # BLD AUTO: 3.92 M/UL (ref 4.1–5.7)
TROPONIN-HIGH SENSITIVITY: 36 NG/L (ref 0–76)
WBC # BLD AUTO: 12.8 K/UL (ref 4.1–11.1)

## 2022-12-23 PROCEDURE — 74011250637 HC RX REV CODE- 250/637: Performed by: INTERNAL MEDICINE

## 2022-12-23 PROCEDURE — 85025 COMPLETE CBC W/AUTO DIFF WBC: CPT

## 2022-12-23 PROCEDURE — 84484 ASSAY OF TROPONIN QUANT: CPT

## 2022-12-23 PROCEDURE — 36415 COLL VENOUS BLD VENIPUNCTURE: CPT

## 2022-12-23 PROCEDURE — 82962 GLUCOSE BLOOD TEST: CPT

## 2022-12-23 PROCEDURE — 77030005122 HC CATH GASTMY PEG BSC -B: Performed by: INTERNAL MEDICINE

## 2022-12-23 PROCEDURE — 77030013991 HC SNR POLYP ENDOSC BSC -A: Performed by: INTERNAL MEDICINE

## 2022-12-23 PROCEDURE — 74011250636 HC RX REV CODE- 250/636: Performed by: ANESTHESIOLOGY

## 2022-12-23 PROCEDURE — 65270000029 HC RM PRIVATE

## 2022-12-23 PROCEDURE — 76040000009: Performed by: INTERNAL MEDICINE

## 2022-12-23 PROCEDURE — 74011000250 HC RX REV CODE- 250: Performed by: ANESTHESIOLOGY

## 2022-12-23 PROCEDURE — 74011000250 HC RX REV CODE- 250: Performed by: INTERNAL MEDICINE

## 2022-12-23 PROCEDURE — 0CCM8ZZ EXTIRPATION OF MATTER FROM PHARYNX, VIA NATURAL OR ARTIFICIAL OPENING ENDOSCOPIC: ICD-10-PCS | Performed by: INTERNAL MEDICINE

## 2022-12-23 PROCEDURE — 2709999900 HC NON-CHARGEABLE SUPPLY: Performed by: INTERNAL MEDICINE

## 2022-12-23 PROCEDURE — 74011000258 HC RX REV CODE- 258: Performed by: INTERNAL MEDICINE

## 2022-12-23 PROCEDURE — 74011000258 HC RX REV CODE- 258: Performed by: ANESTHESIOLOGY

## 2022-12-23 PROCEDURE — 0DH68UZ INSERTION OF FEEDING DEVICE INTO STOMACH, VIA NATURAL OR ARTIFICIAL OPENING ENDOSCOPIC: ICD-10-PCS | Performed by: INTERNAL MEDICINE

## 2022-12-23 PROCEDURE — 76060000034 HC ANESTHESIA 1.5 TO 2 HR: Performed by: INTERNAL MEDICINE

## 2022-12-23 RX ORDER — PROPOFOL 10 MG/ML
INJECTION, EMULSION INTRAVENOUS
Status: COMPLETED
Start: 2022-12-23 | End: 2022-12-23

## 2022-12-23 RX ORDER — PROPOFOL 10 MG/ML
INJECTION, EMULSION INTRAVENOUS AS NEEDED
Status: DISCONTINUED | OUTPATIENT
Start: 2022-12-23 | End: 2022-12-23 | Stop reason: HOSPADM

## 2022-12-23 RX ORDER — CEFAZOLIN SODIUM 1 G/3ML
INJECTION, POWDER, FOR SOLUTION INTRAMUSCULAR; INTRAVENOUS
Status: COMPLETED
Start: 2022-12-23 | End: 2022-12-23

## 2022-12-23 RX ORDER — PROPOFOL 10 MG/ML
INJECTION, EMULSION INTRAVENOUS
Status: DISCONTINUED
Start: 2022-12-23 | End: 2022-12-23 | Stop reason: WASHOUT

## 2022-12-23 RX ORDER — DILTIAZEM HYDROCHLORIDE 30 MG/1
30 TABLET, FILM COATED ORAL
Status: DISCONTINUED | OUTPATIENT
Start: 2022-12-23 | End: 2022-12-28

## 2022-12-23 RX ORDER — CEFAZOLIN SODIUM 1 G/3ML
INJECTION, POWDER, FOR SOLUTION INTRAMUSCULAR; INTRAVENOUS AS NEEDED
Status: DISCONTINUED | OUTPATIENT
Start: 2022-12-23 | End: 2022-12-23 | Stop reason: HOSPADM

## 2022-12-23 RX ORDER — SODIUM CHLORIDE 9 MG/ML
INJECTION, SOLUTION INTRAVENOUS
Status: DISCONTINUED | OUTPATIENT
Start: 2022-12-23 | End: 2022-12-23 | Stop reason: HOSPADM

## 2022-12-23 RX ADMIN — PROPOFOL 30 MG: 10 INJECTION, EMULSION INTRAVENOUS at 10:47

## 2022-12-23 RX ADMIN — PROPOFOL 30 MG: 10 INJECTION, EMULSION INTRAVENOUS at 10:32

## 2022-12-23 RX ADMIN — PROPOFOL 20 MG: 10 INJECTION, EMULSION INTRAVENOUS at 10:30

## 2022-12-23 RX ADMIN — PROPOFOL 30 MG: 10 INJECTION, EMULSION INTRAVENOUS at 10:43

## 2022-12-23 RX ADMIN — PROPOFOL 20 MG: 10 INJECTION, EMULSION INTRAVENOUS at 10:28

## 2022-12-23 RX ADMIN — SODIUM CHLORIDE 100 ML/HR: 4.5 INJECTION, SOLUTION INTRAVENOUS at 19:52

## 2022-12-23 RX ADMIN — PROPOFOL 30 MG: 10 INJECTION, EMULSION INTRAVENOUS at 11:00

## 2022-12-23 RX ADMIN — PROPOFOL 30 MG: 10 INJECTION, EMULSION INTRAVENOUS at 10:34

## 2022-12-23 RX ADMIN — SODIUM CHLORIDE: 9 INJECTION, SOLUTION INTRAVENOUS at 10:16

## 2022-12-23 RX ADMIN — PROPOFOL 30 MG: 10 INJECTION, EMULSION INTRAVENOUS at 11:04

## 2022-12-23 RX ADMIN — PROPOFOL 30 MG: 10 INJECTION, EMULSION INTRAVENOUS at 10:39

## 2022-12-23 RX ADMIN — PROPOFOL 30 MG: 10 INJECTION, EMULSION INTRAVENOUS at 10:26

## 2022-12-23 RX ADMIN — DILTIAZEM HYDROCHLORIDE 5 MG/HR: 5 INJECTION, SOLUTION INTRAVENOUS at 10:16

## 2022-12-23 RX ADMIN — SODIUM CHLORIDE 100 ML/HR: 4.5 INJECTION, SOLUTION INTRAVENOUS at 07:08

## 2022-12-23 RX ADMIN — DILTIAZEM HYDROCHLORIDE 30 MG: 30 TABLET, FILM COATED ORAL at 17:05

## 2022-12-23 RX ADMIN — CEFAZOLIN SODIUM 1 G: 1 INJECTION, POWDER, FOR SOLUTION INTRAMUSCULAR; INTRAVENOUS at 11:04

## 2022-12-23 RX ADMIN — PROPOFOL 30 MG: 10 INJECTION, EMULSION INTRAVENOUS at 10:24

## 2022-12-23 RX ADMIN — NITROGLYCERIN 1 INCH: 20 OINTMENT TOPICAL at 17:05

## 2022-12-23 RX ADMIN — DILTIAZEM HYDROCHLORIDE 5 MG/HR: 5 INJECTION INTRAVENOUS at 00:25

## 2022-12-23 RX ADMIN — NITROGLYCERIN 1 INCH: 20 OINTMENT TOPICAL at 08:29

## 2022-12-23 RX ADMIN — ATORVASTATIN CALCIUM 10 MG: 10 TABLET, FILM COATED ORAL at 21:44

## 2022-12-23 NOTE — PROGRESS NOTES
Progress Note  Date:2022       Room:[facility]  Patient Name:Beltran Sánchez     YOB: 1935     Age:87 y.o. Subjective    Subjective   Covering for   Chart reviewed and patient examined  Patient just returned from endoscopy following foreign body from esophagus and placement of PEG tube  As per nurses episode of bleeding from the PEG tube site compressive dressing applied by endoscopy nurse  Patient is somewhat drowsy from anesthesia  Denies any significant pain shortness of breath chest pain  Cardiology help appreciated    Review of Systems    Unremarkable than mentioned in subjective  Objective         Vitals Last 24 Hours:  TEMPERATURE:  Temp  Av.6 °F (37 °C)  Min: 97.4 °F (36.3 °C)  Max: 99.5 °F (37.5 °C)  RESPIRATIONS RANGE: Resp  Avg: 15.2  Min: 0  Max: 22  PULSE OXIMETRY RANGE: SpO2  Av.7 %  Min: 92 %  Max: 100 %  PULSE RANGE: Pulse  Av.9  Min: 60  Max: 138  BLOOD PRESSURE RANGE: Systolic (83QCZ), APQ:094 , Min:83 , OQA:127   ; Diastolic (42CTG), BNO:56, Min:50, Max:93      I/O (24Hr):   No intake or output data in the 24 hours ending 22 1258    Current Facility-Administered Medications:     dilTIAZem IR (CARDIZEM) tablet 30 mg, 30 mg, Oral, TIDAC, Loren Fofana MD    nitroglycerin (NITROBID) 2 % ointment 1 Inch, 1 Inch, Topical, Q6H, Russ Olivares MD, 1 Inch at 22 0829    0.45% sodium chloride infusion, 100 mL/hr, IntraVENous, CONTINUOUS, Russ Olivares MD, Last Rate: 100 mL/hr at 22 0708, 100 mL/hr at 22 0708    atorvastatin (LIPITOR) tablet 10 mg, 10 mg, Oral, QHS, Cordelia SCOTT MD, 10 mg at 22 2233    clopidogreL (PLAVIX) tablet 75 mg, 75 mg, Oral, DAILY, Russ Olivares MD    multivitamin, tx-iron-ca-min (THERA-M w/ IRON) tablet 1 Tablet, 1 Tablet, Oral, DAILY, Lewis Connors MD    SITagliptin phosphate (JANUVIA) tablet tab 25 mg, 25 mg, Oral, DAILY, Consuelo Connors MD    insulin glargine (LANTUS) injection 20 Units, 20 Units, SubCUTAneous, QHS, Lewis Connors MD    aspirin chewable tablet 81 mg, 81 mg, Oral, DAILY, Lewis Connors MD    metoprolol (LOPRESSOR) injection 5 mg, 5 mg, IntraVENous, Q6H PRN, Pato Daley MD     Objective  General exam drowsy sleepy from anesthesia. Telemetry reveals regular sinus with heart rate around 60s  HEENT no paler icterus  Neck   supple both carotid valve unable to appreciate carotid bruit/lymphadenopathy. Status post tracheostomy  RS Shallow breathing no rhonchi or rales  Abdomen status post PEG tube placement compressive dressing in place  Soft with tenderness at the PEG tube site  Extremity no edema peripheral pulse decrease  CNS patient drowsy sleepy from medication. Moves all 4 extremity    Labs/Imaging/Diagnostics    Labs:  CBC:  Recent Labs     12/21/22 2146   WBC 10.0   RBC 4.17   HGB 13.0   HCT 39.5   MCV 94.7   RDW 13.4        CHEMISTRIES:  Recent Labs     12/21/22 2146      K 4.8      CO2 26   BUN 36*   CREA 1.45*   CA 9.9   PT/INR:No results for input(s): INR, INREXT in the last 72 hours. No lab exists for component: PROTIME  APTT:No results for input(s): APTT in the last 72 hours. LIVER PROFILE:No results for input(s): AST, ALT in the last 72 hours. No lab exists for component: Pamthelma Quintanilla ALKPHOS  Lab Results   Component Value Date/Time    ALT (SGPT) 39 08/23/2022 02:13 PM    AST (SGOT) 30 08/23/2022 02:13 PM    Alk. phosphatase 114 08/23/2022 02:13 PM    Bilirubin, total 0.6 08/23/2022 02:13 PM       Imaging Last 24 Hours:  No results found.   Assessment//Plan           Patient Active Problem List    Diagnosis Date Noted    Dysphagia 12/21/2022    Loss of consciousness ([facility] [address] 05/04/2022    Generalized weakness 05/04/2022    Seizure ([facility] [address] 05/02/2022    Encounter for long-term (current) use of insulin ([facility] [address] 06/19/2016    Mixed hyperlipidemia 06/19/2016    Single kidney     Choledocholithiasis 12/18/2012    Diabetes mellitus ([facility] 12/18/2012    Hypertension 12/18/2012    Arthritis 12/18/2012     Assessment & Plan      IMPRESSION:    Impacted food bolus in esophagus status post  removal of impacted food bolus and PEG tube placement postoperative discomfort/Bleeding at PEG tube site    1. Dysphagia,From  impacted food bolus in esophagus improved    2. Wide-complex tachycardia arrhythmia currently on diazepam infusion getting switched to p.o.    3.  Hypertension     4. History of diabetes, hypertension, osteoarthritis. 5.  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    6.  History of arrhythmia, status post pacemaker placement, details unknown. 7.  History of nephrectomy and single kidney.     Plan    Continue n.p.o. for now  PEG tube postoperative care and feeding as per GI  IV fluid for hydration  Close monitoring for hypoglycemia/bleeding at bedside  Follow-up lab    Discussed with the nurse and GI ( Dr Sho Rodríguez)    Time spent more than 30 minutes    Electronically signed by Edgard Cunha MD on 12/23/2022 at 12:58 PM

## 2022-12-23 NOTE — PROGRESS NOTES
Orders to stop cardizem drip and start PO. However, patient was NPO for EGD. Left patient on cardizem drip during procedure. Dr. Coty Cooper aware. PEG tube placed. Unable to use PEG tube for at least 4 hours for medication, and 6 hour post placement for feedings. PEG began bleeding profusely around insertion sight. Endo called, Dr. Lidia Ward to be notified by Endo of the bleeding. Endo nurse came to bedside. Bleeding controlled and pressure wrap/bandage reapplied. Dr. Coty Cooper aware of the incident. Also, aware of drip. Dr. Coty Cooper states stop the cardizem drip, and monitor until able to administer meds via PEG tube. Cardizem drip stopped.

## 2022-12-23 NOTE — ANESTHESIA POSTPROCEDURE EVALUATION
Procedure(s):  ESOPHAGOGASTRODUODENOSCOPY (EGD)  ENDOSCOPIC FOREIGN BODY REMOVAL  PERCUTANEOUS ENDOSCOPIC GASTROSTOMY TUBE INSERTION. MAC    Anesthesia Post Evaluation        Patient location during evaluation: bedside (Endoscopy suite)  Patient participation: complete - patient cannot participate  Level of consciousness: sleepy but conscious  Pain score: 0  Pain management: adequate  Airway patency: patent  Anesthetic complications: no  Cardiovascular status: acceptable  Respiratory status: acceptable and nasal cannula  Hydration status: acceptable  Comments: This patient remained on the stretcher. The patient was handed off to the endoscopy nursing team.  All questions regarding pre-, intra-, and postoperative care were answered.   Post anesthesia nausea and vomiting:  none      INITIAL Post-op Vital signs:   Vitals Value Taken Time   /58 12/23/22 1111   Temp     Pulse 60 12/23/22 1111   Resp 19 12/23/22 1111   SpO2 100 % 12/23/22 1111

## 2022-12-23 NOTE — PROGRESS NOTES
Progress Note    Patient: Dio Lazcano MRN: [id number]  SSN: [id number]    YOB: 1935  Age: 80 y.o. Sex: male      Admit Date: 12/21/2022    LOS: 2 days     Subjective:     Patient presented for further evaluation of difficulty swallowing. Objective:     Vitals:    12/23/22 0024 12/23/22 0313 12/23/22 0400 12/23/22 0810   BP: 113/66 103/64  (!) 170/75   Pulse:  (!) 133 (!) 133 79   Resp:  16  18   Temp:  98.3 °F (36.8 °C)  98.5 °F (36.9 °C)   SpO2:  98%  99%   Weight:            Intake and Output:  Current Shift: No intake/output data recorded. Last three shifts: No intake/output data recorded. Physical Exam:   General:  Alert, cooperative, no distress, appears stated age. Eyes:  Conjunctivae/corneas clear. PERRL, EOMs intact. Fundi benign   Ears:  Normal TMs and external ear canals both ears. Nose: Nares normal. Septum midline. Mucosa normal. No drainage or sinus tenderness. Mouth/Throat: Lips, mucosa, and tongue normal. Teeth and gums normal.   Neck: Supple, symmetrical, trachea midline, no adenopathy, thyroid: no enlargment/tenderness/nodules, no carotid bruit and no JVD. Back:   Symmetric, no curvature. ROM normal. No CVA tenderness. Lungs:   Clear to auscultation bilaterally. Heart:  Regular rate and rhythm, S1, S2 normal, no murmur, click, rub or gallop. Abdomen:   Soft, non-tender. Bowel sounds normal. No masses,  No organomegaly. Extremities: Extremities normal, atraumatic, no cyanosis or edema. Pulses: 2+ and symmetric all extremities. Skin: Skin color, texture, turgor normal. No rashes or lesions   Lymph nodes: Cervical, supraclavicular, and axillary nodes normal.   Neurologic: CNII-XII intact. Normal strength, sensation and reflexes throughout. Lab/Data Review: All lab results for the last 24 hours reviewed. Assessment:     Active Problems:    Dysphagia (12/21/2022)    1. The patient presents for evaluation of new onset dysphagia  2. History of throat cancer  3. Status post tracheostomy   4. Status post laryngectomy  5. Wide-complex tachycardia  6. Status post permanent pacemaker ([address])  7. Hypertension  8. Dyslipidemia  9. Type 2 diabetes  10. Former smoker    6. Chronic obstructive pulmonary disease (COPD-emphysema)  12. History of seizure disorder  13. Status post nephrectomy    Plan:     Blood pressure is well controlled. White count is normal.  Creatinine 2 days ago was 1.45. We will recheck labs. Currently on aspirin, atorvastatin, Plavix, nifedipine. Currently in sinus rhythm. .  Use short acting Cardizem. Discontinue Cardizem gtt. Discontinue nifedipine and probably upon discharge he should be on Cardizem for rate control.   Signed By: Debbi Chan MD     December 23, 2022

## 2022-12-23 NOTE — PROGRESS NOTES
Problem: Pressure Injury - Risk of  Goal: *Prevention of pressure injury  Description: Document Jabier Scale and appropriate interventions in the flowsheet. Outcome: Progressing Towards Goal  Note: Pressure Injury Interventions:  Sensory Interventions: Assess changes in LOC, Check visual cues for pain, Float heels, Minimize linen layers, Turn and reposition approx.  every two hours (pillows and wedges if needed)    Moisture Interventions: Absorbent underpads, Apply protective barrier, creams and emollients, Minimize layers    Activity Interventions: PT/OT evaluation    Mobility Interventions: Assess need for specialty bed    Nutrition Interventions: Document food/fluid/supplement intake    Friction and Shear Interventions: Apply protective barrier, creams and emollients, HOB 30 degrees or less, Minimize layers                Problem: Patient Education: Go to Patient Education Activity  Goal: Patient/Family Education  Outcome: Progressing Towards Goal

## 2022-12-23 NOTE — ANESTHESIA PREPROCEDURE EVALUATION
Relevant Problems   NEUROLOGY   (+) Seizure (HCC)      CARDIOVASCULAR   (+) Hypertension      ENDOCRINE   (+) Arthritis   (+) Diabetes mellitus (HCC)     Relevant Problems   NEUROLOGY   (+) Seizure (HCC)      CARDIOVASCULAR   (+) Hypertension      ENDOCRINE   (+) Arthritis   (+) Diabetes mellitus (HCC)       Anesthetic History   No history of anesthetic complications            Review of Systems / Medical History  Patient summary reviewed, nursing notes reviewed and pertinent labs reviewed    Pulmonary                Comments: S/p laryngectomy with associated tracheostomy   Neuro/Psych   Within defined limits           Cardiovascular    Hypertension          Hyperlipidemia         GI/Hepatic/Renal         Renal disease (Creatinine 1.45): CRI      Comments: Severe Dysphagia    Suspected TE Fistula vs supraglottic mechanical obstruction vs neuromuscular dysfunction.  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 ENDO 02 / [facility] ENDOSCOPY  Providers: Domenica Stephenson MD      Medical History  Hypertension  Diabetes ([facility]  Arthritis  Hypercholesterolemia  Single kidney  Throat cancer ([facility]  Anemia           Physical Exam    Airway    TM Distance: 4 - 6 cm  Neck ROM: normal range of motion   Mouth opening: Normal  Tracheostomy present   Cardiovascular    Rhythm: regular  Rate: normal         Dental         Pulmonary  Breath sounds clear to auscultation               Abdominal  GI exam deferred       Other Findings            Anesthetic Plan    ASA: 3  Anesthesia type: general    Monitoring Plan: Continuous noninvasive hemodynamic monitoring      Induction: Intravenous      CONSENT CURRENTLY PENDING 12/22/2022 @ 1015 am    Relevant Problems   NEUROLOGY   (+) Seizure (HCC)      CARDIOVASCULAR   (+) Hypertension      ENDOCRINE   (+) Arthritis   (+) Diabetes mellitus ([address]       Anesthetic History   No history of anesthetic complications            Review of Systems / Medical History  Patient summary reviewed, nursing notes reviewed and pertinent labs reviewed    Pulmonary                Comments: S/p laryngectomy with associated tracheostomy   Neuro/Psych   Within defined limits           Cardiovascular    Hypertension          Hyperlipidemia         GI/Hepatic/Renal         Renal disease (Creatinine 1.45): CRI      Comments: Severe Dysphagia    Suspected TE Fistula vs supraglottic mechanical obstruction vs neuromuscular dysfunction.  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.)     Other Findings   Comments: Procedure Information    Case: [id number] Date/Time: 12/22/22 1030  Procedure: ESOPHAGOGASTRODUODENOSCOPY (EGD)  Anesthesia type: MAC  Diagnosis: Dysphagia, unspecified type (R13.10)  Pre-op diagnosis: Dysphagia, unspecified type (R13.10)  Location: [facility] ENDO 02 / [facility] ENDOSCOPY  Providers: Nolan Garces MD      Medical History  Hypertension  Diabetes ([address]  Arthritis  Hypercholesterolemia  Single kidney  Throat cancer ([facility]  Anemia           Physical Exam    Airway    TM Distance: 4 - 6 cm  Neck ROM: normal range of motion   Mouth opening: Normal  Tracheostomy present   Cardiovascular    Rhythm: regular  Rate: normal         Dental         Pulmonary  Breath sounds clear to auscultation               Abdominal  GI exam deferred       Other Findings            Anesthetic Plan    ASA: 3  Anesthesia type: general    Monitoring Plan: Continuous noninvasive hemodynamic monitoring      Induction: Intravenous      CONSENT CURRENTLY PENDING 12/22/2022 @ 1015 am        Anesthetic History   No history of anesthetic complications            Review of Systems / Medical History  Patient summary reviewed, nursing notes reviewed and pertinent labs reviewed    Pulmonary    COPD            Comments: FORMER SMOKER. Neuro/Psych              Cardiovascular    Hypertension        Dysrhythmias   Hyperlipidemia      Comments: EKG 12-22-22: Wide QRS tachycardia   Left axis deviation   Right bundle branch block   Abnormal ECG   No previous ECGs available. GI/Hepatic/Renal               Comments: DYSPHAGIA Endo/Other    Diabetes    Arthritis and cancer (\"THROAT CA\" S/P TRACH)     Other Findings   Comments: S/P NEPHRECTOMY. S/P LARYNGECTOMY. PLAVIX: LAST DOSE ON ?          Physical Exam    Airway          Tracheostomy present   Cardiovascular    Rhythm: regular  Rate: normal         Dental         Pulmonary      Decreased breath sounds           Abdominal  GI exam deferred       Other Findings            Anesthetic Plan    ASA: 3  Anesthesia type: MAC          Induction: Intravenous  Anesthetic plan and risks discussed with: Patient

## 2022-12-24 PROBLEM — E43 SEVERE PROTEIN-ENERGY MALNUTRITION (HCC): Status: ACTIVE | Noted: 2022-12-24

## 2022-12-24 LAB
ALBUMIN SERPL-MCNC: 3.4 G/DL (ref 3.5–5)
ALBUMIN/GLOB SERPL: 0.8 (ref 1.1–2.2)
ALP SERPL-CCNC: 84 U/L (ref 45–117)
ALT SERPL-CCNC: 31 U/L (ref 12–78)
ANION GAP SERPL CALC-SCNC: 8 MMOL/L (ref 5–15)
AST SERPL W P-5'-P-CCNC: 29 U/L (ref 15–37)
BASOPHILS # BLD: 0 K/UL (ref 0–0.1)
BASOPHILS NFR BLD: 0 % (ref 0–1)
BILIRUB SERPL-MCNC: 1.7 MG/DL (ref 0.2–1)
BUN SERPL-MCNC: 27 MG/DL (ref 6–20)
BUN/CREAT SERPL: 26 (ref 12–20)
CA-I BLD-MCNC: 9.1 MG/DL (ref 8.5–10.1)
CHLORIDE SERPL-SCNC: 109 MMOL/L (ref 97–108)
CO2 SERPL-SCNC: 22 MMOL/L (ref 21–32)
CREAT SERPL-MCNC: 1.04 MG/DL (ref 0.7–1.3)
DIFFERENTIAL METHOD BLD: ABNORMAL
EOSINOPHIL # BLD: 0 K/UL (ref 0–0.4)
EOSINOPHIL NFR BLD: 0 % (ref 0–7)
ERYTHROCYTE [DISTWIDTH] IN BLOOD BY AUTOMATED COUNT: 13.2 % (ref 11.5–14.5)
GLOBULIN SER CALC-MCNC: 4.2 G/DL (ref 2–4)
GLUCOSE BLD STRIP.AUTO-MCNC: 161 MG/DL (ref 65–100)
GLUCOSE BLD STRIP.AUTO-MCNC: 164 MG/DL (ref 65–100)
GLUCOSE BLD STRIP.AUTO-MCNC: 237 MG/DL (ref 65–100)
GLUCOSE SERPL-MCNC: 153 MG/DL (ref 65–100)
HCT VFR BLD AUTO: 36.5 % (ref 36.6–50.3)
HGB BLD-MCNC: 12.1 G/DL (ref 12.1–17)
IMM GRANULOCYTES # BLD AUTO: 0 K/UL (ref 0–0.04)
IMM GRANULOCYTES NFR BLD AUTO: 0 % (ref 0–0.5)
LYMPHOCYTES # BLD: 0.8 K/UL (ref 0.8–3.5)
LYMPHOCYTES NFR BLD: 5 % (ref 12–49)
MCH RBC QN AUTO: 31.6 PG (ref 26–34)
MCHC RBC AUTO-ENTMCNC: 33.2 G/DL (ref 30–36.5)
MCV RBC AUTO: 95.3 FL (ref 80–99)
MONOCYTES # BLD: 1 K/UL (ref 0–1)
MONOCYTES NFR BLD: 6 % (ref 5–13)
NEUTS SEG # BLD: 14 K/UL (ref 1.8–8)
NEUTS SEG NFR BLD: 89 % (ref 32–75)
NRBC # BLD: 0 K/UL (ref 0–0.01)
NRBC BLD-RTO: 0 PER 100 WBC
PERFORMED BY, TECHID: ABNORMAL
PLATELET # BLD AUTO: 154 K/UL (ref 150–400)
PMV BLD AUTO: 11.2 FL (ref 8.9–12.9)
POTASSIUM SERPL-SCNC: 3.9 MMOL/L (ref 3.5–5.1)
PROT SERPL-MCNC: 7.6 G/DL (ref 6.4–8.2)
RBC # BLD AUTO: 3.83 M/UL (ref 4.1–5.7)
SODIUM SERPL-SCNC: 139 MMOL/L (ref 136–145)
WBC # BLD AUTO: 15.9 K/UL (ref 4.1–11.1)

## 2022-12-24 PROCEDURE — 82962 GLUCOSE BLOOD TEST: CPT

## 2022-12-24 PROCEDURE — 80053 COMPREHEN METABOLIC PANEL: CPT

## 2022-12-24 PROCEDURE — 85025 COMPLETE CBC W/AUTO DIFF WBC: CPT

## 2022-12-24 PROCEDURE — 74011636637 HC RX REV CODE- 636/637: Performed by: INTERNAL MEDICINE

## 2022-12-24 PROCEDURE — 74011000250 HC RX REV CODE- 250: Performed by: INTERNAL MEDICINE

## 2022-12-24 PROCEDURE — 36415 COLL VENOUS BLD VENIPUNCTURE: CPT

## 2022-12-24 PROCEDURE — 65270000029 HC RM PRIVATE

## 2022-12-24 PROCEDURE — 74011250637 HC RX REV CODE- 250/637: Performed by: INTERNAL MEDICINE

## 2022-12-24 RX ADMIN — ATORVASTATIN CALCIUM 10 MG: 10 TABLET, FILM COATED ORAL at 21:37

## 2022-12-24 RX ADMIN — SODIUM CHLORIDE 100 ML/HR: 4.5 INJECTION, SOLUTION INTRAVENOUS at 21:38

## 2022-12-24 RX ADMIN — DILTIAZEM HYDROCHLORIDE 30 MG: 30 TABLET, FILM COATED ORAL at 09:13

## 2022-12-24 RX ADMIN — DILTIAZEM HYDROCHLORIDE 30 MG: 30 TABLET, FILM COATED ORAL at 11:23

## 2022-12-24 RX ADMIN — Medication 1 TABLET: at 09:16

## 2022-12-24 RX ADMIN — NITROGLYCERIN 1 INCH: 20 OINTMENT TOPICAL at 11:23

## 2022-12-24 RX ADMIN — NITROGLYCERIN 1 INCH: 20 OINTMENT TOPICAL at 00:09

## 2022-12-24 RX ADMIN — INSULIN GLARGINE 20 UNITS: 100 INJECTION, SOLUTION SUBCUTANEOUS at 21:37

## 2022-12-24 RX ADMIN — DILTIAZEM HYDROCHLORIDE 30 MG: 30 TABLET, FILM COATED ORAL at 15:24

## 2022-12-24 RX ADMIN — CLOPIDOGREL BISULFATE 75 MG: 75 TABLET ORAL at 09:13

## 2022-12-24 RX ADMIN — ASPIRIN 81 MG CHEWABLE TABLET 81 MG: 81 TABLET CHEWABLE at 09:13

## 2022-12-24 RX ADMIN — ALOGLIPTIN 12.5 MG: 12.5 TABLET, FILM COATED ORAL at 09:13

## 2022-12-24 NOTE — PROGRESS NOTES
Progress Note  Date:2022       Room:[facility]  Patient Name:Beltran Sánchez     YOB: 1935     Age:87 y.o. Subjective    Subjective   Covering for   Chart reviewed and patient examined  Claimed feeling better denies any significant pain shortness of breath chest pain  Hemodynamically stable  Episode of confusion agitation last night better today ( as per nurses)  Available lab and test reviewed  Cardiology help appreciated    Review of Systems    Unremarkable than mentioned in subjective  Objective         Vitals Last 24 Hours:  TEMPERATURE:  Temp  Av.2 °F (37.3 °C)  Min: 98.3 °F (36.8 °C)  Max: 100 °F (37.8 °C)  RESPIRATIONS RANGE: Resp  Av  Min: 16  Max: 18  PULSE OXIMETRY RANGE: SpO2  Av.7 %  Min: 96 %  Max: 99 %  PULSE RANGE: Pulse  Av.9  Min: 66  Max: 140  BLOOD PRESSURE RANGE: Systolic (41VEE), JOSIANE:523 , Min:108 , NV   ; Diastolic (97WDM), GJK:10, Min:67, Max:86      I/O (24Hr):     Intake/Output Summary (Last 24 hours) at 2022 1447  Last data filed at 2022 1430  Gross per 24 hour   Intake 2503 ml   Output 0 ml   Net 2503 ml       Current Facility-Administered Medications:     dilTIAZem IR (CARDIZEM) tablet 30 mg, 30 mg, Oral, TIDAC, Loren Fofana MD, 30 mg at 22 1123    nitroglycerin (NITROBID) 2 % ointment 1 Inch, 1 Inch, Topical, Q6H, Russ Olivares MD, 1 Inch at 22 1123    0.45% sodium chloride infusion, 100 mL/hr, IntraVENous, CONTINUOUS, Cordelia SCOTT MD, Last Rate: 100 mL/hr at 22, 100 mL/hr at 22    atorvastatin (LIPITOR) tablet 10 mg, 10 mg, Oral, QHS, Russ Olivares MD, 10 mg at 22    clopidogreL (PLAVIX) tablet 75 mg, 75 mg, Oral, DAILY, Russ Olivares MD, 75 mg at 22 0913    multivitamin, tx-iron-ca-min (THERA-M w/ IRON) tablet 1 Tablet, 1 Tablet, Oral, DAILY, Russ Olivares MD, 1 Tablet at 22 0916    alogliptin (NESINA) tablet 12.5 mg, 12.5 mg, Oral, DAILY, Leonel Scruggs MD, 12.5 mg at 12/24/22 0913    insulin glargine (LANTUS) injection 20 Units, 20 Units, SubCUTAneous, QHS, Lewis Connors MD    aspirin chewable tablet 81 mg, 81 mg, Oral, DAILY, Leoenl Scruggs MD, 81 mg at 12/24/22 0913    metoprolol (LOPRESSOR) injection 5 mg, 5 mg, IntraVENous, Q6H PRN, Gene HOBSON MD     Objective:  Vital signs: (most recent): Blood pressure 114/67, pulse 69, temperature 98.5 °F (36.9 °C), resp. rate 16, height 6' 0.01\" (1.829 m), weight 61.2 kg (135 lb), SpO2 97 %. General exam patient awake alert laying comfortably in bed claimed feeling better  HEENT no paler icterus  Neck   supple both carotid valve unable to appreciate carotid bruit/lymphadenopathy. Status post tracheostomy  RS Shallow breathing no rhonchi or rales  Abdomen status post PEG tube placement compressive dressing in place  Soft with tenderness at the PEG tube site  Extremity no edema peripheral pulse decrease  CNS awake alert no focal neurodeficit    Labs/Imaging/Diagnostics    Labs:  CBC:  Recent Labs     12/24/22  0716 12/23/22  1744 12/21/22  2146   WBC 15.9* 12.8* 10.0   RBC 3.83* 3.92* 4.17   HGB 12.1 12.3 13.0   HCT 36.5* 37.6 39.5   MCV 95.3 95.9 94.7   RDW 13.2 13.2 13.4    152 181       CHEMISTRIES:  Recent Labs     12/24/22  0716 12/21/22  2146    138   K 3.9 4.8   * 106   CO2 22 26   BUN 27* 36*   CREA 1.04 1.45*   CA 9.1 9.9     PT/INR:No results for input(s): INR, INREXT, INREXT in the last 72 hours. No lab exists for component: PROTIME  APTT:No results for input(s): APTT in the last 72 hours. LIVER PROFILE:  Recent Labs     12/24/22  0716   AST 29   ALT 31     Lab Results   Component Value Date/Time    ALT (SGPT) 31 12/24/2022 07:16 AM    AST (SGOT) 29 12/24/2022 07:16 AM    Alk. phosphatase 84 12/24/2022 07:16 AM    Bilirubin, total 1.7 (H) 12/24/2022 07:16 AM       Imaging Last 24 Hours:  No results found.   Assessment//Plan           Patient Active Problem List Diagnosis Date Noted    Dysphagia 12/21/2022    Loss of consciousness ([facility] 05/04/2022    Generalized weakness 05/04/2022    Seizure ([facility] 05/02/2022    Encounter for long-term (current) use of insulin ([facility] 06/19/2016    Mixed hyperlipidemia 06/19/2016    Single kidney     Choledocholithiasis 12/18/2012    Diabetes mellitus ([facility] 12/18/2012    Hypertension 12/18/2012    Arthritis 12/18/2012     Assessment & Plan      IMPRESSION:    Impacted food bolus in esophagus status post  removal of impacted food bolus and PEG tube placement postoperative discomfort/Bleeding at PEG tube site    1. Dysphagia,From  impacted food bolus in esophagus improved    2. Wide-complex tachycardia arrhythmia currently on diazepam infusion getting switched to p.o.    3.  Hypertension     4. History of diabetes, hypertension, osteoarthritis. 5.  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    6.  History of arrhythmia, status post pacemaker placement, details unknown. 7.  History of nephrectomy and single kidney.     Plan    Continue n.p.o. for now  PEG bolus feeding to start from today  Continue current IV fluid  Resume home p.o. medication via PEG tube  Close monitoring for hypoglycemia/bleeding at bedside  Initial consult regarding discharge   Follow-up lab    Discussed with the nurse and GI ( Dr Jonathon Arana)    Time spent more than 30 minutes    Electronically signed by Tiara Walker MD on 12/24/2022 at 12:58 PM

## 2022-12-24 NOTE — PROGRESS NOTES
Problem: Pressure Injury - Risk of  Goal: *Prevention of pressure injury  Description: Document Jabier Scale and appropriate interventions in the flowsheet. Outcome: Progressing Towards Goal  Note: Pressure Injury Interventions:  Sensory Interventions: Assess changes in LOC, Assess need for specialty bed, Float heels, Keep linens dry and wrinkle-free, Maintain/enhance activity level, Minimize linen layers, Monitor skin under medical devices    Moisture Interventions: Absorbent underpads, Assess need for specialty bed, Check for incontinence Q2 hours and as needed, Internal/External urinary devices, Limit adult briefs, Maintain skin hydration (lotion/cream), Minimize layers, Offer toileting Q_hr    Activity Interventions: PT/OT evaluation, Pressure redistribution bed/mattress(bed type)    Mobility Interventions: PT/OT evaluation, Assess need for specialty bed    Nutrition Interventions: Document food/fluid/supplement intake, Offer support with meals,snacks and hydration    Friction and Shear Interventions: Apply protective barrier, creams and emollients, HOB 30 degrees or less, Minimize layers                Problem: Falls - Risk of  Goal: *Absence of Falls  Description: Document Mikaela Fall Risk and appropriate interventions in the flowsheet.   Outcome: Progressing Towards Goal  Note: Fall Risk Interventions:

## 2022-12-24 NOTE — CONSULTS
Nutrition Education    Educated on Home TF regimen  Learners: Patient and Family  Readiness: Non-acceptance  Method: Explanation and Handout  Response: Verbalizes Understanding  Contact name and number provided. Educated pt and family member on suggested home TF regimen. RD encouraged pt to observe RN providing bolus feeds, and eventually give himself feeds. Pt family member concerned that pt would be unable to do this by himself, inquired about Home health. Encourage her to speak to CM about it.      [address] [place] [address] Number: Ext [phone], or via [employer]

## 2022-12-24 NOTE — PROGRESS NOTES
Progress Note    Patient: Gladis Bowman MRN: [id number]  SSN: [id number]    YOB: 1935  Age: 80 y.o. Sex: male      Admit Date: 12/21/2022    LOS: 3 days     Subjective:   No nausea or vomiting   On tracheostomy O2    Past Medical History:   Diagnosis Date    Anemia     Arthritis     Diabetes ([facility]     Hypercholesterolemia     Hypertension     Single kidney     Cr normal    Throat cancer ([facility] 2008    s/p laryngectomy        Current Facility-Administered Medications:     dilTIAZem IR (CARDIZEM) tablet 30 mg, 30 mg, Oral, TIDAC, Loren Fofana MD, 30 mg at 12/24/22 1123    nitroglycerin (NITROBID) 2 % ointment 1 Inch, 1 Inch, Topical, Q6H, Bonita Connors MD, 1 Inch at 12/24/22 1123    0.45% sodium chloride infusion, 100 mL/hr, IntraVENous, CONTINUOUS, Washington Villalobos MD, Last Rate: 100 mL/hr at 12/23/22 1952, 100 mL/hr at 12/23/22 1952    atorvastatin (LIPITOR) tablet 10 mg, 10 mg, Oral, QHS, Washington Villalobos MD, 10 mg at 12/23/22 2144    clopidogreL (PLAVIX) tablet 75 mg, 75 mg, Oral, DAILY, Washington Villalobos MD, 75 mg at 12/24/22 0913    multivitamin, tx-iron-ca-min (THERA-M w/ IRON) tablet 1 Tablet, 1 Tablet, Oral, DAILY, Washington Villalobos MD, 1 Tablet at 12/24/22 0916    alogliptin (NESINA) tablet 12.5 mg, 12.5 mg, Oral, DAILY, Washington Villalobos MD, 12.5 mg at 12/24/22 0913    insulin glargine (LANTUS) injection 20 Units, 20 Units, SubCUTAneous, QHS, Lewis Calderón MD    aspirin chewable tablet 81 mg, 81 mg, Oral, DAILY, Washington Villalobos MD, 81 mg at 12/24/22 0913    metoprolol (LOPRESSOR) injection 5 mg, 5 mg, IntraVENous, Q6H PRN, Nestor Quiñonez MD    Objective:     Vitals:    12/24/22 0810 12/24/22 1147 12/24/22 1152 12/24/22 1200   BP: 108/71  114/67    Pulse: (!) 140  74 69   Resp: 16  16    Temp: 98.9 °F (37.2 °C)  98.5 °F (36.9 °C)    SpO2: 98%  97%    Weight:       Height:  6' 0.01\" (1.829 m)          Intake and Output:  Current Shift: No intake/output data recorded.   Last three shifts: No intake/output data recorded. Physical Exam:   Physical Exam  Constitutional:       Appearance: He is ill-appearing. HENT:      Mouth/Throat:      Mouth: Mucous membranes are dry. Cardiovascular:      Rate and Rhythm: Normal rate. Heart sounds: Normal heart sounds. Pulmonary:      Breath sounds: Normal breath sounds. Abdominal:      Palpations: Abdomen is soft. Skin:     General: Skin is warm. Neurological:      General: No focal deficit present. Lab/Data Review:  Recent Results (from the past 24 hour(s))   CBC WITH AUTOMATED DIFF    Collection Time: 12/23/22  5:44 PM   Result Value Ref Range    WBC 12.8 (H) 4.1 - 11.1 K/uL    RBC 3.92 (L) 4.10 - 5.70 M/uL    HGB 12.3 12.1 - 17.0 g/dL    HCT 37.6 36.6 - 50.3 %    MCV 95.9 80.0 - 99.0 FL    MCH 31.4 26.0 - 34.0 PG    MCHC 32.7 30.0 - 36.5 g/dL    RDW 13.2 11.5 - 14.5 %    PLATELET 898 774 - 101 K/uL    MPV 11.3 8.9 - 12.9 FL    NRBC 0.0 0.0  WBC    ABSOLUTE NRBC 0.00 0.00 - 0.01 K/uL    NEUTROPHILS 88 (H) 32 - 75 %    LYMPHOCYTES 5 (L) 12 - 49 %    MONOCYTES 7 5 - 13 %    EOSINOPHILS 0 0 - 7 %    BASOPHILS 0 0 - 1 %    IMMATURE GRANULOCYTES 0 0 - 0.5 %    ABS. NEUTROPHILS 11.3 (H) 1.8 - 8.0 K/UL    ABS. LYMPHOCYTES 0.6 (L) 0.8 - 3.5 K/UL    ABS. MONOCYTES 0.8 0.0 - 1.0 K/UL    ABS. EOSINOPHILS 0.0 0.0 - 0.4 K/UL    ABS. BASOPHILS 0.0 0.0 - 0.1 K/UL    ABS. IMM.  GRANS. 0.0 0.00 - 0.04 K/UL    DF AUTOMATED     GLUCOSE, POC    Collection Time: 12/23/22  7:42 PM   Result Value Ref Range    Glucose (POC) 113 (H) 65 - 100 mg/dL    Performed by [id number], COMPREHENSIVE    Collection Time: 12/24/22  7:16 AM   Result Value Ref Range    Sodium 139 136 - 145 mmol/L    Potassium 3.9 3.5 - 5.1 mmol/L    Chloride 109 (H) 97 - 108 mmol/L    CO2 22 21 - 32 mmol/L    Anion gap 8 5 - 15 mmol/L    Glucose 153 (H) 65 - 100 mg/dL    BUN 27 (H) 6 - 20 mg/dL    Creatinine 1.04 0.70 - 1.30 mg/dL    BUN/Creatinine ratio 26 (H) 12 - 20      eGFR >60 >60 ml/min/1.73m2    Calcium 9.1 8.5 - 10.1 mg/dL    Bilirubin, total 1.7 (H) 0.2 - 1.0 mg/dL    AST (SGOT) 29 15 - 37 U/L    ALT (SGPT) 31 12 - 78 U/L    Alk. phosphatase 84 45 - 117 U/L    Protein, total 7.6 6.4 - 8.2 g/dL    Albumin 3.4 (L) 3.5 - 5.0 g/dL    Globulin 4.2 (H) 2.0 - 4.0 g/dL    A-G Ratio 0.8 (L) 1.1 - 2.2     CBC WITH AUTOMATED DIFF    Collection Time: 12/24/22  7:16 AM   Result Value Ref Range    WBC 15.9 (H) 4.1 - 11.1 K/uL    RBC 3.83 (L) 4.10 - 5.70 M/uL    HGB 12.1 12.1 - 17.0 g/dL    HCT 36.5 (L) 36.6 - 50.3 %    MCV 95.3 80.0 - 99.0 FL    MCH 31.6 26.0 - 34.0 PG    MCHC 33.2 30.0 - 36.5 g/dL    RDW 13.2 11.5 - 14.5 %    PLATELET 456 770 - 608 K/uL    MPV 11.2 8.9 - 12.9 FL    NRBC 0.0 0.0  WBC    ABSOLUTE NRBC 0.00 0.00 - 0.01 K/uL    NEUTROPHILS 89 (H) 32 - 75 %    LYMPHOCYTES 5 (L) 12 - 49 %    MONOCYTES 6 5 - 13 %    EOSINOPHILS 0 0 - 7 %    BASOPHILS 0 0 - 1 %    IMMATURE GRANULOCYTES 0 0 - 0.5 %    ABS. NEUTROPHILS 14.0 (H) 1.8 - 8.0 K/UL    ABS. LYMPHOCYTES 0.8 0.8 - 3.5 K/UL    ABS. MONOCYTES 1.0 0.0 - 1.0 K/UL    ABS. EOSINOPHILS 0.0 0.0 - 0.4 K/UL    ABS. BASOPHILS 0.0 0.0 - 0.1 K/UL    ABS. IMM. GRANS. 0.0 0.00 - 0.04 K/UL    DF AUTOMATED     GLUCOSE, POC    Collection Time: 12/24/22  8:16 AM   Result Value Ref Range    Glucose (POC) 164 (H) 65 - 100 mg/dL    Performed by Chantal Salmeron, POC    Collection Time: 12/24/22 11:55 AM   Result Value Ref Range    Glucose (POC) 161 (H) 65 - 100 mg/dL    Performed by Moisés Aj         CT NECK CHEST WO CONT   Final Result   1. Status post total laryngectomy and tracheostomy creation. No definite   tracheoesophageal fistula on this Limited exam. Nasopharyngeal reflux of   contrast material and fluid/debris. Consider a dedicated fluoroscopic swallow   study with water-soluble contrast for more sensitive evaluation for subtle   fistula.    2.  Emphysema with scattered pleural parenchymal scarring and calcified   granulomas. Several calcified right hilar lymph nodes. XR CHEST PORT   Final Result      No acute findings. Assessment:     Active Problems:    Dysphagia (12/21/2022)      s/p  tracheostomy,  No Fistula or fissure, noted on CT,  Large meat bolus impacted the upper esophagus opening in the laryngeal area  No mechanical obstriction,  muscular/neurological ?  S/p peg placement  Plan:   Continue on tube feeding  Speech follow   Aspiration precautions  I will follow as needed.           Signed By: Yoseph Lawson MD     December 24, 2022        Thank you for allowing me to participate in this patients care  Cc Referring Physician   Al Arenas MD

## 2022-12-24 NOTE — CONSULTS
Comprehensive Nutrition Assessment    Type and Reason for Visit: Initial, Consult (TF)    Nutrition Recommendations/Plan:   NPO  Initiate TF via PEG of Jevity 1.5, bolus at 240ml 5x/d   Flush with 200ml water after each feed        Provides  1800 kcals (97%), 77 g pro (104%), and 1912 ml fluids (100%)   Please document TF initiation, rate, flushes, prosource provision, and tolerance     Malnutrition Assessment:  Malnutrition Status:  Severe malnutrition (12/24/22 1612)    Context:  Acute illness     Findings of the 6 clinical characteristics of malnutrition:   Energy Intake:  50% or less of est energy requirements for 5 or more days  Weight Loss:  Greater than 7.5% over 3 months     Body Fat Loss:  No significant body fat loss,     Muscle Mass Loss: Moderate muscle mass loss, Scapula (trapezius), Thigh (quadriceps), Temples (temporalis), Clavicles (pectoralis & deltoids), Calf  Fluid Accumulation:  No significant fluid accumulation,        Nutrition Assessment:    Admitted for dysphagia and PEG tube placement, completed 12/23. RD now consulted for TF recs. Inable to get much reports from pt d/t malfunction from assistive voice machine. Family in room provided much of hx, endorsed she noticed pt unable to eat a few days pta, however unsure how long it was going on overall. Severe wt loss noted from UBW to current RD obtained bed wt. TF recs above, also educated family. Labs grossly unremarkable. Meds: IVF, lantus, MVI +fe. Nutrition Related Findings:    NFPE finding mod-severe wasting. PEG in place. Deny previous hx dysphagia. No N/V/D/C, last BM pta. No edema. Wound Type: Surgical incision (abd, PEG)      Current Nutrition Intake & Therapies:  Average Meal Intake: NPO  Average Supplement Intake: NPO  DIET NPO  ADULT TUBE FEEDING PEG; Standard with Fiber; Delivery Method: Bolus; Bolus Frequency: 5 Times Daily; Bolus Volume (mL): 240; Bolus Method of Infusion: Syringe Push; Water Flush Volume (mL): 200;  Water Flush Frequency: After bolus    Anthropometric Measures:  Height: 6' 0.01\" (182.9 cm)  Ideal Body Weight (IBW): 178 lbs (81 kg)     Current Body Wt:  50.3 kg (110 lb 14.3 oz) (12/24, RD obtained bedwt), 62.3 % IBW. Not specified  Current BMI (kg/m2): 15  Usual Body Weight: 63.5 kg (140 lb) (per EMR review)  % Weight Change (Calculated): -20.8  Weight Adjustment: No adjustment                 BMI Category: Underweight (BMI less than 22) age over 72  Wt Readings from Last 10 Encounters:   12/24/22 50.3 kg (110 lb 14.3 oz)   08/23/22 61.2 kg (135 lb)   08/14/22 61.2 kg (135 lb)   07/27/22 61.2 kg (135 lb)   05/31/22 61.2 kg (135 lb)   05/04/22 63.5 kg (139 lb 15.9 oz)   05/02/22 63.5 kg (139 lb 15.9 oz)   01/13/22 63.5 kg (140 lb)   07/16/21 63.5 kg (140 lb)   09/16/16 68.9 kg (152 lb)   Wt loss of 5# x1 year, not significant. Estimated Daily Nutrient Needs:  Energy Requirements Based On: Formula  Weight Used for Energy Requirements: Usual  Energy (kcal/day): 1856-1989kcals (MSJ x1.4-1. 5AF)  Weight Used for Protein Requirements: Usual  Protein (g/day): 73g (1.2g/kg)     Fluid (ml/day): 1856-1989ml    Nutrition Diagnosis:   Severe malnutrition, In context of acute illness or injury related to swallowing difficulty, altered GI structure as evidenced by Criteria as identified in malnutrition assessment    Nutrition Interventions:   Food and/or Nutrient Delivery: Continue NPO, Start tube feeding  Nutrition Education/Counseling: Education completed (home TF instructions)  Coordination of Nutrition Care: Continue to monitor while inpatient, Swallow evaluation  Plan of Care discussed with: pt, rn    Goals:     Goals: Initiate nutrition support, Tolerate nutrition support at goal rate, within 2 days       Nutrition Monitoring and Evaluation:   Behavioral-Environmental Outcomes: Knowledge or skill (Home TF)  Food/Nutrient Intake Outcomes: Enteral nutrition intake/tolerance  Physical Signs/Symptoms Outcomes: Weight, Nausea/vomiting    Discharge Planning:    Enteral nutrition    Skye Umaña  Contact: Ext [phone], or via [employer]

## 2022-12-25 ENCOUNTER — APPOINTMENT (OUTPATIENT)
Dept: GENERAL RADIOLOGY | Age: 87
End: 2022-12-25
Attending: INTERNAL MEDICINE
Payer: MEDICARE

## 2022-12-25 LAB
ALBUMIN SERPL-MCNC: 2.8 G/DL (ref 3.5–5)
ALBUMIN SERPL-MCNC: 2.9 G/DL (ref 3.5–5)
ALBUMIN/GLOB SERPL: 0.6 (ref 1.1–2.2)
ALBUMIN/GLOB SERPL: 0.7 (ref 1.1–2.2)
ALP SERPL-CCNC: 119 U/L (ref 45–117)
ALP SERPL-CCNC: 123 U/L (ref 45–117)
ALT SERPL-CCNC: 29 U/L (ref 12–78)
ALT SERPL-CCNC: 33 U/L (ref 12–78)
ANION GAP SERPL CALC-SCNC: 5 MMOL/L (ref 5–15)
ANION GAP SERPL CALC-SCNC: 6 MMOL/L (ref 5–15)
ARTERIAL PATENCY WRIST A: YES
AST SERPL W P-5'-P-CCNC: 25 U/L (ref 15–37)
AST SERPL W P-5'-P-CCNC: 31 U/L (ref 15–37)
BASE DEFICIT BLDA-SCNC: 1.2 MMOL/L
BASOPHILS # BLD: 0 K/UL (ref 0–0.1)
BASOPHILS # BLD: 0 K/UL (ref 0–0.1)
BASOPHILS NFR BLD: 0 % (ref 0–1)
BASOPHILS NFR BLD: 0 % (ref 0–1)
BDY SITE: ABNORMAL
BILIRUB SERPL-MCNC: 0.9 MG/DL (ref 0.2–1)
BILIRUB SERPL-MCNC: 1 MG/DL (ref 0.2–1)
BNP SERPL-MCNC: 750 PG/ML
BODY TEMPERATURE: 98
BUN SERPL-MCNC: 22 MG/DL (ref 6–20)
BUN SERPL-MCNC: 25 MG/DL (ref 6–20)
BUN/CREAT SERPL: 21 (ref 12–20)
BUN/CREAT SERPL: 24 (ref 12–20)
CA-I BLD-MCNC: 8.8 MG/DL (ref 8.5–10.1)
CA-I BLD-MCNC: 9 MG/DL (ref 8.5–10.1)
CHLORIDE SERPL-SCNC: 103 MMOL/L (ref 97–108)
CHLORIDE SERPL-SCNC: 105 MMOL/L (ref 97–108)
CK SERPL-CCNC: 210 U/L (ref 39–308)
CO2 SERPL-SCNC: 26 MMOL/L (ref 21–32)
CO2 SERPL-SCNC: 27 MMOL/L (ref 21–32)
COHGB MFR BLD: 0.9 % (ref 1–2)
CREAT SERPL-MCNC: 1.04 MG/DL (ref 0.7–1.3)
CREAT SERPL-MCNC: 1.05 MG/DL (ref 0.7–1.3)
DIFFERENTIAL METHOD BLD: ABNORMAL
DIFFERENTIAL METHOD BLD: ABNORMAL
EOSINOPHIL # BLD: 0 K/UL (ref 0–0.4)
EOSINOPHIL # BLD: 0 K/UL (ref 0–0.4)
EOSINOPHIL NFR BLD: 0 % (ref 0–7)
EOSINOPHIL NFR BLD: 0 % (ref 0–7)
ERYTHROCYTE [DISTWIDTH] IN BLOOD BY AUTOMATED COUNT: 13 % (ref 11.5–14.5)
ERYTHROCYTE [DISTWIDTH] IN BLOOD BY AUTOMATED COUNT: 13 % (ref 11.5–14.5)
FIO2 ON VENT: 50 %
GLOBULIN SER CALC-MCNC: 4.3 G/DL (ref 2–4)
GLOBULIN SER CALC-MCNC: 4.4 G/DL (ref 2–4)
GLUCOSE BLD STRIP.AUTO-MCNC: 202 MG/DL (ref 65–100)
GLUCOSE BLD STRIP.AUTO-MCNC: 256 MG/DL (ref 65–100)
GLUCOSE BLD STRIP.AUTO-MCNC: 256 MG/DL (ref 65–100)
GLUCOSE BLD STRIP.AUTO-MCNC: 309 MG/DL (ref 65–100)
GLUCOSE SERPL-MCNC: 281 MG/DL (ref 65–100)
GLUCOSE SERPL-MCNC: 327 MG/DL (ref 65–100)
HCO3 BLDA-SCNC: 26 MMOL/L (ref 22–26)
HCT VFR BLD AUTO: 34.3 % (ref 36.6–50.3)
HCT VFR BLD AUTO: 35.4 % (ref 36.6–50.3)
HGB BLD-MCNC: 11.4 G/DL (ref 12.1–17)
HGB BLD-MCNC: 11.7 G/DL (ref 12.1–17)
IMM GRANULOCYTES # BLD AUTO: 0 K/UL (ref 0–0.04)
IMM GRANULOCYTES # BLD AUTO: 0.1 K/UL (ref 0–0.04)
IMM GRANULOCYTES NFR BLD AUTO: 0 % (ref 0–0.5)
IMM GRANULOCYTES NFR BLD AUTO: 1 % (ref 0–0.5)
LYMPHOCYTES # BLD: 0.5 K/UL (ref 0.8–3.5)
LYMPHOCYTES # BLD: 1.2 K/UL (ref 0.8–3.5)
LYMPHOCYTES NFR BLD: 11 % (ref 12–49)
LYMPHOCYTES NFR BLD: 4 % (ref 12–49)
MCH RBC QN AUTO: 31 PG (ref 26–34)
MCH RBC QN AUTO: 31.1 PG (ref 26–34)
MCHC RBC AUTO-ENTMCNC: 33.1 G/DL (ref 30–36.5)
MCHC RBC AUTO-ENTMCNC: 33.2 G/DL (ref 30–36.5)
MCV RBC AUTO: 93.7 FL (ref 80–99)
MCV RBC AUTO: 93.7 FL (ref 80–99)
METHGB MFR BLD: 0.3 % (ref 0–1.4)
MONOCYTES # BLD: 0.9 K/UL (ref 0–1)
MONOCYTES # BLD: 0.9 K/UL (ref 0–1)
MONOCYTES NFR BLD: 7 % (ref 5–13)
MONOCYTES NFR BLD: 8 % (ref 5–13)
NEUTS SEG # BLD: 10.7 K/UL (ref 1.8–8)
NEUTS SEG # BLD: 8.5 K/UL (ref 1.8–8)
NEUTS SEG NFR BLD: 81 % (ref 32–75)
NEUTS SEG NFR BLD: 88 % (ref 32–75)
NRBC # BLD: 0 K/UL (ref 0–0.01)
NRBC # BLD: 0 K/UL (ref 0–0.01)
NRBC BLD-RTO: 0 PER 100 WBC
NRBC BLD-RTO: 0 PER 100 WBC
OXYHGB MFR BLD: 97.8 % (ref 95–99)
PCO2 BLDA: 53 MMHG (ref 35–45)
PERFORMED BY, TECHID: ABNORMAL
PH BLDA: 7.31 (ref 7.35–7.45)
PLATELET # BLD AUTO: 153 K/UL (ref 150–400)
PLATELET # BLD AUTO: 157 K/UL (ref 150–400)
PMV BLD AUTO: 11 FL (ref 8.9–12.9)
PMV BLD AUTO: 11.5 FL (ref 8.9–12.9)
PO2 BLDA: 231 MMHG (ref 80–100)
POTASSIUM SERPL-SCNC: 4.1 MMOL/L (ref 3.5–5.1)
POTASSIUM SERPL-SCNC: 4.4 MMOL/L (ref 3.5–5.1)
PROCALCITONIN SERPL-MCNC: 1.76 NG/ML
PROT SERPL-MCNC: 7.2 G/DL (ref 6.4–8.2)
PROT SERPL-MCNC: 7.2 G/DL (ref 6.4–8.2)
RBC # BLD AUTO: 3.66 M/UL (ref 4.1–5.7)
RBC # BLD AUTO: 3.78 M/UL (ref 4.1–5.7)
SAO2 % BLD: 99 % (ref 95–99)
SAO2% DEVICE SAO2% SENSOR NAME: ABNORMAL
SODIUM SERPL-SCNC: 136 MMOL/L (ref 136–145)
SODIUM SERPL-SCNC: 136 MMOL/L (ref 136–145)
SPECIMEN SITE: ABNORMAL
TROPONIN-HIGH SENSITIVITY: 53 NG/L (ref 0–76)
WBC # BLD AUTO: 10.6 K/UL (ref 4.1–11.1)
WBC # BLD AUTO: 12.2 K/UL (ref 4.1–11.1)

## 2022-12-25 PROCEDURE — 74011000250 HC RX REV CODE- 250: Performed by: INTERNAL MEDICINE

## 2022-12-25 PROCEDURE — 82550 ASSAY OF CK (CPK): CPT

## 2022-12-25 PROCEDURE — 84484 ASSAY OF TROPONIN QUANT: CPT

## 2022-12-25 PROCEDURE — 74011250637 HC RX REV CODE- 250/637: Performed by: INTERNAL MEDICINE

## 2022-12-25 PROCEDURE — 84145 PROCALCITONIN (PCT): CPT

## 2022-12-25 PROCEDURE — 83036 HEMOGLOBIN GLYCOSYLATED A1C: CPT

## 2022-12-25 PROCEDURE — 82962 GLUCOSE BLOOD TEST: CPT

## 2022-12-25 PROCEDURE — 85025 COMPLETE CBC W/AUTO DIFF WBC: CPT

## 2022-12-25 PROCEDURE — 36600 WITHDRAWAL OF ARTERIAL BLOOD: CPT

## 2022-12-25 PROCEDURE — 65610000006 HC RM INTENSIVE CARE

## 2022-12-25 PROCEDURE — 83880 ASSAY OF NATRIURETIC PEPTIDE: CPT

## 2022-12-25 PROCEDURE — 80053 COMPREHEN METABOLIC PANEL: CPT

## 2022-12-25 PROCEDURE — 71045 X-RAY EXAM CHEST 1 VIEW: CPT

## 2022-12-25 PROCEDURE — 36415 COLL VENOUS BLD VENIPUNCTURE: CPT

## 2022-12-25 PROCEDURE — 82803 BLOOD GASES ANY COMBINATION: CPT

## 2022-12-25 RX ORDER — IPRATROPIUM BROMIDE AND ALBUTEROL SULFATE 2.5; .5 MG/3ML; MG/3ML
3 SOLUTION RESPIRATORY (INHALATION)
Status: DISCONTINUED | OUTPATIENT
Start: 2022-12-25 | End: 2022-12-26

## 2022-12-25 RX ORDER — INSULIN GLARGINE 100 [IU]/ML
23 INJECTION, SOLUTION SUBCUTANEOUS
Status: DISCONTINUED | OUTPATIENT
Start: 2022-12-25 | End: 2022-12-30

## 2022-12-25 RX ORDER — TRIPROLIDINE/PSEUDOEPHEDRINE 2.5MG-60MG
400 TABLET ORAL EVERY 6 HOURS
Status: COMPLETED | OUTPATIENT
Start: 2022-12-25 | End: 2022-12-27

## 2022-12-25 RX ORDER — TRIPROLIDINE/PSEUDOEPHEDRINE 2.5MG-60MG
400 TABLET ORAL
Status: DISCONTINUED | OUTPATIENT
Start: 2022-12-25 | End: 2022-12-25

## 2022-12-25 RX ADMIN — IBUPROFEN 400 MG: 100 SUSPENSION ORAL at 17:02

## 2022-12-25 RX ADMIN — SODIUM CHLORIDE 100 ML/HR: 4.5 INJECTION, SOLUTION INTRAVENOUS at 15:53

## 2022-12-25 RX ADMIN — DILTIAZEM HYDROCHLORIDE 30 MG: 30 TABLET, FILM COATED ORAL at 12:37

## 2022-12-25 RX ADMIN — DILTIAZEM HYDROCHLORIDE 30 MG: 30 TABLET, FILM COATED ORAL at 16:54

## 2022-12-25 RX ADMIN — SODIUM CHLORIDE 100 ML/HR: 4.5 INJECTION, SOLUTION INTRAVENOUS at 05:02

## 2022-12-25 RX ADMIN — Medication 1 TABLET: at 09:41

## 2022-12-25 RX ADMIN — ALOGLIPTIN 12.5 MG: 12.5 TABLET, FILM COATED ORAL at 09:41

## 2022-12-25 RX ADMIN — DILTIAZEM HYDROCHLORIDE 30 MG: 30 TABLET, FILM COATED ORAL at 09:41

## 2022-12-25 RX ADMIN — CLOPIDOGREL BISULFATE 75 MG: 75 TABLET ORAL at 09:41

## 2022-12-25 RX ADMIN — ASPIRIN 81 MG CHEWABLE TABLET 81 MG: 81 TABLET CHEWABLE at 09:41

## 2022-12-25 NOTE — PROGRESS NOTES
Progress Note  Date:2022       Room:[facility]  Patient Name:Beltran Walden     YOB: 1935     Age:87 y.o. Subjective    Subjective   Covering for   Chart reviewed and patient examined  Tolerated PEG feeling well no nausea vomiting or diarrhoea  Left wrist hand pain and swelling, as per nurses patient had  a recent IV access left hand  No history of trauma or gout  Claimed feeling better denies any significant pain shortness of breath chest pain  Hemodynamically stable  Episode of confusion agitation last night better today ( as per nurses)  Available lab and test reviewed  Hyperglycemia with blood sugar around 280  Cardiology help appreciated    Review of Systems    Unremarkable than mentioned in subjective  Objective         Vitals Last 24 Hours:  TEMPERATURE:  Temp  Av.4 °F (36.9 °C)  Min: 97.8 °F (36.6 °C)  Max: 99.6 °F (37.6 °C)  RESPIRATIONS RANGE: Resp  Av.2  Min: 18  Max: 22  PULSE OXIMETRY RANGE: SpO2  Av.5 %  Min: 94 %  Max: 98 %  PULSE RANGE: Pulse  Av.3  Min: 68  Max: 100  BLOOD PRESSURE RANGE: Systolic (76XSC), JKU:045 , Min:106 , MMV:071   ; Diastolic (08TBA), NLB:97, Min:65, Max:88      I/O (24Hr):     Intake/Output Summary (Last 24 hours) at 2022 1509  Last data filed at 2022 1237  Gross per 24 hour   Intake 2670 ml   Output 800 ml   Net 1870 ml         Current Facility-Administered Medications:     insulin glargine (LANTUS) injection 23 Units, 23 Units, SubCUTAneous, QHS, Lewis Connors MD    ibuprofen (ADVIL;MOTRIN) 100 mg/5 mL oral suspension 400 mg, 400 mg, Per G Tube, Q6H PRN, Monserrat Mcintosh MD    dilTIAZem IR (CARDIZEM) tablet 30 mg, 30 mg, Oral, TIDAC, Loren Fofana MD, 30 mg at 22 1237    0.45% sodium chloride infusion, 100 mL/hr, IntraVENous, CONTINUOUS, Nati SCOTT MD, Last Rate: 100 mL/hr at 22 0502, 100 mL/hr at 22    atorvastatin (LIPITOR) tablet 10 mg, 10 mg, Oral, QHS, Monserrat Mcintosh MD, 10 mg at 12/24/22 2137    clopidogreL (PLAVIX) tablet 75 mg, 75 mg, Oral, DAILY, Elicia Raymundo MD, 75 mg at 12/25/22 0941    multivitamin, tx-iron-ca-min (THERA-M w/ IRON) tablet 1 Tablet, 1 Tablet, Oral, DAILY, Elicia Raymundo MD, 1 Tablet at 12/25/22 0941    alogliptin (NESINA) tablet 12.5 mg, 12.5 mg, Oral, DAILY, Elicia Raymundo MD, 12.5 mg at 12/25/22 0941    aspirin chewable tablet 81 mg, 81 mg, Oral, DAILY, Elicia Raymundo MD, 81 mg at 12/25/22 0941    metoprolol (LOPRESSOR) injection 5 mg, 5 mg, IntraVENous, Q6H PRN, Elissa HOBSON MD     Objective:  Vital signs: (most recent): Blood pressure (!) 143/76, pulse 72, temperature 98.7 °F (37.1 °C), resp. rate 22, height 6' 0.01\" (1.829 m), weight 51.4 kg (113 lb 5.1 oz), SpO2 95 %. General exam patient awake alert laying comfortably in bed complaining of left wrist and hand swelling pain  HEENT no paler icterus  Neck   supple both carotid valve unable to appreciate carotid bruit/lymphadenopathy. Status post tracheostomy  RS Shallow breathing no rhonchi or rales  Abdomen status post PEG tube placement compressive dressing in place  Soft with tenderness at the PEG tube site  Extremity left hand and is wrist swelling warmth and markedly tender possible acute exacerbation of arthritis  CNS awake alert no focal neurodeficit    Labs/Imaging/Diagnostics    Labs:  CBC:  Recent Labs     12/25/22  0937 12/24/22  0716 12/23/22  1744   WBC 10.6 15.9* 12.8*   RBC 3.78* 3.83* 3.92*   HGB 11.7* 12.1 12.3   HCT 35.4* 36.5* 37.6   MCV 93.7 95.3 95.9   RDW 13.0 13.2 13.2    154 152       CHEMISTRIES:  Recent Labs     12/25/22  0937 12/24/22  0716    139   K 4.1 3.9    109*   CO2 26 22   BUN 25* 27*   CREA 1.05 1.04   CA 8.8 9.1     PT/INR:No results for input(s): INR, INREXT, INREXT in the last 72 hours. No lab exists for component: PROTIME  APTT:No results for input(s): APTT in the last 72 hours.   LIVER PROFILE:  Recent Labs     12/25/22  525 12/24/22  0716   AST 25 29   ALT 29 31       Lab Results   Component Value Date/Time    ALT (SGPT) 29 12/25/2022 09:37 AM    AST (SGOT) 25 12/25/2022 09:37 AM    Alk. phosphatase 119 (H) 12/25/2022 09:37 AM    Bilirubin, total 1.0 12/25/2022 09:37 AM       Imaging Last 24 Hours:  No results found. Assessment//Plan           Patient Active Problem List    Diagnosis Date Noted    Severe protein-energy malnutrition ([address] 12/24/2022    Dysphagia 12/21/2022    Loss of consciousness ([address] 05/04/2022    Generalized weakness 05/04/2022    Seizure ([address] 05/02/2022    Encounter for long-term (current) use of insulin ([address] 06/19/2016    Mixed hyperlipidemia 06/19/2016    Single kidney     Choledocholithiasis 12/18/2012    Diabetes mellitus ([address] 12/18/2012    Hypertension 12/18/2012    Arthritis 12/18/2012     Assessment & Plan      IMPRESSION:    Wrist swelling painful and warm possible acute OA  v/s thrombophlebitis from the IV access  Diabetes with hyperglycemia  Impacted food bolus in esophagus status post  removal of impacted food bolus and PEG tube placement postoperative discomfort/Bleeding at PEG tube site    1. Dysphagia,From  impacted food bolus in esophagus improved    2. Wide-complex tachycardia arrhythmia currently on diazepam infusion getting switched to p.o.    3.  Hypertension     4. History of diabetes, hypertension, osteoarthritis. 5.  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    6.  History of arrhythmia, status post pacemaker placement, details unknown. 7.  History of nephrectomy and single kidney.     Plan    Warm compresses to left wrist and hand ( D/w nurses)  Motrin 400 mg via PEG tube 3 times a day for 2 days  Continue n.p.o. for now  Titration of long-acting insulin for better control of sugar  Continue current IV fluid  Close monitoring for hypoglycemia/bleeding at bedside  Initial consult regarding discharge   Awaiting discharge planning assistance      Discussed with the nurse /case management ( yesterday)    Time spent more than 30 minutes    Electronically signed by Loli Ken MD on 12/25/2022 at 12:58 PM

## 2022-12-25 NOTE — PROGRESS NOTES
Patient at end of bed, not responding to name. Rapid response called. Oxygen saturation 83% on room air. Rapid response called. Patient placed on 15L. Oxygen saturation back up to 95%, RT, ICU nurse, supervisor, and Dr. Laney Masters in room. Orders received to transfer to ICU. Dr. Claude Randy notified. Bedside report given to Elvi Jay RN in icu. Family notified of transfer.

## 2022-12-25 NOTE — PROGRESS NOTES
Progress Note      12/25/2022 10:51 AM  NAME: Dio Lazcano   MRN:  [id number]   Admit Diagnosis: Dysphagia [R13.10]      Problem List:   -Admitted to the hospital with shortness of breath  -History of throat cancer status post tracheostomy  -Wide-complex tachycardia  -Status post pacemaker insertion  -Hypertension  -Dyslipidemia  -Former smoker  -NIDDM  -COPD  -History of seizure disorder  -Renal disease status post nephrectomy     Assessment/Plan:   Dated 12/25/2022 as a follow-up from cardiology team  -Patient is lying comfortably in the bed and per nonverbal communication denies any symptoms  -Initial cardiology consult was invited secondary to shortness of breath and multiple coronary artery disease risk factors.  -Cardiac enzyme has been unremarkable during this admission.  -There is no interval change since patient is admitted to the hospital other than improved in breathing.  -Continue current conservative medical management with no further cardiovascular testing as it would not change my cardiac management.  -Our team will continue to follow while he is in the hospital along with the team         []       High complexity decision making was performed in this patient at high risk for decompensation with multiple organ involvement. Subjective:     80-year old -American male admitted to the hospital with shortness of breath. By reviewing the chart patient has no known established coronary artery disease and is status post permanent pacemaker insertion somewhere else the details of which is not available to us. I am following him as a cardiology team member as he is a still in the hospital. There is no interval change other than he has improved his breathing and otherwise clinically and hemodynamically stable lying comfortably in the bed.  Based on my overall cardiac assessment no further cardiovascular testing is warranted and we will continue current conservative medical management. Review of Systems:    Symptom Y/N Comments  Symptom Y/N Comments   Fever/Chills N   Chest Pain N    Poor Appetite N   Edema N    Cough N   Abdominal Pain N    Sputum N   Joint Pain N    SOB/LAMB N   Pruritis/Rash N    Nausea/vomit N   Tolerating PT/OT Y    Diarrhea N   Tolerating Diet Y    Constipation N   Other       Could NOT obtain due to:      Objective:      Physical Exam:    Last 24hrs VS reviewed since prior progress note. Most recent are:    Visit Vitals  BP (!) 149/80 (BP 1 Location: Right upper arm)   Pulse 91   Temp 98.5 °F (36.9 °C)   Resp 18   Ht 6' 0.01\" (1.829 m)   Wt 51.4 kg (113 lb 5.1 oz)   SpO2 97%   BMI 15.37 kg/m²       Intake/Output Summary (Last 24 hours) at 12/25/2022 1051  Last data filed at 12/25/2022 0470  Gross per 24 hour   Intake 4973 ml   Output 800 ml   Net 4173 ml        General Appearance: Well developed, well nourished, alert & oriented x 3,    no acute distress. Ears/Nose/Mouth/Throat: Hearing grossly normal.  Neck: Supple. Chest: Lungs clear to auscultation bilaterally. Cardiovascular: Regular rate and rhythm, S1S2 normal, no murmur. Abdomen: Soft, non-tender, bowel sounds are active. Extremities: No edema bilaterally. Skin: Warm and dry. []         Post-cath site without hematoma, bruit, tenderness, or thrill. Distal pulses intact. PMH/SH reviewed - no change compared to H&P    Data Review    Telemetry: normal sinus rhythm     EKG:   []  No new EKG for review  CT NECK CHEST WO CONT   Final Result   1. Status post total laryngectomy and tracheostomy creation. No definite   tracheoesophageal fistula on this Limited exam. Nasopharyngeal reflux of   contrast material and fluid/debris. Consider a dedicated fluoroscopic swallow   study with water-soluble contrast for more sensitive evaluation for subtle   fistula. 2.  Emphysema with scattered pleural parenchymal scarring and calcified   granulomas. Several calcified right hilar lymph nodes.             XR CHEST PORT   Final Result      No acute findings. Lab Data Personally Reviewed:    Recent Labs     12/24/22  0716 12/23/22  1744   WBC 15.9* 12.8*   HGB 12.1 12.3   HCT 36.5* 37.6    152     No results for input(s): INR, PTP, APTT, INREXT in the last 72 hours. Recent Labs     12/24/22  0716      K 3.9   *   CO2 22   BUN 27*   CREA 1.04   *   CA 9.1     No results for input(s): CPK, CKNDX, TROIQ in the last 72 hours. No lab exists for component: CPKMB  Lab Results   Component Value Date/Time    Cholesterol, total 126 09/09/2016 10:38 AM    HDL Cholesterol 70 09/09/2016 10:38 AM    LDL, calculated 45 09/09/2016 10:38 AM    Triglyceride 56 09/09/2016 10:38 AM       Recent Labs     12/24/22  0716   AP 84   TP 7.6   ALB 3.4*   GLOB 4.2*     No results for input(s): PH, PCO2, PO2 in the last 72 hours.     Medications Personally Reviewed:    Current Facility-Administered Medications   Medication Dose Route Frequency    dilTIAZem IR (CARDIZEM) tablet 30 mg  30 mg Oral TIDAC    0.45% sodium chloride infusion  100 mL/hr IntraVENous CONTINUOUS    atorvastatin (LIPITOR) tablet 10 mg  10 mg Oral QHS    clopidogreL (PLAVIX) tablet 75 mg  75 mg Oral DAILY    multivitamin, tx-iron-ca-min (THERA-M w/ IRON) tablet 1 Tablet  1 Tablet Oral DAILY    alogliptin (NESINA) tablet 12.5 mg  12.5 mg Oral DAILY    insulin glargine (LANTUS) injection 20 Units  20 Units SubCUTAneous QHS    aspirin chewable tablet 81 mg  81 mg Oral DAILY    metoprolol (LOPRESSOR) injection 5 mg  5 mg IntraVENous Q6H PRN         Oral Hays MD

## 2022-12-25 NOTE — PROGRESS NOTES
Problem: Pressure Injury - Risk of  Goal: *Prevention of pressure injury  Description: Document Jabier Scale and appropriate interventions in the flowsheet. Outcome: Progressing Towards Goal  Note: Pressure Injury Interventions:  Sensory Interventions: Assess changes in LOC, Assess need for specialty bed, Keep linens dry and wrinkle-free, Maintain/enhance activity level, Minimize linen layers, Monitor skin under medical devices    Moisture Interventions: Absorbent underpads, Internal/External urinary devices, Limit adult briefs, Maintain skin hydration (lotion/cream), Minimize layers, Offer toileting Q_hr, Moisture barrier    Activity Interventions: Assess need for specialty bed, PT/OT evaluation    Mobility Interventions: Assess need for specialty bed, HOB 30 degrees or less, PT/OT evaluation    Nutrition Interventions: Document food/fluid/supplement intake, Discuss nutritional consult with provider, Offer support with meals,snacks and hydration    Friction and Shear Interventions: HOB 30 degrees or less, Minimize layers                Problem: Falls - Risk of  Goal: *Absence of Falls  Description: Document Mikaela Fall Risk and appropriate interventions in the flowsheet.   Outcome: Progressing Towards Goal  Note: Fall Risk Interventions:

## 2022-12-26 LAB
ANION GAP SERPL CALC-SCNC: 7 MMOL/L (ref 5–15)
BUN SERPL-MCNC: 23 MG/DL (ref 6–20)
BUN/CREAT SERPL: 23 (ref 12–20)
CA-I BLD-MCNC: 9.5 MG/DL (ref 8.5–10.1)
CHLORIDE SERPL-SCNC: 105 MMOL/L (ref 97–108)
CO2 SERPL-SCNC: 25 MMOL/L (ref 21–32)
CREAT SERPL-MCNC: 1.02 MG/DL (ref 0.7–1.3)
ERYTHROCYTE [SEDIMENTATION RATE] IN BLOOD: 60 MM/HR (ref 0–20)
EST. AVERAGE GLUCOSE BLD GHB EST-MCNC: 171 MG/DL
GLUCOSE BLD STRIP.AUTO-MCNC: 245 MG/DL (ref 65–100)
GLUCOSE BLD STRIP.AUTO-MCNC: 247 MG/DL (ref 65–100)
GLUCOSE BLD STRIP.AUTO-MCNC: 253 MG/DL (ref 65–100)
GLUCOSE SERPL-MCNC: 242 MG/DL (ref 65–100)
HBA1C MFR BLD: 7.6 % (ref 4–5.6)
PERFORMED BY, TECHID: ABNORMAL
POTASSIUM SERPL-SCNC: 4.4 MMOL/L (ref 3.5–5.1)
SODIUM SERPL-SCNC: 137 MMOL/L (ref 136–145)
URATE SERPL-MCNC: 3.1 MG/DL (ref 3.5–7.2)

## 2022-12-26 PROCEDURE — 74011250637 HC RX REV CODE- 250/637: Performed by: INTERNAL MEDICINE

## 2022-12-26 PROCEDURE — 85652 RBC SED RATE AUTOMATED: CPT

## 2022-12-26 PROCEDURE — 84550 ASSAY OF BLOOD/URIC ACID: CPT

## 2022-12-26 PROCEDURE — 74011000250 HC RX REV CODE- 250: Performed by: INTERNAL MEDICINE

## 2022-12-26 PROCEDURE — 80048 BASIC METABOLIC PNL TOTAL CA: CPT

## 2022-12-26 PROCEDURE — 94640 AIRWAY INHALATION TREATMENT: CPT

## 2022-12-26 PROCEDURE — 82962 GLUCOSE BLOOD TEST: CPT

## 2022-12-26 PROCEDURE — 74011250636 HC RX REV CODE- 250/636: Performed by: INTERNAL MEDICINE

## 2022-12-26 PROCEDURE — 74011636637 HC RX REV CODE- 636/637: Performed by: INTERNAL MEDICINE

## 2022-12-26 PROCEDURE — 65610000006 HC RM INTENSIVE CARE

## 2022-12-26 PROCEDURE — 74011000258 HC RX REV CODE- 258: Performed by: INTERNAL MEDICINE

## 2022-12-26 PROCEDURE — 36415 COLL VENOUS BLD VENIPUNCTURE: CPT

## 2022-12-26 RX ORDER — LISINOPRIL 10 MG/1
10 TABLET ORAL DAILY
COMMUNITY
End: 2023-01-07

## 2022-12-26 RX ORDER — IPRATROPIUM BROMIDE AND ALBUTEROL SULFATE 2.5; .5 MG/3ML; MG/3ML
3 SOLUTION RESPIRATORY (INHALATION)
Status: DISCONTINUED | OUTPATIENT
Start: 2022-12-26 | End: 2022-12-28

## 2022-12-26 RX ORDER — LEVETIRACETAM 1000 MG/1
1250 TABLET ORAL 2 TIMES DAILY
COMMUNITY

## 2022-12-26 RX ADMIN — INSULIN GLARGINE 23 UNITS: 100 INJECTION, SOLUTION SUBCUTANEOUS at 22:15

## 2022-12-26 RX ADMIN — SODIUM CHLORIDE 100 ML/HR: 4.5 INJECTION, SOLUTION INTRAVENOUS at 20:33

## 2022-12-26 RX ADMIN — PIPERACILLIN AND TAZOBACTAM 4.5 G: 4; .5 INJECTION, POWDER, FOR SOLUTION INTRAVENOUS at 00:01

## 2022-12-26 RX ADMIN — IPRATROPIUM BROMIDE AND ALBUTEROL SULFATE 3 ML: 2.5; .5 SOLUTION RESPIRATORY (INHALATION) at 05:53

## 2022-12-26 RX ADMIN — ASPIRIN 81 MG CHEWABLE TABLET 81 MG: 81 TABLET CHEWABLE at 08:10

## 2022-12-26 RX ADMIN — ATORVASTATIN CALCIUM 10 MG: 10 TABLET, FILM COATED ORAL at 00:02

## 2022-12-26 RX ADMIN — ALOGLIPTIN 12.5 MG: 12.5 TABLET, FILM COATED ORAL at 08:10

## 2022-12-26 RX ADMIN — DILTIAZEM HYDROCHLORIDE 30 MG: 30 TABLET, FILM COATED ORAL at 11:22

## 2022-12-26 RX ADMIN — Medication 1 TABLET: at 08:10

## 2022-12-26 RX ADMIN — IBUPROFEN 400 MG: 100 SUSPENSION ORAL at 11:29

## 2022-12-26 RX ADMIN — SODIUM CHLORIDE 100 ML/HR: 4.5 INJECTION, SOLUTION INTRAVENOUS at 09:25

## 2022-12-26 RX ADMIN — IBUPROFEN 400 MG: 100 SUSPENSION ORAL at 00:04

## 2022-12-26 RX ADMIN — IBUPROFEN 400 MG: 100 SUSPENSION ORAL at 17:23

## 2022-12-26 RX ADMIN — METHYLPREDNISOLONE SODIUM SUCCINATE 40 MG: 40 INJECTION, POWDER, FOR SOLUTION INTRAMUSCULAR; INTRAVENOUS at 00:01

## 2022-12-26 RX ADMIN — ATORVASTATIN CALCIUM 10 MG: 10 TABLET, FILM COATED ORAL at 21:31

## 2022-12-26 RX ADMIN — METHYLPREDNISOLONE SODIUM SUCCINATE 40 MG: 40 INJECTION, POWDER, FOR SOLUTION INTRAMUSCULAR; INTRAVENOUS at 06:20

## 2022-12-26 RX ADMIN — IPRATROPIUM BROMIDE AND ALBUTEROL SULFATE 3 ML: 2.5; .5 SOLUTION RESPIRATORY (INHALATION) at 19:35

## 2022-12-26 RX ADMIN — INSULIN GLARGINE 23 UNITS: 100 INJECTION, SOLUTION SUBCUTANEOUS at 00:02

## 2022-12-26 RX ADMIN — CLOPIDOGREL BISULFATE 75 MG: 75 TABLET ORAL at 08:10

## 2022-12-26 RX ADMIN — DILTIAZEM HYDROCHLORIDE 30 MG: 30 TABLET, FILM COATED ORAL at 08:10

## 2022-12-26 RX ADMIN — IPRATROPIUM BROMIDE AND ALBUTEROL SULFATE 3 ML: 2.5; .5 SOLUTION RESPIRATORY (INHALATION) at 00:28

## 2022-12-26 RX ADMIN — METHYLPREDNISOLONE SODIUM SUCCINATE 40 MG: 40 INJECTION, POWDER, FOR SOLUTION INTRAMUSCULAR; INTRAVENOUS at 11:21

## 2022-12-26 RX ADMIN — PIPERACILLIN AND TAZOBACTAM 3.38 G: 3; .375 INJECTION, POWDER, FOR SOLUTION INTRAVENOUS at 06:17

## 2022-12-26 RX ADMIN — PIPERACILLIN AND TAZOBACTAM 3.38 G: 3; .375 INJECTION, POWDER, FOR SOLUTION INTRAVENOUS at 19:25

## 2022-12-26 RX ADMIN — IBUPROFEN 400 MG: 100 SUSPENSION ORAL at 06:19

## 2022-12-26 RX ADMIN — LEVETIRACETAM 1250 MG: 250 TABLET, FILM COATED ORAL at 20:34

## 2022-12-26 RX ADMIN — LEVETIRACETAM 1250 MG: 250 TABLET, FILM COATED ORAL at 12:45

## 2022-12-26 RX ADMIN — METHYLPREDNISOLONE SODIUM SUCCINATE 40 MG: 40 INJECTION, POWDER, FOR SOLUTION INTRAMUSCULAR; INTRAVENOUS at 21:31

## 2022-12-26 RX ADMIN — IPRATROPIUM BROMIDE AND ALBUTEROL SULFATE 3 ML: 2.5; .5 SOLUTION RESPIRATORY (INHALATION) at 13:08

## 2022-12-26 RX ADMIN — PIPERACILLIN AND TAZOBACTAM 3.38 G: 3; .375 INJECTION, POWDER, FOR SOLUTION INTRAVENOUS at 11:21

## 2022-12-26 NOTE — PROGRESS NOTES
1915 Report received from [facility], [facility] Assessment completed and Dr. Pedro Perez call as requested. Spoke with doctor, order received and informed writer he was on way. Labs drawn and sent. 2040 Dr Pedro Perez in to see patient and assessment done again. New orders received. 2115 Per Dr. Pedro Perez, order to hold tube feedings until tomorrow when he sees patient. Order place by Jag Ryamond. Also informed that patient may go out tomorrow. 2237 Telemetry called by Pedro Perez to see what happened on the monitor when Rapid was called. 2300 Patient resting quietly.

## 2022-12-26 NOTE — PROGRESS NOTES
PROGRESS NOTE      Subjective: Lying in bed. Comfortable. Tracheostomy opening appears to have a foreign body suspicious for food material, hard , not easily removable. Family member at bedside.   Events overnight discussed with covering physician Kvng Khan    Visit Vitals  BP (!) 149/70   Pulse 89   Temp 98.4 °F (36.9 °C)   Resp 21   Ht 6' 0.01\" (1.829 m)   Wt 51.4 kg (113 lb 5.1 oz)   SpO2 100%   BMI 15.37 kg/m²       Current Facility-Administered Medications:     albuterol-ipratropium (DUO-NEB) 2.5 MG-0.5 MG/3 ML, 3 mL, Nebulization, Q6H RT, Lewis Connors MD    insulin glargine (LANTUS) injection 23 Units, 23 Units, SubCUTAneous, QHS, Catina Ramirez MD, 23 Units at 12/26/22 0002    ibuprofen (ADVIL;MOTRIN) 100 mg/5 mL oral suspension 400 mg, 400 mg, Per G Tube, Q6H, Catina Ramirez MD, 400 mg at 12/26/22 1129    methylPREDNISolone (PF) (SOLU-MEDROL) injection 40 mg, 40 mg, IntraVENous, Q6H, Catina Ramirez MD, 40 mg at 12/26/22 1121    [COMPLETED] piperacillin-tazobactam (ZOSYN) 4.5 g in 0.9% sodium chloride (MBP/ADV) 100 mL MBP, 4.5 g, IntraVENous, ONCE, Last Rate: 200 mL/hr at 12/26/22 0001, 4.5 g at 12/26/22 0001 **FOLLOWED BY** piperacillin-tazobactam (ZOSYN) 3.375 g in 0.9% sodium chloride (MBP/ADV) 100 mL MBP, 3.375 g, IntraVENous, Q8H, Federica Connors MD, Last Rate: 25 mL/hr at 12/26/22 1121, 3.375 g at 12/26/22 1121    dilTIAZem IR (CARDIZEM) tablet 30 mg, 30 mg, Oral, TIDAC, Loren Fofana MD, 30 mg at 12/26/22 1122    0.45% sodium chloride infusion, 100 mL/hr, IntraVENous, CONTINUOUS, Megargel Resides R, MD, Last Rate: 100 mL/hr at 12/26/22 0925, 100 mL/hr at 12/26/22 0925    atorvastatin (LIPITOR) tablet 10 mg, 10 mg, Oral, QHS, Carlos SCOTT MD, 10 mg at 12/26/22 0002    clopidogreL (PLAVIX) tablet 75 mg, 75 mg, Oral, DAILY, Catina Ramirez MD, 75 mg at 12/26/22 0810    multivitamin, tx-iron-ca-min (THERA-M w/ IRON) tablet 1 Tablet, 1 Tablet, Oral, DAILY, Catina Ramirez MD, 1 Tablet at 12/26/22 0810    alogliptin (NESINA) tablet 12.5 mg, 12.5 mg, Oral, DAILY, Catina Ramirez MD, 12.5 mg at 12/26/22 1032    aspirin chewable tablet 81 mg, 81 mg, Oral, DAILY, Catina Ramirez MD, 81 mg at 12/26/22 0810    metoprolol (LOPRESSOR) injection 5 mg, 5 mg, IntraVENous, Q6H PRN, Nelda Daley MD  Recent Results (from the past 24 hour(s))   GLUCOSE, POC    Collection Time: 12/25/22  5:10 PM   Result Value Ref Range    Glucose (POC) 256 (H) 65 - 100 mg/dL    Performed by Muriel De La Cruz    GLUCOSE, POC    Collection Time: 12/25/22  6:06 PM   Result Value Ref Range    Glucose (POC) 256 (H) 65 - 100 mg/dL    Performed by Muriel De La Cruz    BLOOD GAS, ARTERIAL    Collection Time: 12/25/22  6:38 PM   Result Value Ref Range    pH 7.31 (L) 7.35 - 7.45      PCO2 53 (H) 35 - 45 mmHg    PO2 231 (H) 80 - 100 mmHg    O2 SATURATION 99 95 - 99 %    BICARBONATE 26 22 - 26 mmol/L    BASE DEFICIT 1.2 mmol/L    O2 METHOD VENTURI MASK      FIO2 50.0 %    Sample source Arterial      SITE Right Radial      JASON'S TEST YES      Carboxy-Hgb 0.9 (L) 1 - 2 %    Methemoglobin 0.3 0 - 1.4 %    Oxyhemoglobin 97.8 95 - 99 %    Performed by Fly Obregon     TEMPERATURE 98.0     CBC WITH AUTOMATED DIFF    Collection Time: 12/25/22  8:35 PM   Result Value Ref Range    WBC 12.2 (H) 4.1 - 11.1 K/uL    RBC 3.66 (L) 4.10 - 5.70 M/uL    HGB 11.4 (L) 12.1 - 17.0 g/dL    HCT 34.3 (L) 36.6 - 50.3 %    MCV 93.7 80.0 - 99.0 FL    MCH 31.1 26.0 - 34.0 PG    MCHC 33.2 30.0 - 36.5 g/dL    RDW 13.0 11.5 - 14.5 %    PLATELET 164 782 - 903 K/uL    MPV 11.0 8.9 - 12.9 FL    NRBC 0.0 0.0  WBC    ABSOLUTE NRBC 0.00 0.00 - 0.01 K/uL    NEUTROPHILS 88 (H) 32 - 75 %    LYMPHOCYTES 4 (L) 12 - 49 %    MONOCYTES 7 5 - 13 %    EOSINOPHILS 0 0 - 7 %    BASOPHILS 0 0 - 1 %    IMMATURE GRANULOCYTES 1 (H) 0 - 0.5 %    ABS. NEUTROPHILS 10.7 (H) 1.8 - 8.0 K/UL    ABS. LYMPHOCYTES 0.5 (L) 0.8 - 3.5 K/UL    ABS. MONOCYTES 0.9 0.0 - 1.0 K/UL    ABS.  EOSINOPHILS 0.0 0.0 - 0.4 K/UL    ABS. BASOPHILS 0.0 0.0 - 0.1 K/UL    ABS. IMM. GRANS. 0.1 (H) 0.00 - 0.04 K/UL    DF AUTOMATED     METABOLIC PANEL, COMPREHENSIVE    Collection Time: 12/25/22  8:35 PM   Result Value Ref Range    Sodium 136 136 - 145 mmol/L    Potassium 4.4 3.5 - 5.1 mmol/L    Chloride 103 97 - 108 mmol/L    CO2 27 21 - 32 mmol/L    Anion gap 6 5 - 15 mmol/L    Glucose 327 (H) 65 - 100 mg/dL    BUN 22 (H) 6 - 20 mg/dL    Creatinine 1.04 0.70 - 1.30 mg/dL    BUN/Creatinine ratio 21 (H) 12 - 20      eGFR >60 >60 ml/min/1.73m2    Calcium 9.0 8.5 - 10.1 mg/dL    Bilirubin, total 0.9 0.2 - 1.0 mg/dL    AST (SGOT) 31 15 - 37 U/L    ALT (SGPT) 33 12 - 78 U/L    Alk. phosphatase 123 (H) 45 - 117 U/L    Protein, total 7.2 6.4 - 8.2 g/dL    Albumin 2.8 (L) 3.5 - 5.0 g/dL    Globulin 4.4 (H) 2.0 - 4.0 g/dL    A-G Ratio 0.6 (L) 1.1 - 2.2     NT-PRO BNP    Collection Time: 12/25/22  8:35 PM   Result Value Ref Range    NT pro- (H) <450 pg/mL   PROCALCITONIN    Collection Time: 12/25/22  8:35 PM   Result Value Ref Range    Procalcitonin 1.76 (H) 0 ng/mL   CK    Collection Time: 12/25/22  8:35 PM   Result Value Ref Range     39 - 308 U/L   TROPONIN-HIGH SENSITIVITY    Collection Time: 12/25/22  8:35 PM   Result Value Ref Range    Troponin-High Sensitivity 53 0 - 76 ng/L   SED RATE (ESR)    Collection Time: 12/26/22  6:30 AM   Result Value Ref Range    Sed rate, automated 60 (H) 0 - 20 mm/hr     XR CHEST PORT   Final Result      No acute process on portable chest.   Pneumoperitoneum      This result was verbally relayed by me at 1916 hours to [place], RN   789         CT NECK CHEST WO CONT   Final Result   1. Status post total laryngectomy and tracheostomy creation. No definite   tracheoesophageal fistula on this Limited exam. Nasopharyngeal reflux of   contrast material and fluid/debris.  Consider a dedicated fluoroscopic swallow   study with water-soluble contrast for more sensitive evaluation for subtle fistula. 2.  Emphysema with scattered pleural parenchymal scarring and calcified   granulomas. Several calcified right hilar lymph nodes. XR CHEST PORT   Final Result      No acute findings. HEENT: Normocephalic atraumatic . Anicteric sclera. Neck: Neck scaphoid. Tracheostomy site with foreign body probably hard food aspirate  Lungs: Scattered rhonchi. Heart: Regular. Abdomen: Soft positive bowel sounds. Status post PEG tube placement. Lower Extremities: Lower extremities without edema. Right upper extremity wrist ganglion cyst and restricted movement. Left upper extremity hand swelling. Arthritic changes. CNS: Awake alert,  talks with a voice device, follows command. Psych: Cooperative. Assessment: #1. Acute hypoxic respiratory failure transient due to aspiration of stomach contents/aspiration pneumonitis  #2. Wide-complex tachycardia  #3. Hypertension  #4. Diabetes  #5. 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 History of nephrectomy  #8. Dysphonia/dysphagia          Plan: Consult ENT regarding the foreign body in the tracheostomy. Reinstate his seizure medications. Spoke with family member who knows the dose. We will get the actual list of medications from home. Apparently patient goes to [facility]. DVT prophylaxis. Discussed with family and nurse at length. Taper steroid. Continue empiric antibiotic.

## 2022-12-26 NOTE — PROGRESS NOTES
Progress Note  Date:2022       Room:[facility]  Patient Name:Beltran Garcia [place]     YOB: 1935     Age:87 y.o. Subjective    Subjective   Covering for   Chart reviewed and patient examined  Came to see patient following the rapid response earlier around 6:00pm  As per nurses patient was found unresponsive hypoxic with O2 sats around 83%  Patient responded to 50 Lasix alert with improved saturation to 95% on transfer to intensive care for close monitoring  Telemetry revealed patient  was on regular sinus rhythm no significant arrhythmia or pauses or asystole noted (as per telemetry  monitor tech) around 6 PM at time of episode  Sugar check after episode was elevated  Claimed feeling better denies any significant pain shortness of breath chest pain  At the time of examination  Chest revealed no pulmonary infiltrate. Free air under diaphragm patient had a PEG tube done before yesterday  Cardiology help appreciated    Review of Systems    Unremarkable than mentioned in subjective  Objective         Vitals Last 24 Hours:  TEMPERATURE:  Temp  Av.5 °F (36.9 °C)  Min: 98 °F (36.7 °C)  Max: 99.2 °F (37.3 °C)  RESPIRATIONS RANGE: Resp  Av  Min: 18  Max: 22  PULSE OXIMETRY RANGE: SpO2  Av.1 %  Min: 94 %  Max: 100 %  PULSE RANGE: Pulse  Av.4  Min: 72  Max: 100  BLOOD PRESSURE RANGE: Systolic (34MZG), RVH:733 , Min:106 , ZII:945   ; Diastolic (08QTA), YJN:34, Min:65, Max:88      I/O (24Hr):     Intake/Output Summary (Last 24 hours) at 2022  Last data filed at 2022  Gross per 24 hour   Intake 2361.67 ml   Output 800 ml   Net 1561.67 ml         Current Facility-Administered Medications:     insulin glargine (LANTUS) injection 23 Units, 23 Units, SubCUTAneous, QHS, Lewis Connors MD    ibuprofen (ADVIL;MOTRIN) 100 mg/5 mL oral suspension 400 mg, 400 mg, Per G Tube, Q6H, Demetria Nyhan R, MD, 400 mg at 222    [START ON 2022] methylPREDNISolone (PF) (SOLU-MEDROL) injection 40 mg, 40 mg, IntraVENous, Q6H, Lewis Connors MD    piperacillin-tazobactam (ZOSYN) 3.375 g in 0.9% sodium chloride (MBP/ADV) 100 mL MBP, 3.375 g, IntraVENous, Q6H, Lewis Connors MD    albuterol-ipratropium (DUO-NEB) 2.5 MG-0.5 MG/3 ML, 3 mL, Nebulization, QID RT, Lewis Connors MD    dilTIAZem IR (CARDIZEM) tablet 30 mg, 30 mg, Oral, TIDAC, Loren Fofana MD, 30 mg at 12/25/22 1654    0.45% sodium chloride infusion, 100 mL/hr, IntraVENous, CONTINUOUS, Franklyn SCOTT MD, Last Rate: 100 mL/hr at 12/25/22 1553, 100 mL/hr at 12/25/22 1553    atorvastatin (LIPITOR) tablet 10 mg, 10 mg, Oral, QHS, Minda Billingsley MD, 10 mg at 12/24/22 2137    clopidogreL (PLAVIX) tablet 75 mg, 75 mg, Oral, DAILY, Minda Billingsley MD, 75 mg at 12/25/22 0941    multivitamin, tx-iron-ca-min (THERA-M w/ IRON) tablet 1 Tablet, 1 Tablet, Oral, DAILY, Minda Billingsley MD, 1 Tablet at 12/25/22 0941    alogliptin (NESINA) tablet 12.5 mg, 12.5 mg, Oral, DAILY, Minda Billingsley MD, 12.5 mg at 12/25/22 0941    aspirin chewable tablet 81 mg, 81 mg, Oral, DAILY, Minda Billingsley MD, 81 mg at 12/25/22 0941    metoprolol (LOPRESSOR) injection 5 mg, 5 mg, IntraVENous, Q6H PRN, Isaias Michel MD     Objective:  Vital signs: (most recent): Blood pressure (!) 169/77, pulse 78, temperature 99.2 °F (37.3 °C), resp. rate 22, height 6' 0.01\" (1.829 m), weight 51.4 kg (113 lb 5.1 oz), SpO2 100 %. General exam patient awake alert laying comfortably in bed on trach collar with   FIO2 28%   HEENT no paler icterus  Neck   supple both carotid valve unable to appreciate carotid bruit/lymphadenopathy.   Status post tracheostomy  RS Shallow breathing no rhonchi or rales  Abdomen status post PEG tube placement compressive dressing in place  Soft with tenderness at the PEG tube site  Extremity left hand and is wrist swelling warmth and markedly tender possible acute exacerbation of arthritis  CNS awake alert no focal neurodeficit    Labs/Imaging/Diagnostics    Labs:  CBC:  Recent Labs     12/25/22 2035 12/25/22  0937 12/24/22  0716   WBC 12.2* 10.6 15.9*   RBC 3.66* 3.78* 3.83*   HGB 11.4* 11.7* 12.1   HCT 34.3* 35.4* 36.5*   MCV 93.7 93.7 95.3   RDW 13.0 13.0 13.2    157 154       CHEMISTRIES:  Recent Labs     12/25/22  0937 12/24/22  0716    139   K 4.1 3.9    109*   CO2 26 22   BUN 25* 27*   CREA 1.05 1.04   CA 8.8 9.1     PT/INR:No results for input(s): INR, INREXT, INREXT in the last 72 hours. No lab exists for component: PROTIME  APTT:No results for input(s): APTT in the last 72 hours. LIVER PROFILE:  Recent Labs     12/25/22 0937 12/24/22  0716   AST 25 29   ALT 29 31       Lab Results   Component Value Date/Time    ALT (SGPT) 29 12/25/2022 09:37 AM    AST (SGOT) 25 12/25/2022 09:37 AM    Alk. phosphatase 119 (H) 12/25/2022 09:37 AM    Bilirubin, total 1.0 12/25/2022 09:37 AM       Imaging Last 24 Hours:  XR CHEST PORT    Result Date: 12/25/2022  EXAM:  XR CHEST PORT INDICATION: Hypoxia. PEG tube placed today, per nursing. COMPARISON: 12/21/2022 TECHNIQUE: portable chest AP view at 1853 hours FINDINGS: The cardiac silhouette is within normal limits. The pulmonary vasculature is within normal limits. The lungs and pleural spaces are clear. The visualized bones and upper abdomen are age-appropriate. There is new air beneath the right hemidiaphragm.      No acute process on portable chest. Pneumoperitoneum This result was verbally relayed by me at 1916 hours to [address]    Assessment//Plan           Patient Active Problem List    Diagnosis Date Noted    Severe protein-energy malnutrition ([facility] 12/24/2022    Dysphagia 12/21/2022    Loss of consciousness ([facility] 05/04/2022    Generalized weakness 05/04/2022    Seizure ([facility] [address] 05/02/2022    Encounter for long-term (current) use of insulin ([facility] 06/19/2016    Mixed hyperlipidemia 06/19/2016    Single kidney     Choledocholithiasis 12/18/2012    Diabetes mellitus ([facility] [address] 12/18/2012    Hypertension 12/18/2012    Arthritis 12/18/2012     Assessment & Plan      IMPRESSION:    Episode of unresponsiveness hypoxemia S/p rapid response  ? possible aspiration pneumonia  Hypoxic respiratory failure status post rapid response  Wrist swelling painful and warm possible acute OA  v/s thrombophlebitis from the IV access  Diabetes with hyperglycemia  Impacted food bolus in esophagus status post  removal of impacted food bolus and PEG tube placement postoperative discomfort/Bleeding at PEG tube site    1. Dysphagia,From  impacted food bolus in esophagus improved    2. Wide-complex tachycardia arrhythmia currently on diazepam infusion getting switched to p.o.    3.  Hypertension     4. History of diabetes, hypertension, osteoarthritis. 5.  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    6.  History of arrhythmia, status post pacemaker placement, details unknown. 7.  History of nephrectomy and single kidney.     Plan    Add  Solu-Medrol 60 mg IV every 6 hours/IV Zosyn and neb treatment   Wean oxygen by trach collar to minimum as for saturation more than 91%  Hold PEG feeding overnight  Aspiration precaution  Close monitoring for cardiac arrhythmia hypoxemia  I Will check CMP mag level troponin I and CBC now  Continue n.p.o. for now  Titration of long-acting insulin for better control of sugar  Continue current IV fluid  Close monitoring for hypoglycemia/bleeding at bedside  Initial consult regarding discharge   Awaiting discharge planning assistance      Discussed with the nurse /family    Critical care time spent more than 60 minutes    Electronically signed by Lidia Raza MD on 12/25/2022 at 12:58 PM

## 2022-12-26 NOTE — PROGRESS NOTES
Progress Note      12/26/2022 10:51 AM  NAME: Hamzah Brown   MRN:  [id number]   Admit Diagnosis: Dysphagia [R13.10]      Problem List:   -Admitted to the hospital with shortness of breath  -History of throat cancer status post tracheostomy  -Wide-complex tachycardia  -Status post pacemaker insertion  -Hypertension  -Dyslipidemia  -Former smoker  -NIDDM  -COPD  -History of seizure disorder  -Renal disease status post nephrectomy     Assessment/Plan:   12/26/2022  -Patient was transferred to ICU this morning after he found unresponsive.  -At the time of my examination in the ICU at 10:30 AM he was awake respond to vocal command and denies any complaints.  -Telemetry shows no acute cardiac arrhythmias pauses or malignant arrhythmias  -Monitor in the ICU shows pacer spikes periodically indicating his pacemaker is working.  -Until proven otherwise it was a noncardiac event and firing evaluation for sleep apnea. -In the meantime I will order an echocardiogram and check pacemaker for any underlying cardiac event leading to the episode of syncope. -This has been discussed with the niece present in the room. Dated 12/25/2022 as a follow-up from cardiology team  -Patient is lying comfortably in the bed and per nonverbal communication denies any symptoms  -Initial cardiology consult was invited secondary to shortness of breath and multiple coronary artery disease risk factors.  -Cardiac enzyme has been unremarkable during this admission.  -There is no interval change since patient is admitted to the hospital other than improved in breathing.  -Continue current conservative medical management with no further cardiovascular testing as it would not change my cardiac management.  -Our team will continue to follow while he is in the hospital along with the team         []       High complexity decision making was performed in this patient at high risk for decompensation with multiple organ involvement.     Subjective: 80-year old -American male admitted to the hospital with shortness of breath. By reviewing the chart patient has no known established coronary artery disease and is status post permanent pacemaker insertion somewhere else the details of which is not available to us. I am following him as a cardiology team member as he is a still in the hospital. There is no interval change other than he has improved his breathing and otherwise clinically and hemodynamically stable lying comfortably in the bed. Based on my overall cardiac assessment no further cardiovascular testing is warranted and we will continue current conservative medical management. Review of Systems:    Symptom Y/N Comments  Symptom Y/N Comments   Fever/Chills N   Chest Pain N    Poor Appetite N   Edema N    Cough N   Abdominal Pain N    Sputum N   Joint Pain N    SOB/LAMB N   Pruritis/Rash N    Nausea/vomit N   Tolerating PT/OT Y    Diarrhea N   Tolerating Diet Y    Constipation N   Other       Could NOT obtain due to:      Objective:      Physical Exam:    Last 24hrs VS reviewed since prior progress note. Most recent are:    Visit Vitals  /70   Pulse 69   Temp 98.3 °F (36.8 °C)   Resp 19   Ht 6' 0.01\" (1.829 m)   Wt 51.4 kg (113 lb 5.1 oz)   SpO2 100%   BMI 15.37 kg/m²       Intake/Output Summary (Last 24 hours) at 12/26/2022 1037  Last data filed at 12/26/2022 0600  Gross per 24 hour   Intake 400 ml   Output 350 ml   Net 50 ml          General Appearance: Well developed, well nourished, alert & oriented x 3,    no acute distress. Ears/Nose/Mouth/Throat: Hearing grossly normal.  Neck: Supple. Chest: Lungs clear to auscultation bilaterally. Cardiovascular: Regular rate and rhythm, S1S2 normal, no murmur. Abdomen: Soft, non-tender, bowel sounds are active. Extremities: No edema bilaterally. Skin: Warm and dry. []         Post-cath site without hematoma, bruit, tenderness, or thrill. Distal pulses intact.     PMH/SH reviewed - no change compared to H&P    Data Review    Telemetry: normal sinus rhythm     EKG:   []  No new EKG for review  XR CHEST PORT   Final Result      No acute process on portable chest.   Pneumoperitoneum      This result was verbally relayed by me at 1916 hours to [place] [id number]         CT NECK CHEST WO CONT   Final Result   1. Status post total laryngectomy and tracheostomy creation. No definite   tracheoesophageal fistula on this Limited exam. Nasopharyngeal reflux of   contrast material and fluid/debris. Consider a dedicated fluoroscopic swallow   study with water-soluble contrast for more sensitive evaluation for subtle   fistula. 2.  Emphysema with scattered pleural parenchymal scarring and calcified   granulomas. Several calcified right hilar lymph nodes. XR CHEST PORT   Final Result      No acute findings. Lab Data Personally Reviewed:    Recent Labs     12/25/22 2035 12/25/22 0937   WBC 12.2* 10.6   HGB 11.4* 11.7*   HCT 34.3* 35.4*    157       No results for input(s): INR, PTP, APTT, INREXT, INREXT in the last 72 hours.    Recent Labs     12/25/22 2035 12/25/22  0937 12/24/22  0716    136 139   K 4.4 4.1 3.9    105 109*   CO2 27 26 22   BUN 22* 25* 27*   CREA 1.04 1.05 1.04   * 281* 153*   CA 9.0 8.8 9.1       Recent Labs     12/25/22 2035        Lab Results   Component Value Date/Time    Cholesterol, total 126 09/09/2016 10:38 AM    HDL Cholesterol 70 09/09/2016 10:38 AM    LDL, calculated 45 09/09/2016 10:38 AM    Triglyceride 56 09/09/2016 10:38 AM       Recent Labs     12/25/22 2035 12/25/22  0937 12/24/22  0716   * 119* 84   TP 7.2 7.2 7.6   ALB 2.8* 2.9* 3.4*   GLOB 4.4* 4.3* 4.2*       Recent Labs     12/25/22  1838   PH 7.31*   PCO2 53*   PO2 231*       Medications Personally Reviewed:    Current Facility-Administered Medications   Medication Dose Route Frequency    albuterol-ipratropium (DUO-NEB) 2.5 MG-0.5 MG/3 ML  3 mL Nebulization Q6H RT    insulin glargine (LANTUS) injection 23 Units  23 Units SubCUTAneous QHS    ibuprofen (ADVIL;MOTRIN) 100 mg/5 mL oral suspension 400 mg  400 mg Per G Tube Q6H    methylPREDNISolone (PF) (SOLU-MEDROL) injection 40 mg  40 mg IntraVENous Q6H    piperacillin-tazobactam (ZOSYN) 3.375 g in 0.9% sodium chloride (MBP/ADV) 100 mL MBP  3.375 g IntraVENous Q8H    dilTIAZem IR (CARDIZEM) tablet 30 mg  30 mg Oral TIDAC    0.45% sodium chloride infusion  100 mL/hr IntraVENous CONTINUOUS    atorvastatin (LIPITOR) tablet 10 mg  10 mg Oral QHS    clopidogreL (PLAVIX) tablet 75 mg  75 mg Oral DAILY    multivitamin, tx-iron-ca-min (THERA-M w/ IRON) tablet 1 Tablet  1 Tablet Oral DAILY    alogliptin (NESINA) tablet 12.5 mg  12.5 mg Oral DAILY    aspirin chewable tablet 81 mg  81 mg Oral DAILY    metoprolol (LOPRESSOR) injection 5 mg  5 mg IntraVENous Q6H PRN         Emeka Ortega MD

## 2022-12-27 ENCOUNTER — APPOINTMENT (OUTPATIENT)
Dept: NON INVASIVE DIAGNOSTICS | Age: 87
End: 2022-12-27
Attending: INTERNAL MEDICINE
Payer: MEDICARE

## 2022-12-27 LAB
GLUCOSE BLD STRIP.AUTO-MCNC: 158 MG/DL (ref 65–100)
GLUCOSE BLD STRIP.AUTO-MCNC: 191 MG/DL (ref 65–100)
GLUCOSE BLD STRIP.AUTO-MCNC: 219 MG/DL (ref 65–100)
PERFORMED BY, TECHID: ABNORMAL

## 2022-12-27 PROCEDURE — 74011636637 HC RX REV CODE- 636/637: Performed by: INTERNAL MEDICINE

## 2022-12-27 PROCEDURE — 74011250636 HC RX REV CODE- 250/636: Performed by: INTERNAL MEDICINE

## 2022-12-27 PROCEDURE — 74011250637 HC RX REV CODE- 250/637: Performed by: INTERNAL MEDICINE

## 2022-12-27 PROCEDURE — 94640 AIRWAY INHALATION TREATMENT: CPT

## 2022-12-27 PROCEDURE — 93306 TTE W/DOPPLER COMPLETE: CPT

## 2022-12-27 PROCEDURE — 82962 GLUCOSE BLOOD TEST: CPT

## 2022-12-27 PROCEDURE — 65270000029 HC RM PRIVATE

## 2022-12-27 PROCEDURE — 74011000258 HC RX REV CODE- 258: Performed by: INTERNAL MEDICINE

## 2022-12-27 PROCEDURE — 74011000250 HC RX REV CODE- 250: Performed by: INTERNAL MEDICINE

## 2022-12-27 RX ORDER — DICLOFENAC SODIUM 10 MG/G
2 GEL TOPICAL 3 TIMES DAILY
Status: DISCONTINUED | OUTPATIENT
Start: 2022-12-27 | End: 2023-01-07 | Stop reason: HOSPADM

## 2022-12-27 RX ORDER — GUAIFENESIN 100 MG/5ML
600 SOLUTION ORAL EVERY 12 HOURS
Status: DISCONTINUED | OUTPATIENT
Start: 2022-12-27 | End: 2023-01-07 | Stop reason: HOSPADM

## 2022-12-27 RX ORDER — GUAIFENESIN 600 MG/1
600 TABLET, EXTENDED RELEASE ORAL EVERY 12 HOURS
Status: DISCONTINUED | OUTPATIENT
Start: 2022-12-27 | End: 2022-12-27

## 2022-12-27 RX ADMIN — LEVETIRACETAM 1250 MG: 250 TABLET, FILM COATED ORAL at 08:00

## 2022-12-27 RX ADMIN — IPRATROPIUM BROMIDE AND ALBUTEROL SULFATE 3 ML: 2.5; .5 SOLUTION RESPIRATORY (INHALATION) at 20:02

## 2022-12-27 RX ADMIN — METHYLPREDNISOLONE SODIUM SUCCINATE 40 MG: 40 INJECTION, POWDER, FOR SOLUTION INTRAMUSCULAR; INTRAVENOUS at 05:55

## 2022-12-27 RX ADMIN — PIPERACILLIN AND TAZOBACTAM 3.38 G: 3; .375 INJECTION, POWDER, FOR SOLUTION INTRAVENOUS at 11:16

## 2022-12-27 RX ADMIN — ASPIRIN 81 MG CHEWABLE TABLET 81 MG: 81 TABLET CHEWABLE at 08:00

## 2022-12-27 RX ADMIN — DILTIAZEM HYDROCHLORIDE 30 MG: 30 TABLET, FILM COATED ORAL at 08:00

## 2022-12-27 RX ADMIN — METHYLPREDNISOLONE SODIUM SUCCINATE 40 MG: 40 INJECTION, POWDER, FOR SOLUTION INTRAMUSCULAR; INTRAVENOUS at 21:04

## 2022-12-27 RX ADMIN — ALOGLIPTIN 12.5 MG: 12.5 TABLET, FILM COATED ORAL at 08:00

## 2022-12-27 RX ADMIN — ATORVASTATIN CALCIUM 10 MG: 10 TABLET, FILM COATED ORAL at 21:05

## 2022-12-27 RX ADMIN — SODIUM CHLORIDE 100 ML/HR: 4.5 INJECTION, SOLUTION INTRAVENOUS at 07:59

## 2022-12-27 RX ADMIN — DICLOFENAC SODIUM 2 G: 10 GEL TOPICAL at 21:06

## 2022-12-27 RX ADMIN — GUAIFENESIN 600 MG: 100 SOLUTION ORAL at 13:43

## 2022-12-27 RX ADMIN — PIPERACILLIN AND TAZOBACTAM 3.38 G: 3; .375 INJECTION, POWDER, FOR SOLUTION INTRAVENOUS at 20:42

## 2022-12-27 RX ADMIN — Medication 1 TABLET: at 08:00

## 2022-12-27 RX ADMIN — DICLOFENAC SODIUM 2 G: 10 GEL TOPICAL at 15:12

## 2022-12-27 RX ADMIN — CLOPIDOGREL BISULFATE 75 MG: 75 TABLET ORAL at 08:00

## 2022-12-27 RX ADMIN — IPRATROPIUM BROMIDE AND ALBUTEROL SULFATE 3 ML: 2.5; .5 SOLUTION RESPIRATORY (INHALATION) at 06:01

## 2022-12-27 RX ADMIN — IPRATROPIUM BROMIDE AND ALBUTEROL SULFATE 3 ML: 2.5; .5 SOLUTION RESPIRATORY (INHALATION) at 01:38

## 2022-12-27 RX ADMIN — DILTIAZEM HYDROCHLORIDE 30 MG: 30 TABLET, FILM COATED ORAL at 16:25

## 2022-12-27 RX ADMIN — IBUPROFEN 400 MG: 100 SUSPENSION ORAL at 00:15

## 2022-12-27 RX ADMIN — DICLOFENAC SODIUM 2 G: 10 GEL TOPICAL at 11:53

## 2022-12-27 RX ADMIN — IPRATROPIUM BROMIDE AND ALBUTEROL SULFATE 3 ML: 2.5; .5 SOLUTION RESPIRATORY (INHALATION) at 11:28

## 2022-12-27 RX ADMIN — GUAIFENESIN 600 MG: 100 SOLUTION ORAL at 21:05

## 2022-12-27 RX ADMIN — PIPERACILLIN AND TAZOBACTAM 3.38 G: 3; .375 INJECTION, POWDER, FOR SOLUTION INTRAVENOUS at 04:20

## 2022-12-27 RX ADMIN — INSULIN GLARGINE 23 UNITS: 100 INJECTION, SOLUTION SUBCUTANEOUS at 23:07

## 2022-12-27 RX ADMIN — LEVETIRACETAM 1250 MG: 250 TABLET, FILM COATED ORAL at 21:05

## 2022-12-27 NOTE — PROGRESS NOTES
Bedside and Verbal shift change report given to Richard Lozano RN (oncoming nurse) by Nga Raygoza RN (offgoing nurse). Report included the following information SBAR, Kardex, Intake/Output, MAR, Cardiac Rhythm  , and Dual Neuro Assessment.

## 2022-12-27 NOTE — PROGRESS NOTES
PROGRESS NOTE      Subjective: Patient reports that he is hungry. Uneventful night. No seizure activity. Occasional coughing. Foreign material dislodged from the tracheostomy site. Bilateral hand/wrist pain.      Visit Vitals  BP (!) 145/80   Pulse 66   Temp 98.1 °F (36.7 °C)   Resp 12   Ht 6' 0.01\" (1.829 m)   Wt 60 kg (132 lb 4.4 oz)   SpO2 100%   BMI 17.94 kg/m²       Current Facility-Administered Medications:     albuterol-ipratropium (DUO-NEB) 2.5 MG-0.5 MG/3 ML, 3 mL, Nebulization, Q6H RT, Lewis Jenkins MD, 3 mL at 12/27/22 0601    levETIRAcetam (KEPPRA) tablet 1,250 mg, 1,250 mg, Oral, BID, Danita Gitelman, [place] A, MD, 1,250 mg at 12/27/22 0800    methylPREDNISolone (PF) (SOLU-MEDROL) injection 40 mg, 40 mg, IntraVENous, Q8H, Jihan Meza MD, 40 mg at 12/27/22 0555    insulin glargine (LANTUS) injection 23 Units, 23 Units, SubCUTAneous, QHS, Jamia Salazar MD, 23 Units at 12/26/22 2215    [COMPLETED] piperacillin-tazobactam (ZOSYN) 4.5 g in 0.9% sodium chloride (MBP/ADV) 100 mL MBP, 4.5 g, IntraVENous, ONCE, Last Rate: 200 mL/hr at 12/26/22 0001, 4.5 g at 12/26/22 0001 **FOLLOWED BY** piperacillin-tazobactam (ZOSYN) 3.375 g in 0.9% sodium chloride (MBP/ADV) 100 mL MBP, 3.375 g, IntraVENous, Q8H, Noa Connors MD, Last Rate: 25 mL/hr at 12/27/22 0420, 3.375 g at 12/27/22 0420    dilTIAZem IR (CARDIZEM) tablet 30 mg, 30 mg, Oral, TIDAC, Loren Fofana MD, 30 mg at 12/27/22 0800    0.45% sodium chloride infusion, 25 mL/hr, IntraVENous, CONTINUOUS, Danita Gitelman, [place] A, MD, Last Rate: 100 mL/hr at 12/27/22 0759, 100 mL/hr at 12/27/22 0759    atorvastatin (LIPITOR) tablet 10 mg, 10 mg, Oral, QHS, Carloz SCOTT MD, 10 mg at 12/26/22 2131    clopidogreL (PLAVIX) tablet 75 mg, 75 mg, Oral, DAILY, Carloz SCOTT MD, 75 mg at 12/27/22 0800    multivitamin, tx-iron-ca-min (THERA-M w/ IRON) tablet 1 Tablet, 1 Tablet, Oral, DAILY, Jamia Salazar MD, 1 Tablet at 12/27/22 0800    alogliptin (NESINA) tablet 12.5 mg, 12.5 mg, Oral, DAILY, Emily Culver MD, 12.5 mg at 12/27/22 0800    aspirin chewable tablet 81 mg, 81 mg, Oral, DAILY, Emily Culver MD, 81 mg at 12/27/22 0800    metoprolol (LOPRESSOR) injection 5 mg, 5 mg, IntraVENous, Q6H PRN, Francisco Daley MD  Recent Results (from the past 24 hour(s))   GLUCOSE, POC    Collection Time: 12/26/22 12:04 PM   Result Value Ref Range    Glucose (POC) 247 (H) 65 - 100 mg/dL    Performed by Khalif Rodriguez    GLUCOSE, POC    Collection Time: 12/26/22  3:40 PM   Result Value Ref Range    Glucose (POC) 253 (H) 65 - 100 mg/dL    Performed by Jonah Perales, POC    Collection Time: 12/26/22  9:58 PM   Result Value Ref Range    Glucose (POC) 245 (H) 65 - 100 mg/dL    Performed by Sid Cid    GLUCOSE, POC    Collection Time: 12/27/22  7:30 AM   Result Value Ref Range    Glucose (POC) 219 (H) 65 - 100 mg/dL    Performed by Hi Brambila      XR CHEST PORT   Final Result      No acute process on portable chest.   Pneumoperitoneum      This result was verbally relayed by me at 1916 hours to Karen Hand [address] [place] [address] CONT   Final Result   1. Status post total laryngectomy and tracheostomy creation. No definite   tracheoesophageal fistula on this Limited exam. Nasopharyngeal reflux of   contrast material and fluid/debris. Consider a dedicated fluoroscopic swallow   study with water-soluble contrast for more sensitive evaluation for subtle   fistula. 2.  Emphysema with scattered pleural parenchymal scarring and calcified   granulomas. Several calcified right hilar lymph nodes. XR CHEST PORT   Final Result      No acute findings. HEENT: Normocephalic atraumatic. Neck: No distended neck vein. Tracheostomy site still with remanent material   lungs: Scattered rhonchi  Heart: Regular  Abdomen: Soft positive bowel sounds. PEG tube in place.   Lower Extremities: Wrist swelling and stiffness worse on the left. CNS: Alert and oriented. Dysarthric. Psych: Cooperative. Assessment: #1. Acute hypoxic respiratory failure transient due to aspiration of stomach contents/aspiration pneumonitis    #2. Wide-complex tachycardia    #3. Hypertension    #4. Diabetes    #5. 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 History of nephrectomy    #8. Dysphonia/dysphagia          Plan: Transfer to medical floor. Start tube feeding. Discussed with nurse. Discussed with daughter at length at  [facility] regarding status progress. Physical therapy, pain medications for his arthritis. Still awaiting ENT consult.

## 2022-12-27 NOTE — PROGRESS NOTES
Progress Note      12/27/2022 10:36 AM  NAME: Ernie Coreas   MRN:  [id number]   Admit Diagnosis: Dysphagia [R13.10]      Problem List:   -Admitted to the hospital with shortness of breath  -History of throat cancer status post tracheostomy  -Wide-complex tachycardia  -Status post pacemaker insertion  -Hypertension  -Dyslipidemia  -Former smoker  -NIDDM  -COPD  -History of seizure disorder  -Renal disease status post nephrectomy     Assessment/Plan:   Note dictated December 27, 2022  -Patient is lying comfortably in the bed since he moved to the ICU no acute cardiac event and he is a stable. -Monitor shows sinus rhythm with occasional PVCs. -Patient was awake and attempt to communicate with me through the device.  -Continue to monitor and based on my overall cardiovascular assessment he can transfer back to floor.  -Echocardiogram is pending. Will check echocardiogram and if needed make further cardiovascular management decision as clinically warranted. []       High complexity decision making was performed in this patient at high risk for decompensation with multiple organ involvement. Subjective:     80-year old -American male admitted to the hospital with shortness of breath. By reviewing the chart patient has no known established coronary artery disease and is status post permanent pacemaker insertion somewhere else the details of which is not available to us. I am following him as a cardiology team member as he is a still in the hospital. He was transferred to ICU after he found unconscious. Subsequently he recovered with no obvious acute EKG changes with a stable troponin. Echocardiogram is a still pending. Once echo results are available and if indicated we will make further cardiac management decision as clinically indicated.     Review of Systems:    Symptom Y/N Comments  Symptom Y/N Comments   Fever/Chills N   Chest Pain N    Poor Appetite N   Edema N    Cough N   Abdominal Pain N    Sputum N   Joint Pain N    SOB/LAMB N   Pruritis/Rash N    Nausea/vomit N   Tolerating PT/OT Y    Diarrhea N   Tolerating Diet Y    Constipation N   Other       Could NOT obtain due to:      Objective:      Physical Exam:    Last 24hrs VS reviewed since prior progress note. Most recent are:    Visit Vitals  BP (!) 145/80   Pulse 66   Temp 97.8 °F (36.6 °C)   Resp 12   Ht 6' 0.01\" (1.829 m)   Wt 60 kg (132 lb 4.4 oz)   SpO2 100%   BMI 17.94 kg/m²       Intake/Output Summary (Last 24 hours) at 12/27/2022 1036  Last data filed at 12/27/2022 0558  Gross per 24 hour   Intake 5556.67 ml   Output 1150 ml   Net 4406.67 ml        General Appearance: Well developed, well nourished, alert & oriented x 3,    no acute distress. Ears/Nose/Mouth/Throat: Hearing grossly normal.  Neck: Supple. Chest: Lungs clear to auscultation bilaterally. Cardiovascular: Regular rate and rhythm, S1S2 normal, no murmur. Abdomen: Soft, non-tender, bowel sounds are active. Extremities: No edema bilaterally. Skin: Warm and dry. []         Post-cath site without hematoma, bruit, tenderness, or thrill. Distal pulses intact. PMH/SH reviewed - no change compared to H&P    Data Review    Telemetry: normal sinus rhythm     EKG:   []  No new EKG for review  XR CHEST PORT   Final Result      No acute process on portable chest.   Pneumoperitoneum      This result was verbally relayed by me at 1916 hours to [facility], RN   789         CT NECK CHEST WO CONT   Final Result   1. Status post total laryngectomy and tracheostomy creation. No definite   tracheoesophageal fistula on this Limited exam. Nasopharyngeal reflux of   contrast material and fluid/debris. Consider a dedicated fluoroscopic swallow   study with water-soluble contrast for more sensitive evaluation for subtle   fistula. 2.  Emphysema with scattered pleural parenchymal scarring and calcified   granulomas. Several calcified right hilar lymph nodes.             XR CHEST PORT Final Result      No acute findings. Lab Data Personally Reviewed:    Recent Labs     12/25/22 2035 12/25/22  0937   WBC 12.2* 10.6   HGB 11.4* 11.7*   HCT 34.3* 35.4*    157     No results for input(s): INR, PTP, APTT, INREXT in the last 72 hours.    Recent Labs     12/26/22  0630 12/25/22 2035 12/25/22  0937    136 136   K 4.4 4.4 4.1    103 105   CO2 25 27 26   BUN 23* 22* 25*   CREA 1.02 1.04 1.05   * 327* 281*   CA 9.5 9.0 8.8     Recent Labs     12/25/22 2035        Lab Results   Component Value Date/Time    Cholesterol, total 126 09/09/2016 10:38 AM    HDL Cholesterol 70 09/09/2016 10:38 AM    LDL, calculated 45 09/09/2016 10:38 AM    Triglyceride 56 09/09/2016 10:38 AM       Recent Labs     12/25/22 2035 12/25/22  0937   * 119*   TP 7.2 7.2   ALB 2.8* 2.9*   GLOB 4.4* 4.3*     Recent Labs     12/25/22  1838   PH 7.31*   PCO2 53*   PO2 231*       Medications Personally Reviewed:    Current Facility-Administered Medications   Medication Dose Route Frequency    albuterol-ipratropium (DUO-NEB) 2.5 MG-0.5 MG/3 ML  3 mL Nebulization Q6H RT    levETIRAcetam (KEPPRA) tablet 1,250 mg  1,250 mg Oral BID    methylPREDNISolone (PF) (SOLU-MEDROL) injection 40 mg  40 mg IntraVENous Q8H    insulin glargine (LANTUS) injection 23 Units  23 Units SubCUTAneous QHS    piperacillin-tazobactam (ZOSYN) 3.375 g in 0.9% sodium chloride (MBP/ADV) 100 mL MBP  3.375 g IntraVENous Q8H    dilTIAZem IR (CARDIZEM) tablet 30 mg  30 mg Oral TIDAC    0.45% sodium chloride infusion  100 mL/hr IntraVENous CONTINUOUS    atorvastatin (LIPITOR) tablet 10 mg  10 mg Oral QHS    clopidogreL (PLAVIX) tablet 75 mg  75 mg Oral DAILY    multivitamin, tx-iron-ca-min (THERA-M w/ IRON) tablet 1 Tablet  1 Tablet Oral DAILY    alogliptin (NESINA) tablet 12.5 mg  12.5 mg Oral DAILY    aspirin chewable tablet 81 mg  81 mg Oral DAILY    metoprolol (LOPRESSOR) injection 5 mg  5 mg IntraVENous Q6H PRN Davy Juarez MD           Progress Note      12/27/2022 10:51 AM  NAME: Brigette Meyer   MRN:  [id number]   Admit Diagnosis: Dysphagia [R13.10]      Problem List:   -Admitted to the hospital with shortness of breath  -History of throat cancer status post tracheostomy  -Wide-complex tachycardia  -Status post pacemaker insertion  -Hypertension  -Dyslipidemia  -Former smoker  -NIDDM  -COPD  -History of seizure disorder  -Renal disease status post nephrectomy     Assessment/Plan:   12/26/2022  -Patient was transferred to ICU this morning after he found unresponsive.  -At the time of my examination in the ICU at 10:30 AM he was awake respond to vocal command and denies any complaints.  -Telemetry shows no acute cardiac arrhythmias pauses or malignant arrhythmias  -Monitor in the ICU shows pacer spikes periodically indicating his pacemaker is working.  -Until proven otherwise it was a noncardiac event and firing evaluation for sleep apnea. -In the meantime I will order an echocardiogram and check pacemaker for any underlying cardiac event leading to the episode of syncope. -This has been discussed with the niece present in the room.   Dated 12/25/2022 as a follow-up from cardiology team  -Patient is lying comfortably in the bed and per nonverbal communication denies any symptoms  -Initial cardiology consult was invited secondary to shortness of breath and multiple coronary artery disease risk factors.  -Cardiac enzyme has been unremarkable during this admission.  -There is no interval change since patient is admitted to the hospital other than improved in breathing.  -Continue current conservative medical management with no further cardiovascular testing as it would not change my cardiac management.  -Our team will continue to follow while he is in the hospital along with the team         []       High complexity decision making was performed in this patient at high risk for decompensation with multiple organ involvement. Subjective:     80-year old -American male admitted to the hospital with shortness of breath. By reviewing the chart patient has no known established coronary artery disease and is status post permanent pacemaker insertion somewhere else the details of which is not available to us. I am following him as a cardiology team member as he is a still in the hospital. There is no interval change other than he has improved his breathing and otherwise clinically and hemodynamically stable lying comfortably in the bed. Based on my overall cardiac assessment no further cardiovascular testing is warranted and we will continue current conservative medical management. Review of Systems:    Symptom Y/N Comments  Symptom Y/N Comments   Fever/Chills N   Chest Pain N    Poor Appetite N   Edema N    Cough N   Abdominal Pain N    Sputum N   Joint Pain N    SOB/LAMB N   Pruritis/Rash N    Nausea/vomit N   Tolerating PT/OT Y    Diarrhea N   Tolerating Diet Y    Constipation N   Other       Could NOT obtain due to:      Objective:      Physical Exam:    Last 24hrs VS reviewed since prior progress note. Most recent are:    Visit Vitals  BP (!) 145/80   Pulse 66   Temp 97.8 °F (36.6 °C)   Resp 12   Ht 6' 0.01\" (1.829 m)   Wt 60 kg (132 lb 4.4 oz)   SpO2 100%   BMI 17.94 kg/m²       Intake/Output Summary (Last 24 hours) at 12/27/2022 1036  Last data filed at 12/27/2022 0558  Gross per 24 hour   Intake 5556.67 ml   Output 1150 ml   Net 4406.67 ml          General Appearance: Well developed, well nourished, alert & oriented x 3,    no acute distress. Ears/Nose/Mouth/Throat: Hearing grossly normal.  Neck: Supple. Chest: Lungs clear to auscultation bilaterally. Cardiovascular: Regular rate and rhythm, S1S2 normal, no murmur. Abdomen: Soft, non-tender, bowel sounds are active. Extremities: No edema bilaterally. Skin: Warm and dry. []         Post-cath site without hematoma, bruit, tenderness, or thrill.   Distal pulses intact. PMH/SH reviewed - no change compared to H&P    Data Review    Telemetry: normal sinus rhythm     EKG:   []  No new EKG for review  XR CHEST PORT   Final Result      No acute process on portable chest.   Pneumoperitoneum      This result was verbally relayed by me at 1916 hours to Leann Su, RN   789         CT NECK CHEST WO CONT   Final Result   1. Status post total laryngectomy and tracheostomy creation. No definite   tracheoesophageal fistula on this Limited exam. Nasopharyngeal reflux of   contrast material and fluid/debris. Consider a dedicated fluoroscopic swallow   study with water-soluble contrast for more sensitive evaluation for subtle   fistula. 2.  Emphysema with scattered pleural parenchymal scarring and calcified   granulomas. Several calcified right hilar lymph nodes. XR CHEST PORT   Final Result      No acute findings. Lab Data Personally Reviewed:    Recent Labs     12/25/22 2035 12/25/22  0937   WBC 12.2* 10.6   HGB 11.4* 11.7*   HCT 34.3* 35.4*    157       No results for input(s): INR, PTP, APTT, INREXT, INREXT in the last 72 hours.    Recent Labs     12/26/22  0630 12/25/22 2035 12/25/22  0937    136 136   K 4.4 4.4 4.1    103 105   CO2 25 27 26   BUN 23* 22* 25*   CREA 1.02 1.04 1.05   * 327* 281*   CA 9.5 9.0 8.8       Recent Labs     12/25/22 2035          Lab Results   Component Value Date/Time    Cholesterol, total 126 09/09/2016 10:38 AM    HDL Cholesterol 70 09/09/2016 10:38 AM    LDL, calculated 45 09/09/2016 10:38 AM    Triglyceride 56 09/09/2016 10:38 AM       Recent Labs     12/25/22 2035 12/25/22  0937   * 119*   TP 7.2 7.2   ALB 2.8* 2.9*   GLOB 4.4* 4.3*       Recent Labs     12/25/22  1838   PH 7.31*   PCO2 53*   PO2 231*         Medications Personally Reviewed:    Current Facility-Administered Medications   Medication Dose Route Frequency    albuterol-ipratropium (DUO-NEB) 2.5 MG-0.5 MG/3 ML  3 mL Nebulization Q6H RT    levETIRAcetam (KEPPRA) tablet 1,250 mg  1,250 mg Oral BID    methylPREDNISolone (PF) (SOLU-MEDROL) injection 40 mg  40 mg IntraVENous Q8H    insulin glargine (LANTUS) injection 23 Units  23 Units SubCUTAneous QHS    piperacillin-tazobactam (ZOSYN) 3.375 g in 0.9% sodium chloride (MBP/ADV) 100 mL MBP  3.375 g IntraVENous Q8H    dilTIAZem IR (CARDIZEM) tablet 30 mg  30 mg Oral TIDAC    0.45% sodium chloride infusion  100 mL/hr IntraVENous CONTINUOUS    atorvastatin (LIPITOR) tablet 10 mg  10 mg Oral QHS    clopidogreL (PLAVIX) tablet 75 mg  75 mg Oral DAILY    multivitamin, tx-iron-ca-min (THERA-M w/ IRON) tablet 1 Tablet  1 Tablet Oral DAILY    alogliptin (NESINA) tablet 12.5 mg  12.5 mg Oral DAILY    aspirin chewable tablet 81 mg  81 mg Oral DAILY    metoprolol (LOPRESSOR) injection 5 mg  5 mg IntraVENous Q6H PRN         Araceli Luna MD

## 2022-12-27 NOTE — PROGRESS NOTES
Report called to [facility] on 2E. Left message for patient's family Darline Harrison).

## 2022-12-27 NOTE — PROGRESS NOTES
0845: Chart reviewed. Patient with trach and PEG tube. Prior to admission patient provided self care to trach. No PT/OT orders at this time. Current Dispo: Discharge Home Self-Care. CM will continue to follow patient and recs of medical team.    9376: PT orders noted. Transfer to medical floor orders noted.

## 2022-12-28 ENCOUNTER — ANESTHESIA (OUTPATIENT)
Dept: SURGERY | Age: 87
DRG: 393 | End: 2022-12-28
Payer: MEDICARE

## 2022-12-28 ENCOUNTER — ANESTHESIA EVENT (OUTPATIENT)
Dept: SURGERY | Age: 87
DRG: 393 | End: 2022-12-28
Payer: MEDICARE

## 2022-12-28 LAB
ECHO AO ROOT DIAM: 3.4 CM
ECHO AO ROOT INDEX: 1.9 CM/M2
ECHO AV PEAK GRADIENT: 11 MMHG
ECHO AV PEAK VELOCITY: 1.7 M/S
ECHO AV VELOCITY RATIO: 0.65
ECHO EST RA PRESSURE: 3 MMHG
ECHO LA DIAMETER INDEX: 1.9 CM/M2
ECHO LA DIAMETER: 3.4 CM
ECHO LA TO AORTIC ROOT RATIO: 1
ECHO LV E' LATERAL VELOCITY: 7 CM/S
ECHO LV E' SEPTAL VELOCITY: 6 CM/S
ECHO LV EJECTION FRACTION BIPLANE: 60 % (ref 55–100)
ECHO LV FRACTIONAL SHORTENING: 31 % (ref 28–44)
ECHO LV INTERNAL DIMENSION DIASTOLE INDEX: 2.68 CM/M2
ECHO LV INTERNAL DIMENSION DIASTOLIC: 4.8 CM (ref 4.2–5.9)
ECHO LV INTERNAL DIMENSION SYSTOLIC INDEX: 1.84 CM/M2
ECHO LV INTERNAL DIMENSION SYSTOLIC: 3.3 CM
ECHO LV IVSD: 1.3 CM (ref 0.6–1)
ECHO LV MASS 2D: 219.1 G (ref 88–224)
ECHO LV MASS INDEX 2D: 122.4 G/M2 (ref 49–115)
ECHO LV POSTERIOR WALL DIASTOLIC: 1.1 CM (ref 0.6–1)
ECHO LV RELATIVE WALL THICKNESS RATIO: 0.46
ECHO LVOT PEAK GRADIENT: 5 MMHG
ECHO LVOT PEAK VELOCITY: 1.1 M/S
ECHO MV A VELOCITY: 1.17 M/S
ECHO MV E DECELERATION TIME (DT): 320 MS
ECHO MV E VELOCITY: 0.74 M/S
ECHO MV E/A RATIO: 0.63
ECHO MV E/E' LATERAL: 10.57
ECHO MV E/E' RATIO (AVERAGED): 11.45
ECHO MV E/E' SEPTAL: 12.33
ECHO PV MAX VELOCITY: 0.9 M/S
ECHO PV PEAK GRADIENT: 3 MMHG
ECHO RIGHT VENTRICULAR SYSTOLIC PRESSURE (RVSP): 31 MMHG
ECHO RV INTERNAL DIMENSION: 3 CM
ECHO TV REGURGITANT MAX VELOCITY: 2.63 M/S
ECHO TV REGURGITANT PEAK GRADIENT: 28 MMHG
GLUCOSE BLD STRIP.AUTO-MCNC: 189 MG/DL (ref 65–100)
GLUCOSE BLD STRIP.AUTO-MCNC: 254 MG/DL (ref 65–100)
GLUCOSE BLD STRIP.AUTO-MCNC: 282 MG/DL (ref 65–100)
GLUCOSE BLD STRIP.AUTO-MCNC: 301 MG/DL (ref 65–100)
PERFORMED BY, TECHID: ABNORMAL

## 2022-12-28 PROCEDURE — 77030040361 HC SLV COMPR DVT MDII -B: Performed by: OTOLARYNGOLOGY

## 2022-12-28 PROCEDURE — 77030016441 HC APPL CLP LIG1 J&J -B: Performed by: OTOLARYNGOLOGY

## 2022-12-28 PROCEDURE — 65270000029 HC RM PRIVATE

## 2022-12-28 PROCEDURE — 74011250637 HC RX REV CODE- 250/637: Performed by: INTERNAL MEDICINE

## 2022-12-28 PROCEDURE — 74011250636 HC RX REV CODE- 250/636: Performed by: INTERNAL MEDICINE

## 2022-12-28 PROCEDURE — 74011000250 HC RX REV CODE- 250: Performed by: INTERNAL MEDICINE

## 2022-12-28 PROCEDURE — 76060000031 HC ANESTHESIA FIRST 0.5 HR: Performed by: OTOLARYNGOLOGY

## 2022-12-28 PROCEDURE — 97161 PT EVAL LOW COMPLEX 20 MIN: CPT

## 2022-12-28 PROCEDURE — 0BC18ZZ EXTIRPATION OF MATTER FROM TRACHEA, VIA NATURAL OR ARTIFICIAL OPENING ENDOSCOPIC: ICD-10-PCS | Performed by: OTOLARYNGOLOGY

## 2022-12-28 PROCEDURE — 92610 EVALUATE SWALLOWING FUNCTION: CPT

## 2022-12-28 PROCEDURE — 77030011267 HC ELECTRD BLD COVD -A: Performed by: OTOLARYNGOLOGY

## 2022-12-28 PROCEDURE — 77030038156 HC CRD BPLR DISP CARF -A: Performed by: OTOLARYNGOLOGY

## 2022-12-28 PROCEDURE — 76010000154 HC OR TIME FIRST 0.5 HR: Performed by: OTOLARYNGOLOGY

## 2022-12-28 PROCEDURE — 31615 TRCHEOBRNCHSC EST TRACHS INC: CPT | Performed by: OTOLARYNGOLOGY

## 2022-12-28 PROCEDURE — 77030008683 HC TU ET CUF COVD -A: Performed by: OTOLARYNGOLOGY

## 2022-12-28 PROCEDURE — 77030018390 HC SPNG HEMSTAT2 J&J -B: Performed by: OTOLARYNGOLOGY

## 2022-12-28 PROCEDURE — 97530 THERAPEUTIC ACTIVITIES: CPT

## 2022-12-28 PROCEDURE — 94640 AIRWAY INHALATION TREATMENT: CPT

## 2022-12-28 PROCEDURE — 77030003029 HC SUT VCRL J&J -B: Performed by: OTOLARYNGOLOGY

## 2022-12-28 PROCEDURE — 2709999900 HC NON-CHARGEABLE SUPPLY: Performed by: OTOLARYNGOLOGY

## 2022-12-28 PROCEDURE — 74011000258 HC RX REV CODE- 258: Performed by: INTERNAL MEDICINE

## 2022-12-28 PROCEDURE — 74011250636 HC RX REV CODE- 250/636: Performed by: NURSE ANESTHETIST, CERTIFIED REGISTERED

## 2022-12-28 PROCEDURE — 74011636637 HC RX REV CODE- 636/637: Performed by: INTERNAL MEDICINE

## 2022-12-28 PROCEDURE — 82962 GLUCOSE BLOOD TEST: CPT

## 2022-12-28 PROCEDURE — 76210000063 HC OR PH I REC FIRST 0.5 HR: Performed by: OTOLARYNGOLOGY

## 2022-12-28 PROCEDURE — 99223 1ST HOSP IP/OBS HIGH 75: CPT | Performed by: OTOLARYNGOLOGY

## 2022-12-28 PROCEDURE — 92526 ORAL FUNCTION THERAPY: CPT

## 2022-12-28 RX ORDER — IPRATROPIUM BROMIDE AND ALBUTEROL SULFATE 2.5; .5 MG/3ML; MG/3ML
3 SOLUTION RESPIRATORY (INHALATION)
Status: DISCONTINUED | OUTPATIENT
Start: 2022-12-28 | End: 2023-01-07 | Stop reason: HOSPADM

## 2022-12-28 RX ORDER — IPRATROPIUM BROMIDE AND ALBUTEROL SULFATE 2.5; .5 MG/3ML; MG/3ML
3 SOLUTION RESPIRATORY (INHALATION)
Status: DISCONTINUED | OUTPATIENT
Start: 2022-12-28 | End: 2022-12-31

## 2022-12-28 RX ORDER — HYDRALAZINE HYDROCHLORIDE 20 MG/ML
10 INJECTION INTRAMUSCULAR; INTRAVENOUS
Status: COMPLETED | OUTPATIENT
Start: 2022-12-28 | End: 2022-12-28

## 2022-12-28 RX ORDER — SODIUM CHLORIDE, SODIUM LACTATE, POTASSIUM CHLORIDE, CALCIUM CHLORIDE 600; 310; 30; 20 MG/100ML; MG/100ML; MG/100ML; MG/100ML
INJECTION, SOLUTION INTRAVENOUS
Status: DISCONTINUED | OUTPATIENT
Start: 2022-12-28 | End: 2022-12-28 | Stop reason: HOSPADM

## 2022-12-28 RX ORDER — DILTIAZEM HYDROCHLORIDE 30 MG/1
60 TABLET, FILM COATED ORAL
Status: DISCONTINUED | OUTPATIENT
Start: 2022-12-28 | End: 2022-12-30

## 2022-12-28 RX ADMIN — DICLOFENAC SODIUM 2 G: 10 GEL TOPICAL at 22:44

## 2022-12-28 RX ADMIN — DILTIAZEM HYDROCHLORIDE 30 MG: 30 TABLET, FILM COATED ORAL at 11:30

## 2022-12-28 RX ADMIN — DICLOFENAC SODIUM 2 G: 10 GEL TOPICAL at 10:21

## 2022-12-28 RX ADMIN — IPRATROPIUM BROMIDE AND ALBUTEROL SULFATE 3 ML: 2.5; .5 SOLUTION RESPIRATORY (INHALATION) at 02:02

## 2022-12-28 RX ADMIN — PIPERACILLIN AND TAZOBACTAM 3.38 G: 3; .375 INJECTION, POWDER, FOR SOLUTION INTRAVENOUS at 11:08

## 2022-12-28 RX ADMIN — ATORVASTATIN CALCIUM 10 MG: 10 TABLET, FILM COATED ORAL at 22:44

## 2022-12-28 RX ADMIN — GUAIFENESIN 600 MG: 100 SOLUTION ORAL at 10:20

## 2022-12-28 RX ADMIN — INSULIN GLARGINE 23 UNITS: 100 INJECTION, SOLUTION SUBCUTANEOUS at 23:21

## 2022-12-28 RX ADMIN — ASPIRIN 81 MG CHEWABLE TABLET 81 MG: 81 TABLET CHEWABLE at 10:20

## 2022-12-28 RX ADMIN — Medication 1 TABLET: at 08:54

## 2022-12-28 RX ADMIN — DICLOFENAC SODIUM 2 G: 10 GEL TOPICAL at 18:17

## 2022-12-28 RX ADMIN — LEVETIRACETAM 1250 MG: 250 TABLET, FILM COATED ORAL at 10:20

## 2022-12-28 RX ADMIN — LEVETIRACETAM 1250 MG: 250 TABLET, FILM COATED ORAL at 20:13

## 2022-12-28 RX ADMIN — HYDRALAZINE HYDROCHLORIDE 10 MG: 20 INJECTION INTRAMUSCULAR; INTRAVENOUS at 10:38

## 2022-12-28 RX ADMIN — GUAIFENESIN 600 MG: 100 SOLUTION ORAL at 20:18

## 2022-12-28 RX ADMIN — ALOGLIPTIN 12.5 MG: 12.5 TABLET, FILM COATED ORAL at 10:20

## 2022-12-28 RX ADMIN — PIPERACILLIN AND TAZOBACTAM 3.38 G: 3; .375 INJECTION, POWDER, FOR SOLUTION INTRAVENOUS at 20:14

## 2022-12-28 RX ADMIN — IPRATROPIUM BROMIDE AND ALBUTEROL SULFATE 3 ML: 2.5; .5 SOLUTION RESPIRATORY (INHALATION) at 07:24

## 2022-12-28 RX ADMIN — CLOPIDOGREL BISULFATE 75 MG: 75 TABLET ORAL at 10:20

## 2022-12-28 RX ADMIN — METHYLPREDNISOLONE SODIUM SUCCINATE 40 MG: 40 INJECTION, POWDER, FOR SOLUTION INTRAMUSCULAR; INTRAVENOUS at 10:20

## 2022-12-28 RX ADMIN — PIPERACILLIN AND TAZOBACTAM 3.38 G: 3; .375 INJECTION, POWDER, FOR SOLUTION INTRAVENOUS at 04:31

## 2022-12-28 RX ADMIN — SODIUM CHLORIDE, POTASSIUM CHLORIDE, SODIUM LACTATE AND CALCIUM CHLORIDE: 600; 310; 30; 20 INJECTION, SOLUTION INTRAVENOUS at 09:18

## 2022-12-28 RX ADMIN — IPRATROPIUM BROMIDE AND ALBUTEROL SULFATE 3 ML: 2.5; .5 SOLUTION RESPIRATORY (INHALATION) at 20:12

## 2022-12-28 RX ADMIN — DILTIAZEM HYDROCHLORIDE 60 MG: 30 TABLET, FILM COATED ORAL at 18:17

## 2022-12-28 NOTE — PROGRESS NOTES
Called to patient's room for difficulty breathing. RN states oxygen sats are 70's. When I arrived to room ENT at bedside removing debris from patient's laryngectomy. Patient then taken to the OR.

## 2022-12-28 NOTE — PROGRESS NOTES
Notified Dr. Liza Vaughn of pt's BP. Received order for 10mg Hydralazine push NOW one time and for remote cardiac monitoring.

## 2022-12-28 NOTE — CONSULTS
Otolaryngology-Head and Neck Surgery  Inpatient Consultation    Patient: Anselmo Glaser  YOB: 1935  MRN: [id number]  Date of Service:  12/28/2022    Reason for Consultation:   Chief Complaint   Patient presents with    Aspiration     Requesting Physician:  Ann Urrutia    History of Present Illness: Anselmo Glaser is a 80y.o. year old male who has a history of laryngeal SCCa s/p laryngectomy 2008     He was admitted with dysphagia, s/p EGD found to have large food impaction  A G tube was placed     This AM upon my arrival to see the patient, he clearly appeared to be in distress and short of breath  His stoma is occluded     Past Medical History:  Past Medical History:   Diagnosis Date    Anemia     Arthritis     Diabetes ([facility] [address]     Hypercholesterolemia     Hypertension     Single kidney     Cr normal    Throat cancer ([facility] [address] 2008    s/p laryngectomy       Past Surgical History:   Past Surgical History:   Procedure Laterality Date    HX LAP CHOLECYSTECTOMY  12-19-12    By Dr. Bernadine Baltazar states does not know reason       Medications:   Current Outpatient Medications   Medication Instructions    atorvastatin (LIPITOR) 10 mg, Oral, EVERY BEDTIME    PATRICIO ASPIRIN PO 81 mg, Oral, DAILY    clopidogreL (PLAVIX) 75 mg tab No dose, route, or frequency recorded.     insulin detemir U-100 (LEVEMIR FLEXTOUCH) 35 Units, SubCUTAneous, EVERY BEDTIME, 35 Units by SubCUTAneous route at night    levETIRAcetam (KEPPRA) 1,250 mg, Oral, 2 TIMES DAILY    lisinopriL (PRINIVIL, ZESTRIL) 10 mg, Oral, DAILY    multivitamin, tx-iron-ca-min (THERA-M W/ IRON) 9 mg iron-400 mcg tab tablet 1 Tablet, Oral, DAILY    NIFEdipine ER (PROCARDIA XL) 60 mg, DAILY    saw palmetto 450 mg, DAILY    SITagliptin phosphate (JANUVIA) 25 mg, Oral, DAILY       Allergies:   No Known Allergies    Social History:   Social History     Socioeconomic History    Marital status: SINGLE   Tobacco Use    Smoking status: Former     Packs/day: 1.00     Years: 57.00     Pack years: 57.00     Types: Cigarettes     Quit date: 7/20/2012     Years since quitting: 10.4    Smokeless tobacco: Never   Substance and Sexual Activity    Alcohol use: Yes     Alcohol/week: 17.5 standard drinks     Types: 21 Shots of liquor per week    Drug use: No       Family History:  Family History   Family history unknown: Yes       Review of Systems:  Did not obtain due to acuity of situation    Physical Examination:   Visit Vitals  BP (!) 244/124   Pulse 87   Temp 97.7 °F (36.5 °C)   Resp 18   Ht 6' 0.01\" (1.829 m)   Wt 132 lb 4.4 oz (60 kg)   SpO2 96%   BMI 17.94 kg/m²       General: Respiratory distress with accessory muscle use   Voice: Aphonic, reportedly uses electrolarynx  Face: No masses or lesions, facial strength symmetric   Ears: External ears unremarkable. Nose: External nose unremarkable  Oral Cavity / Oropharynx:  Did not evaluate due to acuity   Neck: Laryngectomy stoma essentially completely occluded with very dry hard adherent debris       CT neck / chest without contrast 12/21  IMPRESSION  1. Status post total laryngectomy and tracheostomy creation. No definite  tracheoesophageal fistula on this Limited exam. Nasopharyngeal reflux of  contrast material and fluid/debris. Consider a dedicated fluoroscopic swallow  study with water-soluble contrast for more sensitive evaluation for subtle  fistula. 2.  Emphysema with scattered pleural parenchymal scarring and calcified  granulomas. Several calcified right hilar lymph nodes.     Assessment and Plan:   History of laryngectomy  Stoma occlusion  Respiratory distress   Dysphagia s/p food impaction, s/p G tube   - Pt acutely in respiratory distress at time of my arrival with just about complete occlusion of his laryngectomy stoma with sat 70's  - Attempted to remove debris at bedside, but with limited supplies, and some difficulty with obtaining necessary supplies fast enough, I called anesthesia and we wheeled him to the OR  - With saline and a hemostat I was able to finally dislodge a large deep mucus plug  - Scope afterwards to aneta is clear  - Stoma otherwise widely patent, though skin of stoma and trachea now macerated  - Discussed with nursing staff, pt cannot breathe from above - no O2, etc to his nose or mouth  - Needs humidified face tent at all times to his neck stoma  - Needs RT care and suctioning  - If concerns re: mucus plugging, please call ENT   - In terms of swallowing, he should not aspirate as he has had a laryngectomy (his trachea communicates with his neck only)  - He is NPO with tube feeds  - As he did have dysphagia and food impaction on admission, could consider MBS to evaluate swallow further, but would anticipate he should be able to tolerate a PO diet as there is no sign of tracheoesophageal fistula on EGD or CT imaging I dont believe stricture etc was noted on EGD either      MD France [facility]  [address] [place] [address],[address]  [facility]  Office Phone: [phone]

## 2022-12-28 NOTE — PROGRESS NOTES
While preparing pt's morning medications. ENT MD came to assess the pt. Pt became SOB, dyspneic and using accessory muscles to breath. O2 sat of 70%, P-118 and /129. RT notified. Non rebreather at 15L placed  to his neck stoma as pt does not breath above this artificial airway. Pt was stabilized and taken to the OR. Family Notified.

## 2022-12-28 NOTE — ANESTHESIA POSTPROCEDURE EVALUATION
Procedure(s):  TRACHEOSTOMY REVISION. general    Anesthesia Post Evaluation      Multimodal analgesia: multimodal analgesia used between 6 hours prior to anesthesia start to PACU discharge  Patient location during evaluation: PACU  Patient participation: complete - patient participated  Level of consciousness: awake  Pain score: 0  Pain management: adequate  Airway patency: patent  Anesthetic complications: no  Cardiovascular status: acceptable  Respiratory status: acceptable  Hydration status: acceptable  Post anesthesia nausea and vomiting:  controlled  Final Post Anesthesia Temperature Assessment:  Normothermia (36.0-37.5 degrees C)      INITIAL Post-op Vital signs:   Vitals Value Taken Time   /65 12/28/22 0950   Temp 36.7 °C (98 °F) 12/28/22 0944   Pulse 85 12/28/22 0953   Resp 18 12/28/22 0953   SpO2 93 % 12/28/22 0953   Vitals shown include unvalidated device data.

## 2022-12-28 NOTE — PROGRESS NOTES
Problem: Mobility Impaired (Adult and Pediatric)  Goal: *Acute Goals and Plan of Care (Insert Text)  Description: I with LE HEP x7 days  Supine to sit EOB with mod I x7 days  Sit to stand with SBAx1 x7 days  Stand pivot from bed to chair x7 days  Amb 15-50ft with RW and CGAx1 x7 days    Pt stated goal: to get stronger  Outcome: Not Met    PHYSICAL THERAPY EVALUATION  Patient: Darcy Burciaga (3 y.o. male)  Date: 12/28/2022  Primary Diagnosis: Dysphagia [R13.10]  Procedure(s) (LRB):  TRACHEOSTOMY REVISION (N/A) Day of Surgery   Precautions:        ASSESSMENT    Pt is a 80 y.o. male admitted to hospital with dysphagia, s/p [address] found to have large food impaction. Pt has a history of laryngeal SCCa s/p laryngectomy 2008 and uses electrolarynx and writing for communication. Pt presents to PT with decreased bed mob, transfers, LE strength, gt, balance, activity tolerance, and overall functional mobility. He is currently on 50% FIO2 10L/min w/ 100% O2. Pt semi supine in bed upon PT arrival, agreeable to evaluation. Pt A&O x 4. PLOF listed below. Based on the objective data described below, the patient presents with generalized weakness, impaired functional mobility, impaired amb, impaired balance, and decreased overall activity tolerance. (See below for objective details and assist levels). Overall pt tolerated session good today with overall min A with bed mobility and transfers. Pt was able to take a few side steps at EOB with min A x1 and use of RW. Further ambulation deferred as pt's 02 lines would not extend out further. Pt will benefit from continued skilled PT to address above deficits and return to PLOF. Current PT DC recommendation Inpatient Rehabilitation Facility  once medically appropriate as pt currently has acute needs and typically lives alone with a goal of wanting to return to home as he is able.        PLAN :  Recommendations and Planned Interventions: bed mobility training, transfer training, gait training, therapeutic exercises, patient and family training/education, and therapeutic activities      Recommend for staff: Out of bed to chair for meals, Encourage HEP in prep for ADLs/mobility, and Use of bed/chair alarm for safety    Frequency/Duration: Patient will be followed by physical therapy:  3-5x/week to address goals.     Recommendation for discharge: (in order for the patient to meet his/her long term goals)  See assessment above, IRF             SUBJECTIVE:   Patient semi supine in bed upon PT arrival, agreeable to work with PT    OBJECTIVE DATA SUMMARY:   HISTORY:    Past Medical History:   Diagnosis Date    Anemia     Arthritis     Diabetes ([facility] [address]     Hypercholesterolemia     Hypertension     Single kidney     Cr normal    Throat cancer ([facility] [address] 2008    s/p laryngectomy     Past Surgical History:   Procedure Laterality Date    HX LAP CHOLECYSTECTOMY  12-19-12    By Dr. Kusum Zamora states does not know reason       Home Situation  Home Environment: Apartment  # Steps to Enter: 0  One/Two Story Residence: One story  Living Alone: Yes  Support Systems: Child(belle)  Patient Expects to be Discharged to[de-identified] Rehab Unit Subacute  Current DME Used/Available at Home: desiree Rosen    Personal factors and/or comorbidities impacting plan of care:     Home Situation  Home Environment: Apartment  # Steps to Enter: 0  One/Two Story Residence: One story  Living Alone: Yes  Support Systems: Child(belle)  Patient Expects to be Discharged to[de-identified] Rehab Unit Subacute  Current DME Used/Available at Home: Walker, rolling    EXAMINATION/PRESENTATION/DECISION MAKING:   Critical Behavior:  Neurologic State: Alert  Orientation Level: Oriented X4  Cognition: Follows commands  Safety/Judgement: Decreased insight into deficits, Decreased awareness of environment    Skin:  intact where exposed    Edema: noted B hands, slight edema    Range Of Motion:  AROM: Generally decreased, functional   B LE    Strength:    Strength: Generally decreased, functional  Grossly 4-/5 B LE         Tone & Sensation:     Sensation: Intact    Functional Mobility:  Bed Mobility:     Supine to Sit: Minimum assistance  Sit to Supine: Minimum assistance  Scooting: Minimum assistance  Transfers:  Sit to Stand: Minimum assistance  Stand to Sit: Minimum assistance                       Balance:   Sitting: Intact  Standing: Impaired; With support  Standing - Static: Constant support; Fair  Standing - Dynamic : Constant support; Fair  Ambulation/Gait Training:     Pt was able to take several side steps at EOB with UE support on RW and min Ax1, no LOB. Further ambulation deferred as pt's O2 lines would not extend. Functional Measure:  [employer] 6 Clicks         Basic Mobility Inpatient Short Form  How much difficulty does the patient currently have. .. Unable A Lot A Little None   1. Turning over in bed (including adjusting bedclothes, sheets and blankets)? [] 1   [] 2   [x] 3   [] 4   2. Sitting down on and standing up from a chair with arms ( e.g., wheelchair, bedside commode, etc.)   [] 1   [] 2   [x] 3   [] 4   3. Moving from lying on back to sitting on the side of the bed? [] 1   [] 2   [x] 3   [] 4          How much help from another person does the patient currently need. .. Total A Lot A Little None   4. Moving to and from a bed to a chair (including a wheelchair)? [] 1   [] 2   [x] 3   [] 4   5. Need to walk in hospital room? [] 1   [x] 2   [] 3   [] 4   6. Climbing 3-5 steps with a railing? [] 1   [x] 2   [] 3   [] 4   © 2007, Trustees of [employer], under license to [employer]. All rights reserved     Score:  Initial: 16 Most Recent: X (Date: -- )   Interpretation of Tool:  Represents activities that are increasingly more difficult (i.e. Bed mobility, Transfers, Gait).   Score 24 23 22-20 19-15 14-10 9-7 6   Modifier CH CI CJ CK CL CM CN           Physical Therapy Evaluation Charge Determination   History Examination Presentation Decision-Making   MEDIUM  Complexity : 1-2 comorbidities / personal factors will impact the outcome/ POC  MEDIUM Complexity : 3 Standardized tests and measures addressing body structure, function, activity limitation and / or participation in recreation  MEDIUM Complexity : Evolving with changing characteristics  Other Functional Measure [facility] 6 MED      Based on the above components, the patient evaluation is determined to be of the following complexity level: MEDIUM    Pain Rating:  No c/o pain during session    Activity Tolerance:   Fair    After treatment patient left in no apparent distress:   Bed locked and in lowest position Supine in bed, Call bell within reach, Bed / chair alarm activated, and Side rails x 3     Patient Stated Goal:      COMMUNICATION/EDUCATION:   The patients plan of care was discussed with: Speech therapist and Case management. Patient/family agree to work toward stated goals and plan of care.       Thank you for this referral.  Magali Beltrán   Time Calculation: 20 mins

## 2022-12-28 NOTE — PROGRESS NOTES
Progress Note      12/28/2022 10:36 AM  NAME: Sangeeta Bradley   MRN:  [id number]   Admit Diagnosis: Dysphagia [R13.10]      Problem List:   -Admitted to the hospital with shortness of breath  -History of throat cancer status post tracheostomy  -Wide-complex tachycardia  -Status post pacemaker insertion  -Hypertension  -Dyslipidemia  -Former smoker  -NIDDM  -COPD  -History of seizure disorder  -Renal disease status post nephrectomy     Assessment/Plan:   Note dictated December 28, 2022  -Nurse called to take orders for blood pressure medications so hydralazine was given. -Patient went to OR briefly with no anesthesia and endotracheal tube was cleaned by suction.  -Normal ejection fraction of 60 to 65% with no wall motion abnormalities or any significant valvular heart disease.  -When I came to see the patient he was lying comfortably in the bed and denies any symptoms.  -History of coronary artery disease and pacemaker insertion. Initial cardiology consult was invited secondary to wide-complex tachycardia. He has no more cardiac arrhythmias.  -No acute cardiovascular issue at this time and we will follow him on as needed basis while he is in the hospital along with the team.  Note dictated December 27, 2022  -Patient is lying comfortably in the bed since he moved to the ICU no acute cardiac event and he is a stable. -Monitor shows sinus rhythm with occasional PVCs. -Patient was awake and attempt to communicate with me through the device.  -Continue to monitor and based on my overall cardiovascular assessment he can transfer back to floor.  -Echocardiogram is pending. Will check echocardiogram and if needed make further cardiovascular management decision as clinically warranted. []       High complexity decision making was performed in this patient at high risk for decompensation with multiple organ involvement.     Subjective:     80-year old -American male admitted to the hospital with shortness of breath. By reviewing the chart patient has no known established coronary artery disease and is status post permanent pacemaker insertion somewhere else the details of which is not available to us. I am following him as a cardiology team member as he is a still in the hospital. He was transferred to ICU after he found unconscious. Subsequently he recovered with no obvious acute EKG changes with a stable troponin. Echocardiogram is a still pending. Once echo results are available and if indicated we will make further cardiac management decision as clinically indicated. Review of Systems:    Symptom Y/N Comments  Symptom Y/N Comments   Fever/Chills N   Chest Pain N    Poor Appetite N   Edema N    Cough N   Abdominal Pain N    Sputum N   Joint Pain N    SOB/LAMB N   Pruritis/Rash N    Nausea/vomit N   Tolerating PT/OT Y    Diarrhea N   Tolerating Diet Y    Constipation N   Other       Could NOT obtain due to:      Objective:      Physical Exam:    Last 24hrs VS reviewed since prior progress note. Most recent are:    Visit Vitals  BP (!) 174/101 (BP 1 Location: Left arm, BP Patient Position: At rest)   Pulse 85   Temp 97.6 °F (36.4 °C)   Resp 18   Ht 6' 0.01\" (1.829 m)   Wt 60 kg (132 lb 4.4 oz)   SpO2 99%   BMI 17.94 kg/m²       Intake/Output Summary (Last 24 hours) at 12/28/2022 1147  Last data filed at 12/28/2022 0931  Gross per 24 hour   Intake 1620 ml   Output 1400 ml   Net 220 ml          General Appearance: Well developed, well nourished, alert & oriented x 3,    no acute distress. Ears/Nose/Mouth/Throat: Hearing grossly normal.  Neck: Supple. Chest: Lungs clear to auscultation bilaterally. Cardiovascular: Regular rate and rhythm, S1S2 normal, no murmur. Abdomen: Soft, non-tender, bowel sounds are active. Extremities: No edema bilaterally. Skin: Warm and dry. []         Post-cath site without hematoma, bruit, tenderness, or thrill. Distal pulses intact.     PMH/SH reviewed - no change compared to H&P    Data Review    Telemetry: normal sinus rhythm     EKG:   []  No new EKG for review  XR CHEST PORT   Final Result      No acute process on portable chest.   Pneumoperitoneum      This result was verbally relayed by me at 1916 hours to Yazan, [facility]   78         CT NECK CHEST WO CONT   Final Result   1. Status post total laryngectomy and tracheostomy creation. No definite   tracheoesophageal fistula on this Limited exam. Nasopharyngeal reflux of   contrast material and fluid/debris. Consider a dedicated fluoroscopic swallow   study with water-soluble contrast for more sensitive evaluation for subtle   fistula. 2.  Emphysema with scattered pleural parenchymal scarring and calcified   granulomas. Several calcified right hilar lymph nodes. XR CHEST PORT   Final Result      No acute findings. Lab Data Personally Reviewed:    Recent Labs     12/25/22 2035   WBC 12.2*   HGB 11.4*   HCT 34.3*          No results for input(s): INR, PTP, APTT, INREXT, INREXT in the last 72 hours.    Recent Labs     12/26/22  0630 12/25/22 2035    136   K 4.4 4.4    103   CO2 25 27   BUN 23* 22*   CREA 1.02 1.04   * 327*   CA 9.5 9.0       Recent Labs     12/25/22 2035          Lab Results   Component Value Date/Time    Cholesterol, total 126 09/09/2016 10:38 AM    HDL Cholesterol 70 09/09/2016 10:38 AM    LDL, calculated 45 09/09/2016 10:38 AM    Triglyceride 56 09/09/2016 10:38 AM       Recent Labs     12/25/22 2035   *   TP 7.2   ALB 2.8*   GLOB 4.4*       Recent Labs     12/25/22 1838   PH 7.31*   PCO2 53*   PO2 231*         Medications Personally Reviewed:    Current Facility-Administered Medications   Medication Dose Route Frequency    albuterol-ipratropium (DUO-NEB) 2.5 MG-0.5 MG/3 ML  3 mL Nebulization BID RT    albuterol-ipratropium (DUO-NEB) 2.5 MG-0.5 MG/3 ML  3 mL Nebulization Q4H PRN    dilTIAZem IR (CARDIZEM) tablet 60 mg  60 mg Oral TIDAC diclofenac (VOLTAREN) 1 % topical gel 2 g  2 g Topical TID    methylPREDNISolone (PF) (SOLU-MEDROL) injection 40 mg  40 mg IntraVENous Q12H    guaiFENesin (ROBITUSSIN) 100 mg/5 mL oral liquid 600 mg  600 mg Per G Tube Q12H    levETIRAcetam (KEPPRA) tablet 1,250 mg  1,250 mg Oral BID    insulin glargine (LANTUS) injection 23 Units  23 Units SubCUTAneous QHS    piperacillin-tazobactam (ZOSYN) 3.375 g in 0.9% sodium chloride (MBP/ADV) 100 mL MBP  3.375 g IntraVENous Q8H    0.45% sodium chloride infusion  25 mL/hr IntraVENous CONTINUOUS    atorvastatin (LIPITOR) tablet 10 mg  10 mg Oral QHS    clopidogreL (PLAVIX) tablet 75 mg  75 mg Oral DAILY    multivitamin, tx-iron-ca-min (THERA-M w/ IRON) tablet 1 Tablet  1 Tablet Oral DAILY    alogliptin (NESINA) tablet 12.5 mg  12.5 mg Oral DAILY    aspirin chewable tablet 81 mg  81 mg Oral DAILY         Alcon Washington MD           Progress Note      12/28/2022 10:51 AM  NAME: Dread Domingo   MRN:  [id number]   Admit Diagnosis: Dysphagia [R13.10]      Problem List:   -Admitted to the hospital with shortness of breath  -History of throat cancer status post tracheostomy  -Wide-complex tachycardia  -Status post pacemaker insertion  -Hypertension  -Dyslipidemia  -Former smoker  -NIDDM  -COPD  -History of seizure disorder  -Renal disease status post nephrectomy     Assessment/Plan:   12/26/2022  -Patient was transferred to ICU this morning after he found unresponsive.  -At the time of my examination in the ICU at 10:30 AM he was awake respond to vocal command and denies any complaints.  -Telemetry shows no acute cardiac arrhythmias pauses or malignant arrhythmias  -Monitor in the ICU shows pacer spikes periodically indicating his pacemaker is working.  -Until proven otherwise it was a noncardiac event and firing evaluation for sleep apnea. -In the meantime I will order an echocardiogram and check pacemaker for any underlying cardiac event leading to the episode of syncope.   -This has been discussed with the niece present in the room. Dated 12/25/2022 as a follow-up from cardiology team  -Patient is lying comfortably in the bed and per nonverbal communication denies any symptoms  -Initial cardiology consult was invited secondary to shortness of breath and multiple coronary artery disease risk factors.  -Cardiac enzyme has been unremarkable during this admission.  -There is no interval change since patient is admitted to the hospital other than improved in breathing.  -Continue current conservative medical management with no further cardiovascular testing as it would not change my cardiac management.  -Our team will continue to follow while he is in the hospital along with the team         []       High complexity decision making was performed in this patient at high risk for decompensation with multiple organ involvement. Subjective:     80-year old -American male admitted to the hospital with shortness of breath. By reviewing the chart patient has no known established coronary artery disease and is status post permanent pacemaker insertion somewhere else the details of which is not available to us. I am following him as a cardiology team member as he is a still in the hospital. There is no interval change other than he has improved his breathing and otherwise clinically and hemodynamically stable lying comfortably in the bed. Based on my overall cardiac assessment no further cardiovascular testing is warranted and we will continue current conservative medical management.     Review of Systems:    Symptom Y/N Comments  Symptom Y/N Comments   Fever/Chills N   Chest Pain N    Poor Appetite N   Edema N    Cough N   Abdominal Pain N    Sputum N   Joint Pain N    SOB/LAMB N   Pruritis/Rash N    Nausea/vomit N   Tolerating PT/OT Y    Diarrhea N   Tolerating Diet Y    Constipation N   Other       Could NOT obtain due to:      Objective:      Physical Exam:    Last 24hrs VS reviewed since prior progress note. Most recent are:    Visit Vitals  BP (!) 174/101 (BP 1 Location: Left arm, BP Patient Position: At rest)   Pulse 85   Temp 97.6 °F (36.4 °C)   Resp 18   Ht 6' 0.01\" (1.829 m)   Wt 60 kg (132 lb 4.4 oz)   SpO2 99%   BMI 17.94 kg/m²       Intake/Output Summary (Last 24 hours) at 12/28/2022 1147  Last data filed at 12/28/2022 0931  Gross per 24 hour   Intake 1620 ml   Output 1400 ml   Net 220 ml          General Appearance: Well developed, well nourished, alert & oriented x 3,    no acute distress. Ears/Nose/Mouth/Throat: Hearing grossly normal.  Neck: Supple. Chest: Lungs clear to auscultation bilaterally. Cardiovascular: Regular rate and rhythm, S1S2 normal, no murmur. Abdomen: Soft, non-tender, bowel sounds are active. Extremities: No edema bilaterally. Skin: Warm and dry. []         Post-cath site without hematoma, bruit, tenderness, or thrill. Distal pulses intact. PMH/SH reviewed - no change compared to H&P    Data Review    Telemetry: normal sinus rhythm     EKG:   []  No new EKG for review  XR CHEST PORT   Final Result      No acute process on portable chest.   Pneumoperitoneum      This result was verbally relayed by me at 1916 hours to [place], RN   789         CT NECK CHEST WO CONT   Final Result   1. Status post total laryngectomy and tracheostomy creation. No definite   tracheoesophageal fistula on this Limited exam. Nasopharyngeal reflux of   contrast material and fluid/debris. Consider a dedicated fluoroscopic swallow   study with water-soluble contrast for more sensitive evaluation for subtle   fistula. 2.  Emphysema with scattered pleural parenchymal scarring and calcified   granulomas. Several calcified right hilar lymph nodes. XR CHEST PORT   Final Result      No acute findings.                Lab Data Personally Reviewed:    Recent Labs     12/25/22 2035   WBC 12.2*   HGB 11.4*   HCT 34.3*          No results for input(s): INR, PTP, APTT, INREXT, INREXT in the last 72 hours.    Recent Labs     12/26/22  0630 12/25/22 2035    136   K 4.4 4.4    103   CO2 25 27   BUN 23* 22*   CREA 1.02 1.04   * 327*   CA 9.5 9.0       Recent Labs     12/25/22 2035          Lab Results   Component Value Date/Time    Cholesterol, total 126 09/09/2016 10:38 AM    HDL Cholesterol 70 09/09/2016 10:38 AM    LDL, calculated 45 09/09/2016 10:38 AM    Triglyceride 56 09/09/2016 10:38 AM       Recent Labs     12/25/22 2035   *   TP 7.2   ALB 2.8*   GLOB 4.4*       Recent Labs     12/25/22 1838   PH 7.31*   PCO2 53*   PO2 231*         Medications Personally Reviewed:    Current Facility-Administered Medications   Medication Dose Route Frequency    albuterol-ipratropium (DUO-NEB) 2.5 MG-0.5 MG/3 ML  3 mL Nebulization BID RT    albuterol-ipratropium (DUO-NEB) 2.5 MG-0.5 MG/3 ML  3 mL Nebulization Q4H PRN    dilTIAZem IR (CARDIZEM) tablet 60 mg  60 mg Oral TIDAC    diclofenac (VOLTAREN) 1 % topical gel 2 g  2 g Topical TID    methylPREDNISolone (PF) (SOLU-MEDROL) injection 40 mg  40 mg IntraVENous Q12H    guaiFENesin (ROBITUSSIN) 100 mg/5 mL oral liquid 600 mg  600 mg Per G Tube Q12H    levETIRAcetam (KEPPRA) tablet 1,250 mg  1,250 mg Oral BID    insulin glargine (LANTUS) injection 23 Units  23 Units SubCUTAneous QHS    piperacillin-tazobactam (ZOSYN) 3.375 g in 0.9% sodium chloride (MBP/ADV) 100 mL MBP  3.375 g IntraVENous Q8H    0.45% sodium chloride infusion  25 mL/hr IntraVENous CONTINUOUS    atorvastatin (LIPITOR) tablet 10 mg  10 mg Oral QHS    clopidogreL (PLAVIX) tablet 75 mg  75 mg Oral DAILY    multivitamin, tx-iron-ca-min (THERA-M w/ IRON) tablet 1 Tablet  1 Tablet Oral DAILY    alogliptin (NESINA) tablet 12.5 mg  12.5 mg Oral DAILY    aspirin chewable tablet 81 mg  81 mg Oral DAILY         Vesna Young MD

## 2022-12-28 NOTE — RT PROTOCOL NOTE
RT Nebulizer Bronchodilator Protocol Note    There is a bronchodilator order in the chart from a provider indicating to follow the RT Bronchodilator Protocol and there is an Initiate RT Bronchodilator Protocol order as well (see protocol at bottom of note). CXR Findings:  No results found for this or any previous visit from the past 2 days. The findings from the last RT Protocol Assessment were as follows:  History of Pulmonary Disease: Chronic pulmonary disease  Respiratory Pattern: Regular pattern and RR 12-20 bpm  Breath Sounds: Slighly diminished and/or crackles  Cough: Strong, spontaneous, non-productive  Indication for Bronchodilator Therapy:    Bronchodilator Assessment Score: 4     Aerosolized bronchodilator medication orders have been revised according to the RT Nebulizer Bronchodilator Protocol below. Respiratory Therapist to perform RT Therapy Protocol Assessment initially then follow the protocol. Repeat RT Therapy Protocol Assessment PRN for score 0-3 or on second treatment, BID, and PRN for scores above 3. No Indications - adjust the frequency to every 6 hours PRN wheezing or bronchospasm, if no treatments needed after 48 hours then discontinue using Per Protocol order mode. If indication present, adjust the RT bronchodilator orders based on the Bronchodilator Assessment Score as indicated below. If a patient is on this medication at home then do not decrease Frequency below that used at home. 0-3 - enter or revise RT bronchodilator order(s) to equivalent RT Bronchodilator order with Frequency of every 4 hours PRN for wheezing or increased work of breathing using Per Protocol order mode. 4-6 - enter or revise RT Bronchodilator order(s) to two equivalent RT bronchodilator orders with one order with BID Frequency and one order with Frequency of every 4 hours PRN wheezing or increased work of breathing using Per Protocol order mode.          7-10 - enter or revise RT Bronchodilator order(s) to two equivalent RT bronchodilator orders with one order with TID Frequency and one order with Frequency of every 4 hours PRN wheezing or increased work of breathing using Per Protocol order mode. 11-13 - enter or revise RT Bronchodilator order(s) to one equivalent RT bronchodilator order with QID Frequency and an Albuterol order with Frequency of every 4 hours PRN wheezing or increased work of breathing using Per Protocol order mode. Greater than 13 - enter or revise RT Bronchodilator order(s) to one equivalent RT bronchodilator order with every 4 hours Frequency and an Albuterol order with Frequency of every 2 hours PRN wheezing or increased work of breathing using Per Protocol order mode. RT to enter RT Home Evaluation for COPD & MDI Assessment order using Per Protocol order mode.     Electronically signed by RT Noreen on 12/28/2022 at 6:25 AM

## 2022-12-28 NOTE — PROGRESS NOTES
Spoke with Dr. Tess Cook regarding pt's increased elevated blood sugar. Orders received to reduce pt's solu-medrol 40mg from BID to VANESSA. MD does not want to apply a sliding scale insulin at this time.

## 2022-12-28 NOTE — PROGRESS NOTES
Problem: Pressure Injury - Risk of  Goal: *Prevention of pressure injury  Description: Document Jabier Scale and appropriate interventions in the flowsheet. Outcome: Progressing Towards Goal  Note: Pressure Injury Interventions:  Sensory Interventions: Assess changes in LOC, Check visual cues for pain, Keep linens dry and wrinkle-free, Minimize linen layers    Moisture Interventions: Absorbent underpads, Apply protective barrier, creams and emollients, Check for incontinence Q2 hours and as needed, Internal/External urinary devices, Maintain skin hydration (lotion/cream), Minimize layers, Moisture barrier    Activity Interventions: Assess need for specialty bed, Pressure redistribution bed/mattress(bed type)    Mobility Interventions: HOB 30 degrees or less, Pressure redistribution bed/mattress (bed type)    Nutrition Interventions: Document food/fluid/supplement intake, Offer support with meals,snacks and hydration    Friction and Shear Interventions: Apply protective barrier, creams and emollients, HOB 30 degrees or less, Minimize layers                Problem: Falls - Risk of  Goal: *Absence of Falls  Description: Document Mikaela Fall Risk and appropriate interventions in the flowsheet.   Outcome: Progressing Towards Goal  Note: Fall Risk Interventions:  Mobility Interventions: Bed/chair exit alarm, Patient to call before getting OOB    Mentation Interventions: Adequate sleep, hydration, pain control, Bed/chair exit alarm, Door open when patient unattended, More frequent rounding, Reorient patient, Toileting rounds    Medication Interventions: Bed/chair exit alarm, Patient to call before getting OOB    Elimination Interventions: Bed/chair exit alarm, Call light in reach, Patient to call for help with toileting needs, Toileting schedule/hourly rounds    History of Falls Interventions: Bed/chair exit alarm, Door open when patient unattended

## 2022-12-28 NOTE — PROGRESS NOTES
Notified by ENT, request to assess for PO diet. Per ENT, no concerns for fistula at this time and reports patient was taken to OR for removal of mucus plug s/t respiratory distress. Bedside swallow evaluation performed. Rec puree/thin diet w/ slow rate of intake, liquid wash, 6 small meals, avoid breads, tough meats, rice and raw vegetables. He may benefit from esophagram to further assess esophageal phase of swallow. Suspect esophageal dysmotility and reflux. Please see documentation for full report.

## 2022-12-28 NOTE — PROGRESS NOTES
Patient brought into OR at 0422 by Dr. Lucía Cabello and  [facility] AT [facility]. Count and Timeouts waived due to the emergent nature of this case per policy. Procedure completed and patient taken to PACU at [phone].

## 2022-12-28 NOTE — PROGRESS NOTES
CM reviewed chart and spoke with patient's daughter, Shwetha Schultz ([phone]). Discharge disposition is TBD. PT will be assessing patient and giving recommendations for discharge. CM will continue to follow.

## 2022-12-28 NOTE — ANESTHESIA PREPROCEDURE EVALUATION
Relevant Problems   NEUROLOGY   (+) Seizure (HCC)      CARDIOVASCULAR   (+) Hypertension      ENDOCRINE   (+) Arthritis   (+) Diabetes mellitus (HCC)       Anesthetic History   No history of anesthetic complications            Review of Systems / Medical History  Patient summary reviewed, nursing notes reviewed and pertinent labs reviewed    Pulmonary                Comments: S/p laryngectomy with associated tracheostomy   Neuro/Psych   Within defined limits           Cardiovascular    Hypertension: poorly controlled          Hyperlipidemia         GI/Hepatic/Renal         Renal disease (Single Kidney)      Comments: Severe Dysphagia Endo/Other    Diabetes: type 2, using insulin    Arthritis and 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.)     Other Findings   Comments: Procedure Information    Case: [id number] Anesthesia Start Date/Time: 12/28/22 4776  Procedure: TRACHEOSTOMY REVISION (Neck)  Anesthesia type: General  Pre-op diagnosis: Trach revision  Location: [facility]  / [facility] MA[facility] OR  Surgeons: Shantell Smith MD      Medical History  Hypertension  Diabetes ([address]  Arthritis  Hypercholesterolemia  Single kidney  Throat cancer ([address]  Anemia           Physical Exam    Airway    TM Distance: 4 - 6 cm  Neck ROM: normal range of motion   Mouth opening: Normal  Tracheostomy present   Cardiovascular    Rhythm: regular  Rate: normal         Dental         Pulmonary  Breath sounds clear to auscultation               Abdominal  GI exam deferred       Other Findings            Anesthetic Plan    ASA: 3, emergent  Anesthesia type: general    Monitoring Plan: Continuous noninvasive hemodynamic monitoring      Induction: Intravenous      Emergency Consent Applied.  Attempted Life Saving Procedure

## 2022-12-28 NOTE — PROGRESS NOTES
SPEECH LANGUAGE PATHOLOGY BEDSIDE SWALLOW EVALUATIONS  Patient: David Mcintosh  (17 y.o. )  Date: 12/28/2022  Primary Diagnosis:  Dysphagia, severe protein malnutrition  Precautions:  Fall    ASSESSMENT :  Patient is A&Ox4 and follows basic commands. 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 Patient uses electrolarynx and writing for communication. He reports globus sensation w/ solids and belching food into oral and nasal cavity. He received EGD w/ food impaction and PEG was placed. Notified by ENT, request to assess for PO diet. Per ENT, no concerns for fistula at this time and reports patient was taken to OR for removal of mucus plug s/t respiratory distress. He is currently on 50% FIO2 10L/min w/ 100% O2 and denies distress. Patient tolerates thin and  puree w/ adequate oral manipulation and timely swallow. He denies globus sensation. Belching noted. No evidence of food/liquid leakage within stoma. Educated with demonstration of swallow strategies, encouraged patient to consume 6 smaller meal and remain upright 1 hour after PO. Given concerns for esophageal dysmotility and reflux he may benefit from esophagram.   Rec RD follow-up as patient prefers to consume PO and adjust TF as indicated. Patient will benefit from skilled intervention to address the above impairments. Patients rehabilitation potential is considered to be Fair     PLAN :  Recommendations and Planned Interventions:   Rec puree/thin diet w/ slow rate of intake, liquid wash, 6 small meals, avoid breads, tough meats, rice and raw vegetables. He may benefit from esophagram to further assess esophageal phase of swallow. Suspect esophageal dysmotility and reflux. Frequency/Duration: Patient will be followed by speech-language pathology 1 time a week to address goals.   Discharge Recommendations: Skilled Nursing Facility     SUBJECTIVE:   Patient reports was having pharyngoesophageal globus sensation and reflux worse with solids at home. States he wants to eat and does not want PEG. Patient states he hasn't been out of bed since admission and is worried he is week. OBJECTIVE:   Patient admitted w/ dysphagia reports food coming out of mouth and nose. Notified by ENT, request to assess for PO diet. Per ENT, no concerns for fistula at this time and reports patient was taken to OR for removal of mucus plug s/t respiratory distress. Past Medical History:   Diagnosis Date    Anemia     Arthritis     Diabetes ([facility] [address]     Hypercholesterolemia     Hypertension     Single kidney     Cr normal    Throat cancer ([facility] [address] 2008    s/p laryngectomy             Diet prior to admission: Soft/thin  Current Diet:  DIET ADULT TUBE FEEDING  DIET ADULT TUBE FEEDING  DIET ADULT     Cognitive and Communication Status:  Neurologic State: Alert  Orientation Level: Oriented X4  Cognition: Follows commands, Memory loss  Perception: Appears intact  Perseveration: No perseveration noted  Safety/Judgement: Decreased insight into deficits, Decreased awareness of environment  Swallowing Evaluation:   Oral Assessment:  Oral Assessment  Labial: No impairment  Dentition: Edentulous  Oral Hygiene: wfl  Lingual: No impairment  Velum: No impairment  Mandible: No impairment  P.O. Trials:  Patient Position: upright in bed  Vocal quality prior to P.O.: Aphonic (s/t laryngectomy uses electrolarynx and writing)  Consistency Presented:  Thin liquid;Puree  How Presented: Self-fed/presented;SLP-fed/presented;Cup/sip;Straw;Successive swallows     Bolus Acceptance: No impairment  Bolus Formation/Control: No impairment     Propulsion: No impairment  Oral Residue: None  Initiation of Swallow: No impairment                      Oral Phase Severity: No impairment  Pharyngeal Phase Severity : No impairment  Voice:                 Vocal Quality: Aphonic (s/t laryngectomy uses electrolarynx and writing)                               Pain:  Pain Scale 1: Numeric (0 - 10)  Pain Intensity 1: 0         After treatment:   Patient left in no apparent distress in bed, Call bell within reach, and Nursing notified    COMMUNICATION/EDUCATION:   Patient was educated regarding swallow strategies, GERD diet, and ST POC. He demonstrated Fair understanding as evidenced by engagement w/ reduced recall. The patient's plan of care including recommendations, planned interventions, and recommended diet changes were discussed with: Physical therapist, Registered nurse, and Physician. Patient/family have participated as able in goal setting and plan of care. Patient/family agree to work toward stated goals and plan of care. Problem: Dysphagia (Adult)  Goal: *Acute Goals and Plan of Care (Insert Text)  Description: Speech Therapy Swallow Goals  Initiated 12/28/2022  -Patient will tolerate puree diet with thin liquids without clinical indicators of aspiration given minimal cues within 7 day(s). -Patient will tolerate PO trials without clinical indicators of aspiration given minimal cues within 7 day(s). -Patient will demonstrate understanding of swallow safety precautions and aspiration precautions, diet recs with minimal cues within 7 day(s).          -Patient goal: to eat  Outcome: Not Met     Thank you for this referral.  Nichole Araujo M.S., M.Ed., CCC-SLP  Time Calculation: 29 mins

## 2022-12-28 NOTE — PROGRESS NOTES
Pt requested suctioning. Removed large, moist, thick, white/bloody mucus plug from the outside of pt's laryngeal stoma. Pt resting comfortable. No SOB or acute distress noted.

## 2022-12-28 NOTE — PROGRESS NOTES
PROGRESS NOTE      Subjective: Events this am reviewed and d/w nurse. Patiet was hypertensive, hypoxic, had to be taken to the operating room for removal of foreign body from the trachea.  Looks better now and on trach collar oxygen     Visit Vitals  BP (!) 174/101 (BP 1 Location: Left arm, BP Patient Position: At rest)   Pulse 85   Temp 97.6 °F (36.4 °C)   Resp 18   Ht 6' 0.01\" (1.829 m)   Wt 60 kg (132 lb 4.4 oz)   SpO2 99%   BMI 17.94 kg/m²       Current Facility-Administered Medications:     albuterol-ipratropium (DUO-NEB) 2.5 MG-0.5 MG/3 ML, 3 mL, Nebulization, BID RT, Magdalena Jennings MD, 3 mL at 12/28/22 0724    albuterol-ipratropium (DUO-NEB) 2.5 MG-0.5 MG/3 ML, 3 mL, Nebulization, Q4H PRN, Sharon Luna MD    diclofenac (VOLTAREN) 1 % topical gel 2 g, 2 g, Topical, TID, Jihan Sandra MD, 2 g at 12/28/22 1021    methylPREDNISolone (PF) (SOLU-MEDROL) injection 40 mg, 40 mg, IntraVENous, Q12H, Jihan Sandra MD, 40 mg at 12/28/22 1020    guaiFENesin (ROBITUSSIN) 100 mg/5 mL oral liquid 600 mg, 600 mg, Per G Tube, Q12H, George SCOTT MD, 600 mg at 12/28/22 1020    levETIRAcetam (KEPPRA) tablet 1,250 mg, 1,250 mg, Oral, BID, Jihan Sandra MD, 1,250 mg at 12/28/22 1020    insulin glargine (LANTUS) injection 23 Units, 23 Units, SubCUTAneous, QHS, Sharon Luna MD, 23 Units at 12/27/22 2307    [COMPLETED] piperacillin-tazobactam (ZOSYN) 4.5 g in 0.9% sodium chloride (MBP/ADV) 100 mL MBP, 4.5 g, IntraVENous, ONCE, Last Rate: 200 mL/hr at 12/26/22 0001, 4.5 g at 12/26/22 0001 **FOLLOWED BY** piperacillin-tazobactam (ZOSYN) 3.375 g in 0.9% sodium chloride (MBP/ADV) 100 mL MBP, 3.375 g, IntraVENous, Q8H, Balbir Connors MD, Last Rate: 25 mL/hr at 12/28/22 1108, 3.375 g at 12/28/22 1108    dilTIAZem IR (CARDIZEM) tablet 30 mg, 30 mg, Oral, TIDAC, Loren Fofana MD, 30 mg at 12/28/22 1130    0.45% sodium chloride infusion, 25 mL/hr, IntraVENous, CONTINUOUS, Meghan Gracia MD, Last Rate: 25 mL/hr at 12/27/22 1117, 25 mL/hr at 12/27/22 1117    atorvastatin (LIPITOR) tablet 10 mg, 10 mg, Oral, QHS, Arthur SCOTT MD, 10 mg at 12/27/22 2105    clopidogreL (PLAVIX) tablet 75 mg, 75 mg, Oral, DAILY, Asha Lainez MD, 75 mg at 12/28/22 1020    multivitamin, tx-iron-ca-min (THERA-M w/ IRON) tablet 1 Tablet, 1 Tablet, Oral, DAILY, Asha Lainez MD, 1 Tablet at 12/28/22 0854    alogliptin (NESINA) tablet 12.5 mg, 12.5 mg, Oral, DAILY, Asha Lainez MD, 12.5 mg at 12/28/22 1020    aspirin chewable tablet 81 mg, 81 mg, Oral, DAILY, Asha Lainez MD, 81 mg at 12/28/22 1020  Recent Results (from the past 24 hour(s))   GLUCOSE, POC    Collection Time: 12/27/22 11:38 AM   Result Value Ref Range    Glucose (POC) 191 (H) 65 - 100 mg/dL    Performed by Brinda Rausch    ECHO ADULT COMPLETE    Collection Time: 12/27/22  3:10 PM   Result Value Ref Range    AV Peak Velocity 1.7 m/s    AV Peak Gradient 11 mmHg    Aortic Root 3.4 cm    IVSd 1.3 (A) 0.6 - 1.0 cm    LVIDd 4.8 4.2 - 5.9 cm    LVIDs 3.3 cm    LVOT Peak Velocity 1.1 m/s    LVOT Peak Gradient 5 mmHg    LVPWd 1.1 (A) 0.6 - 1.0 cm    LV E' Lateral Velocity 7 cm/s    LV E' Septal Velocity 6 cm/s    LA Diameter 3.4 cm    MV E Wave Deceleration Time 320.0 ms    MV A Velocity 1.17 m/s    MV E Velocity 0.74 m/s    PV Max Velocity 0.9 m/s    PV Peak Gradient 3 mmHg    Est. RA Pressure 3 mmHg    RVIDd 3.0 cm    TR Max Velocity 2.63 m/s    TR Peak Gradient 28 mmHg    Fractional Shortening 2D 31 28 - 44 %    LVIDd Index 2.68 cm/m2    LVIDs Index 1.84 cm/m2    LV RWT Ratio 0.46     LV Mass 2D 219.1 88 - 224 g    LV Mass 2D Index 122.4 (A) 49 - 115 g/m2    MV E/A 0.63     E/E' Ratio (Averaged) 11.45     E/E' Lateral 10.57     E/E' Septal 12.33     LA Size Index 1.90 cm/m2    LA/AO Root Ratio 1.00     Ao Root Index 1.90 cm/m2    AV Velocity Ratio 0.65     RVSP 31 mmHg    EF BP 60 55 - 100 %   GLUCOSE, POC    Collection Time: 12/27/22 11:13 PM   Result Value Ref Range    Glucose (POC) 158 (H) 65 - 100 mg/dL    Performed by Patricia Zayas 1950, POC    Collection Time: 12/28/22 12:52 AM   Result Value Ref Range    Glucose (POC) 189 (H) 65 - 100 mg/dL    Performed by Yanira Brannon    GLUCOSE, POC    Collection Time: 12/28/22  6:18 AM   Result Value Ref Range    Glucose (POC) 254 (H) 65 - 100 mg/dL    Performed by Yanira Brannon      XR CHEST PORT   Final Result      No acute process on portable chest.   Pneumoperitoneum      This result was verbally relayed by me at 1916 hours to [place] [id number]         CT NECK CHEST WO CONT   Final Result   1. Status post total laryngectomy and tracheostomy creation. No definite   tracheoesophageal fistula on this Limited exam. Nasopharyngeal reflux of   contrast material and fluid/debris. Consider a dedicated fluoroscopic swallow   study with water-soluble contrast for more sensitive evaluation for subtle   fistula. 2.  Emphysema with scattered pleural parenchymal scarring and calcified   granulomas. Several calcified right hilar lymph nodes. XR CHEST PORT   Final Result      No acute findings. HEENT: NC/AT anicteric sclera. Neck: Foreign body removed from the stoma site. trach collar in place. Lungs: Clear anteriorly  Heart: Regular  Abdomen: Soft positive bowel sounds PEG tube in place. Lower Extremities: No edema. Upper extremity swelling and stiffness in the wrist.  CNS: Awake comfortable now. Uses assistive devices to talk. Psych: Cooperative. Assessment: #1. Acute hypoxic respiratory failure transient due to aspiration of stomach contents/aspiration pneumonitis  #2. Wide-complex tachycardia  #3. Hypertension  #4. Diabetes  #5. 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 History of nephrectomy  #8. Dysphonia/dysphagia  #9. Acute respiratory distress from tracheostomy stoma plugging now status post removal by ENT.      Plan: Frequent suctioning and cleaning. Continue steroid and antibiotics for now. Aspiration precautions. Feeding through the tube. .  Physical therapy when more stable. Appreciate consultant input.

## 2022-12-28 NOTE — PERIOP NOTES
TRANSFER - OUT REPORT:    Verbal report given to CHUY Blackmon(name) on Enedelia Nichols  being transferred to [facility](unit) for routine post - op       Report consisted of patients Situation, Background, Assessment and   Recommendations(SBAR). Information from the following report(s) SBAR, Procedure Summary, Intake/Output, and MAR was reviewed with the receiving nurse. Opportunity for questions and clarification was provided.       Patient transported with:   Registered Nurse

## 2022-12-29 LAB
GLUCOSE BLD STRIP.AUTO-MCNC: 127 MG/DL (ref 65–100)
GLUCOSE BLD STRIP.AUTO-MCNC: 132 MG/DL (ref 65–100)
GLUCOSE BLD STRIP.AUTO-MCNC: 145 MG/DL (ref 65–100)
GLUCOSE BLD STRIP.AUTO-MCNC: 151 MG/DL (ref 65–100)
GLUCOSE BLD STRIP.AUTO-MCNC: 246 MG/DL (ref 65–100)
GLUCOSE BLD STRIP.AUTO-MCNC: 261 MG/DL (ref 65–100)
PERFORMED BY, TECHID: ABNORMAL

## 2022-12-29 PROCEDURE — 74011250636 HC RX REV CODE- 250/636: Performed by: INTERNAL MEDICINE

## 2022-12-29 PROCEDURE — 74011250637 HC RX REV CODE- 250/637: Performed by: INTERNAL MEDICINE

## 2022-12-29 PROCEDURE — 74011000250 HC RX REV CODE- 250: Performed by: OTOLARYNGOLOGY

## 2022-12-29 PROCEDURE — 74011000258 HC RX REV CODE- 258: Performed by: INTERNAL MEDICINE

## 2022-12-29 PROCEDURE — 94640 AIRWAY INHALATION TREATMENT: CPT

## 2022-12-29 PROCEDURE — 97110 THERAPEUTIC EXERCISES: CPT

## 2022-12-29 PROCEDURE — 99232 SBSQ HOSP IP/OBS MODERATE 35: CPT | Performed by: OTOLARYNGOLOGY

## 2022-12-29 PROCEDURE — 74011636637 HC RX REV CODE- 636/637: Performed by: INTERNAL MEDICINE

## 2022-12-29 PROCEDURE — 74011000250 HC RX REV CODE- 250: Performed by: INTERNAL MEDICINE

## 2022-12-29 PROCEDURE — 82962 GLUCOSE BLOOD TEST: CPT

## 2022-12-29 PROCEDURE — 94761 N-INVAS EAR/PLS OXIMETRY MLT: CPT

## 2022-12-29 PROCEDURE — 97116 GAIT TRAINING THERAPY: CPT

## 2022-12-29 PROCEDURE — 77010033711 HC HIGH FLOW OXYGEN

## 2022-12-29 PROCEDURE — 65270000029 HC RM PRIVATE

## 2022-12-29 RX ADMIN — INSULIN GLARGINE 23 UNITS: 100 INJECTION, SOLUTION SUBCUTANEOUS at 22:34

## 2022-12-29 RX ADMIN — PIPERACILLIN AND TAZOBACTAM 3.38 G: 3; .375 INJECTION, POWDER, FOR SOLUTION INTRAVENOUS at 20:17

## 2022-12-29 RX ADMIN — IPRATROPIUM BROMIDE AND ALBUTEROL SULFATE 3 ML: 2.5; .5 SOLUTION RESPIRATORY (INHALATION) at 21:06

## 2022-12-29 RX ADMIN — DICLOFENAC SODIUM 2 G: 10 GEL TOPICAL at 16:34

## 2022-12-29 RX ADMIN — ASPIRIN 81 MG CHEWABLE TABLET 81 MG: 81 TABLET CHEWABLE at 09:44

## 2022-12-29 RX ADMIN — PIPERACILLIN AND TAZOBACTAM 3.38 G: 3; .375 INJECTION, POWDER, FOR SOLUTION INTRAVENOUS at 13:36

## 2022-12-29 RX ADMIN — PIPERACILLIN AND TAZOBACTAM 3.38 G: 3; .375 INJECTION, POWDER, FOR SOLUTION INTRAVENOUS at 04:56

## 2022-12-29 RX ADMIN — DICLOFENAC SODIUM 2 G: 10 GEL TOPICAL at 09:46

## 2022-12-29 RX ADMIN — SODIUM CHLORIDE 25 ML/HR: 4.5 INJECTION, SOLUTION INTRAVENOUS at 13:36

## 2022-12-29 RX ADMIN — IPRATROPIUM BROMIDE AND ALBUTEROL SULFATE 3 ML: 2.5; .5 SOLUTION RESPIRATORY (INHALATION) at 07:54

## 2022-12-29 RX ADMIN — ALOGLIPTIN 12.5 MG: 12.5 TABLET, FILM COATED ORAL at 09:46

## 2022-12-29 RX ADMIN — LEVETIRACETAM 1250 MG: 250 TABLET, FILM COATED ORAL at 22:11

## 2022-12-29 RX ADMIN — GUAIFENESIN 600 MG: 100 SOLUTION ORAL at 09:44

## 2022-12-29 RX ADMIN — GUAIFENESIN 600 MG: 100 SOLUTION ORAL at 22:11

## 2022-12-29 RX ADMIN — Medication 1 TABLET: at 09:44

## 2022-12-29 RX ADMIN — ATORVASTATIN CALCIUM 10 MG: 10 TABLET, FILM COATED ORAL at 22:11

## 2022-12-29 RX ADMIN — CLOPIDOGREL BISULFATE 75 MG: 75 TABLET ORAL at 09:45

## 2022-12-29 RX ADMIN — DILTIAZEM HYDROCHLORIDE 60 MG: 30 TABLET, FILM COATED ORAL at 13:36

## 2022-12-29 RX ADMIN — DILTIAZEM HYDROCHLORIDE 60 MG: 30 TABLET, FILM COATED ORAL at 16:33

## 2022-12-29 RX ADMIN — LEVETIRACETAM 1250 MG: 250 TABLET, FILM COATED ORAL at 09:45

## 2022-12-29 RX ADMIN — DILTIAZEM HYDROCHLORIDE 60 MG: 30 TABLET, FILM COATED ORAL at 09:45

## 2022-12-29 RX ADMIN — DICLOFENAC SODIUM 2 G: 10 GEL TOPICAL at 22:12

## 2022-12-29 NOTE — PROGRESS NOTES
Comprehensive Nutrition Assessment    Type and Reason for Visit: Reassess (Interim)    Nutrition Recommendations/Plan:   Continue TF via PEG of Jevity 1.5 bolus at 240ml 5x/d   Flush with 200ml water after each feed  Provides 1800 kcals (97%), 77 g pro (104%), and 1912 ml fluids (100%)   3. Monitor and document GRVs, flushes, and Bms in I/Os     Malnutrition Assessment:  Malnutrition Status:  Severe malnutrition (12/29/22 1408)    Context:  Acute illness     Findings of the 6 clinical characteristics of malnutrition:   Energy Intake:  No significant decrease in energy intake  Weight Loss:  Greater than 7.5% over 3 months     Body Fat Loss:  No significant body fat loss,     Muscle Mass Loss: Moderate muscle mass loss, Scapula (trapezius), Thigh (quadriceps), Temples (temporalis), Clavicles (pectoralis & deltoids), Calf  Fluid Accumulation:  No significant fluid accumulation,     Strength:  Not performed     Nutrition Assessment:    Admitted for dysphagia and PEG tube placement, completed 12/23. RD now consulted for TF recs. Inable to get much reports from pt d/t malfunction from assistive voice machine. Family in room provided much of hx, endorsed she noticed pt unable to eat a few days pta, however unsure how long it was going on overall. Severe wt loss noted from UBW to current RD obtained bed wt. TF recs above, also educated family. Labs grossly unremarkable. (12/29) TF stopped, ate 100% pureed food. If pt cont to tolerate food PO and intakes remain >76%, bolus feed needs will be adjusted prn. Will cnt to provide bolus recs per MD. Cnt current bolus regimen. Meds: Gluc 132, H/H: 11.4/34. 3. Meds: Nesina, plavix, MVI, zosyn. Nutrition Related Findings:    NFPE finding mod-severe wasting. PEG in place. Per SLP 12/29, rec's pureed/thin. No N/V/D/C, last BM 12/27. LUE 3+ edema, RUE trace edema.  Wound Type: Surgical incision (abd, peg)    Current Nutrition Intake & Therapies:  Average Meal Intake: %  Average Supplement Intake: None ordered  ADULT TUBE FEEDING PEG; Diabetic; Delivery Method: Bolus; Bolus Frequency: 4 Times Daily; Bolus Volume (mL): 200; Water Flush Volume (mL): 30; Water Flush Frequency: Q 4 hours    Anthropometric Measures:  Height: 6' 0.01\" (182.9 cm)  Ideal Body Weight (IBW): 178 lbs (81 kg)     Current Body Wt:  50.3 kg (110 lb 14.3 oz), 62.3 % IBW. Bed scale  Current BMI (kg/m2): 15  Usual Body Weight: 63.5 kg (140 lb) (per EMR review)  % Weight Change (Calculated): -20.8  Weight Adjustment: No adjustment                 BMI Category: Underweight (BMI less than 22) age over 72    Estimated Daily Nutrient Needs:  Energy Requirements Based On: Formula  Weight Used for Energy Requirements: Usual  Energy (kcal/day): 1856-1989kcals (MSJx1.4-1. 5AF)  Weight Used for Protein Requirements: Usual  Protein (g/day): 73g/day (1.2g/kg)  Method Used for Fluid Requirements: 1 ml/kcal  Fluid (ml/day): 1856-1989mL    Nutrition Diagnosis:   Severe malnutrition, In context of acute illness or injury related to swallowing difficulty, altered GI structure as evidenced by Criteria as identified in malnutrition assessment    Nutrition Interventions:   Food and/or Nutrient Delivery: Continue current diet, Continue tube feeding  Nutrition Education/Counseling: Education completed (home TF instructions)  Coordination of Nutrition Care: Continue to monitor while inpatient, Swallow evaluation, Coordination of care  Plan of Care discussed with: Pt, RN, and family    Goals:  Previous Goal Met: Goal(s) achieved  Goals: by next RD assessment, Meet at least 75% of estimated needs, Tolerate nutrition support at goal rate     Nutrition Monitoring and Evaluation:   Behavioral-Environmental Outcomes: Knowledge or skill (Home TF)  Food/Nutrient Intake Outcomes: Diet advancement/tolerance, Food and nutrient intake, Enteral nutrition intake/tolerance  Physical Signs/Symptoms Outcomes: Weight, Skin    Discharge Planning: Enteral nutrition, Continue current diet    Maksim Chu  Contact: [phone]

## 2022-12-29 NOTE — PROGRESS NOTES
PROGRESS NOTE      Subjective: Uneventful night. On trach collar humidified oxygen. Ostomy site shows some bright red blood. No chest pain or shortness of breath.      Visit Vitals  BP (!) 115/59 (BP 1 Location: Left lower arm, BP Patient Position: Lying)   Pulse 64   Temp 98.6 °F (37 °C)   Resp 20   Ht 6' 0.01\" (1.829 m)   Wt 60 kg (132 lb 4.4 oz)   SpO2 100%   BMI 17.94 kg/m²       Current Facility-Administered Medications:     albuterol-ipratropium (DUO-NEB) 2.5 MG-0.5 MG/3 ML, 3 mL, Nebulization, BID RT, Cristi Dale MD, 3 mL at 12/29/22 0754    albuterol-ipratropium (DUO-NEB) 2.5 MG-0.5 MG/3 ML, 3 mL, Nebulization, Q4H PRN, Bonita Calderón MD    dilTIAZem IR (CARDIZEM) tablet 60 mg, 60 mg, Oral, TIDAC, David DAVILA MD, 60 mg at 12/29/22 0945    diclofenac (VOLTAREN) 1 % topical gel 2 g, 2 g, Topical, TID, David DAVILA MD, 2 g at 12/29/22 0946    guaiFENesin (ROBITUSSIN) 100 mg/5 mL oral liquid 600 mg, 600 mg, Per G Tube, Q12H, Washington Villalobos MD, 600 mg at 12/29/22 0944    levETIRAcetam (KEPPRA) tablet 1,250 mg, 1,250 mg, Oral, BID, David DAVILA MD, 1,250 mg at 12/29/22 0945    insulin glargine (LANTUS) injection 23 Units, 23 Units, SubCUTAneous, QHS, Washington Villalobos MD, 23 Units at 12/28/22 2321    [COMPLETED] piperacillin-tazobactam (ZOSYN) 4.5 g in 0.9% sodium chloride (MBP/ADV) 100 mL MBP, 4.5 g, IntraVENous, ONCE, Last Rate: 200 mL/hr at 12/26/22 0001, 4.5 g at 12/26/22 0001 **FOLLOWED BY** piperacillin-tazobactam (ZOSYN) 3.375 g in 0.9% sodium chloride (MBP/ADV) 100 mL MBP, 3.375 g, IntraVENous, Q8H, Lewis Connors MD, Last Rate: 25 mL/hr at 12/29/22 0456, 3.375 g at 12/29/22 0456    0.45% sodium chloride infusion, 25 mL/hr, IntraVENous, CONTINUOUS, David DAVILA MD, Last Rate: 25 mL/hr at 12/27/22 1117, 25 mL/hr at 12/27/22 1117    atorvastatin (LIPITOR) tablet 10 mg, 10 mg, Oral, QHS, Jose Enrique SCOTT MD, 10 mg at 12/28/22 2244    clopidogreL (PLAVIX) tablet 75 mg, 75 mg, Oral, DAILY, Consuelo Castro MD, 75 mg at 12/29/22 0945    multivitamin, tx-iron-ca-min (THERA-M w/ IRON) tablet 1 Tablet, 1 Tablet, Oral, DAILY, Consuelo Castro MD, 1 Tablet at 12/29/22 0944    alogliptin (NESINA) tablet 12.5 mg, 12.5 mg, Oral, DAILY, Consuelo Castro MD, 12.5 mg at 12/29/22 7455    aspirin chewable tablet 81 mg, 81 mg, Oral, DAILY, Consuelo Castro MD, 81 mg at 12/29/22 5173  Recent Results (from the past 24 hour(s))   GLUCOSE, POC    Collection Time: 12/28/22  5:40 PM   Result Value Ref Range    Glucose (POC) 282 (H) 65 - 100 mg/dL    Performed by Robinson Mitchell (Float [place])    GLUCOSE, POC    Collection Time: 12/29/22 12:57 AM   Result Value Ref Range    Glucose (POC) 261 (H) 65 - 100 mg/dL    Performed by Titus Dela Cruz (PCT)    GLUCOSE, POC    Collection Time: 12/29/22  6:49 AM   Result Value Ref Range    Glucose (POC) 127 (H) 65 - 100 mg/dL    Performed by Patricia Prakash Zayas 1950, POC    Collection Time: 12/29/22  7:40 AM   Result Value Ref Range    Glucose (POC) 132 (H) 65 - 100 mg/dL    Performed by [id number], POC    Collection Time: 12/29/22 11:32 AM   Result Value Ref Range    Glucose (POC) 145 (H) 65 - 100 mg/dL    Performed by [address], POC    Collection Time: 12/29/22 11:43 AM   Result Value Ref Range    Glucose (POC) 151 (H) 65 - 100 mg/dL    Performed by ADOLFO FALL      XR CHEST PORT   Final Result      No acute process on portable chest.   Pneumoperitoneum      This result was verbally relayed by me at 1916 hours to [address]         CT NECK CHEST WO CONT   Final Result   1. Status post total laryngectomy and tracheostomy creation. No definite   tracheoesophageal fistula on this Limited exam. Nasopharyngeal reflux of   contrast material and fluid/debris. Consider a dedicated fluoroscopic swallow   study with water-soluble contrast for more sensitive evaluation for subtle   fistula.    2.  Emphysema with scattered pleural parenchymal scarring and calcified   granulomas. Several calcified right hilar lymph nodes. XR CHEST PORT   Final Result      No acute findings. HEENT: Normocephalic atraumatic. Neck: Ostomy opening clear with some bright red blood  Lungs: Generally clear no wheeze or crackles  Heart: Regular  Abdomen: Soft positive bowel sounds. PEG tube in place. Lower Extremities: No edema. Upper extremity wrist stiffness left edema improving. CNS: Alert and oriented. Dysphonia. He nodes right now to questions. Psych: Cooperative. Assessment: #1. Acute hypoxic respiratory failure transient due to aspiration of stomach contents/aspiration pneumonitis    #2. Wide-complex tachycardia    #3. Hypertension    #4. Diabetes    #5. 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 History of nephrectomy    #8. Dysphonia/dysphagia    #9. Acute respiratory distress from tracheostomy stoma plugging now status post removal by ENT. Plan: Physical therapy. Continue tube feeding for now. We will reassess swallow at later date. Patient will need skilled nursing facility for rehab.

## 2022-12-29 NOTE — PROGRESS NOTES
PHYSICAL THERAPY TREATMENT  Patient: Gladis Bowman (89 y.o. male)  Date: 12/29/2022  Diagnosis: Dysphagia [R13.10] <principal problem not specified>  Procedure(s) (LRB):  TRACHEOSTOMY REVISION (N/A) 1 Day Post-Op  Precautions:    Chart, physical therapy assessment, plan of care and goals were reviewed. ASSESSMENT  Patient continues with skilled PT services and is progressing towards goals. Pt supine in bed upon PT arrival, agreeable to session. (See below for objective details and assist levels). Overall pt tolerated session good today. Patient overall limited by high flow trach tube though out entire session. Patient came to EOB and stood to RW where patient took 4 side steps to FOB and back to [facility] demonstrating steady step through gait pattern with KAY decreased stride length. Patient then performed standing marches at [facility]. Patient then performed seated TE ( see details below). Patient then returned to supine in bed with call bell within reach and all needs meet. Current Level of Function Impacting Discharge (mobility/balance): impaired balance, general weakness, poor activity tolerance    Other factors to consider for discharge: PLOF, assistance at home, level of deficits, acute medical state         PLAN :  Patient continues to benefit from skilled intervention to address the above impairments. Continue treatment per established plan of care to address goals.   Recommendation for discharge: (in order for the patient to meet his/her long term goals)  [address]     This discharge recommendation:  Has been made in collaboration with the attending provider and/or case management    IF patient discharges home will need the following DME: to be determined (TBD)       SUBJECTIVE:   Patient nonverbal    OBJECTIVE DATA SUMMARY:   Critical Behavior:  Neurologic State: Alert  Orientation Level: Oriented X4  Cognition: Follows commands  Safety/Judgement: Decreased insight into deficits, Decreased awareness of environment  Functional Mobility Training:  Bed Mobility:  Rolling: Minimum assistance  Supine to Sit: Minimum assistance  Sit to Supine: Minimum assistance  Scooting: Stand-by assistance; Additional time  Transfers:  Sit to Stand: Minimum assistance  Stand to Sit: Contact guard assistance  Balance:  Sitting: Intact; With support  Sitting - Static: Good (unsupported)  Sitting - Dynamic: Fair (occasional)  Standing: Impaired; With support  Standing - Static: Good;Constant support  Standing - Dynamic : Fair;Constant support  Ambulation/Gait Training:  Distance (ft): 8 Feet (ft)  Assistive Device: Gait belt;Walker, rolling  Ambulation - Level of Assistance: Contact guard assistance  Base of Support: Narrowed  Speed/Alma: Slow  Step Length: Right shortened;Left shortened    Therapeutic Exercises:   1x15 AP  1x15 LAQ  1x15 hip ADD/ABD  1x20 standing marches     Pain Rating:  No pain reported during session    Activity Tolerance:   Fair and requires rest breaks    After treatment patient left in no apparent distress:   Bed locked and returned to lowest position, Supine in bed, Call bell within reach, Bed / chair alarm activated, and Side rails x 3      COMMUNICATION/COLLABORATION:   The patients plan of care was discussed with: Registered nurse.      GOALS:    Problem: Mobility Impaired (Adult and Pediatric)  Goal: *Acute Goals and Plan of Care (Insert Text)  Description: I with LE HEP x7 days  Supine to sit EOB with mod I x7 days  Sit to stand with SBAx1 x7 days  Stand pivot from bed to chair x7 days  Amb 15-50ft with RW and CGAx1 x7 days    Pt stated goal: to get stronger  Outcome: Progressing Towards Goal       Jenniffer Garvin PTA, PT   Time Calculation: 36 mins

## 2022-12-29 NOTE — PROGRESS NOTES
Problem: Pressure Injury - Risk of  Goal: *Prevention of pressure injury  Description: Document Jabier Scale and appropriate interventions in the flowsheet. Outcome: Progressing Towards Goal  Note: Pressure Injury Interventions:  Sensory Interventions: Assess changes in LOC, Avoid rigorous massage over bony prominences, Check visual cues for pain, Keep linens dry and wrinkle-free, Minimize linen layers    Moisture Interventions: Absorbent underpads, Apply protective barrier, creams and emollients, Check for incontinence Q2 hours and as needed, Internal/External urinary devices, Maintain skin hydration (lotion/cream), Minimize layers, Moisture barrier    Activity Interventions: Assess need for specialty bed, Pressure redistribution bed/mattress(bed type)    Mobility Interventions: HOB 30 degrees or less, Pressure redistribution bed/mattress (bed type)    Nutrition Interventions: Document food/fluid/supplement intake    Friction and Shear Interventions: Apply protective barrier, creams and emollients, HOB 30 degrees or less                Problem: Falls - Risk of  Goal: *Absence of Falls  Description: Document Mikaela Fall Risk and appropriate interventions in the flowsheet.   Outcome: Progressing Towards Goal  Note: Fall Risk Interventions:  Mobility Interventions: Bed/chair exit alarm, Patient to call before getting OOB    Mentation Interventions: Adequate sleep, hydration, pain control, Bed/chair exit alarm, Door open when patient unattended, More frequent rounding, Toileting rounds    Medication Interventions: Bed/chair exit alarm, Patient to call before getting OOB    Elimination Interventions: Bed/chair exit alarm, Call light in reach, Patient to call for help with toileting needs    History of Falls Interventions: Bed/chair exit alarm         Problem: Patient Education: Go to Patient Education Activity  Goal: Patient/Family Education  Outcome: Progressing Towards Goal     Problem: Patient Education: Go to Patient Education Activity  Goal: Patient/Family Education  Outcome: Progressing Towards Goal

## 2022-12-29 NOTE — PROGRESS NOTES
CM reviewed chart. PT/OT recommending IRF. CM spoke with patient's daughter, Lm Aranda ([phone]) who gave choice for [facility] IRF. CM also got choices for [facility], [address] [place] [address]) in the event that patient's insurance denies auth for IRF. Choice letter completed and referrals sent via [address] [place] [address],[facility]. CM will continue to follow.

## 2022-12-29 NOTE — PROCEDURES
Procedure Note     Patient:Beltran Guan  YOB: 1935  MRN: [id number]       Date of Procedure: 12/28/2022    Pre-Op Diagnosis:  Laryngectomy stoma occlusion     Post-Op Diagnosis: Same as preoperative diagnosis. Procedure(s): Removal of laryngectomy stoma occlusion, trachescopy     Surgeon(s): Saloni Laguerre MD      Anesthesia: None       Estimated Blood Loss (mL):  Minimal     Complications: None     Specimens: None     Findings: Laryngectomy stoma widely open but completely occluded with firm hard debris. Removed large 2 inch mucus plug     Indications:  Pt has longstanding laryngectomy with acute respiratory distress due to mucus plugging of stoma      Description of Procedure:      Patient seen on the floor but due to inadequate supplies and difficulty obtaining assistance in a quick manner, we wheeled him to the operating room. Saline was sprayed into the stoma, and a hemostat was used to loosen the mucus plug and then remove a large 2 inch plug. Significant tracheal secretions were then suctioned. A flexible bronchoscope was then used to perform tracheoscopy, confirming no further plugging and widely patent stoma. The stoma edges were macerated and coated with antibiotic ointment.       MD Scott Flynnova [facility]  [address] [place] [address]  Office Phone: [phone]

## 2022-12-29 NOTE — PROGRESS NOTES
Otolaryngology-Head and Neck Surgery  Inpatient Progress Note    Patient: Valeria Peralta  YOB: 1935  MRN: [id number]  Date of Service:  12/29/2022    Reason for Consultation:   Chief Complaint   Patient presents with    Aspiration     Requesting Physician:  Leigh Walker    History of Present Illness: Valeria Peralta is a 80y.o. year old male who has a history of laryngeal SCCa s/p laryngectomy 2008     He was admitted with dysphagia, s/p EGD found to have large food impaction  A G tube was placed     Seen 12/28/2022 by Dr. Ernst Councilman with acute respiratory distress and noted to have occluded stoma, large         Past Medical History:  Past Medical History:   Diagnosis Date    Anemia     Arthritis     Diabetes ([facility]     Hypercholesterolemia     Hypertension     Single kidney     Cr normal    Throat cancer ([facility] 2008    s/p laryngectomy       Past Surgical History:   Past Surgical History:   Procedure Laterality Date    HX LAP CHOLECYSTECTOMY  12-19-12    By Dr. Kanika Allen states does not know reason       Medications:   Current Outpatient Medications   Medication Instructions    atorvastatin (LIPITOR) 10 mg, Oral, EVERY BEDTIME    PATRICIO ASPIRIN PO 81 mg, Oral, DAILY    clopidogreL (PLAVIX) 75 mg tab No dose, route, or frequency recorded.     insulin detemir U-100 (LEVEMIR FLEXTOUCH) 35 Units, SubCUTAneous, EVERY BEDTIME, 35 Units by SubCUTAneous route at night    levETIRAcetam (KEPPRA) 1,250 mg, Oral, 2 TIMES DAILY    lisinopriL (PRINIVIL, ZESTRIL) 10 mg, Oral, DAILY    multivitamin, tx-iron-ca-min (THERA-M W/ IRON) 9 mg iron-400 mcg tab tablet 1 Tablet, Oral, DAILY    NIFEdipine ER (PROCARDIA XL) 60 mg, DAILY    saw palmetto 450 mg, DAILY    SITagliptin phosphate (JANUVIA) 25 mg, Oral, DAILY       Allergies:   No Known Allergies    Social History:   Social History     Socioeconomic History    Marital status: SINGLE   Tobacco Use    Smoking status: Former Packs/day: 1.00     Years: 57.00     Pack years: 57.00     Types: Cigarettes     Quit date: 7/20/2012     Years since quitting: 10.4    Smokeless tobacco: Never   Substance and Sexual Activity    Alcohol use: Yes     Alcohol/week: 17.5 standard drinks     Types: 21 Shots of liquor per week    Drug use: No       Family History:  Family History   Family history unknown: Yes       Review of Systems:  Did not obtain due to acuity of situation    Physical Examination:   Visit Vitals  BP (!) 115/59 (BP 1 Location: Left lower arm, BP Patient Position: Lying)   Pulse 64   Temp 98.6 °F (37 °C)   Resp 20   Ht 6' 0.01\" (1.829 m)   Wt 132 lb 4.4 oz (60 kg)   SpO2 100%   BMI 17.93 kg/m²       General: Resting in bed, no acute distress  Voice: Aphonic,  using electrolarynx  Face: No masses or lesions, facial strength symmetric   Ears: External ears unremarkable. Nose: External nose unremarkable  Oral Cavity / Oropharynx:  Mucous membranes moist, without lesion. Neck: Laryngectomy stoma patent, some dried blood and secretions around outer perimeter of site, humidified oxygen over stoma    CT neck / chest without contrast 12/21  IMPRESSION  1. Status post total laryngectomy and tracheostomy creation. No definite  tracheoesophageal fistula on this Limited exam. Nasopharyngeal reflux of  contrast material and fluid/debris. Consider a dedicated fluoroscopic swallow  study with water-soluble contrast for more sensitive evaluation for subtle  fistula. 2.  Emphysema with scattered pleural parenchymal scarring and calcified  granulomas. Several calcified right hilar lymph nodes. Assessment and Plan:   History of laryngectomy  Stoma occlusion  Respiratory distress   Dysphagia s/p food impaction, s/p G tube   - Needs humidified face tent at all times to his neck stoma, this was discussed with family at bedside, will also discuss with daughter, would benefit from humidification at home as well.    - Needs RT care and suctioning, ensure all breathing treatments given over stoma, as trachea does not communicate with nose/mouth.   - If concerns re: mucus plugging, please call ENT. - In terms of swallowing, he should not aspirate as he has had a laryngectomy (his trachea communicates with his neck only)  - He was cleared for diet today by speech therapy after MBS, he denies complications with eating, has been taking PO without difficulty  - no sign of tracheoesophageal fistula on EGD or CT imaging I dont believe stricture etc was noted on EGD either  -Stoma site suctioned, minimal blood and mucous removed from stoma site. -Site cleansed with CHG, petroleum jelly applied just around stoma to help decrease dryness and crusting. Sherley Bravo.  JAZMIN Eng [facility]  [address] [place] [address] [address]  [facility]  Office Phone: [phone]

## 2022-12-30 LAB
GLUCOSE BLD STRIP.AUTO-MCNC: 100 MG/DL (ref 65–100)
GLUCOSE BLD STRIP.AUTO-MCNC: 55 MG/DL (ref 65–100)
GLUCOSE BLD STRIP.AUTO-MCNC: 55 MG/DL (ref 65–100)
GLUCOSE BLD STRIP.AUTO-MCNC: 57 MG/DL (ref 65–100)
GLUCOSE BLD STRIP.AUTO-MCNC: 64 MG/DL (ref 65–100)
GLUCOSE BLD STRIP.AUTO-MCNC: 66 MG/DL (ref 65–100)
GLUCOSE BLD STRIP.AUTO-MCNC: 67 MG/DL (ref 65–100)
GLUCOSE BLD STRIP.AUTO-MCNC: 78 MG/DL (ref 65–100)
GLUCOSE BLD STRIP.AUTO-MCNC: 93 MG/DL (ref 65–100)
PERFORMED BY, TECHID: ABNORMAL
PERFORMED BY, TECHID: NORMAL

## 2022-12-30 PROCEDURE — 77010026065 HC OXYGEN MINIMUM MEDICAL AIR

## 2022-12-30 PROCEDURE — 65270000029 HC RM PRIVATE

## 2022-12-30 PROCEDURE — 82962 GLUCOSE BLOOD TEST: CPT

## 2022-12-30 PROCEDURE — 74011250637 HC RX REV CODE- 250/637: Performed by: INTERNAL MEDICINE

## 2022-12-30 PROCEDURE — 99231 SBSQ HOSP IP/OBS SF/LOW 25: CPT | Performed by: OTOLARYNGOLOGY

## 2022-12-30 PROCEDURE — 94761 N-INVAS EAR/PLS OXIMETRY MLT: CPT

## 2022-12-30 PROCEDURE — 97116 GAIT TRAINING THERAPY: CPT

## 2022-12-30 PROCEDURE — 74011000250 HC RX REV CODE- 250: Performed by: OTOLARYNGOLOGY

## 2022-12-30 PROCEDURE — 74011000258 HC RX REV CODE- 258: Performed by: INTERNAL MEDICINE

## 2022-12-30 PROCEDURE — 74011250636 HC RX REV CODE- 250/636: Performed by: INTERNAL MEDICINE

## 2022-12-30 PROCEDURE — 94640 AIRWAY INHALATION TREATMENT: CPT

## 2022-12-30 RX ORDER — INSULIN GLARGINE 100 [IU]/ML
20 INJECTION, SOLUTION SUBCUTANEOUS
Status: DISCONTINUED | OUTPATIENT
Start: 2022-12-30 | End: 2023-01-07 | Stop reason: HOSPADM

## 2022-12-30 RX ORDER — AMOXICILLIN AND CLAVULANATE POTASSIUM 875; 125 MG/1; MG/1
1 TABLET, FILM COATED ORAL EVERY 12 HOURS
Status: ACTIVE | OUTPATIENT
Start: 2022-12-30 | End: 2023-01-02

## 2022-12-30 RX ORDER — DILTIAZEM HYDROCHLORIDE 30 MG/1
30 TABLET, FILM COATED ORAL
Status: DISCONTINUED | OUTPATIENT
Start: 2022-12-30 | End: 2022-12-31

## 2022-12-30 RX ADMIN — DILTIAZEM HYDROCHLORIDE 60 MG: 30 TABLET, FILM COATED ORAL at 08:09

## 2022-12-30 RX ADMIN — GUAIFENESIN 600 MG: 100 SOLUTION ORAL at 08:08

## 2022-12-30 RX ADMIN — DICLOFENAC SODIUM 2 G: 10 GEL TOPICAL at 09:59

## 2022-12-30 RX ADMIN — DICLOFENAC SODIUM 2 G: 10 GEL TOPICAL at 21:46

## 2022-12-30 RX ADMIN — PIPERACILLIN AND TAZOBACTAM 3.38 G: 3; .375 INJECTION, POWDER, FOR SOLUTION INTRAVENOUS at 04:34

## 2022-12-30 RX ADMIN — ATORVASTATIN CALCIUM 10 MG: 10 TABLET, FILM COATED ORAL at 21:22

## 2022-12-30 RX ADMIN — Medication 1 TABLET: at 08:09

## 2022-12-30 RX ADMIN — ALOGLIPTIN 12.5 MG: 12.5 TABLET, FILM COATED ORAL at 08:09

## 2022-12-30 RX ADMIN — LEVETIRACETAM 1250 MG: 250 TABLET, FILM COATED ORAL at 21:22

## 2022-12-30 RX ADMIN — GUAIFENESIN 600 MG: 100 SOLUTION ORAL at 21:19

## 2022-12-30 RX ADMIN — AMOXICILLIN AND CLAVULANATE POTASSIUM 1 TABLET: 875; 125 TABLET, FILM COATED ORAL at 18:19

## 2022-12-30 RX ADMIN — CLOPIDOGREL BISULFATE 75 MG: 75 TABLET ORAL at 08:09

## 2022-12-30 RX ADMIN — DICLOFENAC SODIUM 2 G: 10 GEL TOPICAL at 16:38

## 2022-12-30 RX ADMIN — IPRATROPIUM BROMIDE AND ALBUTEROL SULFATE 3 ML: 2.5; .5 SOLUTION RESPIRATORY (INHALATION) at 19:44

## 2022-12-30 RX ADMIN — PIPERACILLIN AND TAZOBACTAM 3.38 G: 3; .375 INJECTION, POWDER, FOR SOLUTION INTRAVENOUS at 11:33

## 2022-12-30 RX ADMIN — LEVETIRACETAM 1250 MG: 250 TABLET, FILM COATED ORAL at 08:09

## 2022-12-30 RX ADMIN — ASPIRIN 81 MG CHEWABLE TABLET 81 MG: 81 TABLET CHEWABLE at 08:09

## 2022-12-30 RX ADMIN — IPRATROPIUM BROMIDE AND ALBUTEROL SULFATE 3 ML: 2.5; .5 SOLUTION RESPIRATORY (INHALATION) at 09:09

## 2022-12-30 NOTE — PROGRESS NOTES
PHYSICAL THERAPY TREATMENT  Patient: Trey Ayala (21 y.o. male)  Date: 12/30/2022  Diagnosis: Dysphagia [R13.10] <principal problem not specified>  Procedure(s) (LRB):  TRACHEOSTOMY REVISION (N/A) 2 Days Post-Op  Precautions:    Chart, physical therapy assessment, plan of care and goals were reviewed. ASSESSMENT  Patient continues with skilled PT services and is progressing towards goals. Pt supine in bed upon PT arrival, agreeable to session. (See below for objective details and assist levels). Overall pt tolerated session fairly today. Patient cleared to have trach tube removed by respiratory therapist. Patient then ambulated 100 feet in hallway with RW demonstrating slow but steady step through gait pattern but regressed to step to towards end of ambulation. Patient had no knee buckling or LOB but did need min-mod VC/TC for directional changes and walker control with patient pushing walker to far away from body and slightly drifting to the Le towards end of ambulation. Once patient returned to supine in bed patient educated again on need to pace himself and not walk to far beyond his means at this time since patient had ambulated to far and began to fatigue during ambulation despite therapist previously education to pace himself. Patient verbalized understanding. Patient then returned to supine in bed with call bell within reach and all needs meet with family members present. Nursing alerted to how patient performed during therapy today  Current Level of Function Impacting Discharge (mobility/balance): impaired balance, general weakness, poor activity tolerance     Other factors to consider for discharge: PLOF, assistance at home, level of deficits, acute medical state         PLAN :  Patient continues to benefit from skilled intervention to address the above impairments. Continue treatment per established plan of care to address goals.     Recommendation for discharge: (in order for the patient to meet his/her long term goals)  Jt Earl    This discharge recommendation:  Has been made in collaboration with the attending provider and/or case management    IF patient discharges home will need the following DME: to be determined (TBD)       SUBJECTIVE:   Patient nonverbal    OBJECTIVE DATA SUMMARY:   Critical Behavior:  Neurologic State: Alert  Orientation Level: Oriented X4  Cognition: Follows commands  Safety/Judgement: Decreased insight into deficits, Decreased awareness of environment  Functional Mobility Training:  Bed Mobility:  Rolling: Stand-by assistance  Supine to Sit: Minimum assistance  Sit to Supine: Minimum assistance  Scooting: Stand-by assistance  Transfers:  Sit to Stand: Minimum assistance  Stand to Sit: Minimum assistance  Balance:  Sitting: Intact; With support  Sitting - Static: Fair (occasional)  Sitting - Dynamic: Fair (occasional)  Standing: Impaired; With support  Standing - Static: Good;Constant support  Standing - Dynamic : Fair;Constant support  Ambulation/Gait Training:  Distance (ft): 100 Feet (ft)  Assistive Device: Gait belt;Walker, rolling  Ambulation - Level of Assistance: Contact guard assistance;Minimal assistance  Base of Support: Narrowed  Speed/Alma: Slow  Step Length: Right shortened;Left shortened     Pain Rating:  No pain reported during session    Activity Tolerance:   Fair and requires rest breaks    After treatment patient left in no apparent distress:   Bed locked and returned to lowest position, Supine in bed, Call bell within reach, Bed / chair alarm activated, Caregiver / family present, and Side rails x 3      COMMUNICATION/COLLABORATION:   The patients plan of care was discussed with: Registered nurse.      GOALS:    Problem: Mobility Impaired (Adult and Pediatric)  Goal: *Acute Goals and Plan of Care (Insert Text)  Description: I with LE HEP x7 days  Supine to sit EOB with mod I x7 days  Sit to stand with SBAx1 x7 days  Stand pivot from bed to chair x7 days  Amb 15-50ft with RW and CGAx1 x7 days    Pt stated goal: to get stronger  Outcome: Progressing Towards Goal       Linus Shaw, PTA, PT   Time Calculation: 30 mins

## 2022-12-30 NOTE — PROGRESS NOTES
PROGRESS NOTE      Subjective: Patient wants to eat. Sister at bedside family at bedside. No fever or chills. Still on trach collar oxygen. Looks comfortable. Swelling in the upper extremity better. No fever chills overnight.      Visit Vitals  BP (!) 94/57   Pulse 61   Temp 97.9 °F (36.6 °C)   Resp 18   Ht 6' 0.01\" (1.829 m)   Wt 60 kg (132 lb 4.4 oz)   SpO2 95%   BMI 17.93 kg/m²       Current Facility-Administered Medications:     amoxicillin-clavulanate (AUGMENTIN) 875-125 mg per tablet 1 Tablet, 1 Tablet, Oral, Q12H, Jihan Meza MD    albuterol-ipratropium (DUO-NEB) 2.5 MG-0.5 MG/3 ML, 3 mL, Nebulization, BID RT, Shane Chaney MD, 3 mL at 12/30/22 0909    albuterol-ipratropium (DUO-NEB) 2.5 MG-0.5 MG/3 ML, 3 mL, Nebulization, Q4H PRN, Meghan Campa MD    dilTIAZem IR (CARDIZEM) tablet 60 mg, 60 mg, Oral, TIDAC, Coco DAVILA MD, 60 mg at 12/30/22 0809    diclofenac (VOLTAREN) 1 % topical gel 2 g, 2 g, Topical, TID, Coco DAVILA MD, 2 g at 12/30/22 0959    guaiFENesin (ROBITUSSIN) 100 mg/5 mL oral liquid 600 mg, 600 mg, Per G Tube, Q12H, Chato SCOTT MD, 600 mg at 12/30/22 1134    levETIRAcetam (KEPPRA) tablet 1,250 mg, 1,250 mg, Oral, BID, Jihan Moss MD, 1,250 mg at 12/30/22 0809    insulin glargine (LANTUS) injection 23 Units, 23 Units, SubCUTAneous, QHS, Meghan Campa MD, 23 Units at 12/29/22 2234    0.45% sodium chloride infusion, 25 mL/hr, IntraVENous, CONTINUOUS, Jihan Moss MD, Last Rate: 25 mL/hr at 12/29/22 1336, 25 mL/hr at 12/29/22 1336    atorvastatin (LIPITOR) tablet 10 mg, 10 mg, Oral, QHS, Cahto SCOTT MD, 10 mg at 12/29/22 2211    clopidogreL (PLAVIX) tablet 75 mg, 75 mg, Oral, DAILY, Chato SCOTT MD, 75 mg at 12/30/22 0809    multivitamin, tx-iron-ca-min (THERA-M w/ IRON) tablet 1 Tablet, 1 Tablet, Oral, DAILY, Meghan Campa MD, 1 Tablet at 12/30/22 0809    alogliptin (NESINA) tablet 12.5 mg, 12.5 mg, Oral, DAILY, Dory Juancarlos Mcconnell MD, 12.5 mg at 12/30/22 1606    aspirin chewable tablet 81 mg, 81 mg, Oral, DAILY, Flip Gonzales MD, 81 mg at 12/30/22 9345  Recent Results (from the past 24 hour(s))   GLUCOSE, POC    Collection Time: 12/29/22  4:49 PM   Result Value Ref Range    Glucose (POC) 246 (H) 65 - 100 mg/dL    Performed by Aravind Guerrero, POC    Collection Time: 12/30/22  5:59 AM   Result Value Ref Range    Glucose (POC) 100 65 - 100 mg/dL    Performed by Malachy Krabbe    GLUCOSE, POC    Collection Time: 12/30/22  8:18 AM   Result Value Ref Range    Glucose (POC) 66 65 - 100 mg/dL    Performed by Sunita Maria E (Float)    GLUCOSE, POC    Collection Time: 12/30/22  8:20 AM   Result Value Ref Range    Glucose (POC) 57 (L) 65 - 100 mg/dL    Performed by Sunita Maria E (Float)    GLUCOSE, POC    Collection Time: 12/30/22  8:49 AM   Result Value Ref Range    Glucose (POC) 64 (L) 65 - 100 mg/dL    Performed by [place], POC    Collection Time: 12/30/22  8:52 AM   Result Value Ref Range    Glucose (POC) 67 65 - 100 mg/dL    Performed by Cindy Common    GLUCOSE, POC    Collection Time: 12/30/22 10:14 AM   Result Value Ref Range    Glucose (POC) 93 65 - 100 mg/dL    Performed by Cindy Common    GLUCOSE, POC    Collection Time: 12/30/22 11:48 AM   Result Value Ref Range    Glucose (POC) 78 65 - 100 mg/dL    Performed by Cindy Common      XR CHEST PORT   Final Result      No acute process on portable chest.   Pneumoperitoneum      This result was verbally relayed by me at 1916 hours to [facility] [address]         CT NECK CHEST WO CONT   Final Result   1. Status post total laryngectomy and tracheostomy creation. No definite   tracheoesophageal fistula on this Limited exam. Nasopharyngeal reflux of   contrast material and fluid/debris. Consider a dedicated fluoroscopic swallow   study with water-soluble contrast for more sensitive evaluation for subtle   fistula.    2.  Emphysema with scattered pleural parenchymal scarring and calcified granulomas. Several calcified right hilar lymph nodes. XR CHEST PORT   Final Result      No acute findings. HEENT: Normocephalic atraumatic. Oral mucosa without any thrush. Neck tracheostomy site clean. Lungs: Generally clear no wheeze  Heart: Regular. Abdomen: Soft positive bowel sounds. PEG tube in place. Lower Extremities: No edema in the lower extremities. Stiffness in the upper wrist area. Swelling better. CNS: Alert and oriented. Talks with a voice assistive device. Psych:      Assessment: #1. Acute hypoxic respiratory failure transient due to aspiration of stomach contents/aspiration pneumonitis  #2. Wide-complex tachycardia  #3. Hypertension  #4. Diabetes  #5. 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 History of nephrectomy  #8. Dysphonia/dysphagia  #9. Acute respiratory distress from tracheostomy stoma plugging now status post removal of foreign body by ENT. Plan: N.p.o. except for meds. Continue tube feeding for now. Will reassess later on once much stronger regarding swallow. Discussed at length with family. Rehab placement when bed is available. Decrease Cardizem. Blood pressure now low. Change Zosyn to Augmentin.

## 2022-12-30 NOTE — PROGRESS NOTES
Problem: Pressure Injury - Risk of  Goal: *Prevention of pressure injury  Description: Document Jabier Scale and appropriate interventions in the flowsheet. Outcome: Progressing Towards Goal  Note: Pressure Injury Interventions:  Sensory Interventions: Assess changes in LOC, Avoid rigorous massage over bony prominences, Check visual cues for pain, Keep linens dry and wrinkle-free, Minimize linen layers    Moisture Interventions: Absorbent underpads, Apply protective barrier, creams and emollients, Check for incontinence Q2 hours and as needed, Internal/External urinary devices, Maintain skin hydration (lotion/cream)    Activity Interventions: Assess need for specialty bed, Pressure redistribution bed/mattress(bed type)    Mobility Interventions: HOB 30 degrees or less    Nutrition Interventions: Document food/fluid/supplement intake    Friction and Shear Interventions: Apply protective barrier, creams and emollients, HOB 30 degrees or less                Problem: Falls - Risk of  Goal: *Absence of Falls  Description: Document Mikaela Fall Risk and appropriate interventions in the flowsheet.   Outcome: Progressing Towards Goal  Note: Fall Risk Interventions:  Mobility Interventions: Bed/chair exit alarm    Mentation Interventions: Adequate sleep, hydration, pain control, Bed/chair exit alarm, Door open when patient unattended, More frequent rounding    Medication Interventions: Bed/chair exit alarm, Patient to call before getting OOB    Elimination Interventions: Bed/chair exit alarm, Call light in reach, Patient to call for help with toileting needs, Toileting schedule/hourly rounds    History of Falls Interventions: Bed/chair exit alarm, Door open when patient unattended         Problem: Patient Education: Go to Patient Education Activity  Goal: Patient/Family Education  Outcome: Progressing Towards Goal     Problem: Patient Education: Go to Patient Education Activity  Goal: Patient/Family Education  Outcome: Progressing Towards Goal

## 2022-12-30 NOTE — PROGRESS NOTES
[facility] Progress Note    Patient: Gladis Bowman MRN: [id number]  SSN: [id number]    YOB: 1935  Age: 80 y.o. Sex: male      Admit Date: 12/21/2022    LOS: 9 days     Subjective: Follow-up on initial consultation  No events overnight  Patient denies any shortness of breath    Objective:     Vitals:    12/30/22 0809 12/30/22 0810 12/30/22 0853 12/30/22 0909   BP: (!) 149/70  111/70    Pulse:  66 61    Resp:  18 18    Temp:  98.5 °F (36.9 °C) 97.9 °F (36.6 °C)    SpO2:  98% 97% 95%   Weight:       Height:            Intake and Output:  Current Shift: No intake/output data recorded.   Last three shifts: 12/28 1901 - 12/30 0700  In: 1200   Out: 2600 [Urine:2600]    Physical Exam:   Sitting up in bed, no acute distress  Oropharynx clear  Neck postradiation changes, laryngeal stoma patent no crusting no occlusion  Minimal secretions      Lab/Data Review:    Recent Results (from the past 24 hour(s))   GLUCOSE, POC    Collection Time: 12/29/22 11:32 AM   Result Value Ref Range    Glucose (POC) 145 (H) 65 - 100 mg/dL    Performed by [id number], POC    Collection Time: 12/29/22 11:43 AM   Result Value Ref Range    Glucose (POC) 151 (H) 65 - 100 mg/dL    Performed by ADOLFO FALL    GLUCOSE, POC    Collection Time: 12/29/22  4:49 PM   Result Value Ref Range    Glucose (POC) 246 (H) 65 - 100 mg/dL    Performed by [address], POC    Collection Time: 12/30/22  5:59 AM   Result Value Ref Range    Glucose (POC) 100 65 - 100 mg/dL    Performed by Tammie Singh    GLUCOSE, POC    Collection Time: 12/30/22  8:18 AM   Result Value Ref Range    Glucose (POC) 66 65 - 100 mg/dL    Performed by Anabell Sksamreen (Float)    GLUCOSE, POC    Collection Time: 12/30/22  8:20 AM   Result Value Ref Range    Glucose (POC) 57 (L) 65 - 100 mg/dL    Performed by Anabell SkBel Vino (Float)    GLUCOSE, POC    Collection Time: 12/30/22  8:49 AM   Result Value Ref Range    Glucose (POC) 64 (L) 65 - 100 mg/dL    Performed by Maureen Woodruff, POC    Collection Time: 12/30/22  8:52 AM   Result Value Ref Range    Glucose (POC) 67 65 - 100 mg/dL    Performed by Agustin Farooq             Assessment:     Active Problems:    Dysphagia (12/21/2022)      Severe protein-energy malnutrition ([address] (12/24/2022)        Plan:     Remote history of laryngectomy, status postdebridement of laryngeal stomal obstruction  Stoma looks better this morning, moist, no crusting no obstruction  Continue current care  Being evaluated for rehab placement    Signed By: Camryn Lawrence MD     December 30, 2022         [place], [address] [place] [address], [place] [id number]

## 2022-12-30 NOTE — PROGRESS NOTES
Problem: Pressure Injury - Risk of  Goal: *Prevention of pressure injury  Description: Document Jabier Scale and appropriate interventions in the flowsheet. Outcome: Progressing Towards Goal  Note: Pressure Injury Interventions:  Sensory Interventions: Assess changes in LOC    Moisture Interventions: Absorbent underpads    Activity Interventions: Assess need for specialty bed    Mobility Interventions: HOB 30 degrees or less    Nutrition Interventions: Document food/fluid/supplement intake    Friction and Shear Interventions: Apply protective barrier, creams and emollients                Problem: Patient Education: Go to Patient Education Activity  Goal: Patient/Family Education  Outcome: Progressing Towards Goal     Problem: Falls - Risk of  Goal: *Absence of Falls  Description: Document Mikaela Fall Risk and appropriate interventions in the flowsheet. Outcome: Progressing Towards Goal  Note: Fall Risk Interventions:  Mobility Interventions: Bed/chair exit alarm    Mentation Interventions: Adequate sleep, hydration, pain control    Medication Interventions: Bed/chair exit alarm    Elimination Interventions: Bed/chair exit alarm    History of Falls Interventions: Bed/chair exit alarm         Problem: Patient Education: Go to Patient Education Activity  Goal: Patient/Family Education  Outcome: Progressing Towards Goal     Problem: Discharge Planning  Goal: *Discharge to safe environment  Outcome: Progressing Towards Goal  Goal: *Knowledge of medication management  Outcome: Progressing Towards Goal  Goal: *Knowledge of discharge instructions  Outcome: Progressing Towards Goal     Problem: Patient Education: Go to Patient Education Activity  Goal: Patient/Family Education  Outcome: Progressing Towards Goal     Problem: Diabetes Self-Management  Goal: *Disease process and treatment process  Description: Define diabetes and identify own type of diabetes; list 3 options for treating diabetes.   Outcome: Progressing Towards Goal  Goal: *Incorporating nutritional management into lifestyle  Description: Describe effect of type, amount and timing of food on blood glucose; list 3 methods for planning meals. Outcome: Progressing Towards Goal  Goal: *Incorporating physical activity into lifestyle  Description: State effect of exercise on blood glucose levels. Outcome: Progressing Towards Goal  Goal: *Developing strategies to promote health/change behavior  Description: Define the ABC's of diabetes; identify appropriate screenings, schedule and personal plan for screenings. Outcome: Progressing Towards Goal  Goal: *Using medications safely  Description: State effect of diabetes medications on diabetes; name diabetes medication taking, action and side effects. Outcome: Progressing Towards Goal  Goal: *Monitoring blood glucose, interpreting and using results  Description: Identify recommended blood glucose targets  and personal targets. Outcome: Progressing Towards Goal  Goal: *Prevention, detection, treatment of acute complications  Description: List symptoms of hyper- and hypoglycemia; describe how to treat low blood sugar and actions for lowering  high blood glucose level. Outcome: Progressing Towards Goal  Goal: *Prevention, detection and treatment of chronic complications  Description: Define the natural course of diabetes and describe the relationship of blood glucose levels to long term complications of diabetes.   Outcome: Progressing Towards Goal  Goal: *Developing strategies to address psychosocial issues  Description: Describe feelings about living with diabetes; identify support needed and support network  Outcome: Progressing Towards Goal  Goal: *Insulin pump training  Outcome: Progressing Towards Goal  Goal: *Sick day guidelines  Outcome: Progressing Towards Goal  Goal: *Patient Specific Goal (EDIT GOAL, INSERT TEXT)  Outcome: Progressing Towards Goal     Problem: Patient Education: Go to Patient Education Activity  Goal: Patient/Family Education  Outcome: Progressing Towards Goal     Problem: Patient Education: Go to Patient Education Activity  Goal: Patient/Family Education  Outcome: Progressing Towards Goal     Problem: Patient Education: Go to Patient Education Activity  Goal: Patient/Family Education  Outcome: Progressing Towards Goal

## 2022-12-30 NOTE — PROGRESS NOTES
CM reviewed chart. Discharge disposition is IRF. Patient has been accepted by [facility]. Discharge is pending insurance auth. CM sent clinicals [place] [facility] [place] via fax ([phone]). CM will continue to follow.

## 2022-12-31 LAB
GLUCOSE BLD STRIP.AUTO-MCNC: 144 MG/DL (ref 65–100)
GLUCOSE BLD STRIP.AUTO-MCNC: 199 MG/DL (ref 65–100)
PERFORMED BY, TECHID: ABNORMAL
PERFORMED BY, TECHID: ABNORMAL

## 2022-12-31 PROCEDURE — 82962 GLUCOSE BLOOD TEST: CPT

## 2022-12-31 PROCEDURE — 65270000029 HC RM PRIVATE

## 2022-12-31 PROCEDURE — 74011636637 HC RX REV CODE- 636/637: Performed by: INTERNAL MEDICINE

## 2022-12-31 PROCEDURE — 74011250637 HC RX REV CODE- 250/637: Performed by: INTERNAL MEDICINE

## 2022-12-31 RX ORDER — DILTIAZEM HYDROCHLORIDE 30 MG/1
30 TABLET, FILM COATED ORAL 2 TIMES DAILY
Status: DISCONTINUED | OUTPATIENT
Start: 2022-12-31 | End: 2023-01-01

## 2022-12-31 RX ADMIN — ALOGLIPTIN 12.5 MG: 12.5 TABLET, FILM COATED ORAL at 08:20

## 2022-12-31 RX ADMIN — GUAIFENESIN 600 MG: 100 SOLUTION ORAL at 22:31

## 2022-12-31 RX ADMIN — INSULIN GLARGINE 20 UNITS: 100 INJECTION, SOLUTION SUBCUTANEOUS at 00:07

## 2022-12-31 RX ADMIN — DICLOFENAC SODIUM 2 G: 10 GEL TOPICAL at 08:21

## 2022-12-31 RX ADMIN — LEVETIRACETAM 1250 MG: 250 TABLET, FILM COATED ORAL at 22:29

## 2022-12-31 RX ADMIN — LEVETIRACETAM 1250 MG: 250 TABLET, FILM COATED ORAL at 08:20

## 2022-12-31 RX ADMIN — CLOPIDOGREL BISULFATE 75 MG: 75 TABLET ORAL at 08:20

## 2022-12-31 RX ADMIN — AMOXICILLIN AND CLAVULANATE POTASSIUM 1 TABLET: 875; 125 TABLET, FILM COATED ORAL at 08:20

## 2022-12-31 RX ADMIN — GUAIFENESIN 600 MG: 100 SOLUTION ORAL at 08:20

## 2022-12-31 RX ADMIN — DICLOFENAC SODIUM 2 G: 10 GEL TOPICAL at 16:57

## 2022-12-31 RX ADMIN — ATORVASTATIN CALCIUM 10 MG: 10 TABLET, FILM COATED ORAL at 22:29

## 2022-12-31 RX ADMIN — ASPIRIN 81 MG CHEWABLE TABLET 81 MG: 81 TABLET CHEWABLE at 08:20

## 2022-12-31 RX ADMIN — DICLOFENAC SODIUM 2 G: 10 GEL TOPICAL at 22:50

## 2022-12-31 RX ADMIN — Medication 1 TABLET: at 08:20

## 2022-12-31 RX ADMIN — DILTIAZEM HYDROCHLORIDE 30 MG: 30 TABLET, FILM COATED ORAL at 22:29

## 2022-12-31 RX ADMIN — AMOXICILLIN AND CLAVULANATE POTASSIUM 1 TABLET: 875; 125 TABLET, FILM COATED ORAL at 22:29

## 2022-12-31 NOTE — PROGRESS NOTES
PROGRESS NOTE      Subjective: Patient feels hungry. Getting tube feeding. Had episode of low blood sugar yesterday. Lantus adjusted. Tube feeding increase.      Visit Vitals  /66   Pulse 75   Temp 98.4 °F (36.9 °C)   Resp 18   Ht 6' 0.01\" (1.829 m)   Wt 60 kg (132 lb 4.4 oz)   SpO2 99%   BMI 17.93 kg/m²       Current Facility-Administered Medications:     amoxicillin-clavulanate (AUGMENTIN) 875-125 mg per tablet 1 Tablet, 1 Tablet, Oral, Q12H, Ronny DAVILA MD, 1 Tablet at 12/31/22 0820    dilTIAZem IR (CARDIZEM) tablet 30 mg, 30 mg, Oral, TIDAC, Jihan Meza MD    insulin glargine (LANTUS) injection 20 Units, 20 Units, SubCUTAneous, QHS, Jihan Meza MD, 20 Units at 12/31/22 0007    albuterol-ipratropium (DUO-NEB) 2.5 MG-0.5 MG/3 ML, 3 mL, Nebulization, Q4H PRN, Thu Kunz MD    diclofenac (VOLTAREN) 1 % topical gel 2 g, 2 g, Topical, TID, Jihan Mims MD, 2 g at 12/31/22 5710    guaiFENesin (ROBITUSSIN) 100 mg/5 mL oral liquid 600 mg, 600 mg, Per G Tube, Q12H, Thu Kunz MD, 600 mg at 12/31/22 0820    levETIRAcetam (KEPPRA) tablet 1,250 mg, 1,250 mg, Oral, BID, Jihan Mims MD, 1,250 mg at 12/31/22 0820    0.45% sodium chloride infusion, 25 mL/hr, IntraVENous, CONTINUOUS, Jihan Mims MD, Last Rate: 25 mL/hr at 12/31/22 0008, 25 mL/hr at 12/31/22 0008    atorvastatin (LIPITOR) tablet 10 mg, 10 mg, Oral, QHS, Lizabeth Cooks R, MD, 10 mg at 12/30/22 2122    clopidogreL (PLAVIX) tablet 75 mg, 75 mg, Oral, DAILY, Lizabeth Cooks R, MD, 75 mg at 12/31/22 0820    multivitamin, tx-iron-ca-min (THERA-M w/ IRON) tablet 1 Tablet, 1 Tablet, Oral, DAILY, Thu Kunz MD, 1 Tablet at 12/31/22 0820    alogliptin (NESINA) tablet 12.5 mg, 12.5 mg, Oral, DAILY, Thu Kunz MD, 12.5 mg at 12/31/22 0820    aspirin chewable tablet 81 mg, 81 mg, Oral, DAILY, Thu Kunz MD, 81 mg at 12/31/22 0820  Recent Results (from the past 24 hour(s))   GLUCOSE, POC    Collection Time: 12/30/22  4:27 PM   Result Value Ref Range    Glucose (POC) 55 (L) 65 - 100 mg/dL    Performed by [place], POC    Collection Time: 12/30/22  4:30 PM   Result Value Ref Range    Glucose (POC) 55 (L) 65 - 100 mg/dL    Performed by [place], POC    Collection Time: 12/31/22 12:03 AM   Result Value Ref Range    Glucose (POC) 199 (H) 65 - 100 mg/dL    Performed by Taylor Carias    GLUCOSE, POC    Collection Time: 12/31/22  7:21 AM   Result Value Ref Range    Glucose (POC) 144 (H) 65 - 100 mg/dL    Performed by Margaret Mahoney      XR CHEST PORT   Final Result      No acute process on portable chest.   Pneumoperitoneum      This result was verbally relayed by me at 1916 hours to [facility] [address] [place] [address] Avenue CONT   Final Result   1. Status post total laryngectomy and tracheostomy creation. No definite   tracheoesophageal fistula on this Limited exam. Nasopharyngeal reflux of   contrast material and fluid/debris. Consider a dedicated fluoroscopic swallow   study with water-soluble contrast for more sensitive evaluation for subtle   fistula. 2.  Emphysema with scattered pleural parenchymal scarring and calcified   granulomas. Several calcified right hilar lymph nodes. XR CHEST PORT   Final Result      No acute findings. HEENT: Normocephalic atraumatic anicteric sclera  Neck: Tracheostomy site clean  Lungs: Clear anteriorly  Heart: Regular  Abdomen: Soft positive bowel sounds. PEG tube in place. Lower Extremities: No edema. Wrist swelling better. Stiffness. CNS: Alert and oriented follows command. Psych: Cooperative. Assessment: #1. Acute hypoxic respiratory failure transient due to aspiration of stomach contents/aspiration pneumonitis    #2. Wide-complex tachycardia    #3. Hypertension    #4. Diabetes    #5. 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   History of nephrectomy    #8. Dysphonia/dysphagia    #9. Acute respiratory distress from tracheostomy stoma plugging now status post removal of foreign body by ENT. Plan: Adjust insulin. Increase tube feeding. Physical therapy. Awaiting for rehab placement. Discussed with family member was present in the room. Decrease Cardizem.

## 2022-12-31 NOTE — PROGRESS NOTES
Comprehensive Nutrition Assessment    Type and Reason for Visit: Reassess (goal)    Nutrition Recommendations/Plan:   Consider allowing PO diet, tolerating puree with good appetite and intakes. Continue TF via PEG of Jevity 1.5 bolus at 240ml 5x/d   Flush with 200ml water after each feed        Provides 1800 kcals (97%), 77 g pro (104%), and 1912 ml fluids (100%)   Monitor and document GRVs, flushes, and Bms in I/Os  RD to adjust to supplemental bolus feeds once PO diet initiated     Malnutrition Assessment:  Malnutrition Status:  Severe malnutrition (12/29/22 1408)    Context:  Acute illness     Findings of the 6 clinical characteristics of malnutrition:   Energy Intake:  No significant decrease in energy intake  Weight Loss:  Greater than 7.5% over 3 months     Body Fat Loss:  No significant body fat loss,     Muscle Mass Loss: Moderate muscle mass loss, Scapula (trapezius), Thigh (quadriceps), Temples (temporalis), Clavicles (pectoralis & deltoids), Calf  Fluid Accumulation:  No significant fluid accumulation,        Nutrition Assessment:    Admitted for dysphagia and PEG tube placement, completed 12/23. RD now consulted for TF recs. Inable to get much reports from pt d/t malfunction from assistive voice machine. Family in room provided much of hx, endorsed she noticed pt unable to eat a few days pta, however unsure how long it was going on overall. Severe wt loss noted from UBW to current RD obtained bed wt. TF recs above, also educated family. (12/28) SLP rec'd puree/thin, liquid wash, 6 small meals. (12/29) TF stopped, ate 100% pureed food. (12/31) Per attending, hold PO until stronger. TF running at goal bolus, tolerating well. Labs: . Meds: IVF, augmentin, Plavix, voltaren, robitussin, lantus, keppra, MVI. Nutrition Related Findings:    NFPE finding mod-severe wasting. PEG in place, infusing with min GRVs. Per SLP 12/29, rec's pureed/thin. No N/V/D/C, last BM 12/31, loose.  LUE 2+ edema, RUE trace edema. Wound Type: Surgical incision (abd, peg)    Current Nutrition Intake & Therapies:  Average Meal Intake: NPO (consumes 100% when given PO)  Average Supplement Intake: NPO  ADULT TUBE FEEDING PEG; Diabetic; Delivery Method: Bolus; Bolus Frequency: 4 Times Daily; Bolus Volume (mL): 240; Water Flush Volume (mL): 30; Water Flush Frequency: Q 4 hours    Anthropometric Measures:  Height: 6' 0.01\" (182.9 cm)  Ideal Body Weight (IBW): 178 lbs (81 kg)     Current Body Wt:  50.3 kg (110 lb 14.3 oz), 62.3 % IBW. Bed scale  Current BMI (kg/m2): 15  Usual Body Weight: 63.5 kg (140 lb) (per EMR review)  % Weight Change (Calculated): -20.8  Weight Adjustment: No adjustment                 BMI Category: Underweight (BMI less than 22) age over 72    Estimated Daily Nutrient Needs:  Energy Requirements Based On: Formula  Weight Used for Energy Requirements: Usual  Energy (kcal/day): 1856-1989kcals (MSJx1.4-1. 5AF)  Weight Used for Protein Requirements: Usual  Protein (g/day): 73g/day (1.2g/kg)  Method Used for Fluid Requirements: 1 ml/kcal  Fluid (ml/day): 1856-1989mL    Nutrition Diagnosis:   Severe malnutrition, In context of acute illness or injury related to swallowing difficulty, altered GI structure as evidenced by Criteria as identified in malnutrition assessment    Nutrition Interventions:   Food and/or Nutrient Delivery: Start oral diet, Continue tube feeding, Modify tube feeding (adjust TF to supplemental bolus once PO initiated)  Nutrition Education/Counseling: Education completed (home TF instructions)  Coordination of Nutrition Care: No recommendation at this time  Plan of Care discussed with: Pt, RN, and family    Goals:  Previous Goal Met: Goal(s) achieved (tolerate NST and meet >75% of needs)  Goals:  Tolerate nutrition support at goal rate, Meet at least 75% of estimated needs, Initiate PO diet       Nutrition Monitoring and Evaluation:   Behavioral-Environmental Outcomes: Knowledge or skill (home TF)  Food/Nutrient Intake Outcomes: Food and nutrient intake, Diet advancement/tolerance, Enteral nutrition intake/tolerance  Physical Signs/Symptoms Outcomes: Diarrhea, Weight, Skin, Biochemical data    Discharge Planning:    Enteral nutrition    Skye Umaña  Contact: Ext [phone], or via [employer]

## 2022-12-31 NOTE — PROGRESS NOTES
Problem: Pressure Injury - Risk of  Goal: *Prevention of pressure injury  Description: Document Jabier Scale and appropriate interventions in the flowsheet. Outcome: Progressing Towards Goal  Note: Pressure Injury Interventions:  Sensory Interventions: Assess changes in LOC, Assess need for specialty bed    Moisture Interventions: Absorbent underpads    Activity Interventions: Assess need for specialty bed    Mobility Interventions: HOB 30 degrees or less    Nutrition Interventions: Document food/fluid/supplement intake    Friction and Shear Interventions: Apply protective barrier, creams and emollients                Problem: Patient Education: Go to Patient Education Activity  Goal: Patient/Family Education  Outcome: Progressing Towards Goal     Problem: Falls - Risk of  Goal: *Absence of Falls  Description: Document Mikaela Fall Risk and appropriate interventions in the flowsheet.   Outcome: Progressing Towards Goal  Note: Fall Risk Interventions:  Mobility Interventions: Bed/chair exit alarm    Mentation Interventions: Adequate sleep, hydration, pain control    Medication Interventions: Bed/chair exit alarm    Elimination Interventions: Bed/chair exit alarm    History of Falls Interventions: Bed/chair exit alarm         Problem: Patient Education: Go to Patient Education Activity  Goal: Patient/Family Education  Outcome: Progressing Towards Goal     Problem: Discharge Planning  Goal: *Discharge to safe environment  Outcome: Progressing Towards Goal  Goal: *Knowledge of medication management  Outcome: Progressing Towards Goal  Goal: *Knowledge of discharge instructions  Outcome: Progressing Towards Goal     Problem: Patient Education: Go to Patient Education Activity  Goal: Patient/Family Education  Outcome: Progressing Towards Goal     Problem: Diabetes Self-Management  Goal: *Disease process and treatment process  Description: Define diabetes and identify own type of diabetes; list 3 options for treating diabetes. Outcome: Progressing Towards Goal  Goal: *Incorporating nutritional management into lifestyle  Description: Describe effect of type, amount and timing of food on blood glucose; list 3 methods for planning meals. Outcome: Progressing Towards Goal  Goal: *Incorporating physical activity into lifestyle  Description: State effect of exercise on blood glucose levels. Outcome: Progressing Towards Goal  Goal: *Developing strategies to promote health/change behavior  Description: Define the ABC's of diabetes; identify appropriate screenings, schedule and personal plan for screenings. Outcome: Progressing Towards Goal  Goal: *Using medications safely  Description: State effect of diabetes medications on diabetes; name diabetes medication taking, action and side effects. Outcome: Progressing Towards Goal  Goal: *Monitoring blood glucose, interpreting and using results  Description: Identify recommended blood glucose targets  and personal targets. Outcome: Progressing Towards Goal  Goal: *Prevention, detection, treatment of acute complications  Description: List symptoms of hyper- and hypoglycemia; describe how to treat low blood sugar and actions for lowering  high blood glucose level. Outcome: Progressing Towards Goal  Goal: *Prevention, detection and treatment of chronic complications  Description: Define the natural course of diabetes and describe the relationship of blood glucose levels to long term complications of diabetes.   Outcome: Progressing Towards Goal  Goal: *Developing strategies to address psychosocial issues  Description: Describe feelings about living with diabetes; identify support needed and support network  Outcome: Progressing Towards Goal  Goal: *Insulin pump training  Outcome: Progressing Towards Goal  Goal: *Sick day guidelines  Outcome: Progressing Towards Goal  Goal: *Patient Specific Goal (EDIT GOAL, INSERT TEXT)  Outcome: Progressing Towards Goal     Problem: Patient Education: Go to Patient Education Activity  Goal: Patient/Family Education  Outcome: Progressing Towards Goal     Problem: Patient Education: Go to Patient Education Activity  Goal: Patient/Family Education  Outcome: Progressing Towards Goal     Problem: Patient Education: Go to Patient Education Activity  Goal: Patient/Family Education  Outcome: Progressing Towards Goal

## 2023-01-01 LAB
GLUCOSE BLD STRIP.AUTO-MCNC: 133 MG/DL (ref 65–100)
GLUCOSE BLD STRIP.AUTO-MCNC: 166 MG/DL (ref 65–100)
PERFORMED BY, TECHID: ABNORMAL
PERFORMED BY, TECHID: ABNORMAL

## 2023-01-01 PROCEDURE — 74011250637 HC RX REV CODE- 250/637: Performed by: INTERNAL MEDICINE

## 2023-01-01 PROCEDURE — 74011636637 HC RX REV CODE- 636/637: Performed by: INTERNAL MEDICINE

## 2023-01-01 PROCEDURE — 82962 GLUCOSE BLOOD TEST: CPT

## 2023-01-01 PROCEDURE — 65270000029 HC RM PRIVATE

## 2023-01-01 RX ADMIN — DICLOFENAC SODIUM 2 G: 10 GEL TOPICAL at 23:03

## 2023-01-01 RX ADMIN — INSULIN GLARGINE 20 UNITS: 100 INJECTION, SOLUTION SUBCUTANEOUS at 22:41

## 2023-01-01 RX ADMIN — ALOGLIPTIN 12.5 MG: 12.5 TABLET, FILM COATED ORAL at 10:47

## 2023-01-01 RX ADMIN — AMOXICILLIN AND CLAVULANATE POTASSIUM 1 TABLET: 875; 125 TABLET, FILM COATED ORAL at 10:47

## 2023-01-01 RX ADMIN — ASPIRIN 81 MG CHEWABLE TABLET 81 MG: 81 TABLET CHEWABLE at 10:48

## 2023-01-01 RX ADMIN — GUAIFENESIN 600 MG: 100 SOLUTION ORAL at 22:49

## 2023-01-01 RX ADMIN — LEVETIRACETAM 1250 MG: 250 TABLET, FILM COATED ORAL at 22:41

## 2023-01-01 RX ADMIN — GUAIFENESIN 600 MG: 100 SOLUTION ORAL at 10:50

## 2023-01-01 RX ADMIN — DICLOFENAC SODIUM 2 G: 10 GEL TOPICAL at 10:49

## 2023-01-01 RX ADMIN — Medication 1 TABLET: at 10:47

## 2023-01-01 RX ADMIN — CLOPIDOGREL BISULFATE 75 MG: 75 TABLET ORAL at 10:48

## 2023-01-01 RX ADMIN — DICLOFENAC SODIUM 2 G: 10 GEL TOPICAL at 18:09

## 2023-01-01 RX ADMIN — LEVETIRACETAM 1250 MG: 250 TABLET, FILM COATED ORAL at 10:48

## 2023-01-01 RX ADMIN — ATORVASTATIN CALCIUM 10 MG: 10 TABLET, FILM COATED ORAL at 22:41

## 2023-01-01 NOTE — PROGRESS NOTES
Progress Note      1/1/2023 10:36 AM  NAME: Sarah Recio   MRN:  [id number]   Admit Diagnosis: Dysphagia [R13.10]      Problem List:   -Admitted to the hospital with shortness of breath  -History of throat cancer status post tracheostomy  -Wide-complex tachycardia  -Status post pacemaker insertion  -Hypertension  -Dyslipidemia  -Former smoker  -NIDDM  -COPD  -History of seizure disorder  -Renal disease status post nephrectomy     Assessment/Plan:   Note dictated January 1, 2023  -This is a interval follow-up visit from cardiology as patient is a still in the hospital  -Patient is lying comfortably in the bed 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  -Has improved normal ejection fraction of 65% and no acute cardiovascular issue in the last few days as per review of the chart and report obtained.  -Continue current conservative medical management and we will follow while patient is in the hospital along with the team on as needed basis    Note dictated December 28, 2022  -Nurse called to take orders for blood pressure medications so hydralazine was given. -Patient went to OR briefly with no anesthesia and endotracheal tube was cleaned by suction.  -Normal ejection fraction of 60 to 65% with no wall motion abnormalities or any significant valvular heart disease.  -When I came to see the patient he was lying comfortably in the bed and denies any symptoms.  -History of coronary artery disease and pacemaker insertion. Initial cardiology consult was invited secondary to wide-complex tachycardia. He has no more cardiac arrhythmias.  -No acute cardiovascular issue at this time and we will follow him on as needed basis while he is in the hospital along with the team.  Note dictated December 27, 2022  -Patient is lying comfortably in the bed since he moved to the ICU no acute cardiac event and he is a stable. -Monitor shows sinus rhythm with occasional PVCs.   -Patient was awake and attempt to communicate with me through the device.  -Continue to monitor and based on my overall cardiovascular assessment he can transfer back to floor.  -Echocardiogram is pending. Will check echocardiogram and if needed make further cardiovascular management decision as clinically warranted. []       High complexity decision making was performed in this patient at high risk for decompensation with multiple organ involvement. Subjective:     80-year old -American male admitted to the hospital with shortness of breath. By reviewing the chart patient has no known established coronary artery disease and is status post permanent pacemaker insertion somewhere else the details of which is not available to us. I am following him as a cardiology team member as he is a still in the hospital. He was transferred to ICU after he found unconscious. Subsequently he recovered with no obvious acute EKG changes with a stable troponin. Echocardiogram is a still pending. Once echo results are available and if indicated we will make further cardiac management decision as clinically indicated. Review of Systems:    Symptom Y/N Comments  Symptom Y/N Comments   Fever/Chills N   Chest Pain N    Poor Appetite N   Edema N    Cough N   Abdominal Pain N    Sputum N   Joint Pain N    SOB/LAMB N   Pruritis/Rash N    Nausea/vomit N   Tolerating PT/OT Y    Diarrhea N   Tolerating Diet Y    Constipation N   Other       Could NOT obtain due to:      Objective:      Physical Exam:    Last 24hrs VS reviewed since prior progress note.  Most recent are:    Visit Vitals  /64 (BP 1 Location: Left upper arm, BP Patient Position: Semi fowlers)   Pulse 60   Temp 98.2 °F (36.8 °C)   Resp 18   Ht 6' 0.01\" (1.829 m)   Wt 60 kg (132 lb 4.4 oz)   SpO2 98%   BMI 17.93 kg/m²       Intake/Output Summary (Last 24 hours) at 1/1/2023 1352  Last data filed at 1/1/2023 0544  Gross per 24 hour   Intake 350 ml   Output 1800 ml   Net -1450 ml General Appearance: Well developed, well nourished, alert & oriented x 3,    no acute distress. Ears/Nose/Mouth/Throat: Hearing grossly normal.  Neck: Supple. Chest: Lungs clear to auscultation bilaterally. Cardiovascular: Regular rate and rhythm, S1S2 normal, no murmur. Abdomen: Soft, non-tender, bowel sounds are active. Extremities: No edema bilaterally. Skin: Warm and dry. []         Post-cath site without hematoma, bruit, tenderness, or thrill. Distal pulses intact. PMH/SH reviewed - no change compared to H&P    Data Review    Telemetry: normal sinus rhythm     EKG:   []  No new EKG for review  XR CHEST PORT   Final Result      No acute process on portable chest.   Pneumoperitoneum      This result was verbally relayed by me at 1916 hours to [place], RN   [phone]         CT NECK CHEST WO CONT   Final Result   1. Status post total laryngectomy and tracheostomy creation. No definite   tracheoesophageal fistula on this Limited exam. Nasopharyngeal reflux of   contrast material and fluid/debris. Consider a dedicated fluoroscopic swallow   study with water-soluble contrast for more sensitive evaluation for subtle   fistula. 2.  Emphysema with scattered pleural parenchymal scarring and calcified   granulomas. Several calcified right hilar lymph nodes. XR CHEST PORT   Final Result      No acute findings. Lab Data Personally Reviewed:    No results for input(s): WBC, HGB, HCT, PLT, HGBEXT, HCTEXT, PLTEXT, HGBEXT, HCTEXT, PLTEXT in the last 72 hours. No results for input(s): INR, PTP, APTT, INREXT, INREXT in the last 72 hours. No results for input(s): NA, K, CL, CO2, BUN, CREA, GLU, CA, MG in the last 72 hours. No results for input(s): CPK, CKNDX, TROIQ in the last 72 hours.     No lab exists for component: CPKMB    Lab Results   Component Value Date/Time    Cholesterol, total 126 09/09/2016 10:38 AM    HDL Cholesterol 70 09/09/2016 10:38 AM    LDL, calculated 45 09/09/2016 10:38 AM    Triglyceride 56 09/09/2016 10:38 AM       No results for input(s): AP, TBIL, TP, ALB, GLOB, GGT, AML, LPSE in the last 72 hours. No lab exists for component: SGOT, GPT, AMYP, HLPSE    No results for input(s): PH, PCO2, PO2 in the last 72 hours.       Medications Personally Reviewed:    Current Facility-Administered Medications   Medication Dose Route Frequency    amoxicillin-clavulanate (AUGMENTIN) 875-125 mg per tablet 1 Tablet  1 Tablet Oral Q12H    insulin glargine (LANTUS) injection 20 Units  20 Units SubCUTAneous QHS    albuterol-ipratropium (DUO-NEB) 2.5 MG-0.5 MG/3 ML  3 mL Nebulization Q4H PRN    diclofenac (VOLTAREN) 1 % topical gel 2 g  2 g Topical TID    guaiFENesin (ROBITUSSIN) 100 mg/5 mL oral liquid 600 mg  600 mg Per G Tube Q12H    levETIRAcetam (KEPPRA) tablet 1,250 mg  1,250 mg Oral BID    atorvastatin (LIPITOR) tablet 10 mg  10 mg Oral QHS    clopidogreL (PLAVIX) tablet 75 mg  75 mg Oral DAILY    multivitamin, tx-iron-ca-min (THERA-M w/ IRON) tablet 1 Tablet  1 Tablet Oral DAILY    alogliptin (NESINA) tablet 12.5 mg  12.5 mg Oral DAILY    aspirin chewable tablet 81 mg  81 mg Oral DAILY         Michael Dickerson MD           Progress Note      1/1/2023 10:51 AM  NAME: Jane Marvin   MRN:  [id number]   Admit Diagnosis: Dysphagia [R13.10]      Problem List:   -Admitted to the hospital with shortness of breath  -History of throat cancer status post tracheostomy  -Wide-complex tachycardia  -Status post pacemaker insertion  -Hypertension  -Dyslipidemia  -Former smoker  -NIDDM  -COPD  -History of seizure disorder  -Renal disease status post nephrectomy     Assessment/Plan:   12/26/2022  -Patient was transferred to ICU this morning after he found unresponsive.  -At the time of my examination in the ICU at 10:30 AM he was awake respond to vocal command and denies any complaints.  -Telemetry shows no acute cardiac arrhythmias pauses or malignant arrhythmias  -Monitor in the ICU shows pacer spikes periodically indicating his pacemaker is working.  -Until proven otherwise it was a noncardiac event and firing evaluation for sleep apnea. -In the meantime I will order an echocardiogram and check pacemaker for any underlying cardiac event leading to the episode of syncope. -This has been discussed with the niece present in the room. Dated 12/25/2022 as a follow-up from cardiology team  -Patient is lying comfortably in the bed and per nonverbal communication denies any symptoms  -Initial cardiology consult was invited secondary to shortness of breath and multiple coronary artery disease risk factors.  -Cardiac enzyme has been unremarkable during this admission.  -There is no interval change since patient is admitted to the hospital other than improved in breathing.  -Continue current conservative medical management with no further cardiovascular testing as it would not change my cardiac management.  -Our team will continue to follow while he is in the hospital along with the team         []       High complexity decision making was performed in this patient at high risk for decompensation with multiple organ involvement. Subjective:     80-year old -American male admitted to the hospital with shortness of breath. By reviewing the chart patient has no known established coronary artery disease and is status post permanent pacemaker insertion somewhere else the details of which is not available to us. I am following him as a cardiology team member as he is a still in the hospital. There is no interval change other than he has improved his breathing and otherwise clinically and hemodynamically stable lying comfortably in the bed. Based on my overall cardiac assessment no further cardiovascular testing is warranted and we will continue current conservative medical management.     Review of Systems:    Symptom Y/N Comments  Symptom Y/N Comments   Fever/Chills N   Chest Pain N    Poor Appetite N   Edema N    Cough N   Abdominal Pain N    Sputum N   Joint Pain N    SOB/LAMB N   Pruritis/Rash N    Nausea/vomit N   Tolerating PT/OT Y    Diarrhea N   Tolerating Diet Y    Constipation N   Other       Could NOT obtain due to:      Objective:      Physical Exam:    Last 24hrs VS reviewed since prior progress note. Most recent are:    Visit Vitals  /64 (BP 1 Location: Left upper arm, BP Patient Position: Semi fowlers)   Pulse 60   Temp 98.2 °F (36.8 °C)   Resp 18   Ht 6' 0.01\" (1.829 m)   Wt 60 kg (132 lb 4.4 oz)   SpO2 98%   BMI 17.93 kg/m²       Intake/Output Summary (Last 24 hours) at 1/1/2023 1352  Last data filed at 1/1/2023 0544  Gross per 24 hour   Intake 350 ml   Output 1800 ml   Net -1450 ml          General Appearance: Well developed, well nourished, alert & oriented x 3,    no acute distress. Ears/Nose/Mouth/Throat: Hearing grossly normal.  Neck: Supple. Chest: Lungs clear to auscultation bilaterally. Cardiovascular: Regular rate and rhythm, S1S2 normal, no murmur. Abdomen: Soft, non-tender, bowel sounds are active. Extremities: No edema bilaterally. Skin: Warm and dry. []         Post-cath site without hematoma, bruit, tenderness, or thrill. Distal pulses intact. PMH/SH reviewed - no change compared to H&P    Data Review    Telemetry: normal sinus rhythm     EKG:   []  No new EKG for review  XR CHEST PORT   Final Result      No acute process on portable chest.   Pneumoperitoneum      This result was verbally relayed by me at 1916 hours to [place], RN   789         CT NECK CHEST WO CONT   Final Result   1. Status post total laryngectomy and tracheostomy creation. No definite   tracheoesophageal fistula on this Limited exam. Nasopharyngeal reflux of   contrast material and fluid/debris. Consider a dedicated fluoroscopic swallow   study with water-soluble contrast for more sensitive evaluation for subtle   fistula.    2.  Emphysema with scattered pleural parenchymal scarring and calcified granulomas. Several calcified right hilar lymph nodes. XR CHEST PORT   Final Result      No acute findings. Lab Data Personally Reviewed:    No results for input(s): WBC, HGB, HCT, PLT, HGBEXT, HCTEXT, PLTEXT, HGBEXT, HCTEXT, PLTEXT in the last 72 hours. No results for input(s): INR, PTP, APTT, INREXT, INREXT in the last 72 hours. No results for input(s): NA, K, CL, CO2, BUN, CREA, GLU, CA, MG in the last 72 hours. No results for input(s): CPK, CKNDX, TROIQ in the last 72 hours. No lab exists for component: CPKMB    Lab Results   Component Value Date/Time    Cholesterol, total 126 09/09/2016 10:38 AM    HDL Cholesterol 70 09/09/2016 10:38 AM    LDL, calculated 45 09/09/2016 10:38 AM    Triglyceride 56 09/09/2016 10:38 AM       No results for input(s): AP, TBIL, TP, ALB, GLOB, GGT, AML, LPSE in the last 72 hours. No lab exists for component: SGOT, GPT, AMYP, HLPSE    No results for input(s): PH, PCO2, PO2 in the last 72 hours.       Medications Personally Reviewed:    Current Facility-Administered Medications   Medication Dose Route Frequency    amoxicillin-clavulanate (AUGMENTIN) 875-125 mg per tablet 1 Tablet  1 Tablet Oral Q12H    insulin glargine (LANTUS) injection 20 Units  20 Units SubCUTAneous QHS    albuterol-ipratropium (DUO-NEB) 2.5 MG-0.5 MG/3 ML  3 mL Nebulization Q4H PRN    diclofenac (VOLTAREN) 1 % topical gel 2 g  2 g Topical TID    guaiFENesin (ROBITUSSIN) 100 mg/5 mL oral liquid 600 mg  600 mg Per G Tube Q12H    levETIRAcetam (KEPPRA) tablet 1,250 mg  1,250 mg Oral BID    atorvastatin (LIPITOR) tablet 10 mg  10 mg Oral QHS    clopidogreL (PLAVIX) tablet 75 mg  75 mg Oral DAILY    multivitamin, tx-iron-ca-min (THERA-M w/ IRON) tablet 1 Tablet  1 Tablet Oral DAILY    alogliptin (NESINA) tablet 12.5 mg  12.5 mg Oral DAILY    aspirin chewable tablet 81 mg  81 mg Oral DAILY         Phu Charles MD

## 2023-01-01 NOTE — PROGRESS NOTES
PROGRESS NOTE      Subjective: Uneventful night. No complaints. No bloody trachea aspirate. Tolerating tube feeding. No hypoglycemia. Visit Vitals  /64 (BP 1 Location: Left upper arm, BP Patient Position: Semi fowlers)   Pulse 60   Temp 98.2 °F (36.8 °C)   Resp 18   Ht 6' 0.01\" (1.829 m)   Wt 60 kg (132 lb 4.4 oz)   SpO2 98%   BMI 17.93 kg/m²       Current Facility-Administered Medications:     amoxicillin-clavulanate (AUGMENTIN) 875-125 mg per tablet 1 Tablet, 1 Tablet, Oral, Q12H, Marty DAVILA MD, 1 Tablet at 12/31/22 2229    insulin glargine (LANTUS) injection 20 Units, 20 Units, SubCUTAneous, QHS, Jihan Meza MD, 20 Units at 12/31/22 0007    albuterol-ipratropium (DUO-NEB) 2.5 MG-0.5 MG/3 ML, 3 mL, Nebulization, Q4H PRN, Saul Holbrook MD    diclofenac (VOLTAREN) 1 % topical gel 2 g, 2 g, Topical, TID, Jihan Rodríguez MD, 2 g at 12/31/22 2250    guaiFENesin (ROBITUSSIN) 100 mg/5 mL oral liquid 600 mg, 600 mg, Per G Tube, Q12H, David SCOTT MD, 600 mg at 12/31/22 2231    levETIRAcetam (KEPPRA) tablet 1,250 mg, 1,250 mg, Oral, BID, Jihan Rodríguez MD, 1,250 mg at 12/31/22 2229    atorvastatin (LIPITOR) tablet 10 mg, 10 mg, Oral, QHS, Lewis Connors MD, 10 mg at 12/31/22 2229    clopidogreL (PLAVIX) tablet 75 mg, 75 mg, Oral, DAILY, David SCOTT MD, 75 mg at 12/31/22 0820    multivitamin, tx-iron-ca-min (THERA-M w/ IRON) tablet 1 Tablet, 1 Tablet, Oral, DAILY, Saul Holbrook MD, 1 Tablet at 12/31/22 0820    alogliptin (NESINA) tablet 12.5 mg, 12.5 mg, Oral, DAILY, Saul Holbrook MD, 12.5 mg at 12/31/22 0820    aspirin chewable tablet 81 mg, 81 mg, Oral, DAILY, Saul Holbrook MD, 81 mg at 12/31/22 0820  No results found for this or any previous visit (from the past 24 hour(s)).   XR CHEST PORT   Final Result      No acute process on portable chest.   Pneumoperitoneum      This result was verbally relayed by me at 1916 hours to [address] [address]         CT NECK CHEST WO CONT   Final Result   1. Status post total laryngectomy and tracheostomy creation. No definite   tracheoesophageal fistula on this Limited exam. Nasopharyngeal reflux of   contrast material and fluid/debris. Consider a dedicated fluoroscopic swallow   study with water-soluble contrast for more sensitive evaluation for subtle   fistula. 2.  Emphysema with scattered pleural parenchymal scarring and calcified   granulomas. Several calcified right hilar lymph nodes. XR CHEST PORT   Final Result      No acute findings. HEENT: Normocephalic atraumatic. Neck: Clear laryngectomy site  Lungs: Clear bilaterally no wheeze  Heart: Regular  Abdomen: Soft positive bowel sounds. PEG tube in place. Lower Extremities: No edema in lower extremities. CNS: Alert and oriented. Psych: Cooperative. Assessment:#1. Acute hypoxic respiratory failure transient due to aspiration of stomach contents/aspiration pneumonitis  #2. Wide-complex tachycardia  #3. Hypertension  #4. Diabetes  #5. 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 History of nephrectomy  #8. Dysphonia/dysphagia  #9. Acute respiratory distress from tracheostomy stoma plugging now status post removal of foreign body by ENT. Plan: Continue ambulation. Awaiting transfer to the rehab. Continue current care.

## 2023-01-02 LAB
GLUCOSE BLD STRIP.AUTO-MCNC: 107 MG/DL (ref 65–100)
GLUCOSE BLD STRIP.AUTO-MCNC: 114 MG/DL (ref 65–100)
PERFORMED BY, TECHID: ABNORMAL
PERFORMED BY, TECHID: ABNORMAL

## 2023-01-02 PROCEDURE — 65270000029 HC RM PRIVATE

## 2023-01-02 PROCEDURE — 77010033711 HC HIGH FLOW OXYGEN

## 2023-01-02 PROCEDURE — 94761 N-INVAS EAR/PLS OXIMETRY MLT: CPT

## 2023-01-02 PROCEDURE — 97110 THERAPEUTIC EXERCISES: CPT

## 2023-01-02 PROCEDURE — 74011250637 HC RX REV CODE- 250/637: Performed by: INTERNAL MEDICINE

## 2023-01-02 PROCEDURE — 97116 GAIT TRAINING THERAPY: CPT

## 2023-01-02 PROCEDURE — 82962 GLUCOSE BLOOD TEST: CPT

## 2023-01-02 RX ORDER — NIFEDIPINE 30 MG/1
30 TABLET, EXTENDED RELEASE ORAL DAILY
Status: DISCONTINUED | OUTPATIENT
Start: 2023-01-03 | End: 2023-01-02

## 2023-01-02 RX ORDER — NIFEDIPINE 30 MG/1
30 TABLET, EXTENDED RELEASE ORAL DAILY
Status: DISCONTINUED | OUTPATIENT
Start: 2023-01-02 | End: 2023-01-07 | Stop reason: HOSPADM

## 2023-01-02 RX ADMIN — DICLOFENAC SODIUM 2 G: 10 GEL TOPICAL at 22:48

## 2023-01-02 RX ADMIN — CLOPIDOGREL BISULFATE 75 MG: 75 TABLET ORAL at 10:12

## 2023-01-02 RX ADMIN — DICLOFENAC SODIUM 2 G: 10 GEL TOPICAL at 10:13

## 2023-01-02 RX ADMIN — LEVETIRACETAM 1250 MG: 250 TABLET, FILM COATED ORAL at 22:43

## 2023-01-02 RX ADMIN — GUAIFENESIN 600 MG: 100 SOLUTION ORAL at 10:12

## 2023-01-02 RX ADMIN — GUAIFENESIN 600 MG: 100 SOLUTION ORAL at 22:43

## 2023-01-02 RX ADMIN — DICLOFENAC SODIUM 2 G: 10 GEL TOPICAL at 17:09

## 2023-01-02 RX ADMIN — Medication 1 TABLET: at 10:12

## 2023-01-02 RX ADMIN — ATORVASTATIN CALCIUM 10 MG: 10 TABLET, FILM COATED ORAL at 22:43

## 2023-01-02 RX ADMIN — ALOGLIPTIN 12.5 MG: 12.5 TABLET, FILM COATED ORAL at 10:12

## 2023-01-02 RX ADMIN — LEVETIRACETAM 1250 MG: 250 TABLET, FILM COATED ORAL at 10:12

## 2023-01-02 RX ADMIN — ASPIRIN 81 MG CHEWABLE TABLET 81 MG: 81 TABLET CHEWABLE at 10:11

## 2023-01-02 NOTE — PROGRESS NOTES
PROGRESS NOTE      Subjective: Patient wants to eat. He feels hungry. He tells me it is almost every day. I did talk to him regarding swallowing difficulty and aspiration problems. No chest pain or shortness of breath.      Visit Vitals  BP (!) 161/84 (BP 1 Location: Left upper arm, BP Patient Position: At rest)   Pulse 70   Temp 97.8 °F (36.6 °C)   Resp 20   Ht 6' 0.01\" (1.829 m)   Wt 60 kg (132 lb 4.4 oz)   SpO2 97%   BMI 17.93 kg/m²       Current Facility-Administered Medications:     NIFEdipine ER (PROCARDIA XL) tablet 30 mg, 30 mg, Oral, DAILY, Jihan Meza MD    insulin glargine (LANTUS) injection 20 Units, 20 Units, SubCUTAneous, QHS, Jihan Meza MD, 20 Units at 01/01/23 2241    albuterol-ipratropium (DUO-NEB) 2.5 MG-0.5 MG/3 ML, 3 mL, Nebulization, Q4H PRN, Emily Culver MD    diclofenac (VOLTAREN) 1 % topical gel 2 g, 2 g, Topical, TID, Jihan Richards MD, 2 g at 01/02/23 1013    guaiFENesin (ROBITUSSIN) 100 mg/5 mL oral liquid 600 mg, 600 mg, Per G Tube, Q12H, Tamela SCOTT MD, 600 mg at 01/02/23 1012    levETIRAcetam (KEPPRA) tablet 1,250 mg, 1,250 mg, Oral, BID, Jihan Richards MD, 1,250 mg at 01/02/23 1012    atorvastatin (LIPITOR) tablet 10 mg, 10 mg, Oral, QHS, Tamela SCOTT MD, 10 mg at 01/01/23 2241    clopidogreL (PLAVIX) tablet 75 mg, 75 mg, Oral, DAILY, Tamela SCOTT MD, 75 mg at 01/02/23 1012    multivitamin, tx-iron-ca-min (THERA-M w/ IRON) tablet 1 Tablet, 1 Tablet, Oral, DAILY, Emily Culver MD, 1 Tablet at 01/02/23 1012    alogliptin (NESINA) tablet 12.5 mg, 12.5 mg, Oral, DAILY, Lewis Vallejo MD, 12.5 mg at 01/02/23 1012    aspirin chewable tablet 81 mg, 81 mg, Oral, DAILY, Emily Culver MD, 81 mg at 01/02/23 1011  Recent Results (from the past 24 hour(s))   GLUCOSE, POC    Collection Time: 01/01/23  9:41 PM   Result Value Ref Range    Glucose (POC) 133 (H) 65 - 100 mg/dL    Performed by Jamil Ramsey (PCT)    GLUCOSE, POC    Collection Time: 01/02/23  8:42 AM   Result Value Ref Range    Glucose (POC) 114 (H) 65 - 100 mg/dL    Performed by Vesna Garcia    GLUCOSE, POC    Collection Time: 01/02/23 11:45 AM   Result Value Ref Range    Glucose (POC) 107 (H) 65 - 100 mg/dL    Performed by Vesna Garcia      XR CHEST PORT   Final Result      No acute process on portable chest.   Pneumoperitoneum      This result was verbally relayed by me at 1916 hours to [facility] [address]         CT NECK CHEST WO CONT   Final Result   1. Status post total laryngectomy and tracheostomy creation. No definite   tracheoesophageal fistula on this Limited exam. Nasopharyngeal reflux of   contrast material and fluid/debris. Consider a dedicated fluoroscopic swallow   study with water-soluble contrast for more sensitive evaluation for subtle   fistula. 2.  Emphysema with scattered pleural parenchymal scarring and calcified   granulomas. Several calcified right hilar lymph nodes. XR CHEST PORT   Final Result      No acute findings. HEENT: Normocephalic atraumatic. Neck: Thick inspissated mucus disclaimed from the ostomy site. Lungs: Clear. No wheeze. Heart: Regular. Abdomen: Soft positive bowel sounds. PEG tube in place. Lower Extremities: No edema. CNS: Alert and oriented follows command. Psych: Cooperative. Assessment::#1. Acute hypoxic respiratory failure transient due to aspiration of stomach contents/aspiration pneumonitis    #2. Wide-complex tachycardia    #3. Hypertension    #4. Diabetes    #5. 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 History of nephrectomy    #8. Dysphonia/dysphagia    #9. Acute respiratory distress from tracheostomy stoma plugging now status post removal of foreign body by ENT. Plan: We will discontinue PEG tube feeding for now. We will start patient back on puréed diet.   Discussed at length with patient regarding risk of aspiration and choking. We will have close observation while he is here. Discussed with nurse. Physical therapy. Discharge planning to rehab.

## 2023-01-02 NOTE — PROGRESS NOTES
Problem: Mobility Impaired (Adult and Pediatric)  Goal: *Acute Goals and Plan of Care (Insert Text)  Description: I with LE HEP x7 days  Supine to sit EOB with mod I x7 days  Sit to stand with SBAx1 x7 days  Stand pivot from bed to chair x7 days  Amb 15-50ft with RW and CGAx1 x7 days    Pt stated goal: to get stronger  Outcome: Progressing Towards Goal   PHYSICAL THERAPY TREATMENT  Patient: Jess Serrano (52 y.o. male)  Date: 1/2/2023  Diagnosis: Dysphagia [R13.10] <principal problem not specified>  Procedure(s) (LRB):  TRACHEOSTOMY REVISION (N/A) 5 Days Post-Op  Precautions:    Chart, physical therapy assessment, plan of care and goals were reviewed. ASSESSMENT  Patient continues with skilled PT services and is progressing towards goals. Pt received semi supine  upon PT arrival, agreeable to session. (See below for objective details and assist levels). Pt. On 10L at 21% per RN. Pt. Limited with ambulation due to cords(O2) connected to wall. Overall pt tolerated session good  today with ambulation. Improvement noted in mobility. Gt. Slightly unsteady but no LOB noted. Pt. Tolerated LE exercises pretty well in chair. Pt. Required 1 standing rest break during session due to fatigue. Pt. Did not appear to be SOB during ambulation. Attempted to check O2 but pulse Ox not working. Will continue to benefit from skilled PT services, and will continue to progress as tolerated. Current Level of Function Impacting Discharge (mobility/balance): Min A X 1 for mobility    Other factors to consider for discharge: safety/assistance at home/PLOF         PLAN :  Patient continues to benefit from skilled intervention to address the above impairments. Continue treatment per established plan of care to address goals.     Recommend with staff: Out of bed to chair for meals, Encourage HEP in prep for ADLs/mobility, Amb to bathroom for toileting with gt belt and AD, and Use of bed/chair alarm for safety    Recommendation for discharge: (in order for the patient to meet his/her long term goals)  [address]     This discharge recommendation:  Has been made in collaboration with the attending provider and/or case management    IF patient discharges home will need the following DME: to be determined (TBD)       SUBJECTIVE:   Patient stated I'm ok. \" Pt. Communicates mostly with writing on note pad. OBJECTIVE DATA SUMMARY:   Critical Behavior:  Neurologic State: Alert  Orientation Level: Oriented X4  Cognition: Follows commands, Appropriate for age attention/concentration  Safety/Judgement: Decreased insight into deficits, Decreased awareness of environment  Functional Mobility Training:  Bed Mobility:  Rolling: Stand-by assistance  Supine to Sit: Stand-by assistance     Scooting: Stand-by assistance        Transfers:  Sit to Stand: Minimum assistance  Stand to Sit: Minimum assistance        Bed to Chair: Minimum assistance                    Balance:  Sitting: Intact; Without support  Sitting - Static: Good (unsupported)  Sitting - Dynamic: Fair (occasional)  Standing: Impaired; With support  Standing - Static: Constant support;Good  Standing - Dynamic : Constant support; Fair  Ambulation/Gait Training:  Distance (ft): 75 Feet (ft)  Assistive Device: Walker, rolling  Ambulation - Level of Assistance: Contact guard assistance;Minimal assistance                 Base of Support: Narrowed     Speed/Alma: Slow  Step Length: Left shortened;Right shortened                 Therapeutic Exercises:       EXERCISE   Sets   Reps   Active Active Assist   Passive Self ROM   Comments   Ankle Pumps  20 [x] [] [] [] seated   Quad Sets/Glut Sets   [] [] [] []    Hamstring Sets   [] [] [] []    Short Arc Quads   [] [] [] []    Heel Slides   [] [] [] []    Straight Leg Raises   [] [] [] []    Hip abd/add  15 [x] [] [] [] seated   Long Arc Quads  15 [x] [] [] [] seated   Marching  15 [x] [] [] [] seated      [] [] [] []       Pain Ratin/10      Activity Tolerance:   Good and requires rest breaks    After treatment patient left in no apparent distress:   Bed locked and returned to lowest position, Sitting in chair, Call bell within reach, and Notified RN of session. COMMUNICATION/COLLABORATION:   The patients plan of care was discussed with: Registered nurse.      Warren Del Valle, PT   Time Calculation: 42 mins

## 2023-01-02 NOTE — PROGRESS NOTES
Problem: Pressure Injury - Risk of  Goal: *Prevention of pressure injury  Description: Document Jabier Scale and appropriate interventions in the flowsheet. Outcome: Progressing Towards Goal  Note: Pressure Injury Interventions:  Sensory Interventions: Assess changes in LOC    Moisture Interventions: Absorbent underpads    Activity Interventions: Increase time out of bed    Mobility Interventions: Assess need for specialty bed    Nutrition Interventions: Offer support with meals,snacks and hydration    Friction and Shear Interventions: Apply protective barrier, creams and emollients, Feet elevated on foot rest, Lift sheet, Lift team/patient mobility team, HOB 30 degrees or less, Foam dressings/transparent film/skin sealants                Problem: Patient Education: Go to Patient Education Activity  Goal: Patient/Family Education  Outcome: Progressing Towards Goal     Problem: Falls - Risk of  Goal: *Absence of Falls  Description: Document Mikaela Fall Risk and appropriate interventions in the flowsheet.   Outcome: Progressing Towards Goal  Note: Fall Risk Interventions:  Mobility Interventions: Bed/chair exit alarm    Mentation Interventions: Adequate sleep, hydration, pain control    Medication Interventions: Bed/chair exit alarm    Elimination Interventions: Bed/chair exit alarm    History of Falls Interventions: Bed/chair exit alarm         Problem: Patient Education: Go to Patient Education Activity  Goal: Patient/Family Education  Outcome: Progressing Towards Goal     Problem: Discharge Planning  Goal: *Discharge to safe environment  Outcome: Progressing Towards Goal  Goal: *Knowledge of medication management  Outcome: Progressing Towards Goal  Goal: *Knowledge of discharge instructions  Outcome: Progressing Towards Goal     Problem: Patient Education: Go to Patient Education Activity  Goal: Patient/Family Education  Outcome: Progressing Towards Goal     Problem: Diabetes Self-Management  Goal: *Disease process and treatment process  Description: Define diabetes and identify own type of diabetes; list 3 options for treating diabetes. Outcome: Progressing Towards Goal  Goal: *Incorporating nutritional management into lifestyle  Description: Describe effect of type, amount and timing of food on blood glucose; list 3 methods for planning meals. Outcome: Progressing Towards Goal  Goal: *Incorporating physical activity into lifestyle  Description: State effect of exercise on blood glucose levels. Outcome: Progressing Towards Goal  Goal: *Developing strategies to promote health/change behavior  Description: Define the ABC's of diabetes; identify appropriate screenings, schedule and personal plan for screenings. Outcome: Progressing Towards Goal  Goal: *Using medications safely  Description: State effect of diabetes medications on diabetes; name diabetes medication taking, action and side effects. Outcome: Progressing Towards Goal  Goal: *Monitoring blood glucose, interpreting and using results  Description: Identify recommended blood glucose targets  and personal targets. Outcome: Progressing Towards Goal  Goal: *Prevention, detection, treatment of acute complications  Description: List symptoms of hyper- and hypoglycemia; describe how to treat low blood sugar and actions for lowering  high blood glucose level. Outcome: Progressing Towards Goal  Goal: *Prevention, detection and treatment of chronic complications  Description: Define the natural course of diabetes and describe the relationship of blood glucose levels to long term complications of diabetes.   Outcome: Progressing Towards Goal  Goal: *Developing strategies to address psychosocial issues  Description: Describe feelings about living with diabetes; identify support needed and support network  Outcome: Progressing Towards Goal  Goal: *Insulin pump training  Outcome: Progressing Towards Goal  Goal: *Sick day guidelines  Outcome: Progressing Towards Goal  Goal: *Patient Specific Goal (EDIT GOAL, INSERT TEXT)  Outcome: Progressing Towards Goal     Problem: Patient Education: Go to Patient Education Activity  Goal: Patient/Family Education  Outcome: Progressing Towards Goal     Problem: Patient Education: Go to Patient Education Activity  Goal: Patient/Family Education  Outcome: Progressing Towards Goal     Problem: Patient Education: Go to Patient Education Activity  Goal: Patient/Family Education  Outcome: Progressing Towards Goal

## 2023-01-02 NOTE — PROGRESS NOTES
Comprehensive Nutrition Assessment    Type and Reason for Visit: Reassess (Interim)    Nutrition Recommendations/Plan:   Continue pureed diet   If pt consumes <50% of a meal, provide 240mL supplemental bolus of Jevity 1.5 via PEG   Flush with 200mL h2O after each feed  If 100% of supplemental bolus given, provides 1080kcals (~60%), 46g pro (63%), 1147mL h20 (62%)  4. Monitor and document PO intake as % and supplemental bolus feedings      Malnutrition Assessment:  Malnutrition Status:  Severe malnutrition (01/02/23 1409)    Context:  Acute illness     Findings of the 6 clinical characteristics of malnutrition:   Energy Intake:  No significant decrease in energy intake  Weight Loss:  Greater than 7.5% over 3 months     Body Fat Loss:  No significant body fat loss,     Muscle Mass Loss: Moderate muscle mass loss, Scapula (trapezius), Thigh (quadriceps), Temples (temporalis), Clavicles (pectoralis & deltoids), Calf  Fluid Accumulation:  No significant fluid accumulation,     Strength:  Not performed     Nutrition Assessment:    Admitted for dysphagia and PEG tube placement, completed 12/23. RD now consulted for TF recs. Inable to get much reports from pt d/t malfunction from assistive voice machine. Family in room provided much of hx, endorsed she noticed pt unable to eat a few days pta, however unsure how long it was going on overall. Severe wt loss noted from UBW to current RD obtained bed wt. TF recs above, also educated family. (12/28) SLP rec'd puree/thin, liquid wash, 6 small meals. (12/29) TF stopped, ate 100% pureed food. (12/31) Per attending, hold PO until stronger. TF running at goal bolus, tolerating well. (1/2) Per MD, discnt TF and restarting pureed diet. Will provide supplemental bolus feed recs d/t severe malnutr. Labs: gluc 114. Meds: IVF, nesina, lipitor, plavix, lantus, keppra, MVI. Nutrition Related Findings:    NFPE finding mod-severe wasting.  PEG in place, stopped 1/2, puree diet initiated per MD. Per SLP 12/29, rec's pureed/thin. No N/V/D/C, last BM 1/1, loose. LUE 2+ edema, RUE trace edema. Wound Type: Surgical incision (abd, peg)    Current Nutrition Intake & Therapies:  Average Meal Intake: Unable to assess  Average Supplement Intake: None ordered  ADULT DIET Dysphagia - Pureed; 4 carb choices (60 gm/meal); Low Fat/Low Chol/High Fiber/2 gm Na; Mildly Thick (Nectar); extra gravy on meats and potatoes    Anthropometric Measures:  Height: 6' (182.9 cm)  Ideal Body Weight (IBW): 178 lbs (81 kg)     Current Body Wt:  50.3 kg (110 lb 14.3 oz), 62.3 % IBW. Bed scale  Current BMI (kg/m2): 15  Usual Body Weight: 63.5 kg (140 lb) (per EMR review)  % Weight Change (Calculated): -20.8  Weight Adjustment: No adjustment                 BMI Category: Underweight (BMI less than 22) age over 72    Estimated Daily Nutrient Needs:  Energy Requirements Based On: Formula  Weight Used for Energy Requirements: Usual  Energy (kcal/day): 1856-1988kcals (MSJx1.4-1. 5AF)  Weight Used for Protein Requirements: Usual  Protein (g/day): 73g/day (1.2g/kg)  Method Used for Fluid Requirements: 1 ml/kcal  Fluid (ml/day): 1856-1988mL    Nutrition Diagnosis:   Severe malnutrition, In context of acute illness or injury related to altered GI structure, swallowing difficulty as evidenced by Criteria as identified in malnutrition assessment    Nutrition Interventions:   Food and/or Nutrient Delivery: Start oral diet, Modify tube feeding (supplemental bolus)  Nutrition Education/Counseling: Education completed (home TF instructions)  Coordination of Nutrition Care: No recommendation at this time  Plan of Care discussed with: Pt, RN    Goals:  Previous Goal Met: Goal(s) achieved  Goals: PO intake 50% or greater, by next RD assessment, Tolerate nutrition support at goal rate, Meet at least 75% of estimated needs     Nutrition Monitoring and Evaluation:   Behavioral-Environmental Outcomes: Knowledge or skill (home TF)  Food/Nutrient Intake Outcomes: Food and nutrient intake, Diet advancement/tolerance, Enteral nutrition intake/tolerance  Physical Signs/Symptoms Outcomes: Diarrhea, Weight, Biochemical data    Discharge Planning:    Enteral nutrition, Continue current diet    Mayme Drain  Contact: [phone]

## 2023-01-03 LAB
GLUCOSE BLD STRIP.AUTO-MCNC: 248 MG/DL (ref 65–100)
PERFORMED BY, TECHID: ABNORMAL

## 2023-01-03 PROCEDURE — 74011250637 HC RX REV CODE- 250/637: Performed by: INTERNAL MEDICINE

## 2023-01-03 PROCEDURE — 77010033711 HC HIGH FLOW OXYGEN

## 2023-01-03 PROCEDURE — 77010026065 HC OXYGEN MINIMUM MEDICAL AIR

## 2023-01-03 PROCEDURE — 65270000029 HC RM PRIVATE

## 2023-01-03 PROCEDURE — 92526 ORAL FUNCTION THERAPY: CPT

## 2023-01-03 PROCEDURE — 74011636637 HC RX REV CODE- 636/637: Performed by: INTERNAL MEDICINE

## 2023-01-03 PROCEDURE — 82962 GLUCOSE BLOOD TEST: CPT

## 2023-01-03 PROCEDURE — 94761 N-INVAS EAR/PLS OXIMETRY MLT: CPT

## 2023-01-03 RX ADMIN — DICLOFENAC SODIUM 2 G: 10 GEL TOPICAL at 09:29

## 2023-01-03 RX ADMIN — DICLOFENAC SODIUM 2 G: 10 GEL TOPICAL at 21:18

## 2023-01-03 RX ADMIN — GUAIFENESIN 600 MG: 100 SOLUTION ORAL at 21:17

## 2023-01-03 RX ADMIN — LEVETIRACETAM 1250 MG: 250 TABLET, FILM COATED ORAL at 21:17

## 2023-01-03 RX ADMIN — INSULIN GLARGINE 20 UNITS: 100 INJECTION, SOLUTION SUBCUTANEOUS at 21:39

## 2023-01-03 RX ADMIN — LEVETIRACETAM 1250 MG: 250 TABLET, FILM COATED ORAL at 09:29

## 2023-01-03 RX ADMIN — ASPIRIN 81 MG CHEWABLE TABLET 81 MG: 81 TABLET CHEWABLE at 09:29

## 2023-01-03 RX ADMIN — CLOPIDOGREL BISULFATE 75 MG: 75 TABLET ORAL at 09:29

## 2023-01-03 RX ADMIN — GUAIFENESIN 600 MG: 100 SOLUTION ORAL at 10:44

## 2023-01-03 RX ADMIN — Medication 1 TABLET: at 09:29

## 2023-01-03 RX ADMIN — DICLOFENAC SODIUM 2 G: 10 GEL TOPICAL at 16:36

## 2023-01-03 RX ADMIN — ATORVASTATIN CALCIUM 10 MG: 10 TABLET, FILM COATED ORAL at 21:17

## 2023-01-03 RX ADMIN — ALOGLIPTIN 12.5 MG: 12.5 TABLET, FILM COATED ORAL at 09:29

## 2023-01-03 NOTE — PROGRESS NOTES
PROGRESS NOTE      Subjective: He feels better now that he is eating. No signs and symptoms of choking. Looks comfortable no distress. Laryngotomy stoma clean. Visit Vitals  BP (!) 94/56   Pulse (!) 117   Temp 97.4 °F (36.3 °C)   Resp 16   Ht 6' (1.829 m)   Wt 60 kg (132 lb 4.4 oz)   SpO2 98%   BMI 17.94 kg/m²       Current Facility-Administered Medications:     NIFEdipine ER (PROCARDIA XL) tablet 30 mg, 30 mg, Oral, DAILY, Jihan Meza MD    insulin glargine (LANTUS) injection 20 Units, 20 Units, SubCUTAneous, QHS, Jihan Meza MD, 20 Units at 01/01/23 2241    albuterol-ipratropium (DUO-NEB) 2.5 MG-0.5 MG/3 ML, 3 mL, Nebulization, Q4H PRN, Christina Morris MD    diclofenac (VOLTAREN) 1 % topical gel 2 g, 2 g, Topical, TID, Jihan Olivares MD, 2 g at 01/03/23 0929    guaiFENesin (ROBITUSSIN) 100 mg/5 mL oral liquid 600 mg, 600 mg, Per G Tube, Q12H, Demetria Nyhan R, MD, 600 mg at 01/03/23 1044    levETIRAcetam (KEPPRA) tablet 1,250 mg, 1,250 mg, Oral, BID, Jihan Olivares MD, 1,250 mg at 01/03/23 0929    atorvastatin (LIPITOR) tablet 10 mg, 10 mg, Oral, QHS, Demetria Nyhan R, MD, 10 mg at 01/02/23 2243    clopidogreL (PLAVIX) tablet 75 mg, 75 mg, Oral, DAILY, Christina Morris MD, 75 mg at 01/03/23 1165    multivitamin, tx-iron-ca-min (THERA-M w/ IRON) tablet 1 Tablet, 1 Tablet, Oral, DAILY, Christina Morris MD, 1 Tablet at 01/03/23 0929    alogliptin (NESINA) tablet 12.5 mg, 12.5 mg, Oral, DAILY, Christina Morris MD, 12.5 mg at 01/03/23 5835    aspirin chewable tablet 81 mg, 81 mg, Oral, DAILY, Christina Morris MD, 81 mg at 01/03/23 0929  No results found for this or any previous visit (from the past 24 hour(s)). XR CHEST PORT   Final Result      No acute process on portable chest.   Pneumoperitoneum      This result was verbally relayed by me at 1916 hours to [place], RN   789         CT NECK CHEST WO CONT   Final Result   1. Status post total laryngectomy and tracheostomy creation. No definite   tracheoesophageal fistula on this Limited exam. Nasopharyngeal reflux of   contrast material and fluid/debris. Consider a dedicated fluoroscopic swallow   study with water-soluble contrast for more sensitive evaluation for subtle   fistula. 2.  Emphysema with scattered pleural parenchymal scarring and calcified   granulomas. Several calcified right hilar lymph nodes. XR CHEST PORT   Final Result      No acute findings. HEENT: Normocephalic/atraumatic anicteric sclera. Neck: Laryngotomy stoma looks clean no bloody or crusty discharge. Lungs: Generally clear no wheeze  Heart: Regular. Abdomen: Soft positive bowel sounds. Lower Extremities: Stiff wrist with bony prominence. No swelling in the lower extremities. CNS: Alert comfortable follows command moves extremities. Psych: Cooperative. Assessment:::#1.  Acute hypoxic respiratory failure transient due to aspiration of stomach contents/aspiration pneumonitis  #2. Wide-complex tachycardia  #3. Hypertension  #4. Diabetes  #5. 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 History of nephrectomy  #8. Dysphonia/dysphagia  #9. Acute respiratory distress from tracheostomy stoma plugging now status post removal of foreign body by ENT. Plan: Relative hypotension we will discontinue his Procardia. Increase and encourage p.o. fluid intake. Might consider supplemental water flushes. Awaiting rehab placement.

## 2023-01-03 NOTE — PROGRESS NOTES
Problem: Pressure Injury - Risk of  Goal: *Prevention of pressure injury  Description: Document Jabier Scale and appropriate interventions in the flowsheet. Outcome: Progressing Towards Goal  Note: Pressure Injury Interventions:  Sensory Interventions: Assess changes in LOC    Moisture Interventions: Absorbent underpads, Apply protective barrier, creams and emollients, Internal/External urinary devices, Minimize layers    Activity Interventions: Chair cushion, Increase time out of bed    Mobility Interventions: Chair cushion, Float heels, HOB 30 degrees or less    Nutrition Interventions: Offer support with meals,snacks and hydration, Document food/fluid/supplement intake    Friction and Shear Interventions: Apply protective barrier, creams and emollients, Feet elevated on foot rest, Lift sheet, Lift team/patient mobility team, HOB 30 degrees or less, Foam dressings/transparent film/skin sealants                Problem: Patient Education: Go to Patient Education Activity  Goal: Patient/Family Education  Outcome: Progressing Towards Goal     Problem: Falls - Risk of  Goal: *Absence of Falls  Description: Document Mikaela Fall Risk and appropriate interventions in the flowsheet.   Outcome: Progressing Towards Goal  Note: Fall Risk Interventions:  Mobility Interventions: Bed/chair exit alarm, Communicate number of staff needed for ambulation/transfer, Patient to call before getting OOB, Utilize walker, cane, or other assistive device    Mentation Interventions: Adequate sleep, hydration, pain control    Medication Interventions: Bed/chair exit alarm    Elimination Interventions: Bed/chair exit alarm, Call light in reach    History of Falls Interventions: Bed/chair exit alarm         Problem: Patient Education: Go to Patient Education Activity  Goal: Patient/Family Education  Outcome: Progressing Towards Goal

## 2023-01-03 NOTE — PROGRESS NOTES
CM reviewed chart. Patient's discharge disposition is [facility]. Patient has been accepted by [facility], pending auth. Patient still needs to be seen by OT for auth to be approved. CM will continue to follow.

## 2023-01-03 NOTE — PROGRESS NOTES
SPEECH LANGUAGE PATHOLOGY DYSPHAGIA TREATMENT  Patient: Valeria Peralta (1 y.o. male)  Date: 1/3/2023  Diagnosis: Dysphagia [R13.10] Procedure(s) (LRB):  TRACHEOSTOMY REVISION (N/A) 6 Days Post-Op  Precautions:      ASSESSMENT :  Patient is A&Ox4 and follows basic commands. Patient utilized witting and mouthing words as his niece took home is electrolarynx to replace battery. Patient reports his breathing is improved since trach collar was placed for humidification. PO trials of thin and puree. Patient able to recall strategies and demonstrates w/ min cues. 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 Per discussion w/ ENT, no evidence of tracheoesophageal fistula. ENT placed ST consult for diet recs. Patient is not an aspiration risk as his trachea is no longer connected with pharynx/esophagus. Patient w/ food impaction which is common following a laryngectomy as pharyngeal constriction is impaired and can develop esophageal dysmotility or strictures. However, this does not pose an aspiration risk. PLAN :  Recommendations and Planned Interventions:  Rec puree/thin diet. Rec slow rate of intake, small bites/sips, and liquid wash. Patient w/ good appetite and continues to request PO. Extensive education provided to patient to avoid foods such as breads, tough meats, raw vegetables and rice. Rec cont a puree diet w/ extra gravy and moist texture. Will d/c ST at this time. Please re-consult as medically indicated. SUBJECTIVE:   Patient reports he wants to eat and has been doing well with puree diet. He indicates he does not want to use PEG.      OBJECTIVE:     Past Medical History:   Diagnosis Date    Anemia     Arthritis     Diabetes ([facility], [address]     Hypercholesterolemia     Hypertension     Single kidney     Cr normal    Throat cancer ([facility] [address] 2008    s/p laryngectomy       CXR Results  (Last 48 hours)      None              Current Diet:  DIET ADULT  DIET ADULT TUBE FEEDING     Cognitive and Communication Status:  Neurologic State: Alert  Orientation Level: Oriented X4  Cognition: Follows commands  Perception: Appears intact  Perseveration: No perseveration noted  Safety/Judgement: Decreased insight into deficits, Decreased awareness of environment         After treatment:   Patient left in no apparent distress in bed, Call bell within reach, and Nursing notified    COMMUNICATION/EDUCATION:   Patient was educated regarding diet recs, swallow strategies and s/s aspiration. He demonstrated Good understanding as evidenced by engagement and recall. The patient's plan of care including recommendations, planned interventions, and recommended diet changes were discussed with: Registered nurse and Physician. Problem: Dysphagia (Adult)  Goal: *Acute Goals and Plan of Care (Insert Text)  Description: Speech Therapy Swallow Goals  Initiated 12/28/2022  -Patient will tolerate puree diet with thin liquids without clinical indicators of aspiration given minimal cues within 7 day(s). -Patient will tolerate PO trials without clinical indicators of aspiration given minimal cues within 7 day(s). -Patient will demonstrate understanding of swallow safety precautions and aspiration precautions, diet recs with minimal cues within 7 day(s).          -Patient goal: to eat  Outcome: Resolved/Met     Thank you for this referral.  Jud Stockton M.S., M.Ed., CCC-SLP  Time Calculation: 12 mins

## 2023-01-03 NOTE — PROGRESS NOTES
Problem: Pressure Injury - Risk of  Goal: *Prevention of pressure injury  Description: Document Jabier Scale and appropriate interventions in the flowsheet. Outcome: Progressing Towards Goal  Note: Pressure Injury Interventions:  Sensory Interventions: Assess changes in LOC    Moisture Interventions: Absorbent underpads, Internal/External urinary devices    Activity Interventions: Chair cushion, Increase time out of bed    Mobility Interventions: Assess need for specialty bed, Chair cushion, Float heels, HOB 30 degrees or less    Nutrition Interventions: Document food/fluid/supplement intake    Friction and Shear Interventions: Apply protective barrier, creams and emollients, Feet elevated on foot rest, Lift sheet, Lift team/patient mobility team, HOB 30 degrees or less, Foam dressings/transparent film/skin sealants                Problem: Patient Education: Go to Patient Education Activity  Goal: Patient/Family Education  Outcome: Progressing Towards Goal     Problem: Falls - Risk of  Goal: *Absence of Falls  Description: Document Mikaela Fall Risk and appropriate interventions in the flowsheet.   Outcome: Progressing Towards Goal  Note: Fall Risk Interventions:  Mobility Interventions: Bed/chair exit alarm, Patient to call before getting OOB, Strengthening exercises (ROM-active/passive), Utilize walker, cane, or other assistive device    Mentation Interventions: Adequate sleep, hydration, pain control    Medication Interventions: Bed/chair exit alarm    Elimination Interventions: Bed/chair exit alarm, Call light in reach, Patient to call for help with toileting needs    History of Falls Interventions: Bed/chair exit alarm         Problem: Patient Education: Go to Patient Education Activity  Goal: Patient/Family Education  Outcome: Progressing Towards Goal     Problem: Discharge Planning  Goal: *Discharge to safe environment  Outcome: Progressing Towards Goal  Goal: *Knowledge of medication management  Outcome: Progressing Towards Goal  Goal: *Knowledge of discharge instructions  Outcome: Progressing Towards Goal     Problem: Patient Education: Go to Patient Education Activity  Goal: Patient/Family Education  Outcome: Progressing Towards Goal     Problem: Diabetes Self-Management  Goal: *Disease process and treatment process  Description: Define diabetes and identify own type of diabetes; list 3 options for treating diabetes. Outcome: Progressing Towards Goal  Goal: *Incorporating nutritional management into lifestyle  Description: Describe effect of type, amount and timing of food on blood glucose; list 3 methods for planning meals. Outcome: Progressing Towards Goal  Goal: *Incorporating physical activity into lifestyle  Description: State effect of exercise on blood glucose levels. Outcome: Progressing Towards Goal  Goal: *Developing strategies to promote health/change behavior  Description: Define the ABC's of diabetes; identify appropriate screenings, schedule and personal plan for screenings. Outcome: Progressing Towards Goal  Goal: *Using medications safely  Description: State effect of diabetes medications on diabetes; name diabetes medication taking, action and side effects. Outcome: Progressing Towards Goal  Goal: *Monitoring blood glucose, interpreting and using results  Description: Identify recommended blood glucose targets  and personal targets. Outcome: Progressing Towards Goal  Goal: *Prevention, detection, treatment of acute complications  Description: List symptoms of hyper- and hypoglycemia; describe how to treat low blood sugar and actions for lowering  high blood glucose level. Outcome: Progressing Towards Goal  Goal: *Prevention, detection and treatment of chronic complications  Description: Define the natural course of diabetes and describe the relationship of blood glucose levels to long term complications of diabetes.   Outcome: Progressing Towards Goal  Goal: *Developing strategies to address psychosocial issues  Description: Describe feelings about living with diabetes; identify support needed and support network  Outcome: Progressing Towards Goal  Goal: *Insulin pump training  Outcome: Progressing Towards Goal  Goal: *Sick day guidelines  Outcome: Progressing Towards Goal  Goal: *Patient Specific Goal (EDIT GOAL, INSERT TEXT)  Outcome: Progressing Towards Goal     Problem: Patient Education: Go to Patient Education Activity  Goal: Patient/Family Education  Outcome: Progressing Towards Goal     Problem: Patient Education: Go to Patient Education Activity  Goal: Patient/Family Education  Outcome: Progressing Towards Goal     Problem: Patient Education: Go to Patient Education Activity  Goal: Patient/Family Education  Outcome: Progressing Towards Goal

## 2023-01-03 NOTE — PROGRESS NOTES
Rec puree/thin diet. Rec slow rate of intake, small bites/sips, and liquid wash. 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 Per discussion w/ ENT, no evidence of tracheoesophageal fistula. ENT placed ST consult for diet recs. Patient is not an aspiration risk as his trachea is no longer connected with pharynx/esophagus. Patient w/ food impaction which is common following a laryngectomy as pharyngeal constriction is impaired and can develop esophageal dysmotility or strictures. However, this does not pose an aspiration risk. Patient w/ good appetite and continues to request PO. Extensive education provided to patient to avoid foods such as breads, tough meats, raw vegetables and rice. Rec cont a puree diet w/ extra gravy and moist texture. Will d/c ST at this time. Please re-consult as medically indicated.

## 2023-01-03 NOTE — PROGRESS NOTES
[facility] Progress Note    Patient: Sangeeta Bradley MRN: [id number]  SSN: [id number]    YOB: 1935  Age: 80 y.o. Sex: male      Admit Date: 12/21/2022    LOS: 13 days     Subjective: Follow-up on initial consultation  No events overnight  Patient denies any shortness of breath    Objective:     Vitals:    01/03/23 0400 01/03/23 0805 01/03/23 0900 01/03/23 0937   BP:  91/65 (!) 94/56    Pulse: 62 (!) 117     Resp:  16     Temp:  97.4 °F (36.3 °C)     SpO2:  100%  98%   Weight:       Height:            Intake and Output:  Current Shift: No intake/output data recorded.   Last three shifts: 01/01 1901 - 01/03 0700  In: 450   Out: 2275 [Urine:2275]    Physical Exam:   Sitting up in bed, no acute distress  Oropharynx clear  Neck postradiation changes, laryngeal stoma patent no crusting no occlusion  Minimal secretions      Lab/Data Review:    Recent Results (from the past 24 hour(s))   GLUCOSE, POC    Collection Time: 01/02/23 11:45 AM   Result Value Ref Range    Glucose (POC) 107 (H) 65 - 100 mg/dL    Performed by Gertrude Nair             Assessment:     Active Problems:    Dysphagia (12/21/2022)      Severe protein-energy malnutrition ([address] (12/24/2022)      Plan:     Remote history of laryngectomy, status postdebridement of laryngeal stomal obstruction  Stoma looks good this morning, moist, no crusting no obstruction  Continue current care  Being evaluated for rehab placement    Signed By: Roxanne Fernando NP     January 3, 2023

## 2023-01-04 LAB
GLUCOSE BLD STRIP.AUTO-MCNC: 300 MG/DL (ref 65–100)
PERFORMED BY, TECHID: ABNORMAL

## 2023-01-04 PROCEDURE — 74011250637 HC RX REV CODE- 250/637: Performed by: INTERNAL MEDICINE

## 2023-01-04 PROCEDURE — 74011636637 HC RX REV CODE- 636/637: Performed by: INTERNAL MEDICINE

## 2023-01-04 PROCEDURE — 97165 OT EVAL LOW COMPLEX 30 MIN: CPT

## 2023-01-04 PROCEDURE — 65270000029 HC RM PRIVATE

## 2023-01-04 PROCEDURE — 82962 GLUCOSE BLOOD TEST: CPT

## 2023-01-04 PROCEDURE — 97530 THERAPEUTIC ACTIVITIES: CPT

## 2023-01-04 PROCEDURE — 97116 GAIT TRAINING THERAPY: CPT

## 2023-01-04 RX ADMIN — Medication 1 TABLET: at 09:11

## 2023-01-04 RX ADMIN — GUAIFENESIN 600 MG: 100 SOLUTION ORAL at 09:12

## 2023-01-04 RX ADMIN — NIFEDIPINE 30 MG: 30 TABLET, FILM COATED, EXTENDED RELEASE ORAL at 09:11

## 2023-01-04 RX ADMIN — DICLOFENAC SODIUM 2 G: 10 GEL TOPICAL at 09:12

## 2023-01-04 RX ADMIN — LEVETIRACETAM 1250 MG: 250 TABLET, FILM COATED ORAL at 21:27

## 2023-01-04 RX ADMIN — CLOPIDOGREL BISULFATE 75 MG: 75 TABLET ORAL at 09:11

## 2023-01-04 RX ADMIN — ALOGLIPTIN 12.5 MG: 12.5 TABLET, FILM COATED ORAL at 09:11

## 2023-01-04 RX ADMIN — INSULIN GLARGINE 20 UNITS: 100 INJECTION, SOLUTION SUBCUTANEOUS at 21:30

## 2023-01-04 RX ADMIN — ATORVASTATIN CALCIUM 10 MG: 10 TABLET, FILM COATED ORAL at 21:27

## 2023-01-04 RX ADMIN — ASPIRIN 81 MG CHEWABLE TABLET 81 MG: 81 TABLET CHEWABLE at 09:11

## 2023-01-04 RX ADMIN — DICLOFENAC SODIUM 2 G: 10 GEL TOPICAL at 21:31

## 2023-01-04 RX ADMIN — LEVETIRACETAM 1250 MG: 250 TABLET, FILM COATED ORAL at 09:11

## 2023-01-04 RX ADMIN — GUAIFENESIN 600 MG: 100 SOLUTION ORAL at 21:28

## 2023-01-04 RX ADMIN — DICLOFENAC SODIUM 2 G: 10 GEL TOPICAL at 16:33

## 2023-01-04 NOTE — PROGRESS NOTES
Problem: Mobility Impaired (Adult and Pediatric)  Goal: *Acute Goals and Plan of Care (Insert Text)  Description: I with LE HEP x7 days  Supine to sit EOB with mod I x7 days  Sit to stand with SBAx1 x7 days  Stand pivot from bed to chair x7 days  Amb 125-150ft with RW and CGAx1 x7 days- goal revised 1/4/23    Pt stated goal: to get stronger  Outcome: Progressing Towards Goal    PHYSICAL THERAPY TREATMENT  Patient: Trey Ayala (56 y.o. male)  Date: 1/4/2023  Diagnosis: Dysphagia [R13.10] <principal problem not specified>  Procedure(s) (LRB):  TRACHEOSTOMY REVISION (N/A) 7 Days Post-Op  Precautions:    Chart, physical therapy assessment, plan of care and goals were reviewed. ASSESSMENT  Patient continues with skilled PT services and is progressing towards goals. Pt seen today for weekly PT reassessment and overall has been seen x3 visits since initial PT eval.  Pt has been able to increase ambulation when given clear to take off humidified air, however during this session nsg requested to leave it placed during mobility therefore ambulation distance limited during this session. Pt semi supine in bed upon PT arrival, agreeable to session. (See below for objective details and assist levels). Overall pt tolerated session good today with overall CGA/min a with bed mob and transfers. Pt was able to amb approx 12ft with RW and CGA/min A, no LOB during mobility, distance limited due to O2 lines this session. Pt continues to demo decreased bed mob, transfers, LE strength, gt, activity tolerance, and overall functional mobility. Will continue to benefit from skilled PT services, and will continue to progress as tolerated. Previous goals reviewed and revised. PLAN :  Patient continues to benefit from skilled intervention to address the above impairments. Continue treatment per established plan of care to address goals.     Recommend with staff: Out of bed to chair for meals, Encourage HEP in prep for ADLs/mobility, Amb to bathroom for toileting with gt belt and AD, and Use of bed/chair alarm for safety    Recommendation for discharge: (in order for the patient to meet his/her long term goals)  Inpatient Rehabilitation Facility            SUBJECTIVE:   Patient semi supine in bed upon PT arrival, agreeable to work with PT    OBJECTIVE DATA SUMMARY:   Critical Behavior:  Neurologic State: Alert  Orientation Level: Oriented X4  Cognition: Appropriate decision making, Appropriate for age attention/concentration, Appropriate safety awareness, Follows commands  Safety/Judgement: Decreased insight into deficits, Decreased awareness of environment  Functional Mobility Training:  Bed Mobility:  Rolling: Contact guard assistance  Supine to Sit: Contact guard assistance              Transfers:  Sit to Stand: Minimum assistance  Stand to Sit: Minimum assistance        Bed to Chair: Minimum assistance                    Balance:  Sitting: Intact  Sitting - Static: Good (unsupported)  Sitting - Dynamic: Fair (occasional)  Standing: Impaired; With support  Standing - Static: Constant support;Good  Standing - Dynamic : Constant support; Fair  Ambulation/Gait Training:  Distance (ft): 12 Feet (ft)  Assistive Device: Walker, rolling;Gait belt  Ambulation - Level of Assistance: Contact guard assistance;Minimal assistance                 Base of Support: Narrowed     Speed/Alma: Slow         Pain Rating:  No c/o pain during session    Activity Tolerance:   Good    After treatment patient left in no apparent distress:   Bed locked and returned to lowest position, Sitting in chair and Call bell within reach        COMMUNICATION/COLLABORATION:   The patients plan of care was discussed with: Registered nurse.          Quita Reyna, PT   Time Calculation: 18 mins

## 2023-01-04 NOTE — PROGRESS NOTES
Problem: Pressure Injury - Risk of  Goal: *Prevention of pressure injury  Description: Document Jabier Scale and appropriate interventions in the flowsheet. Outcome: Progressing Towards Goal  Note: Pressure Injury Interventions:  Sensory Interventions: Assess changes in LOC, Avoid rigorous massage over bony prominences, Check visual cues for pain, Keep linens dry and wrinkle-free, Minimize linen layers    Moisture Interventions: Absorbent underpads, Apply protective barrier, creams and emollients, Internal/External urinary devices, Minimize layers    Activity Interventions: Pressure redistribution bed/mattress(bed type)    Mobility Interventions: Chair cushion, Float heels, HOB 30 degrees or less    Nutrition Interventions: Offer support with meals,snacks and hydration, Document food/fluid/supplement intake    Friction and Shear Interventions: Apply protective barrier, creams and emollients, Minimize layers                Problem: Falls - Risk of  Goal: *Absence of Falls  Description: Document Mikaela Fall Risk and appropriate interventions in the flowsheet.   Outcome: Progressing Towards Goal  Note: Fall Risk Interventions:  Mobility Interventions: Bed/chair exit alarm, Patient to call before getting OOB, Utilize walker, cane, or other assistive device    Mentation Interventions: Adequate sleep, hydration, pain control, Bed/chair exit alarm    Medication Interventions: Bed/chair exit alarm, Patient to call before getting OOB    Elimination Interventions: Bed/chair exit alarm, Call light in reach, Patient to call for help with toileting needs    History of Falls Interventions: Bed/chair exit alarm, Door open when patient unattended         Problem: Patient Education: Go to Patient Education Activity  Goal: Patient/Family Education  Outcome: Progressing Towards Goal

## 2023-01-04 NOTE — PROGRESS NOTES
OCCUPATIONAL THERAPY EVALUATION  Patient: Tanya Prado (13 y.o. male)  Date: 1/4/2023  Primary Diagnosis: Dysphagia [R13.10]  Procedure(s) (LRB):  TRACHEOSTOMY REVISION (N/A) 7 Days Post-Op   Precautions:        ASSESSMENT  Pt is a 80 y.o. male presenting to [facility] with c/o aspiration, admitted 12/21/23 and currently being treated for dysphagia, tachyarrhythmia, hypotension, hx of diabetes, HTN, osteoarthritis, hx of laryngeal carcinoma, s/p tracheostomy and laryngectomy, hx of arrhythmia s/p pacemaker placement, hx of nephrectomy with a single kidney. See below for home and PLOF information. Pt received semi-supine in bed upon arrival, AXO x4, and agreeable to OT evaluation. Based on current observations, pt presents with deficits in generalized strength/AROM, balance (see PT note for gait details), functional activity tolerance, coordination, and decreased safety awareness currently impacting overall performance of ADLs and functional transfers/mobility (see below for objective details and assist levels). Overall, pt tolerates session fair with pt completing sit<>stand transfers, ambulating to and from bathroom, and demo'ing toilet transfer with min A overall. VC req'd for pt to push from and reach back for chair during sit<>stand transfers with pt demo'ing understanding. Pt educated to use grab bar during toilet transfer with pt demo'ing understanding. Pt able to don socks with mod A to slide R sock over heel while seated in chair. Pt able to wash face and complete oral hygiene using mouth swab with set up A to provide pt the items. Pt would benefit from continued skilled OT services to address current impairments and improve IND and safety with self cares and functional transfers/mobility. Current OT d/c recommendation Inpatient Rehabilitation Facility  once medically appropriate.     Other factors to consider for discharge: family/social support, DME, time since onset, severity of deficits, functional baseline     Patient will benefit from skilled therapy intervention to address the above noted impairments. PLAN :  Recommendations and Planned Interventions: self care training, functional mobility training, therapeutic exercise, balance training, therapeutic activities, endurance activities, patient education, and home safety training    Recommend with staff: Encourage HEP in prep for ADLs/mobility and Amb to bathroom for toileting with gt belt and AD    Recommend next session: LB dressing  and Standing grooming    Frequency/Duration: Patient will be followed by occupational therapy:  3-5x/week to address goals. Recommendation for discharge: (in order for the patient to meet his/her long term goals)  [facility]     This discharge recommendation:  Has been made in collaboration with the attending provider and/or case management    IF patient discharges home will need the following DME: TBD       SUBJECTIVE:   Patient stated I'm okay.     OBJECTIVE DATA SUMMARY:   HISTORY:   Past Medical History:   Diagnosis Date    Anemia     Arthritis     Diabetes ([facility] [address]     Hypercholesterolemia     Hypertension     Single kidney     Cr normal    Throat cancer ([facility] [address] 2008    s/p laryngectomy     Past Surgical History:   Procedure Laterality Date    HX LAP CHOLECYSTECTOMY  12-19-12    By Dr. Rohan Franco states does not know reason       Per pt:   Home Situation  Home Environment: Apartment (1st floor)  # Steps to Enter: 0  One/Two Story Residence: One story  Living Alone: Yes  Support Systems: Child(belle)  Patient Expects to be Discharged to[de-identified] Rehab Unit Subacute  Current DME Used/Available at Home: [place], rolling, Grab bars, Cane, straight, Shower chair  Tub or Shower Type: Shower    Hand dominance: Right    EXAMINATION OF PERFORMANCE DEFICITS:  Cognitive/Behavioral Status:  Neurologic State: Alert  Orientation Level: Oriented X4  Cognition: Appropriate decision making; Appropriate for age attention/concentration; Appropriate safety awareness; Follows commands        Safety/Judgement: Awareness of environment; Insight into deficits    Hearing: Auditory  Auditory Impairment: None    Range of Motion:  AROM: Grossly decreased, non-functional (Grossly decreased shoulder flexion. External/internal rotation and elbow flexion WFL)    Strength:  Strength: Generally decreased, functional    Coordination:  Fine Motor Skills-Upper: Left Intact; Right Intact    Gross Motor Skills-Upper: Left Intact; Right Impaired    Balance:  Sitting: Intact  Sitting - Static: Good (unsupported)  Sitting - Dynamic: Fair (occasional)  Standing: Impaired; With support  Standing - Static: Constant support;Good  Standing - Dynamic : Constant support; Fair    Functional Mobility and Transfers for ADLs:  Bed Mobility:  Rolling: Contact guard assistance  Supine to Sit: Contact guard assistance    Transfers:  Sit to Stand: Minimum assistance  Stand to Sit: Minimum assistance  Bed to Chair: Minimum assistance  Bathroom Mobility: Minimum assistance  Toilet Transfer : Minimum assistance      ADL Intervention and task modifications:  Grooming  Position Performed: Seated in chair  Washing Face: Set-up  Brushing Teeth: Set-up (Oral care using swab)    Lower Body Dressing Assistance  Socks: Moderate assistance (To slide R sock over heel)  Position Performed: Seated in chair    Cognitive Retraining  Safety/Judgement: Awareness of environment; Insight into deficits    Therapeutic Exercise:  Pt would benefit from UE HEP in prep for ADLs and functional mobility/transfers. Functional Measure:    Manav ELIZONDO \"6 Clicks\"                                                       Daily Activity Inpatient Short Form  How much help from another person does the patient currently need. .. Total; A Lot A Little None   1. Putting on and taking off regular lower body clothing? []  1 [x]  2 []  3 []  4   2.   Bathing (including washing, rinsing, drying)? []  1 [x]  2 []  3 []  4   3. Toileting, which includes using toilet, bedpan or urinal? [] 1 [x]  2 []  3 []  4   4. Putting on and taking off regular upper body clothing? []  1 [x]  2 []  3 []  4   5. Taking care of personal grooming such as brushing teeth? []  1 []  2 [x]  3 []  4   6. Eating meals? []  1 []  2 [x]  3 []  4   © , Trustees of [address] [place] [address] [address] [address], under license to [employer]. All rights reserved     Score:      Interpretation of Tool:  Represents clinically-significant functional categories (i.e. Activities of daily living). Percentage of Impairment CH    0%   CI    1-19% CJ    20-39% CK    40-59% CL    60-79% CM    80-99% CN     100%   [facility]  Score 6-24 24 23 20-22 15-19 10-14 7-9 6     Occupational Therapy Evaluation Charge Determination   History Examination Decision-Making   LOW Complexity : Brief history review  MEDIUM Complexity : 3-5 performance deficits relating to physical, cognitive , or psychosocial skils that result in activity limitations and / or participation restrictions MEDIUM Complexity : Patient may present with comorbidities that affect occupational performnce. Miniml to moderate modification of tasks or assistance (eg, physical or verbal ) with assesment(s) is necessary to enable patient to complete evaluation       Based on the above components, the patient evaluation is determined to be of the following complexity level: LOW     Pain Ratin/10    Activity Tolerance:   Fair and requires rest breaks    After treatment patient left in no apparent distress:    Sitting in chair and Call bell within reach, bed locked and in lowest position    COMMUNICATION/EDUCATION:   The patients plan of care was discussed with: Registered nurse. Patient/family agree to work toward stated goals and plan of care. This patients plan of care is appropriate for delegation to \[facility]\"".      Thank you for this referral.  Rubia Stanley, OT  Time Calculation: 38 mins    Problem: Self Care Deficits Care Plan (Adult)  Goal: *Acute Goals and Plan of Care (Insert Text)  Description:   FUNCTIONAL STATUS PRIOR TO ADMISSION: Patient was mostly independent with functional mobility, however, had a RW and SPC if needed. Patient was modified independent for basic and instrumental ADLs. HOME SUPPORT: The patient lived alone with no local support. Occupational Therapy Goals  Initiated 1/4/2023  Patient Goal: Get back to being independent. 1.  Patient will perform lower body dressing with independence within 7 day(s). 2.  Patient will perform grooming with independence within 7 day(s). 3.  Patient will perform bathing with modified independence within 7 day(s). 4.  Patient will perform toilet transfers with modified independence within 7 day(s). 5.  Patient will perform all aspects of toileting with modified independence within 7 day(s). 6.  Patient will participate in upper extremity therapeutic exercise/activities with independence within 7 day(s).     Outcome: Not Met

## 2023-01-04 NOTE — PROGRESS NOTES
CM reviewed clinical chart. Patient is being recommended for inpatient rehab. CM team had sent clinicals to [facility] [phone] for insurance authorization on 12/30/2022. Today 1941 Vanessa Perales informed CM clinicals need to be sent to a different authorization department within [facility] for Inpatient Rehab Auths at [phone]. CM contacted UR Nurse Finn with [employer] at [phone] ext [phone] and verified above. Patient will need to be seen by OT, once the OT note is in, it will need to be faxed to [facility] [facility] as well to help with this Auth. Dispo: Pending Auth to [facility]. If denied, [facility] and [facility] are back up plans.

## 2023-01-04 NOTE — PROGRESS NOTES
PROGRESS NOTE      Subjective: No complaints. Patient sitting in the chair. Tolerating p.o. intake without any problems. No coughing or choking. Uneventful night.      Visit Vitals  /71 (BP 1 Location: Left upper arm, BP Patient Position: At rest;Lying)   Pulse 64   Temp 97.8 °F (36.6 °C)   Resp 16   Ht 6' (1.829 m)   Wt 60 kg (132 lb 4.4 oz)   SpO2 100%   BMI 17.94 kg/m²       Current Facility-Administered Medications:     NIFEdipine ER (PROCARDIA XL) tablet 30 mg, 30 mg, Oral, DAILY, Jihan Mcdermott MD, 30 mg at 01/04/23 0911    insulin glargine (LANTUS) injection 20 Units, 20 Units, SubCUTAneous, QHS, Jihan Meza MD, 20 Units at 01/03/23 2139    albuterol-ipratropium (DUO-NEB) 2.5 MG-0.5 MG/3 ML, 3 mL, Nebulization, Q4H PRN, Danay Cunningham MD    diclofenac (VOLTAREN) 1 % topical gel 2 g, 2 g, Topical, TID, Jihan Mcdermott MD, 2 g at 01/04/23 0912    guaiFENesin (ROBITUSSIN) 100 mg/5 mL oral liquid 600 mg, 600 mg, Per G Tube, Q12H, Pinky SCOTT MD, 600 mg at 01/04/23 0912    levETIRAcetam (KEPPRA) tablet 1,250 mg, 1,250 mg, Oral, BID, Jihan Mcdermott MD, 1,250 mg at 01/04/23 0911    atorvastatin (LIPITOR) tablet 10 mg, 10 mg, Oral, QHS, Pinky SCOTT MD, 10 mg at 01/03/23 2117    clopidogreL (PLAVIX) tablet 75 mg, 75 mg, Oral, DAILY, Pinky SCOTT MD, 75 mg at 01/04/23 0911    multivitamin, tx-iron-ca-min (THERA-M w/ IRON) tablet 1 Tablet, 1 Tablet, Oral, DAILY, Danay Cunningham MD, 1 Tablet at 01/04/23 0911    alogliptin (NESINA) tablet 12.5 mg, 12.5 mg, Oral, DAILY, Danay Cunningham MD, 12.5 mg at 01/04/23 2095    aspirin chewable tablet 81 mg, 81 mg, Oral, DAILY, Danay Cunningham MD, 81 mg at 01/04/23 3134  Recent Results (from the past 24 hour(s))   GLUCOSE, POC    Collection Time: 01/03/23  8:43 PM   Result Value Ref Range    Glucose (POC) 248 (H) 65 - 100 mg/dL    Performed by Stephanie Hickman (PCT)      XR CHEST PORT   Final Result      No acute process on portable chest.   Pneumoperitoneum      This result was verbally relayed by me at 1916 hours to [facility], [address] CONT   Final Result   1. Status post total laryngectomy and tracheostomy creation. No definite   tracheoesophageal fistula on this Limited exam. Nasopharyngeal reflux of   contrast material and fluid/debris. Consider a dedicated fluoroscopic swallow   study with water-soluble contrast for more sensitive evaluation for subtle   fistula. 2.  Emphysema with scattered pleural parenchymal scarring and calcified   granulomas. Several calcified right hilar lymph nodes. XR CHEST PORT   Final Result      No acute findings. HEENT: Normocephalic atraumatic anicteric sclera. Neck: Ostomy site clear  Lungs: Generally clear  Heart: Regular  Abdomen: Soft positive bowel sounds PEG tube in place  Lower Extremities: No cyanosis. CNS: Alert and oriented follows command. Psych: Cooperative. Assessment:   :::#1.  Acute hypoxic respiratory failure transient due to aspiration of stomach contents/aspiration pneumonitis    #2. Wide-complex tachycardia    #3. Hypertension now okay without blood pressure medicine    #4. Diabetes    #5. 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 History of nephrectomy    #8. Dysphonia/dysphagia    #9. Acute respiratory distress from tracheostomy stoma plugging now status post removal of foreign body by ENT. Plan: Stable for rehab placement. Awaiting placement. Continue current care.

## 2023-01-05 LAB
GLUCOSE BLD STRIP.AUTO-MCNC: 100 MG/DL (ref 65–100)
GLUCOSE BLD STRIP.AUTO-MCNC: 243 MG/DL (ref 65–100)
GLUCOSE BLD STRIP.AUTO-MCNC: 248 MG/DL (ref 65–100)
GLUCOSE BLD STRIP.AUTO-MCNC: 288 MG/DL (ref 65–100)
GLUCOSE BLD STRIP.AUTO-MCNC: 82 MG/DL (ref 65–100)
PERFORMED BY, TECHID: ABNORMAL
PERFORMED BY, TECHID: NORMAL
PERFORMED BY, TECHID: NORMAL

## 2023-01-05 PROCEDURE — 65270000029 HC RM PRIVATE

## 2023-01-05 PROCEDURE — 97110 THERAPEUTIC EXERCISES: CPT

## 2023-01-05 PROCEDURE — 99231 SBSQ HOSP IP/OBS SF/LOW 25: CPT | Performed by: OTOLARYNGOLOGY

## 2023-01-05 PROCEDURE — 82962 GLUCOSE BLOOD TEST: CPT

## 2023-01-05 PROCEDURE — 77010026065 HC OXYGEN MINIMUM MEDICAL AIR

## 2023-01-05 PROCEDURE — 74011636637 HC RX REV CODE- 636/637: Performed by: INTERNAL MEDICINE

## 2023-01-05 PROCEDURE — 94761 N-INVAS EAR/PLS OXIMETRY MLT: CPT

## 2023-01-05 PROCEDURE — 74011250637 HC RX REV CODE- 250/637: Performed by: INTERNAL MEDICINE

## 2023-01-05 PROCEDURE — 97116 GAIT TRAINING THERAPY: CPT

## 2023-01-05 PROCEDURE — 97535 SELF CARE MNGMENT TRAINING: CPT

## 2023-01-05 RX ADMIN — Medication 1 TABLET: at 09:50

## 2023-01-05 RX ADMIN — LEVETIRACETAM 1250 MG: 250 TABLET, FILM COATED ORAL at 09:50

## 2023-01-05 RX ADMIN — ASPIRIN 81 MG CHEWABLE TABLET 81 MG: 81 TABLET CHEWABLE at 09:50

## 2023-01-05 RX ADMIN — LEVETIRACETAM 1250 MG: 250 TABLET, FILM COATED ORAL at 21:47

## 2023-01-05 RX ADMIN — DICLOFENAC SODIUM 2 G: 10 GEL TOPICAL at 09:51

## 2023-01-05 RX ADMIN — GUAIFENESIN 600 MG: 100 SOLUTION ORAL at 21:48

## 2023-01-05 RX ADMIN — ATORVASTATIN CALCIUM 10 MG: 10 TABLET, FILM COATED ORAL at 21:48

## 2023-01-05 RX ADMIN — DICLOFENAC SODIUM 2 G: 10 GEL TOPICAL at 17:25

## 2023-01-05 RX ADMIN — CLOPIDOGREL BISULFATE 75 MG: 75 TABLET ORAL at 09:50

## 2023-01-05 RX ADMIN — ALOGLIPTIN 12.5 MG: 12.5 TABLET, FILM COATED ORAL at 09:50

## 2023-01-05 RX ADMIN — DICLOFENAC SODIUM 2 G: 10 GEL TOPICAL at 21:49

## 2023-01-05 RX ADMIN — INSULIN GLARGINE 20 UNITS: 100 INJECTION, SOLUTION SUBCUTANEOUS at 21:48

## 2023-01-05 RX ADMIN — NIFEDIPINE 30 MG: 30 TABLET, FILM COATED, EXTENDED RELEASE ORAL at 09:50

## 2023-01-05 RX ADMIN — GUAIFENESIN 600 MG: 100 SOLUTION ORAL at 09:50

## 2023-01-05 NOTE — PROGRESS NOTES
CM notified by [facility] IRF that insurance denied auth. CM spoke with patient's daughter and niece, who verbalized their understanding and stated that they would like him to go to [facility]. CM notified [address] via [address] [place] [address],[facility] and requested that they start auth. Their liaison stated they will start auth today. CM [facility] [place] [facility] [place] auth as well. CM will continue to follow.

## 2023-01-05 NOTE — PROGRESS NOTES
Problem: Pressure Injury - Risk of  Goal: *Prevention of pressure injury  Description: Document Jabier Scale and appropriate interventions in the flowsheet. Outcome: Progressing Towards Goal  Note: Pressure Injury Interventions:  Sensory Interventions: Assess changes in LOC, Avoid rigorous massage over bony prominences, Check visual cues for pain, Keep linens dry and wrinkle-free, Minimize linen layers    Moisture Interventions: Absorbent underpads, Assess need for specialty bed, Internal/External urinary devices, Minimize layers    Activity Interventions: Increase time out of bed, Pressure redistribution bed/mattress(bed type)    Mobility Interventions: Chair cushion, Float heels, HOB 30 degrees or less    Nutrition Interventions: Offer support with meals,snacks and hydration, Document food/fluid/supplement intake    Friction and Shear Interventions: Apply protective barrier, creams and emollients, Minimize layers                Problem: Patient Education: Go to Patient Education Activity  Goal: Patient/Family Education  Outcome: Progressing Towards Goal     Problem: Falls - Risk of  Goal: *Absence of Falls  Description: Document Mikaela Fall Risk and appropriate interventions in the flowsheet.   Outcome: Progressing Towards Goal  Note: Fall Risk Interventions:  Mobility Interventions: Bed/chair exit alarm, Communicate number of staff needed for ambulation/transfer, Patient to call before getting OOB, Strengthening exercises (ROM-active/passive), Utilize walker, cane, or other assistive device    Mentation Interventions: Adequate sleep, hydration, pain control, Bed/chair exit alarm    Medication Interventions: Bed/chair exit alarm, Evaluate medications/consider consulting pharmacy, Patient to call before getting OOB, Teach patient to arise slowly    Elimination Interventions: Bed/chair exit alarm, Call light in reach, Patient to call for help with toileting needs    History of Falls Interventions: Bed/chair exit alarm, Door open when patient unattended         Problem: Patient Education: Go to Patient Education Activity  Goal: Patient/Family Education  Outcome: Progressing Towards Goal

## 2023-01-05 NOTE — PROGRESS NOTES
[facility] Progress Note    Patient: Jhon Villalobos MRN: [id number]  SSN: [id number]    YOB: 1935  Age: 80 y.o. Sex: male      Admit Date: 12/21/2022    LOS: 15 days     Subjective: Follow-up consultation  Patient has been doing overall well  Tolerating tube feeds  Denies any shortness of breath    Objective:     Vitals:    01/04/23 1600 01/04/23 1915 01/04/23 2000 01/05/23 0000   BP:  (!) 105/57     Pulse: 66 69 68 84   Resp:  16 16    Temp:  98.3 °F (36.8 °C)     SpO2:  97%     Weight:       Height:            Intake and Output:  Current Shift: No intake/output data recorded. Last three shifts: 01/03 1901 - 01/05 0700  In: 450   Out: 1000 [Urine:1000]    Physical Exam:   Sitting in bed, no acute distress  Oropharynx clear  Neck postradiation changes  Laryngectomy stoma with inspissated secretions around the periphery which is cleaned, otherwise stoma is patent without crusting or any bleeding    Lab/Data Review:    Recent Results (from the past 24 hour(s))   GLUCOSE, POC    Collection Time: 01/04/23  9:27 PM   Result Value Ref Range    Glucose (POC) 300 (H) 65 - 100 mg/dL    Performed by Michael Angeler             Assessment:     Active Problems:    Dysphagia (12/21/2022)      Severe protein-energy malnutrition ([address] (12/24/2022)      Plan:     Remote history of laryngectomy, status postdebridement of laryngeal stomal obstruction  Stoma looks healthier but continues to have thick secretions around periphery, stoma site care/cleaning needs to be done daily and PRN  Continue current care  Being evaluated for rehab placement    Signed By: Michael Alcantar MD     January 5, 2023         [address], [facility], [facility] [place] [address]

## 2023-01-05 NOTE — PROGRESS NOTES
OCCUPATIONAL THERAPY TREATMENT  Patient: Kyler Del Cid (8 y.o. male)  Date: 1/5/2023  Diagnosis: Dysphagia [R13.10] <principal problem not specified>  Procedure(s) (LRB):  TRACHEOSTOMY REVISION (N/A) 8 Days Post-Op  Precautions: FALL  Chart, occupational therapy assessment, plan of care, and goals were reviewed. ASSESSMENT  Pt continues with skilled OT services and is progressing towards goals. Pt received semi-supine in bed upon  arrival, alert and agreeable to SHEEHAN tx at this time using assistive device to talk d/t trache. Pt cooperative and demonstrated good effort during activities. Pt tolerated therapy session fairly with no complaints. Pt much improved with bed mobility>EOB using bed rail with additional time d/t weakness and stiffness. Pt req'd decreased assistance with STS using RW/gait belt however continues to need vc's for hand placement for safety. Upon standing, pt soiled self in bed, with mod A and pt's assistance to perform toileting hygiene as pt able to wipe using R hand but need assistance to complete task d/t weakness. Pt improved with bed>chair tf with continued vc's for proper technique for safety. Seated in chair, pt able to complete grooming with set-up. Pt improved with LB dressing needing only assistance with donning L sock d/t decreased anterior reach. Pt very motivated and presents good rehab potential.     Overall, pt continues to present with deficits in generalized strength/AROM, dynamic standing balance and functional activity tolerance during performance of ADLs/mobility (see below for objective details and assist levels). Will continue to progress. Recommend d/c to IRF once medically appropriate. Other factors to consider for discharge: Time of onset, medical prognosis/diagnosis, severity of deficits, PLOF, functional baseline, home environment, and family support          PLAN :  Patient continues to benefit from skilled intervention to address the above impairments. Continue treatment per established plan of care. to address goals. Recommend with staff: Out of bed to chair for meals and Frequent repositioning to prevent skin breakdown    Recommend next session: Standing grooming    Recommendation for discharge: (in order for the patient to meet his/her long term goals)  [address]     This discharge recommendation:  Has been made in collaboration with the attending provider and/or case management       IF patient discharges home will need the following DME: TBD       SUBJECTIVE:   Patient stated no.  Question: Do you have pain? OBJECTIVE DATA SUMMARY:   Cognitive/Behavioral Status:  Neurologic State: Alert  Orientation Level: Oriented X4  Cognition: Follows commands             Functional Mobility and Transfers for ADLs:  Bed Mobility:  Rolling: Stand-by assistance  Supine to Sit: Stand-by assistance  Scooting: Stand-by assistance    Transfers:  Sit to Stand: Contact guard assistance     Bed to Chair: Minimum assistance;Contact guard assistance    Balance:  Sitting: Intact  Sitting - Static: Good (unsupported)  Sitting - Dynamic: Good (unsupported)  Standing: Impaired; With support  Standing - Static: Constant support;Good  Standing - Dynamic : Constant support; Fair    ADL Intervention:       Grooming  Grooming Assistance: Set-up  Position Performed: Seated in chair  Washing Face: Set-up  Washing Hands: Set-up  Brushing Teeth: Set-up                   Lower Body Dressing Assistance  Dressing Assistance: Minimum assistance  Socks: Minimum assistance (L sock donning only)  Leg Crossed Method Used: Yes  Position Performed: Seated in chair  Cues: Verbal cues provided    Toileting  Toileting Assistance: Moderate assistance  Bowel Hygiene:  Moderate assistance  Clothing Management: Contact guard assistance  Cues: Verbal cues provided           Exercise Sets Reps AROM AAROM PROM Self PROM Comments   Elbow E/F   [] [] [] []    Chest press   [] [] [] [] Shoulder flex/ext          Ceiling punches   [] [] [] []    Upright row          IR/ER          Lateral raises          Arm circles          Scapular retraction          Wrist rotation          Wrist E/F          Digit E/F               Pain:  0/10    Activity Tolerance:   Fair    After treatment patient left in no apparent distress:   Sitting in chair and Call bell within reach, bed locked and in lowest position    COMMUNICATION/COLLABORATION:   The patients plan of care was discussed with: Registered nurse. SISSY De Jesus  Time Calculation: 24 mins     Problem: Self Care Deficits Care Plan (Adult)  Goal: *Acute Goals and Plan of Care (Insert Text)  Description:   FUNCTIONAL STATUS PRIOR TO ADMISSION: Patient was mostly independent with functional mobility, however, had a RW and SPC if needed. Patient was modified independent for basic and instrumental ADLs. HOME SUPPORT: The patient lived alone with no local support. Occupational Therapy Goals  Initiated 1/4/2023  Patient Goal: Get back to being independent. 1.  Patient will perform lower body dressing with independence within 7 day(s). 2.  Patient will perform grooming with independence within 7 day(s). 3.  Patient will perform bathing with modified independence within 7 day(s). 4.  Patient will perform toilet transfers with modified independence within 7 day(s). 5.  Patient will perform all aspects of toileting with modified independence within 7 day(s). 6.  Patient will participate in upper extremity therapeutic exercise/activities with independence within 7 day(s).     Outcome: Progressing Towards Goal

## 2023-01-05 NOTE — PROGRESS NOTES
PROGRESS NOTE      Subjective: No new complaints. Tolerating p.o. He is happy. He is having physical therapy. Swelling in the hands much better.      Visit Vitals  /72   Pulse 72   Temp 98.9 °F (37.2 °C)   Resp 15   Ht 6' (1.829 m)   Wt 60 kg (132 lb 4.4 oz)   SpO2 98%   BMI 17.94 kg/m²       Current Facility-Administered Medications:     NIFEdipine ER (PROCARDIA XL) tablet 30 mg, 30 mg, Oral, DAILY, Jihan Meza MD, 30 mg at 01/05/23 0950    insulin glargine (LANTUS) injection 20 Units, 20 Units, SubCUTAneous, QHS, Jihan Meza MD, 20 Units at 01/04/23 2130    albuterol-ipratropium (DUO-NEB) 2.5 MG-0.5 MG/3 ML, 3 mL, Nebulization, Q4H PRN, Juany Newby MD    diclofenac (VOLTAREN) 1 % topical gel 2 g, 2 g, Topical, TID, Jihan Rowley MD, 2 g at 01/05/23 0951    guaiFENesin (ROBITUSSIN) 100 mg/5 mL oral liquid 600 mg, 600 mg, Per G Tube, Q12H, Gurwinder SCOTT MD, 600 mg at 01/05/23 0950    levETIRAcetam (KEPPRA) tablet 1,250 mg, 1,250 mg, Oral, BID, Jihan Rowley MD, 1,250 mg at 01/05/23 0950    atorvastatin (LIPITOR) tablet 10 mg, 10 mg, Oral, QHS, Gurwinder SCOTT MD, 10 mg at 01/04/23 2127    clopidogreL (PLAVIX) tablet 75 mg, 75 mg, Oral, DAILY, Gurwinder SCOTT MD, 75 mg at 01/05/23 0950    multivitamin, tx-iron-ca-min (THERA-M w/ IRON) tablet 1 Tablet, 1 Tablet, Oral, DAILY, Juany Newby MD, 1 Tablet at 01/05/23 0950    alogliptin (NESINA) tablet 12.5 mg, 12.5 mg, Oral, DAILY, Juany Newby MD, 12.5 mg at 01/05/23 0950    aspirin chewable tablet 81 mg, 81 mg, Oral, DAILY, Juany Newby MD, 81 mg at 01/05/23 9659  Recent Results (from the past 24 hour(s))   GLUCOSE, POC    Collection Time: 01/04/23  9:27 PM   Result Value Ref Range    Glucose (POC) 300 (H) 65 - 100 mg/dL    Performed by ADELA Lopez    Collection Time: 01/05/23  9:48 AM   Result Value Ref Range    Glucose (POC) 82 65 - 100 mg/dL    Performed by Abbie Stern POC Collection Time: 01/05/23  1:22 PM   Result Value Ref Range    Glucose (POC) 100 65 - 100 mg/dL    Performed by [employer]      XR CHEST PORT   Final Result      No acute process on portable chest.   Pneumoperitoneum      This result was verbally relayed by me at 1916 hours to [facility], RN   789         CT NECK CHEST WO CONT   Final Result   1. Status post total laryngectomy and tracheostomy creation. No definite   tracheoesophageal fistula on this Limited exam. Nasopharyngeal reflux of   contrast material and fluid/debris. Consider a dedicated fluoroscopic swallow   study with water-soluble contrast for more sensitive evaluation for subtle   fistula. 2.  Emphysema with scattered pleural parenchymal scarring and calcified   granulomas. Several calcified right hilar lymph nodes. XR CHEST PORT   Final Result      No acute findings. HEENT: Normocephalic atraumatic. Neck: Some inspissated sputum around the ostomy site. Lungs: Clear bilaterally  Heart: Regular  Abdomen: Obese positive bowel sounds  Lower Extremities: No cyanosis or edema  CNS: Alert and oriented follows command  Psych: Cooperative     Assessment: :::#1.  Acute hypoxic respiratory failure transient due to aspiration of stomach contents/aspiration pneumonitis  #2. Wide-complex tachycardia  #3. Hypertension now okay without blood pressure medicine  #4. Diabetes  #5. 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 History of nephrectomy  #8. Dysphonia/dysphagia  #9. Acute respiratory distress from tracheostomy stoma plugging now status post removal of foreign body by ENT. Plan: Insurance denies inpatient rehab. Authorization for skilled nursing care facility. Continue current care. Patient stable for discharge.

## 2023-01-05 NOTE — PROGRESS NOTES
Problem: Mobility Impaired (Adult and Pediatric)  Goal: *Acute Goals and Plan of Care (Insert Text)  Description: I with LE HEP x7 days  Supine to sit EOB with mod I x7 days  Sit to stand with SBAx1 x7 days  Stand pivot from bed to chair x7 days  Amb 125-150ft with RW and CGAx1 x7 days- goal revised 1/4/23    Pt stated goal: to get stronger  Outcome: Progressing Towards Goal    PHYSICAL THERAPY TREATMENT  Patient: Harry Odom (89 y.o. male)  Date: 1/5/2023  Diagnosis: Dysphagia [R13.10] <principal problem not specified>  Procedure(s) (LRB):  TRACHEOSTOMY REVISION (N/A) 8 Days Post-Op  Precautions:    Chart, physical therapy assessment, plan of care and goals were reviewed. ASSESSMENT  Per nsg, doctor has cleared pt to ambulate on room air and heated trach collar can be taken off during ambulation. Patient continues with skilled PT services and is progressing towards goals. Pt received sitting on the chair upon PT arrival, agreeable for PT treatment. (See below for objective details and assist levels). Pt participated in there exs for b/l Le strengthening, requires CGA for transfers and is able to ambulate - 150' with RW, CGA/Cynthia. Pt  with improved ability to perform transfers and ambulation. Increased ambulatory distance of 150'. Overall pt tolerated session good today, will continue to benefit from skilled PT services, and progress as tolerated. Current Level of Function Impacting Discharge (mobility/balance): CGA/Cyntiha    Other factors to consider for discharge: time since onset, severity of deficits         PLAN :  Patient continues to benefit from skilled intervention to address the above impairments. Continue treatment per established plan of care to address goals.     Recommend with staff: Out of bed to chair for meals, Encourage HEP in prep for ADLs/mobility, Amb to bathroom for toileting with gt belt and AD, and Use of bed/chair alarm for safety    Recommendation for discharge: (in order for the patient to meet his/her long term goals)  [address]     This discharge recommendation:  Has been made in collaboration with the attending provider and/or case management    IF patient discharges home will need the following DME: rolling walker       SUBJECTIVE:   Patient stated  I am feeling fine    OBJECTIVE DATA SUMMARY:   Critical Behavior:  Neurologic State: Alert  Orientation Level: Oriented X4  Cognition: Follows commands  Safety/Judgement: Awareness of environment, Insight into deficits  Functional Mobility Training:  Bed Mobility:  Rolling: Stand-by assistance  Supine to Sit: Stand-by assistance     Scooting: Stand-by assistance    Transfers:  Sit to Stand: Contact guard assistance  Stand to Sit: Contact guard assistance        Bed to Chair: Minimum assistance;Contact guard assistance    Balance:  Sitting: Intact  Sitting - Static: Good (unsupported)  Sitting - Dynamic: Good (unsupported)  Standing: Impaired; With support  Standing - Static: Good;Constant support  Standing - Dynamic : Fair;Constant support  Ambulation/Gait Training:  Distance (ft): 150 Feet (ft)  Assistive Device: Walker, rolling;Gait belt  Ambulation - Level of Assistance: Contact guard assistance;Minimal assistance    Base of Support: Narrowed    Speed/Alma: Slow  Step Length: Right shortened;Left shortened      Therapeutic Exercises:       EXERCISE   Sets   Reps   Active Active Assist   Passive Self ROM   Comments   Ankle Pumps  15 [x] [] [] []    Quad Sets/Glut Sets   [] [] [] []    Hamstring Sets   [] [] [] []    Short Arc Quads   [] [] [] []    Heel Slides   [] [] [] []    Straight Leg Raises   [] [] [] []    Hip abd/add   [] [] [] []    Long Arc Quads  10 [x] [] [] []    Marching  10 [x] [] [] []       [] [] [] []       Pain Ratin/10    Activity Tolerance:   Good    After treatment patient left in no apparent distress:   Bed locked and returned to lowest position, Sitting in chair and Call bell within reach      COMMUNICATION/COLLABORATION:   The patients plan of care was discussed with: Registered nurse.          Shadi Hernandez, PT   Time Calculation: 27 mins

## 2023-01-06 PROBLEM — Z90.02 H/O LARYNGECTOMY: Status: ACTIVE | Noted: 2023-01-06

## 2023-01-06 LAB
GLUCOSE BLD STRIP.AUTO-MCNC: 103 MG/DL (ref 65–100)
GLUCOSE BLD STRIP.AUTO-MCNC: 136 MG/DL (ref 65–100)
GLUCOSE BLD STRIP.AUTO-MCNC: 184 MG/DL (ref 65–100)
PERFORMED BY, TECHID: ABNORMAL

## 2023-01-06 PROCEDURE — 74011636637 HC RX REV CODE- 636/637: Performed by: INTERNAL MEDICINE

## 2023-01-06 PROCEDURE — 99231 SBSQ HOSP IP/OBS SF/LOW 25: CPT | Performed by: OTOLARYNGOLOGY

## 2023-01-06 PROCEDURE — 74011250637 HC RX REV CODE- 250/637: Performed by: INTERNAL MEDICINE

## 2023-01-06 PROCEDURE — 94761 N-INVAS EAR/PLS OXIMETRY MLT: CPT

## 2023-01-06 PROCEDURE — 77010026065 HC OXYGEN MINIMUM MEDICAL AIR

## 2023-01-06 PROCEDURE — 65270000029 HC RM PRIVATE

## 2023-01-06 PROCEDURE — 97110 THERAPEUTIC EXERCISES: CPT

## 2023-01-06 PROCEDURE — 97116 GAIT TRAINING THERAPY: CPT

## 2023-01-06 PROCEDURE — 82962 GLUCOSE BLOOD TEST: CPT

## 2023-01-06 RX ADMIN — DICLOFENAC SODIUM 2 G: 10 GEL TOPICAL at 21:15

## 2023-01-06 RX ADMIN — ASPIRIN 81 MG CHEWABLE TABLET 81 MG: 81 TABLET CHEWABLE at 09:40

## 2023-01-06 RX ADMIN — LEVETIRACETAM 1250 MG: 250 TABLET, FILM COATED ORAL at 09:40

## 2023-01-06 RX ADMIN — CLOPIDOGREL BISULFATE 75 MG: 75 TABLET ORAL at 09:40

## 2023-01-06 RX ADMIN — GUAIFENESIN 600 MG: 100 SOLUTION ORAL at 21:07

## 2023-01-06 RX ADMIN — Medication 1 TABLET: at 09:40

## 2023-01-06 RX ADMIN — ALOGLIPTIN 12.5 MG: 12.5 TABLET, FILM COATED ORAL at 09:40

## 2023-01-06 RX ADMIN — LEVETIRACETAM 1250 MG: 250 TABLET, FILM COATED ORAL at 21:06

## 2023-01-06 RX ADMIN — DICLOFENAC SODIUM 2 G: 10 GEL TOPICAL at 09:41

## 2023-01-06 RX ADMIN — INSULIN GLARGINE 20 UNITS: 100 INJECTION, SOLUTION SUBCUTANEOUS at 21:06

## 2023-01-06 RX ADMIN — ATORVASTATIN CALCIUM 10 MG: 10 TABLET, FILM COATED ORAL at 21:06

## 2023-01-06 RX ADMIN — NIFEDIPINE 30 MG: 30 TABLET, FILM COATED, EXTENDED RELEASE ORAL at 09:40

## 2023-01-06 RX ADMIN — GUAIFENESIN 600 MG: 100 SOLUTION ORAL at 09:39

## 2023-01-06 NOTE — PROGRESS NOTES
[facility]     No complaints   Ashleigh po     Stoma with minimal moist secretions , debrided  Tracheal excoriation improved   Stoma healing well    King Harpreet BRIGGS E[facility]

## 2023-01-06 NOTE — PROGRESS NOTES
Comprehensive Nutrition Assessment    Type and Reason for Visit: Reassess (Interim)    Nutrition Recommendations/Plan:   Continue pureed diet   If pt consumes <50% of a meal, provide 240mL supplemental bolus of Jevity 1.5 via PEG   Flush with 200mL h2O after each feed  If 100% of supplemental bolus given, provides 1080kcals (~60%), 46g pro (63%), 1147mL h20 (62%)  4. Monitor and document PO intake as % and supplemental bolus feedings      Malnutrition Assessment:  Malnutrition Status:  Severe malnutrition (01/06/23 1512)    Context:  Acute illness     Findings of the 6 clinical characteristics of malnutrition:   Energy Intake:  No significant decrease in energy intake  Weight Loss:  Greater than 7.5% over 3 months     Body Fat Loss:  No significant body fat loss,     Muscle Mass Loss: Moderate muscle mass loss, Temples (temporalis), Thigh (quadriceps), Scapula (trapezius), Clavicles (pectoralis & deltoids), Calf  Fluid Accumulation:  No significant fluid accumulation,     Strength:  Not performed     Nutrition Assessment:    Admitted for dysphagia and PEG tube placement, completed 12/23. RD now consulted for TF recs. Inable to get much reports from pt d/t malfunction from assistive voice machine. Family in room provided much of hx, endorsed she noticed pt unable to eat a few days pta, however unsure how long it was going on overall. Severe wt loss noted from UBW to current RD obtained bed wt. TF recs above, also educated family. (12/28) SLP rec'd puree/thin, liquid wash, 6 small meals. (12/29) TF stopped, ate 100% pureed food. (12/31) Per attending, hold PO until stronger. TF running at goal bolus, tolerating well. (1/2) Per MD, discnt TF and restarting pureed diet. Will provide supplemental bolus feed recs d/t severe malnutr. (1/6) Pt consuming >76% pureed diet PO, tolerating well w/no aspiration. Not currently using PEG. Will cnt to provide supplemental bolus recs. Labs: gluc 103.  Meds: nesina, plavix, MVI.    Nutrition Related Findings:    NFPE finding mod-severe wasting. PEG in place, stopped 1/2, puree diet initiated per MD. Per SLP 1/3, rec's pureed/thin, pt tolerating well. No N/V/D/C, last BM 1/6. No edema. Wound Type: Surgical incision (abd, peg)    Current Nutrition Intake & Therapies:  Average Meal Intake: %  Average Supplement Intake: None ordered  ADULT DIET Dysphagia - Pureed; 4 carb choices (60 gm/meal); Low Fat/Low Chol/High Fiber/2 gm Na; Mildly Thick (Nectar); extra gravy on meats and potatoes  ADULT TUBE FEEDING PEG; Standard with Fiber; Delivery Method: Bolus; Bolus Frequency: Other (Specify); Specify Other Frequency: Supplemental bolus, only if meal intakes <50%; Bolus Volume (mL): 240; Bolus Method of Infusion: Syringe Push; Water Fl. .. Anthropometric Measures:  Height: 6' (182.9 cm)  Ideal Body Weight (IBW): 178 lbs (81 kg)     Current Body Wt:  50.3 kg (110 lb 14.3 oz), 62.3 % IBW. Bed scale  Current BMI (kg/m2): 15  Usual Body Weight: 63.5 kg (140 lb) (per EMR review)  % Weight Change (Calculated): -20.8  Weight Adjustment: No adjustment                 BMI Category: Underweight (BMI less than 22) age over 72    Estimated Daily Nutrient Needs:  Energy Requirements Based On: Formula  Weight Used for Energy Requirements: Usual  Energy (kcal/day): 1856-1988kcals/day (MSJx1.4-1. 5AF)  Weight Used for Protein Requirements: Usual  Protein (g/day): 73g/day (1.2g/kg)  Method Used for Fluid Requirements: 1 ml/kcal  Fluid (ml/day): 1856-1988mL    Nutrition Diagnosis:   Severe malnutrition, In context of acute illness or injury related to altered GI structure, swallowing difficulty as evidenced by Criteria as identified in malnutrition assessment    Nutrition Interventions:   Food and/or Nutrient Delivery: Continue current diet, Modify tube feeding (supplemental bolus)  Nutrition Education/Counseling: Education completed (home TF instructions)  Coordination of Nutrition Care: No recommendation at this time  Plan of Care discussed with: Pt and wife    Goals:  Previous Goal Met: Goal(s) achieved  Goals: Meet at least 75% of estimated needs, by next RD assessment     Nutrition Monitoring and Evaluation:   Behavioral-Environmental Outcomes: Knowledge or skill (home TF)  Food/Nutrient Intake Outcomes: Enteral nutrition intake/tolerance, Food and nutrient intake, Diet advancement/tolerance  Physical Signs/Symptoms Outcomes: Nutrition focused physical findings, Weight, Skin    Discharge Planning:    Continue current diet, Enteral nutrition    Carl Akers  Contact: [phone]

## 2023-01-06 NOTE — PROGRESS NOTES
PROGRESS NOTE      Subjective: Patient comfortable. Tolerating p.o. intake. No coughing chest pain shortness of breath. He is ambulating better. No fever or chills.      Visit Vitals  /63 (BP 1 Location: Left upper arm, BP Patient Position: At rest)   Pulse 99   Temp 98.1 °F (36.7 °C)   Resp 16   Ht 6' (1.829 m)   Wt 60 kg (132 lb 4.4 oz)   SpO2 99%   BMI 17.94 kg/m²       Current Facility-Administered Medications:     NIFEdipine ER (PROCARDIA XL) tablet 30 mg, 30 mg, Oral, DAILY, Jihan Meza MD, 30 mg at 01/06/23 0940    insulin glargine (LANTUS) injection 20 Units, 20 Units, SubCUTAneous, QHS, Jihan Meza MD, 20 Units at 01/05/23 2148    albuterol-ipratropium (DUO-NEB) 2.5 MG-0.5 MG/3 ML, 3 mL, Nebulization, Q4H PRN, Russ Olivares MD    diclofenac (VOLTAREN) 1 % topical gel 2 g, 2 g, Topical, TID, Jihan Cortés MD, 2 g at 01/06/23 0941    guaiFENesin (ROBITUSSIN) 100 mg/5 mL oral liquid 600 mg, 600 mg, Per G Tube, Q12H, Russ Olivares MD, 600 mg at 01/06/23 0939    levETIRAcetam (KEPPRA) tablet 1,250 mg, 1,250 mg, Oral, BID, Jihan Cortés MD, 1,250 mg at 01/06/23 0940    atorvastatin (LIPITOR) tablet 10 mg, 10 mg, Oral, QHS, Russ Olivares MD, 10 mg at 01/05/23 2148    clopidogreL (PLAVIX) tablet 75 mg, 75 mg, Oral, DAILY, Russ Olivares MD, 75 mg at 01/06/23 0940    multivitamin, tx-iron-ca-min (THERA-M w/ IRON) tablet 1 Tablet, 1 Tablet, Oral, DAILY, Russ Olivares MD, 1 Tablet at 01/06/23 0940    alogliptin (NESINA) tablet 12.5 mg, 12.5 mg, Oral, DAILY, Russ Olivares MD, 12.5 mg at 01/06/23 0940    aspirin chewable tablet 81 mg, 81 mg, Oral, DAILY, Russ Olivares MD, 81 mg at 01/06/23 0940  Recent Results (from the past 24 hour(s))   GLUCOSE, POC    Collection Time: 01/05/23  1:22 PM   Result Value Ref Range    Glucose (POC) 100 65 - 100 mg/dL    Performed by London Duffy, POC    Collection Time: 01/05/23  5:00 PM   Result Value Ref Range Glucose (POC) 243 (H) 65 - 100 mg/dL    Performed by Abbie Stern, POC    Collection Time: 01/05/23  5:02 PM   Result Value Ref Range    Glucose (POC) 288 (H) 65 - 100 mg/dL    Performed by Abbie Stern, POC    Collection Time: 01/05/23  8:49 PM   Result Value Ref Range    Glucose (POC) 248 (H) 65 - 100 mg/dL    Performed by Ace Parra, POC    Collection Time: 01/06/23  8:26 AM   Result Value Ref Range    Glucose (POC) 103 (H) 65 - 100 mg/dL    Performed by ADOLFO FALL    GLUCOSE, POC    Collection Time: 01/06/23 11:19 AM   Result Value Ref Range    Glucose (POC) 136 (H) 65 - 100 mg/dL    Performed by Vu Steinberg (Float)      XR CHEST PORT   Final Result      No acute process on portable chest.   Pneumoperitoneum      This result was verbally relayed by me at 1916 hours to [place] [address]         CT NECK CHEST WO CONT   Final Result   1. Status post total laryngectomy and tracheostomy creation. No definite   tracheoesophageal fistula on this Limited exam. Nasopharyngeal reflux of   contrast material and fluid/debris. Consider a dedicated fluoroscopic swallow   study with water-soluble contrast for more sensitive evaluation for subtle   fistula. 2.  Emphysema with scattered pleural parenchymal scarring and calcified   granulomas. Several calcified right hilar lymph nodes. XR CHEST PORT   Final Result      No acute findings. HEENT: Cephalic atraumatic anicteric sclera. Neck: Stoma with dry mucous. Lungs: Clear anteriorly. Heart: Regular. Abdomen: Soft positive bowel sounds. PEG tube in place. Lower Extremities: No cyanosis or edema. Stiff wrist.  Swelling better. CNS: Alert and oriented. Follows command. Psych: Cooperative. Assessment::::#1.  Acute hypoxic respiratory failure transient due to aspiration of stomach contents/aspiration pneumonitis    #2. Wide-complex tachycardia    #3.   Hypertension now okay without blood pressure medicine    #4. Diabetes    #5. 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 History of nephrectomy    #8. Dysphonia/dysphagia    #9. Acute respiratory distress from tracheostomy stoma plugging now status post removal of foreign body by ENT    Plan: Awaiting skilled nursing care placement. Inpatient rehab has been denied. Discussed with case management. Discussed with family member was present in the room. Patient has been stable for discharge.

## 2023-01-06 NOTE — PROGRESS NOTES
CM reviewed clinical chart. [facility] received authorization from patient's insurance today. CM notified primary physician, who is gone for the day, but stated he will be back in tomorrow to discharge the patient. CM notified the SNF of this update. CM will continue to follow.

## 2023-01-06 NOTE — PROGRESS NOTES
Problem: Pressure Injury - Risk of  Goal: *Prevention of pressure injury  Description: Document Jabier Scale and appropriate interventions in the flowsheet. Outcome: Progressing Towards Goal  Note: Pressure Injury Interventions:  Sensory Interventions: (P) Keep linens dry and wrinkle-free    Moisture Interventions: Apply protective barrier, creams and emollients, Limit adult briefs, Maintain skin hydration (lotion/cream), Minimize layers, Moisture barrier    Activity Interventions: Pressure redistribution bed/mattress(bed type)    Mobility Interventions: Pressure redistribution bed/mattress (bed type)    Nutrition Interventions: Document food/fluid/supplement intake, Offer support with meals,snacks and hydration    Friction and Shear Interventions: Apply protective barrier, creams and emollients, Minimize layers                Problem: Patient Education: Go to Patient Education Activity  Goal: Patient/Family Education  Outcome: Progressing Towards Goal     Problem: Falls - Risk of  Goal: *Absence of Falls  Description: Document Mikaela Fall Risk and appropriate interventions in the flowsheet.   Outcome: Progressing Towards Goal  Note: Fall Risk Interventions:  Mobility Interventions: Bed/chair exit alarm    Mentation Interventions: Adequate sleep, hydration, pain control    Medication Interventions: Bed/chair exit alarm    Elimination Interventions: Bed/chair exit alarm    History of Falls Interventions: Bed/chair exit alarm, Room close to nurse's station         Problem: Patient Education: Go to Patient Education Activity  Goal: Patient/Family Education  Outcome: Progressing Towards Goal     Problem: Discharge Planning  Goal: *Discharge to safe environment  Outcome: Progressing Towards Goal     Problem: Patient Education: Go to Patient Education Activity  Goal: Patient/Family Education  Outcome: Progressing Towards Goal     Problem: Patient Education: Go to Patient Education Activity  Goal: Patient/Family Education  Outcome: Progressing Towards Goal     Problem: Patient Education: Go to Patient Education Activity  Goal: Patient/Family Education  Outcome: Progressing Towards Goal

## 2023-01-06 NOTE — PROGRESS NOTES
PHYSICAL THERAPY TREATMENT  Patient: Trciia Anne (42 y.o. male)  Date: 1/6/2023  Diagnosis: Dysphagia [R13.10] <principal problem not specified>  Procedure(s) (LRB):  TRACHEOSTOMY REVISION (N/A) 9 Days Post-Op  Precautions:    Chart, physical therapy assessment, plan of care and goals were reviewed. ASSESSMENT  Patient continues with skilled PT services and is progressing towards goals. Pt seated in chair upon PT arrival, agreeable to session. Per previous PT note, pt can amb with trach collar disconnected. Patient improved with mobility, min A/CGA for all mobility at this time. (See below for objective details and assist levels). Pt ambulated increased distance today with no LOB or unsteadiness noted. Patient tolerated seated therex well today. Overall, pt tolerated session well today with improved mobility and safety. Will continue to benefit from skilled PT services, and will continue to progress as tolerated. Would benefit from d/c to rehab to progress mobility towards baseline as pt lives alone. Current Level of Function Impacting Discharge (mobility/balance): safety, CGA mobility    Other factors to consider for discharge: weakness, PLOF IND, lives alone         PLAN :  Patient continues to benefit from skilled intervention to address the above impairments. Continue treatment per established plan of care to address goals.       Recommendation for discharge: (in order for the patient to meet his/her long term goals)  [address]     This discharge recommendation:  Has been made in collaboration with the attending provider and/or case management    IF patient discharges home will need the following DME: to be determined (TBD)       SUBJECTIVE:   Patient stated Dayana meyer    OBJECTIVE DATA SUMMARY:   Critical Behavior:  Neurologic State: Alert  Orientation Level: (P) Oriented X4  Cognition: Follows commands  Safety/Judgement: Awareness of environment, Insight into deficits  Functional Mobility Training:    Transfers:  Sit to Stand: Contact guard assistance  Stand to Sit: Contact guard assistance    Balance:  Sitting: Intact (chair level)  Sitting - Static: Good (unsupported)  Sitting - Dynamic: Fair (occasional)  Standing: Impaired; With support  Standing - Static: Constant support;Good  Standing - Dynamic : Constant support; Fair    Ambulation/Gait Training:  Distance (ft): 220 Feet (ft)  Assistive Device: Gait belt;Walker, rolling  Ambulation - Level of Assistance: Contact guard assistance  Base of Support: Narrowed  Speed/Alma: Slow  Step Length: Right shortened;Left shortened      Therapeutic Exercises:       EXERCISE   Sets   Reps   Active Active Assist   Passive Self ROM   Comments   Ankle Pumps  10 [x] [] [] []    Hip abd/add  10 [x] [] [] []    Long Arc Quads  10 [x] [] [] []    Marching  10 [x] [] [] []       [] [] [] []       Pain Ratin/10    Activity Tolerance:   Fair and requires rest breaks    After treatment patient left in no apparent distress:   Sitting in chair, Call bell within reach, and Caregiver / family present      COMMUNICATION/COLLABORATION:   The patients plan of care was discussed with: Occupational therapy assistant and Registered nurse.        Delonte Jordan, PT   Time Calculation: 25 mins         Problem: Mobility Impaired (Adult and Pediatric)  Goal: *Acute Goals and Plan of Care (Insert Text)  Description: I with LE HEP x7 days  Supine to sit EOB with mod I x7 days  Sit to stand with SBAx1 x7 days  Stand pivot from bed to chair x7 days  Amb 125-150ft with RW and CGAx1 x7 days- goal revised 23    Pt stated goal: to get stronger  Outcome: Progressing Towards Goal

## 2023-01-07 VITALS
BODY MASS INDEX: 17.92 KG/M2 | DIASTOLIC BLOOD PRESSURE: 57 MMHG | WEIGHT: 132.28 LBS | TEMPERATURE: 97.7 F | RESPIRATION RATE: 18 BRPM | OXYGEN SATURATION: 99 % | HEIGHT: 72 IN | HEART RATE: 67 BPM | SYSTOLIC BLOOD PRESSURE: 99 MMHG

## 2023-01-07 LAB
GLUCOSE BLD STRIP.AUTO-MCNC: 136 MG/DL (ref 65–100)
GLUCOSE BLD STRIP.AUTO-MCNC: 67 MG/DL (ref 65–100)
GLUCOSE BLD STRIP.AUTO-MCNC: 67 MG/DL (ref 65–100)
PERFORMED BY, TECHID: ABNORMAL
PERFORMED BY, TECHID: NORMAL
PERFORMED BY, TECHID: NORMAL

## 2023-01-07 PROCEDURE — 94761 N-INVAS EAR/PLS OXIMETRY MLT: CPT

## 2023-01-07 PROCEDURE — 97116 GAIT TRAINING THERAPY: CPT

## 2023-01-07 PROCEDURE — 74011250637 HC RX REV CODE- 250/637: Performed by: INTERNAL MEDICINE

## 2023-01-07 PROCEDURE — 77010026065 HC OXYGEN MINIMUM MEDICAL AIR

## 2023-01-07 PROCEDURE — 82962 GLUCOSE BLOOD TEST: CPT

## 2023-01-07 RX ORDER — INSULIN GLARGINE 100 [IU]/ML
20 INJECTION, SOLUTION SUBCUTANEOUS
Qty: 1 ML | Refills: 0 | Status: SHIPPED | OUTPATIENT
Start: 2023-01-07

## 2023-01-07 RX ORDER — NIFEDIPINE 30 MG/1
30 TABLET, EXTENDED RELEASE ORAL DAILY
Qty: 30 TABLET | Refills: 0 | Status: SHIPPED | OUTPATIENT
Start: 2023-01-08 | End: 2023-02-07

## 2023-01-07 RX ADMIN — Medication 1 TABLET: at 09:26

## 2023-01-07 RX ADMIN — DICLOFENAC SODIUM 2 G: 10 GEL TOPICAL at 09:28

## 2023-01-07 RX ADMIN — ASPIRIN 81 MG CHEWABLE TABLET 81 MG: 81 TABLET CHEWABLE at 09:26

## 2023-01-07 RX ADMIN — NIFEDIPINE 30 MG: 30 TABLET, FILM COATED, EXTENDED RELEASE ORAL at 09:27

## 2023-01-07 RX ADMIN — LEVETIRACETAM 1250 MG: 250 TABLET, FILM COATED ORAL at 09:26

## 2023-01-07 RX ADMIN — CLOPIDOGREL BISULFATE 75 MG: 75 TABLET ORAL at 09:27

## 2023-01-07 RX ADMIN — GUAIFENESIN 600 MG: 100 SOLUTION ORAL at 09:26

## 2023-01-07 NOTE — PROGRESS NOTES
Patient discharging today to [facility]. Going to room 212. Patient and family are aware. Informed nurse as well. All documents sent over via [employer]. Discharge plan of care/case management plan validated with provider discharge order. Family will transport patient due to not being bed bound. Nurse is aware. Nurse to call report at [phone]. Medicare pt has received, reviewed, and signed 2nd IM letter informing them of their right to appeal the discharge. Signed copied has been placed on pt bedside chart.

## 2023-01-07 NOTE — PROGRESS NOTES
Problem: Pressure Injury - Risk of  Goal: *Prevention of pressure injury  Description: Document Jabier Scale and appropriate interventions in the flowsheet. Outcome: Progressing Towards Goal  Note: Pressure Injury Interventions:  Sensory Interventions: Assess changes in LOC, Minimize linen layers, Turn and reposition approx. every two hours (pillows and wedges if needed)    Moisture Interventions: Minimize layers, Absorbent underpads, Moisture barrier    Activity Interventions: Increase time out of bed    Mobility Interventions: Pressure redistribution bed/mattress (bed type), Assess need for specialty bed, Trapeze to reposition    Nutrition Interventions: Offer support with meals,snacks and hydration    Friction and Shear Interventions: Apply protective barrier, creams and emollients, Minimize layers                Problem: Patient Education: Go to Patient Education Activity  Goal: Patient/Family Education  Outcome: Progressing Towards Goal     Problem: Falls - Risk of  Goal: *Absence of Falls  Description: Document Lukenicky Funk Fall Risk and appropriate interventions in the flowsheet.   Outcome: Progressing Towards Goal  Note: Fall Risk Interventions:  Mobility Interventions: PT Consult for mobility concerns, OT consult for ADLs    Mentation Interventions: Bed/chair exit alarm    Medication Interventions: Patient to call before getting OOB, Teach patient to arise slowly    Elimination Interventions: Call light in reach    History of Falls Interventions: Bed/chair exit alarm, Door open when patient unattended         Problem: Patient Education: Go to Patient Education Activity  Goal: Patient/Family Education  Outcome: Progressing Towards Goal     Problem: Discharge Planning  Goal: *Discharge to safe environment  Outcome: Progressing Towards Goal  Goal: *Knowledge of medication management  Outcome: Progressing Towards Goal  Goal: *Knowledge of discharge instructions  Outcome: Progressing Towards Goal     Problem: Patient Education: Go to Patient Education Activity  Goal: Patient/Family Education  Outcome: Progressing Towards Goal     Problem: Diabetes Self-Management  Goal: *Disease process and treatment process  Description: Define diabetes and identify own type of diabetes; list 3 options for treating diabetes. Outcome: Progressing Towards Goal  Goal: *Incorporating nutritional management into lifestyle  Description: Describe effect of type, amount and timing of food on blood glucose; list 3 methods for planning meals. Outcome: Progressing Towards Goal  Goal: *Incorporating physical activity into lifestyle  Description: State effect of exercise on blood glucose levels. Outcome: Progressing Towards Goal  Goal: *Developing strategies to promote health/change behavior  Description: Define the ABC's of diabetes; identify appropriate screenings, schedule and personal plan for screenings. Outcome: Progressing Towards Goal  Goal: *Using medications safely  Description: State effect of diabetes medications on diabetes; name diabetes medication taking, action and side effects. Outcome: Progressing Towards Goal  Goal: *Monitoring blood glucose, interpreting and using results  Description: Identify recommended blood glucose targets  and personal targets. Outcome: Progressing Towards Goal  Goal: *Prevention, detection, treatment of acute complications  Description: List symptoms of hyper- and hypoglycemia; describe how to treat low blood sugar and actions for lowering  high blood glucose level. Outcome: Progressing Towards Goal  Goal: *Prevention, detection and treatment of chronic complications  Description: Define the natural course of diabetes and describe the relationship of blood glucose levels to long term complications of diabetes.   Outcome: Progressing Towards Goal  Goal: *Developing strategies to address psychosocial issues  Description: Describe feelings about living with diabetes; identify support needed and support network  Outcome: Progressing Towards Goal  Goal: *Insulin pump training  Outcome: Progressing Towards Goal  Goal: *Sick day guidelines  Outcome: Progressing Towards Goal  Goal: *Patient Specific Goal (EDIT GOAL, INSERT TEXT)  Outcome: Progressing Towards Goal     Problem: Patient Education: Go to Patient Education Activity  Goal: Patient/Family Education  Outcome: Progressing Towards Goal     Problem: Patient Education: Go to Patient Education Activity  Goal: Patient/Family Education  Outcome: Progressing Towards Goal     Problem: Patient Education: Go to Patient Education Activity  Goal: Patient/Family Education  Outcome: Progressing Towards Goal     Problem: Patient Education: Go to Patient Education Activity  Goal: Patient/Family Education  Outcome: Progressing Towards Goal     Problem: Patient Education: Go to Patient Education Activity  Goal: Patient/Family Education  Outcome: Progressing Towards Goal

## 2023-01-07 NOTE — DISCHARGE SUMMARY
Discharge Summary     Sj Evans     Admit Date:   12/21/2022  7:08 PM  Discharge Date:   1/7/2023  Discharge Condition:   Improved, stable  Discharge Diagnosis  Problem List Items Addressed This Visit          Digestive    Dysphagia - Primary       Other    H/O laryngectomy     Other Visit Diagnoses       Tracheostomy mechanical complication ([facility] [address] [Q11.31 (ICD-10-CM)]            ::::#1.  Acute hypoxic respiratory failure transient due to aspiration of stomach contents/aspiration pneumonitis  #2. Wide-complex tachycardia  #3. Hypertension now okay without blood pressure medicine  #4. Diabetes  #5. 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 History of nephrectomy  #8. Dysphonia/dysphagia  #9. Acute respiratory distress from tracheostomy stoma plugging now status post removal of foreign body by ENT     Hospital Stay  Narrative of Hospital Course: See H&P for full details. Briefly This is an 26-year-old black male with history of laryngeal cancer status post laryngectomy who presented with dysphagia for few days, difficulty swallowing. During the hospital he developed respiratory distress at to be transferred to the ICU for close observation. He was treated with empiric antibiotic steroid oxygen. He had upper endoscopy and food particle was removed from his throat and he had PEG tube placement by Dr. Alexandro Duncan  Eventually was downgraded and transferred to the regular floor he started physical therapy, continued on tube feeding. There was also foreign body at the ostomy site needing ENT consultation and he had this removed initially at bedside he had distress had to be taken to the operating room for removal of foreign body at his stoma site. He needs to do well. He had arthritis cream diclofenac to his wrist that was swollen and stiff.   Throughout the hospital course he felt he wanted to eat and then his food was reinstated at a dysphagia diet not tolerating this. We will keep the PEG tube for now. Continue to observe. Is deemed needing physical therapy so is going to skilled nursing care facility and has been accepted now. Initially he was denied by by insurance to transfer to acute rehab. Throughout the hospital course I talked to family care aware of status progress. Consultants:    PEG tube placement by Dr. Jennifer Ko gastroenterologist  ENT by Dr. Naveen montoya     Surgeries/procedures Performed:  PEG tube placement  Foreign body removal from the stoma site  Cardiology consultation for wide-complex tachycardia       Discharge Plan:    [facility]/Incidental Findings Requiring Follow Up:     Patient Instructions:  Puréed diet  Activity as tolerated  Keep head of bed elevated  Fall precautions     Diet: DIET ADULT  DIET ADULT TUBE FEEDING     Activity: As tolerated  For number of days (if applicable): Other Instructions:      Provider Follow-Up: Follow-up with me after discharge from the nursing home. Follow-up Appointments   Procedures    FOLLOW UP VISIT Appointment in: One Week After discharge from the nursing home. After discharge from the nursing home. Standing Status:   Standing     Number of Occurrences:   1     Order Specific Question:   Appointment in     Answer: One Week        Significant Diagnostic Studies:     XR CHEST PORT   Final Result      No acute process on portable chest.   Pneumoperitoneum      This result was verbally relayed by me at 1916 hours to [place] [id number]         CT NECK CHEST WO CONT   Final Result   1. Status post total laryngectomy and tracheostomy creation. No definite   tracheoesophageal fistula on this Limited exam. Nasopharyngeal reflux of   contrast material and fluid/debris. Consider a dedicated fluoroscopic swallow   study with water-soluble contrast for more sensitive evaluation for subtle   fistula.    2.  Emphysema with scattered pleural parenchymal scarring and calcified   granulomas. Several calcified right hilar lymph nodes. XR CHEST PORT   Final Result      No acute findings. Recent Results (from the past 24 hour(s))   GLUCOSE, POC    Collection Time: 01/06/23  9:49 PM   Result Value Ref Range    Glucose (POC) 184 (H) 65 - 100 mg/dL    Performed by [address] [place] [address], POC    Collection Time: 01/07/23  8:14 AM   Result Value Ref Range    Glucose (POC) 67 65 - 100 mg/dL    Performed by Rea Lesches    GLUCOSE, POC    Collection Time: 01/07/23  8:56 AM   Result Value Ref Range    Glucose (POC) 67 65 - 100 mg/dL    Performed by [address] [place] [address], POC    Collection Time: 01/07/23 10:26 AM   Result Value Ref Range    Glucose (POC) 136 (H) 65 - 100 mg/dL    Performed by Salvador Jurado        Discharge Medications:  Current Discharge Medication List        START taking these medications    Details   insulin glargine (LANTUS) 100 unit/mL injection 20 Units by SubCUTAneous route nightly. Qty: 1 mL, Refills: 0  Start date: 1/7/2023           CONTINUE these medications which have CHANGED    Details   NIFEdipine ER (PROCARDIA XL) 30 mg ER tablet Take 1 Tablet by mouth daily for 30 days. Qty: 30 Tablet, Refills: 0  Start date: 1/8/2023, End date: 2/7/2023           CONTINUE these medications which have NOT CHANGED    Details   levETIRAcetam (Keppra) 1,000 mg tablet Take 1,250 mg by mouth two (2) times a day. clopidogreL (PLAVIX) 75 mg tab       SITagliptin (Januvia) 25 mg tablet Take 25 mg by mouth daily. atorvastatin (LIPITOR) 10 mg tablet Take 10 mg by mouth nightly. Associated Diagnoses: Type II diabetes mellitus, uncontrolled; Essential hypertension with goal blood pressure less than 140/90      PATRICIO ASPIRIN PO Take 81 mg by mouth daily.     Associated Diagnoses: Type II diabetes mellitus, uncontrolled; Essential hypertension with goal blood pressure less than 140/90      multivitamin, tx-iron-ca-min (THERA-M W/ IRON) 9 mg iron-400 mcg tab tablet Take 1 Tab by mouth daily. Associated Diagnoses: Type II diabetes mellitus, uncontrolled; Essential hypertension with goal blood pressure less than 140/90      saw palmetto 500 mg cap Take 450 mg by mouth daily.              STOP taking these medications       lisinopriL (PRINIVIL, ZESTRIL) 10 mg tablet Comments:   Reason for Stopping:         insulin detemir (LEVEMIR FLEXTOUCH) 100 unit/mL (3 mL) inpn Comments:   Reason for Stopping:                Time Spent on Discharge: More than 30 minutes

## 2023-01-07 NOTE — PROGRESS NOTES
PHYSICAL THERAPY TREATMENT  Patient: Sarah Recio (59 y.o. male)  Date: 1/7/2023  Diagnosis: Dysphagia [R13.10] <principal problem not specified>  Procedure(s) (LRB):  TRACHEOSTOMY REVISION (N/A) 10 Days Post-Op  Precautions:    Chart, physical therapy assessment, plan of care and goals were reviewed. ASSESSMENT  Patient continues with skilled PT services and is progressing towards goals. Pt supine upon PT arrival, agreeable to session. (See below for objective details and assist levels). Overall pt tolerated session well today with PT. Decreased assistance required for bed mobility and transfers. Increased amb distance, overall demos slow steady malena with no rest breaks required, however pt did take 1 hand off RW to gesture towards someone, this caused pt to lose balance, requiring min A to recover. Will continue to benefit from skilled PT services, and will continue to progress as tolerated. Current Level of Function Impacting Discharge (mobility/balance): Fair balance. Other factors to consider for discharge: Fall risk. PLAN :  Patient continues to benefit from skilled intervention to address the above impairments. Continue treatment per established plan of care to address goals. Recommend with staff: Out of bed to chair for meals, Encourage HEP in prep for ADLs/mobility, Amb to bathroom for toileting with gt belt and AD, and Amb in hallway    Recommendation for discharge: (in order for the patient to meet his/her long term goals)  Inpatient Rehabilitation Facility            SUBJECTIVE:   Pt nonverbal. Shook head yes/no appropriately.      OBJECTIVE DATA SUMMARY:   Critical Behavior:  Neurologic State: Alert  Orientation Level: (P) Oriented X4  Cognition: Appropriate for age attention/concentration  Safety/Judgement: Awareness of environment, Insight into deficits  Functional Mobility Training:  Bed Mobility:     Supine to Sit: Modified independent     Scooting: Modified independent        Transfers:  Sit to Stand: Stand-by assistance  Stand to Sit: Stand-by assistance        Bed to Chair: Stand-by assistance                    Balance:  Sitting: Intact  Sitting - Static: Good (unsupported)  Standing: Intact; With support  Standing - Static: Good;Constant support  Standing - Dynamic : Fair;Constant support  Ambulation/Gait Training:  Distance (ft): 250 Feet (ft)  Assistive Device: Gait belt;Walker, rolling  Ambulation - Level of Assistance: Minimal assistance                 Base of Support: Narrowed     Speed/Alma: Slow                       Stairs:                Pain Rating:  No pain reported. Activity Tolerance:   Good    After treatment patient left in no apparent distress:   Bed locked and returned to lowest position, Sitting in chair and Call bell within reach    GOALS:    Problem: Mobility Impaired (Adult and Pediatric)  Goal: *Acute Goals and Plan of Care (Insert Text)  Description: I with LE HEP x7 days  Supine to sit EOB with mod I x7 days  Sit to stand with SBAx1 x7 days  Stand pivot from bed to chair x7 days  Amb 125-150ft with RW and CGAx1 x7 days- goal revised 1/4/23    Pt stated goal: to get stronger  Outcome: Progressing Towards Goal       COMMUNICATION/COLLABORATION:   The patients plan of care was discussed with: Registered nurse.          Zulema Alvarado, PT   Time Calculation: 24 mins

## 2023-01-07 NOTE — PROGRESS NOTES
Discharge Note. Patient is medically stable to be discharged from the hospital. [place] all patient's belongings. Removed patient's peripheral IV and telemetry prior to discharge. Patient's son will be transporting patient to SNF. Patient transported off the unit via wheelchair. Patient discharged in stable condition. Discharge plan of care/case management plan validated with provider discharge order. 1410  : [facility] to give report on patient. Was on hold for over 10 minutes before someone answered. Report given to [facility], [facility]. No further questions from receiving RN. Unit phone number given if any questions arise. 1528: Patient transported off the unit via wheelchair with patient's family and RN. Patient discharged in stable condition.

## 2023-01-07 NOTE — PROGRESS NOTES
Problem: Falls - Risk of  Goal: *Absence of Falls  Description: Document Ying Reason Fall Risk and appropriate interventions in the flowsheet.   Outcome: Progressing Towards Goal  Note: Fall Risk Interventions:  Mobility Interventions: PT Consult for mobility concerns, OT consult for ADLs    Mentation Interventions: Bed/chair exit alarm    Medication Interventions: Patient to call before getting OOB, Teach patient to arise slowly    Elimination Interventions: Call light in reach    History of Falls Interventions: Bed/chair exit alarm, Door open when patient unattended         Problem: Patient Education: Go to Patient Education Activity  Goal: Patient/Family Education  Outcome: Progressing Towards Goal     Problem: Discharge Planning  Goal: *Knowledge of medication management  Outcome: Progressing Towards Goal     Problem: Patient Education: Go to Patient Education Activity  Goal: Patient/Family Education  Outcome: Progressing Towards Goal     Problem: Patient Education: Go to Patient Education Activity  Goal: Patient/Family Education  Outcome: Progressing Towards Goal     Problem: Patient Education: Go to Patient Education Activity  Goal: Patient/Family Education  Outcome: Progressing Towards Goal     Problem: Patient Education: Go to Patient Education Activity  Goal: Patient/Family Education  Outcome: Progressing Towards Goal

## 2023-01-07 NOTE — PROGRESS NOTES
Physician Progress Note      Mabel Porter  [facility] #:                  [id number]  :                       1935  ADMIT DATE:       2022 7:08 PM  [address] [place] DATE:  RESPONDING  PROVIDER #:        Mal Queen MD          QUERY TEXT:    Pt admitted with dysphagia. Per RD consult note dated  pt with Severe malnutrition. If possible, please document in progress notes and discharge summary:    The medical record reflects the following:  Risk Factors: 81 y/o M, Hx laryngeal carcinoma traceostomy and laryngectomy, DM, OA, dysphagia    Clinical Indicators: Per RD Malnutrition Assessment:  * Status:  Severe malnutrition (22 1612)  * Context:  Acute illness  * Findings of the 6 clinical characteristics of malnutrition:  -  Energy Intake:  50% or less of est energy requirements for 5 or more days  -  Weight Loss:  Greater than 7.5% over 3 months  -  Body Fat Loss:  No significant body fat loss,  -  Muscle Mass Loss: Moderate muscle mass loss, Scapula (trapezius), Thigh (quadriceps), Temples (temporalis), Clavicles (pectoralis & deltoids), Calf  -  Fluid Accumulation:  No significant fluid accumulation,    Treatment: RD consult, PEG tube placement with tube feeds, now pt taking PO food with close monitoring.     Thank you,  Wai FARIASN, RN, CRCR  Please contact me for any questions or concerns regarding this query at [email or web address]  Options provided:  -- Severe malnutrition confirmed present on admission  -- Severe malnutrition ruled out  -- Other - I will add my own diagnosis  -- Disagree - Not applicable / Not valid  -- Disagree - Clinically unable to determine / Unknown  -- Refer to Clinical Documentation Reviewer    PROVIDER RESPONSE TEXT:    See note    Query created by: Paulo Zelaya on 2023 12:54 PM      Electronically signed by:  Mal Queen MD 2023 12:25 PM

## 2023-02-08 ENCOUNTER — HOSPITAL ENCOUNTER (EMERGENCY)
Age: 88
Discharge: HOME | End: 2023-02-08
Attending: EMERGENCY MEDICINE
Payer: MEDICARE

## 2023-02-08 ENCOUNTER — APPOINTMENT (OUTPATIENT)
Dept: CT IMAGING | Age: 88
End: 2023-02-08
Attending: EMERGENCY MEDICINE
Payer: MEDICARE

## 2023-02-08 VITALS
HEART RATE: 65 BPM | DIASTOLIC BLOOD PRESSURE: 72 MMHG | OXYGEN SATURATION: 99 % | TEMPERATURE: 97.5 F | RESPIRATION RATE: 16 BRPM | HEIGHT: 72 IN | WEIGHT: 136 LBS | BODY MASS INDEX: 18.42 KG/M2 | SYSTOLIC BLOOD PRESSURE: 116 MMHG

## 2023-02-08 DIAGNOSIS — R53.1 RIGHT SIDED WEAKNESS: Primary | ICD-10-CM

## 2023-02-08 DIAGNOSIS — G45.9 TIA (TRANSIENT ISCHEMIC ATTACK): ICD-10-CM

## 2023-02-08 LAB
ALBUMIN SERPL-MCNC: 3.5 G/DL (ref 3.5–5)
ALBUMIN/GLOB SERPL: 0.9 (ref 1.1–2.2)
ALP SERPL-CCNC: 127 U/L (ref 45–117)
ALT SERPL-CCNC: 27 U/L (ref 12–78)
ANION GAP SERPL CALC-SCNC: 4 MMOL/L (ref 5–15)
APTT PPP: 29.3 SEC (ref 21.2–34.1)
AST SERPL W P-5'-P-CCNC: 21 U/L (ref 15–37)
ATRIAL RATE: 80 BPM
BASOPHILS # BLD: 0 K/UL (ref 0–0.1)
BASOPHILS NFR BLD: 0 % (ref 0–1)
BILIRUB SERPL-MCNC: 0.7 MG/DL (ref 0.2–1)
BUN SERPL-MCNC: 22 MG/DL (ref 6–20)
BUN/CREAT SERPL: 17 (ref 12–20)
CA-I BLD-MCNC: 9.5 MG/DL (ref 8.5–10.1)
CALCULATED P AXIS, ECG09: 13 DEGREES
CALCULATED R AXIS, ECG10: -82 DEGREES
CALCULATED T AXIS, ECG11: 49 DEGREES
CHLORIDE SERPL-SCNC: 107 MMOL/L (ref 97–108)
CO2 SERPL-SCNC: 28 MMOL/L (ref 21–32)
CREAT SERPL-MCNC: 1.33 MG/DL (ref 0.7–1.3)
DIAGNOSIS, 93000: NORMAL
DIFFERENTIAL METHOD BLD: ABNORMAL
EOSINOPHIL # BLD: 0.1 K/UL (ref 0–0.4)
EOSINOPHIL NFR BLD: 1 % (ref 0–7)
ERYTHROCYTE [DISTWIDTH] IN BLOOD BY AUTOMATED COUNT: 14.7 % (ref 11.5–14.5)
GLOBULIN SER CALC-MCNC: 3.9 G/DL (ref 2–4)
GLUCOSE SERPL-MCNC: 125 MG/DL (ref 65–100)
HCT VFR BLD AUTO: 34.7 % (ref 36.6–50.3)
HGB BLD-MCNC: 11.1 G/DL (ref 12.1–17)
IMM GRANULOCYTES # BLD AUTO: 0 K/UL (ref 0–0.04)
IMM GRANULOCYTES NFR BLD AUTO: 0 % (ref 0–0.5)
INR PPP: 1.1 (ref 0.9–1.1)
LACTATE SERPL-SCNC: 1.7 MMOL/L (ref 0.4–2)
LYMPHOCYTES # BLD: 1.1 K/UL (ref 0.8–3.5)
LYMPHOCYTES NFR BLD: 22 % (ref 12–49)
MCH RBC QN AUTO: 31.4 PG (ref 26–34)
MCHC RBC AUTO-ENTMCNC: 32 G/DL (ref 30–36.5)
MCV RBC AUTO: 98 FL (ref 80–99)
MONOCYTES # BLD: 0.5 K/UL (ref 0–1)
MONOCYTES NFR BLD: 11 % (ref 5–13)
NEUTS SEG # BLD: 3.1 K/UL (ref 1.8–8)
NEUTS SEG NFR BLD: 66 % (ref 32–75)
NRBC # BLD: 0 K/UL (ref 0–0.01)
NRBC BLD-RTO: 0 PER 100 WBC
PLATELET # BLD AUTO: 180 K/UL (ref 150–400)
PMV BLD AUTO: 11.7 FL (ref 8.9–12.9)
POTASSIUM SERPL-SCNC: 4.7 MMOL/L (ref 3.5–5.1)
PROT SERPL-MCNC: 7.4 G/DL (ref 6.4–8.2)
PROTHROMBIN TIME: 14.1 SEC (ref 11.9–14.6)
Q-T INTERVAL, ECG07: 438 MS
QRS DURATION, ECG06: 160 MS
QTC CALCULATION (BEZET), ECG08: 663 MS
RBC # BLD AUTO: 3.54 M/UL (ref 4.1–5.7)
SODIUM SERPL-SCNC: 139 MMOL/L (ref 136–145)
THERAPEUTIC RANGE,PTTT: NORMAL SEC (ref 82–109)
TROPONIN I SERPL HS-MCNC: 11 NG/L (ref 0–76)
VENTRICULAR RATE, ECG03: 138 BPM
WBC # BLD AUTO: 4.7 K/UL (ref 4.1–11.1)

## 2023-02-08 PROCEDURE — 70450 CT HEAD/BRAIN W/O DYE: CPT

## 2023-02-08 PROCEDURE — 83605 ASSAY OF LACTIC ACID: CPT

## 2023-02-08 PROCEDURE — 99285 EMERGENCY DEPT VISIT HI MDM: CPT

## 2023-02-08 PROCEDURE — 36415 COLL VENOUS BLD VENIPUNCTURE: CPT

## 2023-02-08 PROCEDURE — 85610 PROTHROMBIN TIME: CPT

## 2023-02-08 PROCEDURE — 85730 THROMBOPLASTIN TIME PARTIAL: CPT

## 2023-02-08 PROCEDURE — 84484 ASSAY OF TROPONIN QUANT: CPT

## 2023-02-08 PROCEDURE — 92610 EVALUATE SWALLOWING FUNCTION: CPT

## 2023-02-08 PROCEDURE — 85025 COMPLETE CBC W/AUTO DIFF WBC: CPT

## 2023-02-08 PROCEDURE — 93005 ELECTROCARDIOGRAM TRACING: CPT

## 2023-02-08 PROCEDURE — 80053 COMPREHEN METABOLIC PANEL: CPT

## 2023-02-08 RX ORDER — LEVETIRACETAM 750 MG/1
1500 TABLET ORAL 2 TIMES DAILY
Qty: 120 TABLET | Refills: 0 | Status: SHIPPED | OUTPATIENT
Start: 2023-02-08 | End: 2023-03-10

## 2023-02-08 RX ORDER — LEVETIRACETAM 500 MG/5ML
500 INJECTION, SOLUTION, CONCENTRATE INTRAVENOUS
Status: DISCONTINUED | OUTPATIENT
Start: 2023-02-08 | End: 2023-02-08 | Stop reason: HOSPADM

## 2023-02-08 NOTE — ED PROVIDER NOTES
[facility] EMERGENCY DEPT  EMERGENCY DEPARTMENT HISTORY AND PHYSICAL EXAM      Date: 2/8/2023  Patient Name: Ca Mckinney  MRN: [id number]  YOB: 1935  Date of evaluation: 2/8/2023  Provider: Joseph Luna MD   Note Started: 10:20 AM 2/8/23    HISTORY OF PRESENT ILLNESS     Chief Complaint   Patient presents with    Extremity Weakness       History Provided By: Patient    HPI: Ca Mckinney, 80 y.o. male presents to the emergency department complaint of right-sided weakness. 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 EMS states the patient was unable to talk diaphoretic at the time with blood glucose of 188. Symptoms resolved within 15 minutes of EMS arrival and patient has no complaints at this time. Patient denies prior history of stroke. PAST MEDICAL HISTORY   Past Medical History:  Past Medical History:   Diagnosis Date    Anemia     Arthritis     Diabetes ([facility] [address]     Hypercholesterolemia     Hypertension     Single kidney     Cr normal    Throat cancer ([facility] [address] 2008    s/p laryngectomy       Past Surgical History:  Past Surgical History:   Procedure Laterality Date    HX LAP CHOLECYSTECTOMY  12-19-12    By Dr. Sveta Da Silva states does not know reason       Family History:  Family History   Family history unknown: Yes       Social History:  Social History     Tobacco Use    Smoking status: Former     Packs/day: 1.00     Years: 57.00     Pack years: 57.00     Types: Cigarettes     Quit date: 7/20/2012     Years since quitting: 10.5    Smokeless tobacco: Never   Substance Use Topics    Alcohol use:  Yes     Alcohol/week: 17.5 standard drinks     Types: 21 Shots of liquor per week    Drug use: No       Allergies:  No Known Allergies    PCP: Kevin Pimentel MD    Current Meds:   Previous Medications    ATORVASTATIN (LIPITOR) 10 MG TABLET    Take 10 mg by mouth nightly. PATRICIO ASPIRIN PO    Take 81 mg by mouth daily. CLOPIDOGREL (PLAVIX) 75 MG TAB        INSULIN GLARGINE (LANTUS) 100 UNIT/ML INJECTION    20 Units by SubCUTAneous route nightly. LEVETIRACETAM (KEPPRA) 1,000 MG TABLET    Take 1,250 mg by mouth two (2) times a day. MULTIVITAMIN, TX-IRON-CA-MIN (THERA-M W/ IRON) 9 MG IRON-400 MCG TAB TABLET    Take 1 Tab by mouth daily. SAW PALMETTO 500 MG CAP    Take 450 mg by mouth daily. SITAGLIPTIN (JANUVIA) 25 MG TABLET    Take 25 mg by mouth daily. REVIEW OF SYSTEMS   Review of Systems  Positives and Pertinent negatives as per HPI. PHYSICAL EXAM     ED Triage Vitals [02/08/23 1015]   ED Encounter Vitals Group      /78      Pulse (Heart Rate) 70      Resp Rate 18      Temp 97.5 °F (36.4 °C)      Temp src       O2 Sat (%) 100 %      Weight 136 lb      Height 6'      Physical Exam  Physical Exam  Constitutional:       General: No acute distress. Appearance: Normal appearance. Not toxic-appearing. HENT:      Head: Normocephalic and atraumatic. Nose: Nose normal.      Mouth/Throat:      Mouth: Mucous membranes are moist.   Eyes:      Extraocular Movements: Extraocular movements intact. Pupils: Pupils are equal, round, and reactive to light. Cardiovascular:      Rate and Rhythm: Normal rate. Pulses: Normal pulses. Pulmonary:      Effort: Pulmonary effort is normal.      Breath sounds: No stridor. Abdominal:      General: Abdomen is flat. There is no distension. Musculoskeletal:         General: Normal range of motion. Cervical back: Normal range of motion and neck supple. Skin:     General: Skin is warm and dry. Capillary Refill: Capillary refill takes less than 2 seconds. Neurological:      General: No focal deficit present. Mental Status: Aert and oriented to person, place, and time.    Psychiatric:         Mood and Affect: Mood normal.         Behavior: Behavior normal. SCREENINGS               No data recorded        LAB, EKG AND DIAGNOSTIC RESULTS   Labs:  Recent Results (from the past 12 hour(s))   LACTIC ACID    Collection Time: 02/08/23 10:39 AM   Result Value Ref Range    Lactic acid 1.7 0.4 - 2.0 mmol/L   CBC WITH AUTOMATED DIFF    Collection Time: 02/08/23 10:40 AM   Result Value Ref Range    WBC 4.7 4.1 - 11.1 K/uL    RBC 3.54 (L) 4.10 - 5.70 M/uL    HGB 11.1 (L) 12.1 - 17.0 g/dL    HCT 34.7 (L) 36.6 - 50.3 %    MCV 98.0 80.0 - 99.0 FL    MCH 31.4 26.0 - 34.0 PG    MCHC 32.0 30.0 - 36.5 g/dL    RDW 14.7 (H) 11.5 - 14.5 %    PLATELET 470 640 - 381 K/uL    MPV 11.7 8.9 - 12.9 FL    NRBC 0.0 0.0  WBC    ABSOLUTE NRBC 0.00 0.00 - 0.01 K/uL    NEUTROPHILS 66 32 - 75 %    LYMPHOCYTES 22 12 - 49 %    MONOCYTES 11 5 - 13 %    EOSINOPHILS 1 0 - 7 %    BASOPHILS 0 0 - 1 %    IMMATURE GRANULOCYTES 0 0 - 0.5 %    ABS. NEUTROPHILS 3.1 1.8 - 8.0 K/UL    ABS. LYMPHOCYTES 1.1 0.8 - 3.5 K/UL    ABS. MONOCYTES 0.5 0.0 - 1.0 K/UL    ABS. EOSINOPHILS 0.1 0.0 - 0.4 K/UL    ABS. BASOPHILS 0.0 0.0 - 0.1 K/UL    ABS. IMM. GRANS. 0.0 0.00 - 0.04 K/UL    DF AUTOMATED     METABOLIC PANEL, COMPREHENSIVE    Collection Time: 02/08/23 10:40 AM   Result Value Ref Range    Sodium 139 136 - 145 mmol/L    Potassium 4.7 3.5 - 5.1 mmol/L    Chloride 107 97 - 108 mmol/L    CO2 28 21 - 32 mmol/L    Anion gap 4 (L) 5 - 15 mmol/L    Glucose 125 (H) 65 - 100 mg/dL    BUN 22 (H) 6 - 20 mg/dL    Creatinine 1.33 (H) 0.70 - 1.30 mg/dL    BUN/Creatinine ratio 17 12 - 20      eGFR 52 (L) >60 ml/min/1.73m2    Calcium 9.5 8.5 - 10.1 mg/dL    Bilirubin, total 0.7 0.2 - 1.0 mg/dL    AST (SGOT) 21 15 - 37 U/L    ALT (SGPT) 27 12 - 78 U/L    Alk.  phosphatase 127 (H) 45 - 117 U/L    Protein, total 7.4 6.4 - 8.2 g/dL    Albumin 3.5 3.5 - 5.0 g/dL    Globulin 3.9 2.0 - 4.0 g/dL    A-G Ratio 0.9 (L) 1.1 - 2.2     PROTHROMBIN TIME + INR    Collection Time: 02/08/23 10:40 AM   Result Value Ref Range    Prothrombin time 14.1 11.9 - 14.6 sec    INR 1.1 0.9 - 1.1     PTT    Collection Time: 02/08/23 10:40 AM   Result Value Ref Range    aPTT 29.3 21.2 - 34.1 sec    aPTT, therapeutic range   82 - 109 sec   TROPONIN-HIGH SENSITIVITY    Collection Time: 02/08/23 10:40 AM   Result Value Ref Range    Troponin-High Sensitivity 11 0 - 76 ng/L           Interpretation per the Radiologist below, if available at the time of this note:  CT CODE NEURO HEAD WO CONTRAST    Result Date: 2/8/2023  INDICATION:  stroke  alert Exam: Unenhanced head CT. Comparison 8/14/2022 5 mm axial images were obtained from the skull base to vertex. Sagittal and coronal reformats were performed. CT dose reduction was achieved through use of a standardized protocol tailored for this examination and automatic exposure control for dose modulation. Findings: Residuals and cisterns remain enlarged compatible with the overall degree of volume loss. Chronic right basal ganglia lacunar infarct and extensive low density throughout the white matter of the cerebral hemispheres. . There are no extra-axial fluid collections mass lesions or mass effect. . No evidence of acute intracranial hemorrhage or acute infarct. Paranasal sinuses are aerated and no depressed skull fracture. There are degenerative changes at the craniocervical junction with hypertrophy of the soft tissues posterior to the dens resulting in narrowing of the canal     1. No change, no acute intracranial abnormality 2.  Degenerative changes at the craniocervical junction with hypertrophy of the soft tissues posterior to the dens resulting in narrowing of the craniocervical junction            ED COURSE and DIFFERENTIAL DIAGNOSIS/MDM   Vitals:    Vitals:    02/08/23 1115 02/08/23 1130 02/08/23 1145 02/08/23 1215   BP: 125/79 121/76  116/72   Pulse: 65 66 65 65   Resp: 15 15 19 16   Temp:       SpO2: 100% 100% 100% 99%   Weight:       Height:            Patient was given the following medications:  Medications levETIRAcetam (KEPPRA) injection 500 mg (has no administration in time range)       CONSULTS: (Who and What was discussed)  Neurology: No acute findings on CT. Given his seizure history, this could be related to that versus TIA. Recommends giving 500 Keppra here and increasing to 1500 twice daily. Given his findings, she would recommend a TIA work-up, however we also discussed that the patient is currently medically optimized with aspirin and Plavix, antihypertensives as well as a statin. PCP: Discussed findings with primary care. He agrees this could be related to the seizures versus TIA, but agrees patient is medically optimized with his medications and excellent blood pressure today. He states he will follow-up with the patient within a couple days in the office to evaluate for additional work-up. Chronic Conditions: Hypertension, diabetes, seizures  Social Determinants affecting Dx or Tx: None  Counseling:      Records Reviewed (source and summary of external notes): Prior medical records and Nursing notes    CC/HPI Summary, DDx, ED Course, and Reassessment: Patient with additional findings concerning for stroke with unilateral weakness. However, given seizure history, this could have been related to focal seizure and postictal but now back to normal.  Symptoms have resolved, will get work-up for CVA/TIA versus other underlying metabolic or infectious source.        2/8/2023     1026   NIH Stroke Scale     Interval Baseline   Level of Conciousness (1a) Alert   LOC Questions (1b) Answers both questions correctly   LOC Commands (1c) Performs both tasks correctly   Best Gaze (2) Normal   Visual (3) No visual loss   Facial Palsy (4) Normal symmetrical movement   Motor Arm, Left (5a) No drift   Motor Arm, Right (5b) No drift   Motor Leg, Left (6a) No drift   Motor Leg, Right (6b) No drift   Limb Ataxia (7) Absent   Sensory (8) Normal   Best Language (9) No aphasia   Dysarthria (10) Normal   Extinction and Inattention (11) No abnormality   Total 0       Disposition Considerations (Tests not done, Shared Decision Making, Pt Expectation of Test or Treatment.):  Discussed plan with patient and family present regarding the plan to follow-up with Dr. Shoshana Mortensen, will increase the Keppra to 1500 mg twice daily (prescription provided). Return precautions discussed as well. FINAL IMPRESSION     1. Right sided weakness    2. TIA (transient ischemic attack)          DISPOSITION/PLAN       Discharge Note: The patient is stable for discharge home. The signs, symptoms, diagnosis, and discharge instructions have been discussed, understanding conveyed, and agreed upon. The patient is to follow up as recommended or return to ER should their symptoms worsen. PATIENT REFERRED TO:  Follow-up Information    None           DISCHARGE MEDICATIONS:  Current Discharge Medication List            DISCONTINUED MEDICATIONS:  Current Discharge Medication List        STOP taking these medications       NIFEdipine ER (PROCARDIA XL) 30 mg ER tablet Comments:   Reason for Stopping:               I am the Primary Clinician of Record: Radha Verma MD (electronically signed)    (Please note that parts of this dictation were completed with voice recognition software. Quite often unanticipated grammatical, syntax, homophones, and other interpretive errors are inadvertently transcribed by the computer software. Please disregards these errors.  Please excuse any errors that have escaped final proofreading.) Quite often unanticipated grammatical, syntax, homophones, and other interpretive errors are inadvertently transcribed by the computer software. Please disregards these errors.  Please excuse any errors that have escaped final proofreading.)

## 2023-02-08 NOTE — PROGRESS NOTES
SPEECH LANGUAGE PATHOLOGY BEDSIDE SWALLOW EVALUATIONS  Patient: Dante Tabares  (89 y.o. )  Date: 2/8/2023  Primary Diagnosis: <principal problem not specified>     Precautions:      ASSESSMENT :  Patient is A&Ox4 and follows basic commands. 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 Patient uses electrolarynx and writing for communication. Patient effectively utilizing electrolarynx. He is s/p PEG placement on previous admission but does not use PEG and wants it removed. Previous hx of esophageal dilatations w/ food impactions. Patient on RA admitted w/ L sided facial droop and weakness. Patient tolerates thin and  puree w/ adequate oral manipulation and timely swallow. He denies globus sensation. Belching noted. No evidence of food/liquid leakage within stoma. Patient is not an aspiration risk as his trachea is no longer connected with pharynx/esophagus. Patient w/ food impaction which is common following a laryngectomy as pharyngeal constriction is impaired and can develop esophageal dysmotility or strictures. However, this does not pose an aspiration risk. Educated with demonstration of swallow strategies, encouraged patient to consume 6 smaller meal and remain upright 1 hour after PO. PLAN :  Recommendations and Planned Interventions:  Rec puree/thin diet. Rec slow rate of intake, small bites/sips, and liquid wash. Patient w/ good appetite and continues to request PO. Extensive education provided to patient to avoid foods such as breads, tough meats, raw vegetables and rice. Rec cont a puree diet w/ extra gravy and moist texture. Will d/c ST at this time. Please re-consult as medically indicated.       SUBJECTIVE:   Patient and sister report sudden onset of L facial droop while working w/ ST.     OBJECTIVE:     Past Medical History:   Diagnosis Date    Anemia     Arthritis     Diabetes ([facility] [address]     Hypercholesterolemia     Hypertension     Single kidney     Cr normal Throat cancer ([facility] [address] 2008    s/p laryngectomy         Diet prior to admission: puree/thin  Current Diet:  None     Cognitive and Communication Status:  Neurologic State: Alert  Orientation Level: Oriented X4  Cognition: Follows commands  Perception: Appears intact  Perseveration: No perseveration noted     Swallowing Evaluation:   Oral Assessment:  Oral Assessment  Labial: No impairment  Dentition: Edentulous  Oral Hygiene: wfl  Lingual: No impairment  Velum: No impairment  Mandible: No impairment  P.O. Trials:  Patient Position: upright in bed  Vocal quality prior to P.O.: Aphonic (s/t laryngectomy uses electrolarynx and writing)  Consistency Presented: Thin liquid;Puree  How Presented: Self-fed/presented;SLP-fed/presented;Cup/sip;Straw;Successive swallows  Bolus Acceptance: No impairment  Bolus Formation/Control: No impairment  Propulsion: No impairment  Oral Residue: None  Initiation of Swallow: No impairment     Oral Phase Severity: No impairment  Pharyngeal Phase Severity : No impairment  Voice:  Vocal Quality: Aphonic (s/t laryngectomy uses electrolarynx and writing)       Pain:  Pain Scale 1: Numeric (0 - 10)  Pain Intensity 1: 0         After treatment:   Patient left in no apparent distress in bed, Call bell within reach, Nursing notified, and Caregiver / family present    COMMUNICATION/EDUCATION:   Patient and sister educated regarding diet recs, s/s aspiration and aspiration precautions. They demonstrated Good understanding as evidenced by engagement and appropriate questions.       Thank you for this referral.  Soheila Munoz M.S., M.Ed., CCC-SLP  Time Calculation: 19 mins

## 2023-02-08 NOTE — DISCHARGE INSTRUCTIONS
Thank you! Thank you for allowing me to care for you in the emergency department. It is my goal to provide you with excellent care. If you have not received excellent quality care, please ask to speak to the nurse manager. Please fill out the survey that will come to you by mail or email since we listen to your feedback! Below you will find a list of your tests from today's visit. Should you have any questions, please do not hesitate to call the emergency department. Labs  Recent Results (from the past 12 hour(s))   LACTIC ACID    Collection Time: 02/08/23 10:39 AM   Result Value Ref Range    Lactic acid 1.7 0.4 - 2.0 mmol/L   CBC WITH AUTOMATED DIFF    Collection Time: 02/08/23 10:40 AM   Result Value Ref Range    WBC 4.7 4.1 - 11.1 K/uL    RBC 3.54 (L) 4.10 - 5.70 M/uL    HGB 11.1 (L) 12.1 - 17.0 g/dL    HCT 34.7 (L) 36.6 - 50.3 %    MCV 98.0 80.0 - 99.0 FL    MCH 31.4 26.0 - 34.0 PG    MCHC 32.0 30.0 - 36.5 g/dL    RDW 14.7 (H) 11.5 - 14.5 %    PLATELET 228 941 - 992 K/uL    MPV 11.7 8.9 - 12.9 FL    NRBC 0.0 0.0  WBC    ABSOLUTE NRBC 0.00 0.00 - 0.01 K/uL    NEUTROPHILS 66 32 - 75 %    LYMPHOCYTES 22 12 - 49 %    MONOCYTES 11 5 - 13 %    EOSINOPHILS 1 0 - 7 %    BASOPHILS 0 0 - 1 %    IMMATURE GRANULOCYTES 0 0 - 0.5 %    ABS. NEUTROPHILS 3.1 1.8 - 8.0 K/UL    ABS. LYMPHOCYTES 1.1 0.8 - 3.5 K/UL    ABS. MONOCYTES 0.5 0.0 - 1.0 K/UL    ABS. EOSINOPHILS 0.1 0.0 - 0.4 K/UL    ABS. BASOPHILS 0.0 0.0 - 0.1 K/UL    ABS. IMM.  GRANS. 0.0 0.00 - 0.04 K/UL    DF AUTOMATED     METABOLIC PANEL, COMPREHENSIVE    Collection Time: 02/08/23 10:40 AM   Result Value Ref Range    Sodium 139 136 - 145 mmol/L    Potassium 4.7 3.5 - 5.1 mmol/L    Chloride 107 97 - 108 mmol/L    CO2 28 21 - 32 mmol/L    Anion gap 4 (L) 5 - 15 mmol/L    Glucose 125 (H) 65 - 100 mg/dL    BUN 22 (H) 6 - 20 mg/dL    Creatinine 1.33 (H) 0.70 - 1.30 mg/dL    BUN/Creatinine ratio 17 12 - 20      eGFR 52 (L) >60 ml/min/1.73m2    Calcium 9.5 8.5 - 10.1 mg/dL    Bilirubin, total 0.7 0.2 - 1.0 mg/dL    AST (SGOT) 21 15 - 37 U/L    ALT (SGPT) 27 12 - 78 U/L    Alk. phosphatase 127 (H) 45 - 117 U/L    Protein, total 7.4 6.4 - 8.2 g/dL    Albumin 3.5 3.5 - 5.0 g/dL    Globulin 3.9 2.0 - 4.0 g/dL    A-G Ratio 0.9 (L) 1.1 - 2.2     PROTHROMBIN TIME + INR    Collection Time: 02/08/23 10:40 AM   Result Value Ref Range    Prothrombin time 14.1 11.9 - 14.6 sec    INR 1.1 0.9 - 1.1     PTT    Collection Time: 02/08/23 10:40 AM   Result Value Ref Range    aPTT 29.3 21.2 - 34.1 sec    aPTT, therapeutic range   82 - 109 sec   TROPONIN-HIGH SENSITIVITY    Collection Time: 02/08/23 10:40 AM   Result Value Ref Range    Troponin-High Sensitivity 11 0 - 76 ng/L       Radiologic Studies  CT CODE NEURO HEAD WO CONTRAST   Final Result   1. No change, no acute intracranial abnormality   2. Degenerative changes at the craniocervical junction with hypertrophy of the   soft tissues posterior to the dens resulting in narrowing of the craniocervical   junction            CT Results  (Last 48 hours)                 02/08/23 1027  CT CODE NEURO HEAD WO CONTRAST Final result    Impression:  1. No change, no acute intracranial abnormality   2. Degenerative changes at the craniocervical junction with hypertrophy of the   soft tissues posterior to the dens resulting in narrowing of the craniocervical   junction           Narrative:  INDICATION:  stroke  alert        Exam: Unenhanced head CT. Comparison 8/14/2022 5 mm axial images were obtained   from the skull base to vertex. Sagittal and coronal reformats were performed. CT   dose reduction was achieved through use of a standardized protocol tailored for   this examination and automatic exposure control for dose modulation. Findings: Residuals and cisterns remain enlarged compatible with the overall   degree of volume loss.  Chronic right basal ganglia lacunar infarct and extensive   low density throughout the white matter of the cerebral hemispheres. . There are   no extra-axial fluid collections mass lesions or mass effect. . No evidence of   acute intracranial hemorrhage or acute infarct. Paranasal sinuses are aerated   and no depressed skull fracture. There are degenerative changes at the   craniocervical junction with hypertrophy of the soft tissues posterior to the   dens resulting in narrowing of the canal                 CXR Results  (Last 48 hours)      None          ------------------------------------------------------------------------------------------------------------  The exam and treatment you received in the Emergency Department were for an urgent problem and are not intended as complete care. It is important that you follow-up with a doctor, nurse practitioner, or physician assistant to:  (1) confirm your diagnosis,  (2) re-evaluation of changes in your illness and treatment, and  (3) for ongoing care. Please take your discharge instructions with you when you go to your follow-up appointment. If you have any problem arranging a follow-up appointment, contact the Emergency Department. If your symptoms become worse or you do not improve as expected and you are unable to reach your health care provider, please return to the Emergency Department. We are available 24 hours a day. If a prescription has been provided, please have it filled as soon as possible to prevent a delay in treatment. If you have any questions or reservations about taking the medication due to side effects or interactions with other medications, please call your primary care provider or contact the ER.

## 2023-02-08 NOTE — ED TRIAGE NOTES
Started [phone] with lt sided deficits, pt has difficulty speaking due to larynx ca. On EMS arrival, pt could not hold speaker to talk, had facial droop, drooling, was unable to talk, pt very diaphoretic at that time. .   Symptoms improved within 15 minutes of EMS arrival. Feeling normal on arrival.

## 2023-04-19 ENCOUNTER — TRANSCRIBE ORDER (OUTPATIENT)
Dept: SCHEDULING | Age: 88
End: 2023-04-19

## 2023-04-19 DIAGNOSIS — R47.02 DYSPHASIA: Primary | ICD-10-CM

## 2023-04-23 DIAGNOSIS — R47.02 DYSPHASIA: Primary | ICD-10-CM

## 2023-04-28 ENCOUNTER — HOSPITAL ENCOUNTER (OUTPATIENT)
Dept: GENERAL RADIOLOGY | Age: 88
End: 2023-04-28
Attending: INTERNAL MEDICINE
Payer: MEDICARE

## 2023-04-28 DIAGNOSIS — R47.02 DYSPHASIA: ICD-10-CM

## 2023-04-28 PROCEDURE — 74011000250 HC RX REV CODE- 250: Performed by: INTERNAL MEDICINE

## 2023-04-28 PROCEDURE — 92611 MOTION FLUOROSCOPY/SWALLOW: CPT

## 2023-04-28 PROCEDURE — 74230 X-RAY XM SWLNG FUNCJ C+: CPT

## 2023-04-28 RX ADMIN — BARIUM SULFATE 70 ML: 0.81 POWDER, FOR SUSPENSION ORAL at 11:39

## 2023-04-28 RX ADMIN — BARIUM SULFATE 30 ML: 400 PASTE ORAL at 11:39

## 2023-04-28 NOTE — PROGRESS NOTES
SPEECH PATHOLOGY MODIFIED BARIUM SWALLOW STUDY  Patient: Alejandra Mcginnis (9 y.o. male)  Date: 4/28/2023  Primary Diagnosis: Dysphasia [R47.02]       Precautions:      ASSESSMENT :  Patient is s/p laryngectomy >15 years ago. No evidence of fistula. Oral phase c/b mild reduction in mastication s/t edentulous and reduced A-P transit. Reduced base of tongue retraction. Larynx has been removed and there is no connection between trachea and pharynx. There is reduced pharyngeal constriction w/ slow movement of bolus through pharynx into proximal esophagus w/ delayed clearance into esophagus. Bolus does clear w/ mild residue. No evidence of reflux during evaluation. Reduced peristalsis observed which is common s/p laryngectomy. Extensive education provided on swallow strategies and diet recs. PLAN :  Recommendations and Planned Interventions:  Rec cont soft/thin w/ strict asp/GERD precautions. Rec moist foods w/ sauces/gravy/condiments, double swallow, liquid wash, and effortful swallow. He may benefit from manometry. Is followed by GI as OP. No further MBS imaging is warranted. Patient is not an aspiration risk as his trachea is no longer connected with pharynx/esophagus. He was previous evaluated for fistula and no fistula observed. Patient w/ hx of food impaction which is common following a laryngectomy as pharyngeal constriction is impaired and can develop esophageal dysmotility or strictures. However, this does not pose an aspiration risk but can pose nutrition risk. SUBJECTIVE:   Patient is s/p laryngectomy >15 years ago. He denies current dysphagia or weight loss. Reports does not use PEG and would like it removed. Patient communicates via electrolarynx and writing. Patient w/ myotomy w/ laryngectomy. Hx of esophageal food impaction s/t reduced primary and secondary peristalsis.      OBJECTIVE:     Past Medical History:   Diagnosis Date    Anemia     Arthritis     Diabetes ([facility] [address] Hypercholesterolemia     Hypertension     Single kidney     Cr normal    Throat cancer ([facility] [address] 2008    s/p laryngectomy     Past Surgical History:   Procedure Laterality Date    HX LAP CHOLECYSTECTOMY  12-19-12    By Dr. Andrade Comp states does not know reason            Video Flouroscopic Procedures  [x] Lateral View   [] A-P View [] Scanned to level of Sternum    [] Seated at 90 deg. [] Other:    Presentation:   [] Spoon   [x] Cup   [] Straw   [] Syringe   [] Consecutive Swallows  [] Other:    Consistencies:   [x] Ba+ liquid   [] Ba+ liquid (nectar)   [] Ba+ liquid (honey)     [x] Ba+ pudding   [] Ba+ crunched cookie   [x] Ba+ cookie   [] Other:       COMMUNICATION/EDUCATION:   Patient receptive of education regarding MBS results, and diet recs. He demonstrated Good understanding as evidenced by engagement and recall.       Thank you for this referral.  Jesus Mares M.S., M.Ed., CCC-SLP  Time Calculation: 30 mins

## 2023-05-31 ENCOUNTER — OFFICE VISIT (OUTPATIENT)
Age: 88
End: 2023-05-31
Payer: MEDICARE

## 2023-05-31 VITALS
SYSTOLIC BLOOD PRESSURE: 104 MMHG | HEART RATE: 119 BPM | WEIGHT: 139 LBS | OXYGEN SATURATION: 98 % | DIASTOLIC BLOOD PRESSURE: 70 MMHG | HEIGHT: 72 IN | BODY MASS INDEX: 18.83 KG/M2 | RESPIRATION RATE: 20 BRPM

## 2023-05-31 DIAGNOSIS — H61.23 IMPACTED CERUMEN, BILATERAL: ICD-10-CM

## 2023-05-31 DIAGNOSIS — Z90.02 ACQUIRED ABSENCE OF LARYNX: Primary | ICD-10-CM

## 2023-05-31 DIAGNOSIS — Z85.21 PERSONAL HISTORY OF MALIGNANT NEOPLASM OF LARYNX: ICD-10-CM

## 2023-05-31 DIAGNOSIS — R13.14 PHARYNGOESOPHAGEAL DYSPHAGIA: ICD-10-CM

## 2023-05-31 PROCEDURE — 1123F ACP DISCUSS/DSCN MKR DOCD: CPT | Performed by: OTOLARYNGOLOGY

## 2023-05-31 PROCEDURE — 99214 OFFICE O/P EST MOD 30 MIN: CPT | Performed by: OTOLARYNGOLOGY

## 2023-05-31 PROCEDURE — G8420 CALC BMI NORM PARAMETERS: HCPCS | Performed by: OTOLARYNGOLOGY

## 2023-05-31 PROCEDURE — 4004F PT TOBACCO SCREEN RCVD TLK: CPT | Performed by: OTOLARYNGOLOGY

## 2023-05-31 PROCEDURE — G8428 CUR MEDS NOT DOCUMENT: HCPCS | Performed by: OTOLARYNGOLOGY

## 2023-05-31 PROCEDURE — 69210 REMOVE IMPACTED EAR WAX UNI: CPT | Performed by: OTOLARYNGOLOGY

## 2023-05-31 RX ORDER — NIFEDIPINE 30 MG/1
TABLET, EXTENDED RELEASE ORAL
COMMUNITY
Start: 2023-05-02

## 2023-05-31 ASSESSMENT — ENCOUNTER SYMPTOMS
CHOKING: 0
COUGH: 0
NAUSEA: 0
VOMITING: 0
SINUS PRESSURE: 0
EYE DISCHARGE: 0
EYE ITCHING: 0
RHINORRHEA: 0
ABDOMINAL PAIN: 0
TROUBLE SWALLOWING: 1
STRIDOR: 0
SINUS PAIN: 0
APNEA: 0
VOICE CHANGE: 0
BACK PAIN: 0
SHORTNESS OF BREATH: 0
SORE THROAT: 0

## 2023-05-31 NOTE — PROGRESS NOTES
Maite Murphy is a 80 y.o. male here for   Chief Complaint   Patient presents with    Follow-up     1 year follow up        Resp 20   Ht 6' (1.829 m)   BMI 18.44 kg/m²

## 2023-05-31 NOTE — PROGRESS NOTES
Subjective:    Be Hernandez   80 y.o.   1935     Followup Visit    5/31/2022  History of Present Illness:    Location -throat, neck     Quality -laryngectomy     Severity -moderate     Duration -years     Timing -chronic     Context -patient has been seen in this practice before. He had a laryngectomy over 15 years ago. Done at [facility]. Has been doing well since that time no issues. He does not use any method of artificial speech. He swallows fine. There is some concern for hearing loss. Modifying Features -none     Associated symptoms/signs -as above    5/31/2023  1 year follow-up -since last visit he was hospitalized earlier this year with food impaction. He had a G-tube placed. He has been working with dysphagia therapy and now with a modified softer diet he has been doing well without requiring his tube feeds. He is interested in having feeding tube removed. No throat pain. Concerns for hearing loss. Review of Systems  Review of Systems   Constitutional:  Negative for activity change, appetite change, chills, fatigue and fever. HENT:  Positive for hearing loss and trouble swallowing. Negative for congestion, ear discharge, ear pain, mouth sores, nosebleeds, postnasal drip, rhinorrhea, sinus pressure, sinus pain, sneezing, sore throat, tinnitus and voice change. Eyes:  Negative for discharge, itching and visual disturbance. Respiratory:  Negative for apnea, cough, choking, shortness of breath and stridor. Cardiovascular:  Negative for chest pain and palpitations. Gastrointestinal:  Negative for abdominal pain, nausea and vomiting. Endocrine: Negative for cold intolerance and heat intolerance. Genitourinary:  Negative for difficulty urinating and dysuria. Musculoskeletal:  Negative for arthralgias, back pain, gait problem, myalgias, neck pain and neck stiffness. Skin:  Negative for rash and wound.    Allergic/Immunologic: Negative for environmental allergies and food

## 2023-07-05 ENCOUNTER — HOSPITAL ENCOUNTER (OUTPATIENT)
Facility: HOSPITAL | Age: 88
Setting detail: OUTPATIENT SURGERY
Discharge: HOME | End: 2023-07-05
Attending: INTERNAL MEDICINE | Admitting: INTERNAL MEDICINE
Payer: MEDICARE

## 2023-07-05 VITALS
RESPIRATION RATE: 18 BRPM | OXYGEN SATURATION: 100 % | HEART RATE: 62 BPM | TEMPERATURE: 97.8 F | SYSTOLIC BLOOD PRESSURE: 141 MMHG | DIASTOLIC BLOOD PRESSURE: 89 MMHG

## 2023-07-05 PROCEDURE — 3600007502: Performed by: INTERNAL MEDICINE

## 2023-07-05 PROCEDURE — 7100000010 HC PHASE II RECOVERY - FIRST 15 MIN: Performed by: INTERNAL MEDICINE

## 2023-07-05 ASSESSMENT — PAIN SCALES - GENERAL: PAINLEVEL_OUTOF10: 0

## 2023-07-05 NOTE — PERIOP NOTE
PEG tube removed, pt tolerated well. Discharge instructions reviewed with pt and family member. No complaints at this time. Dressing intact. Discharged via wheelchair to private vehicle.

## 2023-08-25 ENCOUNTER — TELEPHONE (OUTPATIENT)
Age: 88
End: 2023-08-25

## 2023-08-25 DIAGNOSIS — Z90.02 ACQUIRED ABSENCE OF LARYNX: Primary | ICD-10-CM

## 2023-08-25 DIAGNOSIS — Z85.21 PERSONAL HISTORY OF MALIGNANT NEOPLASM OF LARYNX: ICD-10-CM

## 2023-08-25 NOTE — TELEPHONE ENCOUNTER
Pt's niece stated pt needs a new electrolarynx - [facility] [phone]  [facility] [phone]

## 2023-09-04 ENCOUNTER — HOSPITAL ENCOUNTER (EMERGENCY)
Facility: HOSPITAL | Age: 88
Discharge: HOME | End: 2023-09-04
Attending: STUDENT IN AN ORGANIZED HEALTH CARE EDUCATION/TRAINING PROGRAM
Payer: MEDICARE

## 2023-09-04 VITALS
DIASTOLIC BLOOD PRESSURE: 89 MMHG | HEART RATE: 67 BPM | HEIGHT: 72 IN | SYSTOLIC BLOOD PRESSURE: 169 MMHG | OXYGEN SATURATION: 100 % | BODY MASS INDEX: 18.96 KG/M2 | TEMPERATURE: 98.1 F | WEIGHT: 140 LBS | RESPIRATION RATE: 18 BRPM

## 2023-09-04 DIAGNOSIS — N17.9 AKI (ACUTE KIDNEY INJURY) (HCC): ICD-10-CM

## 2023-09-04 DIAGNOSIS — E86.0 DEHYDRATION: ICD-10-CM

## 2023-09-04 DIAGNOSIS — R55 NEAR SYNCOPE: Primary | ICD-10-CM

## 2023-09-04 LAB
ALBUMIN SERPL-MCNC: 4 G/DL (ref 3.5–5)
ALBUMIN/GLOB SERPL: 1 (ref 1.1–2.2)
ALP SERPL-CCNC: 135 U/L (ref 45–117)
ALT SERPL-CCNC: 32 U/L (ref 12–78)
ANION GAP SERPL CALC-SCNC: 5 MMOL/L (ref 5–15)
AST SERPL W P-5'-P-CCNC: 37 U/L (ref 15–37)
BASOPHILS # BLD: 0 K/UL (ref 0–0.1)
BASOPHILS NFR BLD: 0 % (ref 0–1)
BILIRUB SERPL-MCNC: 0.7 MG/DL (ref 0.2–1)
BUN SERPL-MCNC: 33 MG/DL (ref 6–20)
BUN/CREAT SERPL: 20 (ref 12–20)
CA-I BLD-MCNC: 9.4 MG/DL (ref 8.5–10.1)
CHLORIDE SERPL-SCNC: 107 MMOL/L (ref 97–108)
CO2 SERPL-SCNC: 26 MMOL/L (ref 21–32)
CREAT SERPL-MCNC: 1.63 MG/DL (ref 0.7–1.3)
DIFFERENTIAL METHOD BLD: ABNORMAL
EOSINOPHIL # BLD: 0.1 K/UL (ref 0–0.4)
EOSINOPHIL NFR BLD: 1 % (ref 0–7)
ERYTHROCYTE [DISTWIDTH] IN BLOOD BY AUTOMATED COUNT: 13.7 % (ref 11.5–14.5)
GLOBULIN SER CALC-MCNC: 4 G/DL (ref 2–4)
GLUCOSE SERPL-MCNC: 155 MG/DL (ref 65–100)
HCT VFR BLD AUTO: 37.2 % (ref 36.6–50.3)
HGB BLD-MCNC: 12.4 G/DL (ref 12.1–17)
IMM GRANULOCYTES # BLD AUTO: 0 K/UL (ref 0–0.04)
IMM GRANULOCYTES NFR BLD AUTO: 0 % (ref 0–0.5)
LYMPHOCYTES # BLD: 1.5 K/UL (ref 0.8–3.5)
LYMPHOCYTES NFR BLD: 19 % (ref 12–49)
MCH RBC QN AUTO: 31 PG (ref 26–34)
MCHC RBC AUTO-ENTMCNC: 33.3 G/DL (ref 30–36.5)
MCV RBC AUTO: 93 FL (ref 80–99)
MONOCYTES # BLD: 0.5 K/UL (ref 0–1)
MONOCYTES NFR BLD: 7 % (ref 5–13)
NEUTS SEG # BLD: 5.9 K/UL (ref 1.8–8)
NEUTS SEG NFR BLD: 73 % (ref 32–75)
NRBC # BLD: 0 K/UL (ref 0–0.01)
NRBC BLD-RTO: 0 PER 100 WBC
PLATELET # BLD AUTO: 176 K/UL (ref 150–400)
PMV BLD AUTO: 11.9 FL (ref 8.9–12.9)
POTASSIUM SERPL-SCNC: 5.2 MMOL/L (ref 3.5–5.1)
PROT SERPL-MCNC: 8 G/DL (ref 6.4–8.2)
RBC # BLD AUTO: 4 M/UL (ref 4.1–5.7)
SODIUM SERPL-SCNC: 138 MMOL/L (ref 136–145)
TROPONIN I SERPL HS-MCNC: 13 NG/L (ref 0–76)
WBC # BLD AUTO: 8.1 K/UL (ref 4.1–11.1)

## 2023-09-04 PROCEDURE — 99284 EMERGENCY DEPT VISIT MOD MDM: CPT

## 2023-09-04 PROCEDURE — 80053 COMPREHEN METABOLIC PANEL: CPT

## 2023-09-04 PROCEDURE — 94761 N-INVAS EAR/PLS OXIMETRY MLT: CPT

## 2023-09-04 PROCEDURE — 2580000003 HC RX 258: Performed by: STUDENT IN AN ORGANIZED HEALTH CARE EDUCATION/TRAINING PROGRAM

## 2023-09-04 PROCEDURE — 84484 ASSAY OF TROPONIN QUANT: CPT

## 2023-09-04 PROCEDURE — 96360 HYDRATION IV INFUSION INIT: CPT

## 2023-09-04 PROCEDURE — 85025 COMPLETE CBC W/AUTO DIFF WBC: CPT

## 2023-09-04 PROCEDURE — 36415 COLL VENOUS BLD VENIPUNCTURE: CPT

## 2023-09-04 RX ORDER — 0.9 % SODIUM CHLORIDE 0.9 %
1000 INTRAVENOUS SOLUTION INTRAVENOUS ONCE
Status: COMPLETED | OUTPATIENT
Start: 2023-09-04 | End: 2023-09-04

## 2023-09-04 RX ADMIN — SODIUM CHLORIDE 1000 ML: 9 INJECTION, SOLUTION INTRAVENOUS at 20:02

## 2023-09-04 ASSESSMENT — LIFESTYLE VARIABLES
HOW MANY STANDARD DRINKS CONTAINING ALCOHOL DO YOU HAVE ON A TYPICAL DAY: PATIENT DOES NOT DRINK
HOW OFTEN DO YOU HAVE A DRINK CONTAINING ALCOHOL: NEVER

## 2023-09-04 ASSESSMENT — PAIN - FUNCTIONAL ASSESSMENT
PAIN_FUNCTIONAL_ASSESSMENT: NONE - DENIES PAIN
PAIN_FUNCTIONAL_ASSESSMENT: 0-10

## 2023-09-04 ASSESSMENT — PAIN SCALES - GENERAL: PAINLEVEL_OUTOF10: 0

## 2023-09-04 NOTE — ED NOTES
Pt states during assessment that \"he does remember getting hot and was sitting in lobby of appartment, where he confirms that the lobby was hotter than usual\". Pt can ask yes or no questions without use of voice box. Pt has no incontinence noted during assessment.        Isaura Brantley, RN  09/04/23 [address] SCHAFER RN  09/04/23 1340

## 2023-09-05 NOTE — ED NOTES
Pt d/c with instructions given. Pt verbalized understanding. Pt d/c to waiting room in wheelchair and into awaiting car with niece.           Tatianna Wilson RN  09/04/23 2123

## 2023-09-05 NOTE — DISCHARGE INSTRUCTIONS
0.2 - 1.0 mg/dL    AST 37 15 - 37 U/L    ALT 32 12 - 78 U/L    Alk Phosphatase 135 (H) 45 - 117 U/L    Total Protein 8.0 6.4 - 8.2 g/dL    Albumin 4.0 3.5 - 5.0 g/dL    Globulin 4.0 2.0 - 4.0 g/dL    Albumin/Globulin Ratio 1.0 (L) 1.1 - 2.2     Troponin    Collection Time: 09/04/23  8:00 PM   Result Value Ref Range    Troponin, High Sensitivity 13 0 - 76 ng/L       Radiologic Studies  No orders to display     ------------------------------------------------------------------------------------------------------------  The exam and treatment you received in the Emergency Department were for an urgent problem and are not intended as complete care. It is important that you follow-up with a doctor, nurse practitioner, or physician assistant to:  (1) confirm your diagnosis,  (2) re-evaluation of changes in your illness and treatment, and  (3) for ongoing care. Please take your discharge instructions with you when you go to your follow-up appointment. If you have any problem arranging a follow-up appointment, contact the Emergency Department. If your symptoms become worse or you do not improve as expected and you are unable to reach your health care provider, please return to the Emergency Department. We are available 24 hours a day.

## 2023-09-06 NOTE — ED PROVIDER NOTES
[facility] EMERGENCY DEPT  EMERGENCY DEPARTMENT HISTORY AND PHYSICAL EXAM      Date: 2023  Patient Name: Yolis Hodge  MRN: [id number]  [address] [facility] 1935  Date of evaluation: 2023  Provider: Eliza Ho MD   Note Started: 11:34 PM EDT 23    HISTORY OF PRESENT ILLNESS     Chief Complaint   Patient presents with    Seizures       History Provided By: Patient    HPI: Yolis Hodge is a 80 y.o. male who presents after episode of lightheadedness and near syncope. His niece is at bedside adding to the history. He reports earlier today feeling hot indoors where he was living, but noted he was wearing multiple layers of clothing and there was no air conditioning on. His niece is also concerned because he sits in the sun which adds to the heat. He said the heat made him feel lightheaded and very fatigued, but he never lost consciousness. He became more aware while in the ambulance. He denies any headaches, neuro deficits, fevers, chest pain, palpitations, confusion. PAST MEDICAL HISTORY   Past Medical History:  Past Medical History:   Diagnosis Date    Anemia     Arthritis     Diabetes ([address])     Hypercholesterolemia     Hypertension     Single kidney     Cr normal    Throat cancer ([address])     s/p laryngectomy       Past Surgical History:  Past Surgical History:   Procedure Laterality Date    CHOLECYSTECTOMY, LAPAROSCOPIC  12    By Dr. Laura Villarreal states does not know reason    LARYNGECTOMY      STOMACH SURGERY N/A 2023    REMOVAL PEG TUBE performed by Jodie Roy MD at [facility] ENDOSCOPY       Family History:  No family history on file. Social History:  Social History     Tobacco Use    Smoking status: Former     Packs/day: 1.00     Types: Cigarettes     Quit date: 2012     Years since quittin.1    Smokeless tobacco: Never   Substance Use Topics    Alcohol use:  Yes     Alcohol/week: 17.5 standard drinks    Drug use: No       Allergies:  No Known

## 2023-12-08 ENCOUNTER — OFFICE VISIT (OUTPATIENT)
Age: 88
End: 2023-12-08

## 2023-12-08 VITALS
HEIGHT: 72 IN | WEIGHT: 140 LBS | HEART RATE: 74 BPM | BODY MASS INDEX: 18.96 KG/M2 | RESPIRATION RATE: 16 BRPM | OXYGEN SATURATION: 98 % | SYSTOLIC BLOOD PRESSURE: 124 MMHG | DIASTOLIC BLOOD PRESSURE: 72 MMHG

## 2023-12-08 DIAGNOSIS — Z85.21 PERSONAL HISTORY OF MALIGNANT NEOPLASM OF LARYNX: Primary | ICD-10-CM

## 2023-12-08 DIAGNOSIS — H61.23 IMPACTED CERUMEN, BILATERAL: ICD-10-CM

## 2023-12-08 DIAGNOSIS — Z90.02 STATUS POST LARYNGECTOMY: ICD-10-CM

## 2023-12-08 ASSESSMENT — ENCOUNTER SYMPTOMS
ABDOMINAL PAIN: 0
EYE DISCHARGE: 0
SORE THROAT: 0
APNEA: 0
CHOKING: 0
VOICE CHANGE: 0
EYE ITCHING: 0
SHORTNESS OF BREATH: 0
SINUS PAIN: 0
BACK PAIN: 0
RHINORRHEA: 0
VOMITING: 0
COUGH: 0
STRIDOR: 0
TROUBLE SWALLOWING: 1
NAUSEA: 0
SINUS PRESSURE: 0

## 2023-12-08 NOTE — PROGRESS NOTES
Subjective:    Mitch Truong   80 y.o.   1935     Followup Visit    5/31/2022  History of Present Illness:    Location -throat, neck     Quality -laryngectomy     Severity -moderate     Duration -years     Timing -chronic     Context -patient has been seen in this practice before. He had a laryngectomy over 15 years ago. Done at [facility]. Has been doing well since that time no issues. He does not use any method of artificial speech. He swallows fine. There is some concern for hearing loss. Modifying Features -none     Associated symptoms/signs -as above    5/31/2023  1 year follow-up -since last visit he was hospitalized earlier this year with food impaction. He had a G-tube placed. He has been working with dysphagia therapy and now with a modified softer diet he has been doing well without requiring his tube feeds. He is interested in having feeding tube removed. No throat pain. Concerns for hearing loss. 12/8/2023  6-month follow-up, patient is overall been doing well. He did have his G-tube removed. He is tolerating a regular diet. No real concerns with his stoma. Review of Systems  Review of Systems   Constitutional:  Negative for activity change, appetite change, chills, fatigue and fever. HENT:  Positive for hearing loss and trouble swallowing. Negative for congestion, ear discharge, ear pain, mouth sores, nosebleeds, postnasal drip, rhinorrhea, sinus pressure, sinus pain, sneezing, sore throat, tinnitus and voice change. Eyes:  Negative for discharge, itching and visual disturbance. Respiratory:  Negative for apnea, cough, choking, shortness of breath and stridor. Cardiovascular:  Negative for chest pain and palpitations. Gastrointestinal:  Negative for abdominal pain, nausea and vomiting. Endocrine: Negative for cold intolerance and heat intolerance. Genitourinary:  Negative for difficulty urinating and dysuria.    Musculoskeletal:  Negative for arthralgias, back

## 2023-12-09 ENCOUNTER — HOSPITAL ENCOUNTER (EMERGENCY)
Facility: HOSPITAL | Age: 88
Discharge: HOME | End: 2023-12-09
Attending: EMERGENCY MEDICINE
Payer: MEDICARE

## 2023-12-09 ENCOUNTER — APPOINTMENT (OUTPATIENT)
Facility: HOSPITAL | Age: 88
End: 2023-12-09
Payer: MEDICARE

## 2023-12-09 VITALS
SYSTOLIC BLOOD PRESSURE: 144 MMHG | HEIGHT: 65 IN | DIASTOLIC BLOOD PRESSURE: 63 MMHG | BODY MASS INDEX: 21.99 KG/M2 | RESPIRATION RATE: 21 BRPM | HEART RATE: 63 BPM | TEMPERATURE: 97.9 F | WEIGHT: 132 LBS | OXYGEN SATURATION: 100 %

## 2023-12-09 DIAGNOSIS — R55 SYNCOPE AND COLLAPSE: Primary | ICD-10-CM

## 2023-12-09 LAB
ALBUMIN SERPL-MCNC: 3.6 G/DL (ref 3.5–5)
ALBUMIN/GLOB SERPL: 1 (ref 1.1–2.2)
ALP SERPL-CCNC: 152 U/L (ref 45–117)
ALT SERPL-CCNC: 27 U/L (ref 12–78)
ANION GAP SERPL CALC-SCNC: 6 MMOL/L (ref 5–15)
AST SERPL W P-5'-P-CCNC: 20 U/L (ref 15–37)
BASOPHILS # BLD: 0 K/UL (ref 0–0.1)
BASOPHILS NFR BLD: 0 % (ref 0–1)
BILIRUB SERPL-MCNC: 0.5 MG/DL (ref 0.2–1)
BUN SERPL-MCNC: 37 MG/DL (ref 6–20)
BUN/CREAT SERPL: 28 (ref 12–20)
CA-I BLD-MCNC: 9 MG/DL (ref 8.5–10.1)
CHLORIDE SERPL-SCNC: 108 MMOL/L (ref 97–108)
CO2 SERPL-SCNC: 25 MMOL/L (ref 21–32)
CREAT SERPL-MCNC: 1.31 MG/DL (ref 0.7–1.3)
DIFFERENTIAL METHOD BLD: ABNORMAL
EOSINOPHIL # BLD: 0.1 K/UL (ref 0–0.4)
EOSINOPHIL NFR BLD: 2 % (ref 0–7)
ERYTHROCYTE [DISTWIDTH] IN BLOOD BY AUTOMATED COUNT: 14.2 % (ref 11.5–14.5)
GLOBULIN SER CALC-MCNC: 3.6 G/DL (ref 2–4)
GLUCOSE SERPL-MCNC: 193 MG/DL (ref 65–100)
HCT VFR BLD AUTO: 36 % (ref 36.6–50.3)
HGB BLD-MCNC: 11.6 G/DL (ref 12.1–17)
IMM GRANULOCYTES # BLD AUTO: 0 K/UL (ref 0–0.04)
IMM GRANULOCYTES NFR BLD AUTO: 0 % (ref 0–0.5)
LYMPHOCYTES # BLD: 0.8 K/UL (ref 0.8–3.5)
LYMPHOCYTES NFR BLD: 16 % (ref 12–49)
MAGNESIUM SERPL-MCNC: 2.4 MG/DL (ref 1.6–2.4)
MCH RBC QN AUTO: 30 PG (ref 26–34)
MCHC RBC AUTO-ENTMCNC: 32.2 G/DL (ref 30–36.5)
MCV RBC AUTO: 93 FL (ref 80–99)
MONOCYTES # BLD: 0.5 K/UL (ref 0–1)
MONOCYTES NFR BLD: 10 % (ref 5–13)
NEUTS SEG # BLD: 3.4 K/UL (ref 1.8–8)
NEUTS SEG NFR BLD: 72 % (ref 32–75)
NRBC # BLD: 0 K/UL (ref 0–0.01)
NRBC BLD-RTO: 0 PER 100 WBC
PLATELET # BLD AUTO: 171 K/UL (ref 150–400)
PMV BLD AUTO: 11.2 FL (ref 8.9–12.9)
POTASSIUM SERPL-SCNC: 4.7 MMOL/L (ref 3.5–5.1)
PROT SERPL-MCNC: 7.2 G/DL (ref 6.4–8.2)
RBC # BLD AUTO: 3.87 M/UL (ref 4.1–5.7)
SODIUM SERPL-SCNC: 139 MMOL/L (ref 136–145)
TROPONIN I SERPL HS-MCNC: 12 NG/L (ref 0–76)
TROPONIN I SERPL HS-MCNC: 13 NG/L (ref 0–76)
WBC # BLD AUTO: 4.7 K/UL (ref 4.1–11.1)

## 2023-12-09 PROCEDURE — 80053 COMPREHEN METABOLIC PANEL: CPT

## 2023-12-09 PROCEDURE — 83735 ASSAY OF MAGNESIUM: CPT

## 2023-12-09 PROCEDURE — 36415 COLL VENOUS BLD VENIPUNCTURE: CPT

## 2023-12-09 PROCEDURE — 99285 EMERGENCY DEPT VISIT HI MDM: CPT

## 2023-12-09 PROCEDURE — 84484 ASSAY OF TROPONIN QUANT: CPT

## 2023-12-09 PROCEDURE — 93005 ELECTROCARDIOGRAM TRACING: CPT | Performed by: EMERGENCY MEDICINE

## 2023-12-09 PROCEDURE — 71045 X-RAY EXAM CHEST 1 VIEW: CPT

## 2023-12-09 PROCEDURE — 85025 COMPLETE CBC W/AUTO DIFF WBC: CPT

## 2023-12-09 ASSESSMENT — PAIN - FUNCTIONAL ASSESSMENT
PAIN_FUNCTIONAL_ASSESSMENT: NONE - DENIES PAIN
PAIN_FUNCTIONAL_ASSESSMENT: 0-10

## 2023-12-09 ASSESSMENT — PAIN SCALES - GENERAL: PAINLEVEL_OUTOF10: 0

## 2023-12-09 NOTE — ED TRIAGE NOTES
[facility] 15 pt is from independent living facility where pt had a syncopal episode where pt passed out; upon EMS arrival pt was diaphoretic with SBP around 96 with EMS after laying down and fluids

## 2023-12-10 NOTE — DISCHARGE INSTRUCTIONS
Thank you! Thank you for allowing me to care for you in the emergency department. It is my goal to provide you with excellent care. If you have not received excellent quality care, please ask to speak to the nurse manager. Please fill out the survey that will come to you by mail or email since we listen to your feedback! Below you will find a list of your tests from today's visit. Should you have any questions, please do not hesitate to call the emergency department.     Labs  Recent Results (from the past 12 hour(s))   CBC with Auto Differential    Collection Time: 12/09/23  6:40 PM   Result Value Ref Range    WBC 4.7 4.1 - 11.1 K/uL    RBC 3.87 (L) 4.10 - 5.70 M/uL    Hemoglobin 11.6 (L) 12.1 - 17.0 g/dL    Hematocrit 36.0 (L) 36.6 - 50.3 %    MCV 93.0 80.0 - 99.0 FL    MCH 30.0 26.0 - 34.0 PG    MCHC 32.2 30.0 - 36.5 g/dL    RDW 14.2 11.5 - 14.5 %    Platelets 433 120 - 701 K/uL    MPV 11.2 8.9 - 12.9 FL    Nucleated RBCs 0.0 0.0  WBC    nRBC 0.00 0.00 - 0.01 K/uL    Neutrophils % 72 32 - 75 %    Lymphocytes % 16 12 - 49 %    Monocytes % 10 5 - 13 %    Eosinophils % 2 0 - 7 %    Basophils % 0 0 - 1 %    Immature Granulocytes 0 0 - 0.5 %    Neutrophils Absolute 3.4 1.8 - 8.0 K/UL    Lymphocytes Absolute 0.8 0.8 - 3.5 K/UL    Monocytes Absolute 0.5 0.0 - 1.0 K/UL    Eosinophils Absolute 0.1 0.0 - 0.4 K/UL    Basophils Absolute 0.0 0.0 - 0.1 K/UL    Absolute Immature Granulocyte 0.0 0.00 - 0.04 K/UL    Differential Type AUTOMATED     Troponin    Collection Time: 12/09/23  6:40 PM   Result Value Ref Range    Troponin, High Sensitivity 12 0 - 76 ng/L   Comprehensive Metabolic Panel    Collection Time: 12/09/23  6:40 PM   Result Value Ref Range    Sodium 139 136 - 145 mmol/L    Potassium 4.7 3.5 - 5.1 mmol/L    Chloride 108 97 - 108 mmol/L    CO2 25 21 - 32 mmol/L    Anion Gap 6 5 - 15 mmol/L    Glucose 193 (H) 65 - 100 mg/dL    BUN 37 (H) 6 - 20 mg/dL    Creatinine 1.31 (H) 0.70 - 1.30 mg/dL

## 2023-12-10 NOTE — ED PROVIDER NOTES
Normal rate and regular rhythm. Pulses: Normal pulses. Heart sounds: Normal heart sounds. Pulmonary:      Effort: Pulmonary effort is normal.      Breath sounds: Normal breath sounds. Abdominal:      General: Abdomen is flat. Palpations: Abdomen is soft. Musculoskeletal:         General: Normal range of motion. Cervical back: Normal range of motion. Skin:     General: Skin is warm. Capillary Refill: Capillary refill takes less than 2 seconds. Neurological:      General: No focal deficit present. Mental Status: He is alert and oriented to person, place, and time.    Psychiatric:         Mood and Affect: Mood normal.         Behavior: Behavior normal.           SCREENINGS                  LAB, EKG AND DIAGNOSTIC RESULTS   Labs:  Recent Results (from the past 12 hour(s))   CBC with Auto Differential    Collection Time: 12/09/23  6:40 PM   Result Value Ref Range    WBC 4.7 4.1 - 11.1 K/uL    RBC 3.87 (L) 4.10 - 5.70 M/uL    Hemoglobin 11.6 (L) 12.1 - 17.0 g/dL    Hematocrit 36.0 (L) 36.6 - 50.3 %    MCV 93.0 80.0 - 99.0 FL    MCH 30.0 26.0 - 34.0 PG    MCHC 32.2 30.0 - 36.5 g/dL    RDW 14.2 11.5 - 14.5 %    Platelets 511 824 - 735 K/uL    MPV 11.2 8.9 - 12.9 FL    Nucleated RBCs 0.0 0.0  WBC    nRBC 0.00 0.00 - 0.01 K/uL    Neutrophils % 72 32 - 75 %    Lymphocytes % 16 12 - 49 %    Monocytes % 10 5 - 13 %    Eosinophils % 2 0 - 7 %    Basophils % 0 0 - 1 %    Immature Granulocytes 0 0 - 0.5 %    Neutrophils Absolute 3.4 1.8 - 8.0 K/UL    Lymphocytes Absolute 0.8 0.8 - 3.5 K/UL    Monocytes Absolute 0.5 0.0 - 1.0 K/UL    Eosinophils Absolute 0.1 0.0 - 0.4 K/UL    Basophils Absolute 0.0 0.0 - 0.1 K/UL    Absolute Immature Granulocyte 0.0 0.00 - 0.04 K/UL    Differential Type AUTOMATED     Troponin    Collection Time: 12/09/23  6:40 PM   Result Value Ref Range    Troponin, High Sensitivity 12 0 - 76 ng/L   Comprehensive Metabolic Panel    Collection Time: 12/09/23  6:40 PM

## 2023-12-11 LAB
EKG ATRIAL RATE: 61 BPM
EKG DIAGNOSIS: NORMAL
EKG P AXIS: 85 DEGREES
EKG P-R INTERVAL: 274 MS
EKG Q-T INTERVAL: 468 MS
EKG QRS DURATION: 156 MS
EKG QTC CALCULATION (BAZETT): 471 MS
EKG R AXIS: -87 DEGREES
EKG T AXIS: 69 DEGREES
EKG VENTRICULAR RATE: 61 BPM

## 2024-01-13 ENCOUNTER — APPOINTMENT (OUTPATIENT)
Facility: HOSPITAL | Age: 89
End: 2024-01-13
Payer: MEDICARE

## 2024-01-13 ENCOUNTER — HOSPITAL ENCOUNTER (EMERGENCY)
Facility: HOSPITAL | Age: 89
Discharge: HOME | End: 2024-01-14
Attending: STUDENT IN AN ORGANIZED HEALTH CARE EDUCATION/TRAINING PROGRAM
Payer: MEDICARE

## 2024-01-13 DIAGNOSIS — G40.919 BREAKTHROUGH SEIZURE (HCC): Primary | ICD-10-CM

## 2024-01-13 LAB
ALBUMIN SERPL-MCNC: 3.8 G/DL (ref 3.5–5)
ALBUMIN/GLOB SERPL: 1 (ref 1.1–2.2)
ALP SERPL-CCNC: 120 U/L (ref 45–117)
ALT SERPL-CCNC: 30 U/L (ref 12–78)
ANION GAP SERPL CALC-SCNC: 7 MMOL/L (ref 5–15)
AST SERPL W P-5'-P-CCNC: 40 U/L (ref 15–37)
AST SERPL W P-5'-P-CCNC: 41 U/L (ref 15–37)
BASE DEFICIT BLD-SCNC: 0.9 MMOL/L
BASE DEFICIT BLD-SCNC: 6.2 MMOL/L
BASOPHILS # BLD: 0 K/UL (ref 0–0.1)
BASOPHILS NFR BLD: 0 % (ref 0–1)
BILIRUB SERPL-MCNC: 1.2 MG/DL (ref 0.2–1)
BUN SERPL-MCNC: 25 MG/DL (ref 6–20)
BUN/CREAT SERPL: 19 (ref 12–20)
CA-I BLD-MCNC: 1.05 MMOL/L (ref 1.12–1.32)
CA-I BLD-MCNC: 1.15 MMOL/L (ref 1.12–1.32)
CA-I BLD-MCNC: 9.6 MG/DL (ref 8.5–10.1)
CHLORIDE BLD-SCNC: 109 MMOL/L (ref 98–107)
CHLORIDE BLD-SCNC: 110 MMOL/L (ref 98–107)
CHLORIDE SERPL-SCNC: 107 MMOL/L (ref 97–108)
CO2 BLD-SCNC: 19 MMOL/L
CO2 BLD-SCNC: 20 MMOL/L
CO2 SERPL-SCNC: 24 MMOL/L (ref 21–32)
CREAT SERPL-MCNC: 1.35 MG/DL (ref 0.7–1.3)
CREAT UR-MCNC: 0.77 MG/DL (ref 0.6–1.3)
CREAT UR-MCNC: 0.95 MG/DL (ref 0.6–1.3)
DIFFERENTIAL METHOD BLD: ABNORMAL
EOSINOPHIL # BLD: 0 K/UL (ref 0–0.4)
EOSINOPHIL NFR BLD: 0 % (ref 0–7)
ERYTHROCYTE [DISTWIDTH] IN BLOOD BY AUTOMATED COUNT: 14 % (ref 11.5–14.5)
GLOBULIN SER CALC-MCNC: 3.9 G/DL (ref 2–4)
GLUCOSE BLD STRIP.AUTO-MCNC: 164 MG/DL (ref 65–100)
GLUCOSE BLD STRIP.AUTO-MCNC: 171 MG/DL (ref 65–100)
GLUCOSE SERPL-MCNC: 183 MG/DL (ref 65–100)
HCO3 BLD-SCNC: 19.3 MMOL/L (ref 19–28)
HCO3 BLD-SCNC: 20.2 MMOL/L (ref 19–28)
HCT VFR BLD AUTO: 40.7 % (ref 36.6–50.3)
HGB BLD-MCNC: 13.3 G/DL (ref 12.1–17)
IMM GRANULOCYTES # BLD AUTO: 0.1 K/UL (ref 0–0.04)
IMM GRANULOCYTES NFR BLD AUTO: 0 % (ref 0–0.5)
LACTATE BLD-SCNC: 2.3 MMOL/L (ref 0.4–2)
LACTATE BLD-SCNC: 2.86 MMOL/L (ref 0.4–2)
LYMPHOCYTES # BLD: 1.4 K/UL (ref 0.8–3.5)
LYMPHOCYTES NFR BLD: 12 % (ref 12–49)
MCH RBC QN AUTO: 30.2 PG (ref 26–34)
MCHC RBC AUTO-ENTMCNC: 32.7 G/DL (ref 30–36.5)
MCV RBC AUTO: 92.3 FL (ref 80–99)
MONOCYTES # BLD: 1 K/UL (ref 0–1)
MONOCYTES NFR BLD: 8 % (ref 5–13)
NEUTS SEG # BLD: 9 K/UL (ref 1.8–8)
NEUTS SEG NFR BLD: 80 % (ref 32–75)
NRBC # BLD: 0 K/UL (ref 0–0.01)
NRBC BLD-RTO: 0 PER 100 WBC
PCO2 BLD: 23.4 MMHG (ref 35–45)
PCO2 BLD: 37.6 MMHG (ref 35–45)
PERFORMED BY:: ABNORMAL
PERFORMED BY:: ABNORMAL
PH BLD: 7.32 (ref 7.35–7.45)
PH BLD: 7.55 (ref 7.35–7.45)
PLATELET # BLD AUTO: 171 K/UL (ref 150–400)
PMV BLD AUTO: 11.1 FL (ref 8.9–12.9)
PO2 BLD: 35 MMHG (ref 75–100)
PO2 BLD: 80 MMHG (ref 75–100)
POTASSIUM BLD-SCNC: 4.4 MMOL/L (ref 3.5–5.5)
POTASSIUM BLD-SCNC: 4.9 MMOL/L (ref 3.5–5.5)
POTASSIUM SERPL-SCNC: 4.9 MMOL/L (ref 3.5–5.1)
POTASSIUM SERPL-SCNC: 5 MMOL/L (ref 3.5–5.1)
PROT SERPL-MCNC: 7.7 G/DL (ref 6.4–8.2)
RBC # BLD AUTO: 4.41 M/UL (ref 4.1–5.7)
SAO2 % BLD: 63 %
SAO2 % BLD: 97 %
SERVICE CMNT-IMP: ABNORMAL
SERVICE CMNT-IMP: ABNORMAL
SODIUM BLD-SCNC: 139 MMOL/L (ref 136–145)
SODIUM BLD-SCNC: 140 MMOL/L (ref 136–145)
SODIUM SERPL-SCNC: 138 MMOL/L (ref 136–145)
SPECIMEN SITE: ABNORMAL
SPECIMEN SITE: ABNORMAL
WBC # BLD AUTO: 11.4 K/UL (ref 4.1–11.1)

## 2024-01-13 PROCEDURE — 85025 COMPLETE CBC W/AUTO DIFF WBC: CPT

## 2024-01-13 PROCEDURE — 2580000003 HC RX 258: Performed by: STUDENT IN AN ORGANIZED HEALTH CARE EDUCATION/TRAINING PROGRAM

## 2024-01-13 PROCEDURE — 84450 TRANSFERASE (AST) (SGOT): CPT

## 2024-01-13 PROCEDURE — 80053 COMPREHEN METABOLIC PANEL: CPT

## 2024-01-13 PROCEDURE — 73030 X-RAY EXAM OF SHOULDER: CPT

## 2024-01-13 PROCEDURE — 96374 THER/PROPH/DIAG INJ IV PUSH: CPT

## 2024-01-13 PROCEDURE — 70450 CT HEAD/BRAIN W/O DYE: CPT

## 2024-01-13 PROCEDURE — 82330 ASSAY OF CALCIUM: CPT

## 2024-01-13 PROCEDURE — 80048 BASIC METABOLIC PNL TOTAL CA: CPT

## 2024-01-13 PROCEDURE — 99284 EMERGENCY DEPT VISIT MOD MDM: CPT

## 2024-01-13 PROCEDURE — 82803 BLOOD GASES ANY COMBINATION: CPT

## 2024-01-13 PROCEDURE — 84295 ASSAY OF SERUM SODIUM: CPT

## 2024-01-13 PROCEDURE — 84132 ASSAY OF SERUM POTASSIUM: CPT

## 2024-01-13 PROCEDURE — 80177 DRUG SCRN QUAN LEVETIRACETAM: CPT

## 2024-01-13 PROCEDURE — 83605 ASSAY OF LACTIC ACID: CPT

## 2024-01-13 PROCEDURE — 36415 COLL VENOUS BLD VENIPUNCTURE: CPT

## 2024-01-13 PROCEDURE — 6360000002 HC RX W HCPCS: Performed by: STUDENT IN AN ORGANIZED HEALTH CARE EDUCATION/TRAINING PROGRAM

## 2024-01-13 PROCEDURE — 82947 ASSAY GLUCOSE BLOOD QUANT: CPT

## 2024-01-13 RX ORDER — MULTIVITAMIN,THER AND MINERALS
1 TABLET ORAL DAILY
Status: ON HOLD | COMMUNITY

## 2024-01-13 RX ORDER — LEVETIRACETAM 500 MG/5ML
1000 INJECTION, SOLUTION, CONCENTRATE INTRAVENOUS
Status: COMPLETED | OUTPATIENT
Start: 2024-01-13 | End: 2024-01-13

## 2024-01-13 RX ORDER — INSULIN DETEMIR 100 [IU]/ML
15 INJECTION, SOLUTION SUBCUTANEOUS NIGHTLY
Status: ON HOLD | COMMUNITY
Start: 2023-12-18

## 2024-01-13 RX ORDER — 0.9 % SODIUM CHLORIDE 0.9 %
1000 INTRAVENOUS SOLUTION INTRAVENOUS ONCE
Status: COMPLETED | OUTPATIENT
Start: 2024-01-13 | End: 2024-01-14

## 2024-01-13 RX ORDER — 0.9 % SODIUM CHLORIDE 0.9 %
1000 INTRAVENOUS SOLUTION INTRAVENOUS ONCE
Status: COMPLETED | OUTPATIENT
Start: 2024-01-13 | End: 2024-01-13

## 2024-01-13 RX ORDER — GUAIFENESIN/PHENYLPROPANOLAMIN
450 EXPECTORANT ORAL DAILY
Status: ON HOLD | COMMUNITY

## 2024-01-13 RX ADMIN — LEVETIRACETAM 1000 MG: 100 INJECTION, SOLUTION INTRAVENOUS at 19:48

## 2024-01-13 RX ADMIN — SODIUM CHLORIDE 1000 ML: 9 INJECTION, SOLUTION INTRAVENOUS at 22:13

## 2024-01-13 RX ADMIN — SODIUM CHLORIDE 1000 ML: 9 INJECTION, SOLUTION INTRAVENOUS at 19:48

## 2024-01-14 VITALS
SYSTOLIC BLOOD PRESSURE: 123 MMHG | DIASTOLIC BLOOD PRESSURE: 70 MMHG | TEMPERATURE: 98.4 F | HEART RATE: 83 BPM | RESPIRATION RATE: 18 BRPM | OXYGEN SATURATION: 100 %

## 2024-01-14 LAB — LACTATE SERPL-SCNC: 1.5 MMOL/L (ref 0.4–2)

## 2024-01-14 PROCEDURE — 83605 ASSAY OF LACTIC ACID: CPT

## 2024-01-14 ASSESSMENT — PAIN - FUNCTIONAL ASSESSMENT: PAIN_FUNCTIONAL_ASSESSMENT: NONE - DENIES PAIN

## 2024-01-14 NOTE — ED TRIAGE NOTES
Patient here after being found on the floor at home, ukn down time. Patient has hx of focal seizures vs syncopal episode.

## 2024-01-14 NOTE — ED PROVIDER NOTES
[facility] EMERGENCY DEPT  EMERGENCY DEPARTMENT HISTORY AND PHYSICAL EXAM      Date: 2024  Patient Name: Nando Mckenzie  MRN: [id number]  Birthdate 1935  Date of evaluation: 2024  Provider: Jermaine Duran MD   Note Started: 9:43 PM EST 24    HISTORY OF PRESENT ILLNESS     Chief Complaint   Patient presents with    Seizures    Loss of Consciousness       History Provided By: Patient, patient's family member    HPI: Nando Mckenzie is a 88 y.o. male with past medical history as reviewed below presents for evaluation of witnessed seizure-like activity.  Patient had 2 such events today he was witnessed to be shaking his extremities.  He slid to the floor from chairs in both instances and was unable to get up from the floor initially requiring help from bystanders.  No missed doses of his medications recently, last dose change was approximately 6 months prior.  He has been well recently with no fevers, chills, chest pain, dyspnea, change in his p.o. intake,  or GI symptoms.  He reports some pain in his right shoulder from sliding off the chair earlier today    PAST MEDICAL HISTORY   Past Medical History:  Past Medical History:   Diagnosis Date    Anemia     Arthritis     Diabetes (HCC)     Hypercholesterolemia     Hypertension     Single kidney     Cr normal    Throat cancer (HCC)     s/p laryngectomy       Past Surgical History:  Past Surgical History:   Procedure Laterality Date    CHOLECYSTECTOMY, LAPAROSCOPIC  12    By Dr. Manzanares    KIDNEY REMOVAL      Pt states does not know reason    LARYNGECTOMY      STOMACH SURGERY N/A 2023    REMOVAL PEG TUBE performed by Daphney Pope MD at [facility] ENDOSCOPY       Family History:  No family history on file.    Social History:  Social History     Tobacco Use    Smoking status: Former     Current packs/day: 0.00     Types: Cigarettes     Quit date: 2012     Years since quittin.4    Smokeless tobacco: Never   Substance Use Topics     Rifampin Pregnancy And Lactation Text: This medication is Pregnancy Category C and it isn't know if it is safe during pregnancy. It is also excreted in breast milk and should not be used if you are breast feeding.

## 2024-01-15 LAB — LEVETIRACETAM SERPL-MCNC: 34.5 UG/ML (ref 10–40)

## 2024-01-20 ENCOUNTER — HOSPITAL ENCOUNTER (INPATIENT)
Facility: HOSPITAL | Age: 89
LOS: 4 days | Discharge: HOME HEALTH | DRG: 392 | End: 2024-01-24
Attending: STUDENT IN AN ORGANIZED HEALTH CARE EDUCATION/TRAINING PROGRAM | Admitting: INTERNAL MEDICINE
Payer: MEDICARE

## 2024-01-20 ENCOUNTER — APPOINTMENT (OUTPATIENT)
Facility: HOSPITAL | Age: 89
DRG: 392 | End: 2024-01-20
Payer: MEDICARE

## 2024-01-20 DIAGNOSIS — R13.10 DYSPHAGIA, UNSPECIFIED TYPE: Primary | ICD-10-CM

## 2024-01-20 LAB
ANION GAP SERPL CALC-SCNC: 2 MMOL/L (ref 5–15)
BASOPHILS # BLD: 0 K/UL (ref 0–0.1)
BASOPHILS NFR BLD: 1 % (ref 0–1)
BUN SERPL-MCNC: 18 MG/DL (ref 6–20)
BUN/CREAT SERPL: 16 (ref 12–20)
CA-I BLD-MCNC: 9.8 MG/DL (ref 8.5–10.1)
CHLORIDE SERPL-SCNC: 105 MMOL/L (ref 97–108)
CO2 SERPL-SCNC: 27 MMOL/L (ref 21–32)
CREAT SERPL-MCNC: 1.13 MG/DL (ref 0.7–1.3)
DIFFERENTIAL METHOD BLD: NORMAL
EOSINOPHIL # BLD: 0.1 K/UL (ref 0–0.4)
EOSINOPHIL NFR BLD: 1 % (ref 0–7)
ERYTHROCYTE [DISTWIDTH] IN BLOOD BY AUTOMATED COUNT: 14.3 % (ref 11.5–14.5)
GLUCOSE SERPL-MCNC: 139 MG/DL (ref 65–100)
HCT VFR BLD AUTO: 40 % (ref 36.6–50.3)
HGB BLD-MCNC: 13.3 G/DL (ref 12.1–17)
IMM GRANULOCYTES # BLD AUTO: 0 K/UL (ref 0–0.04)
IMM GRANULOCYTES NFR BLD AUTO: 0 % (ref 0–0.5)
LYMPHOCYTES # BLD: 1.4 K/UL (ref 0.8–3.5)
LYMPHOCYTES NFR BLD: 22 % (ref 12–49)
MCH RBC QN AUTO: 30.6 PG (ref 26–34)
MCHC RBC AUTO-ENTMCNC: 33.3 G/DL (ref 30–36.5)
MCV RBC AUTO: 92 FL (ref 80–99)
MONOCYTES # BLD: 0.6 K/UL (ref 0–1)
MONOCYTES NFR BLD: 10 % (ref 5–13)
NEUTS SEG # BLD: 4.3 K/UL (ref 1.8–8)
NEUTS SEG NFR BLD: 66 % (ref 32–75)
NRBC # BLD: 0 K/UL (ref 0–0.01)
NRBC BLD-RTO: 0 PER 100 WBC
PLATELET # BLD AUTO: 239 K/UL (ref 150–400)
PMV BLD AUTO: 11.9 FL (ref 8.9–12.9)
POTASSIUM SERPL-SCNC: ABNORMAL MMOL/L (ref 3.5–5.1)
RBC # BLD AUTO: 4.35 M/UL (ref 4.1–5.7)
SODIUM SERPL-SCNC: 134 MMOL/L (ref 136–145)
WBC # BLD AUTO: 6.4 K/UL (ref 4.1–11.1)

## 2024-01-20 PROCEDURE — 99285 EMERGENCY DEPT VISIT HI MDM: CPT

## 2024-01-20 PROCEDURE — 36415 COLL VENOUS BLD VENIPUNCTURE: CPT

## 2024-01-20 PROCEDURE — 1100000000 HC RM PRIVATE

## 2024-01-20 PROCEDURE — 70490 CT SOFT TISSUE NECK W/O DYE: CPT

## 2024-01-20 PROCEDURE — 85025 COMPLETE CBC W/AUTO DIFF WBC: CPT

## 2024-01-20 PROCEDURE — 80048 BASIC METABOLIC PNL TOTAL CA: CPT

## 2024-01-20 ASSESSMENT — PAIN - FUNCTIONAL ASSESSMENT: PAIN_FUNCTIONAL_ASSESSMENT: NONE - DENIES PAIN

## 2024-01-20 NOTE — ED PROVIDER NOTES
105 97 - 108 mmol/L    CO2 27 21 - 32 mmol/L    Anion Gap 2 (L) 5 - 15 mmol/L    Glucose 139 (H) 65 - 100 mg/dL    BUN 18 6 - 20 mg/dL    Creatinine 1.13 0.70 - 1.30 mg/dL    Bun/Cre Ratio 16 12 - 20      Est, Glom Filt Rate >60 >60 ml/min/1.73m2    Calcium 9.8 8.5 - 10.1 mg/dL   CBC with Auto Differential    Collection Time: 01/20/24  4:43 PM   Result Value Ref Range    WBC 6.4 4.1 - 11.1 K/uL    RBC 4.35 4.10 - 5.70 M/uL    Hemoglobin 13.3 12.1 - 17.0 g/dL    Hematocrit 40.0 36.6 - 50.3 %    MCV 92.0 80.0 - 99.0 FL    MCH 30.6 26.0 - 34.0 PG    MCHC 33.3 30.0 - 36.5 g/dL    RDW 14.3 11.5 - 14.5 %    Platelets 239 150 - 400 K/uL    MPV 11.9 8.9 - 12.9 FL    Nucleated RBCs 0.0 0.0  WBC    nRBC 0.00 0.00 - 0.01 K/uL    Neutrophils % 66 32 - 75 %    Lymphocytes % 22 12 - 49 %    Monocytes % 10 5 - 13 %    Eosinophils % 1 0 - 7 %    Basophils % 1 0 - 1 %    Immature Granulocytes 0 0 - 0.5 %    Neutrophils Absolute 4.3 1.8 - 8.0 K/UL    Lymphocytes Absolute 1.4 0.8 - 3.5 K/UL    Monocytes Absolute 0.6 0.0 - 1.0 K/UL    Eosinophils Absolute 0.1 0.0 - 0.4 K/UL    Basophils Absolute 0.0 0.0 - 0.1 K/UL    Absolute Immature Granulocyte 0.0 0.00 - 0.04 K/UL    Differential Type AUTOMATED         Radiologic Studies:  Non-plain film images such as CT, Ultrasound and MRI are read by the radiologist. Plain radiographic images are visualized and preliminarily interpreted by the ED Provider with the following findings: See ED Course Below    Interpretation per the Radiologist below, if available at the time of this note:  CT SOFT TISSUE NECK WO CONTRAST   Final Result   1. Soft tissue thickening anterior to C3-C4. Correlation with surgical and   oncologic history is required. No comparison studies since 12/21/2022. No IV   contrast.   2. Post laryngectomy.           EMERGENCY DEPARTMENT COURSE and DIFFERENTIAL DIAGNOSIS/MDM   CC/HPI Summary, DDx, ED Course, and Reassessment:     Records Reviewed (source and summary of

## 2024-01-20 NOTE — ED NOTES
Per MD, retry swallow screen with applesauce.   Patient able to swallow first small bite with great effort.   Second small bite went up his sinus and came out his nares.   MD made aware.

## 2024-01-20 NOTE — ED TRIAGE NOTES
Pt arrives to ED by EMS from home. Pt reports trouble swallowing since yesterday. Pt has trach and when he eats it is coming out of his nose.

## 2024-01-20 NOTE — ED NOTES
Swallow screen performed with water. Patient was able to swallow the water but it took a great deal of effort and he states it felt like it was going up his nose. Not able to drink several sequential sips without interruption.

## 2024-01-21 PROCEDURE — 6370000000 HC RX 637 (ALT 250 FOR IP): Performed by: INTERNAL MEDICINE

## 2024-01-21 PROCEDURE — 1100000000 HC RM PRIVATE

## 2024-01-21 RX ORDER — MULTIVITAMIN WITH IRON
1 TABLET ORAL DAILY
Status: DISCONTINUED | OUTPATIENT
Start: 2024-01-21 | End: 2024-01-24 | Stop reason: HOSPADM

## 2024-01-21 RX ORDER — ATORVASTATIN CALCIUM 10 MG/1
10 TABLET, FILM COATED ORAL NIGHTLY
Status: DISCONTINUED | OUTPATIENT
Start: 2024-01-21 | End: 2024-01-24 | Stop reason: HOSPADM

## 2024-01-21 RX ORDER — NIFEDIPINE 30 MG/1
30 TABLET, EXTENDED RELEASE ORAL DAILY
Status: DISCONTINUED | OUTPATIENT
Start: 2024-01-21 | End: 2024-01-24 | Stop reason: HOSPADM

## 2024-01-21 RX ORDER — ASPIRIN 81 MG/1
81 TABLET ORAL DAILY
Status: DISCONTINUED | OUTPATIENT
Start: 2024-01-21 | End: 2024-01-24 | Stop reason: HOSPADM

## 2024-01-21 RX ADMIN — NIFEDIPINE 30 MG: 30 TABLET, FILM COATED, EXTENDED RELEASE ORAL at 10:10

## 2024-01-21 RX ADMIN — ATORVASTATIN CALCIUM 10 MG: 10 TABLET, FILM COATED ORAL at 01:50

## 2024-01-21 RX ADMIN — LEVETIRACETAM 750 MG: 250 TABLET, FILM COATED ORAL at 20:31

## 2024-01-21 RX ADMIN — ASPIRIN 81 MG: 81 TABLET, COATED ORAL at 10:10

## 2024-01-21 RX ADMIN — LEVETIRACETAM 750 MG: 250 TABLET, FILM COATED ORAL at 10:10

## 2024-01-21 RX ADMIN — THERA TABS 1 TABLET: TAB at 10:10

## 2024-01-21 RX ADMIN — LEVETIRACETAM 750 MG: 250 TABLET, FILM COATED ORAL at 01:48

## 2024-01-21 RX ADMIN — ATORVASTATIN CALCIUM 10 MG: 10 TABLET, FILM COATED ORAL at 20:23

## 2024-01-21 NOTE — ED NOTES
ED TO INPATIENT SBAR HANDOFF    Patient Name: Nando Mckenzie   Preferred Name: Nando  : 1935  88 y.o.   Family/Caregiver Present: no   Code Status Order: No Order  PO Status: NPO  Telemetry Order:   C-SSRS: Risk of Suicide: No Risk  Sitter no  Restraints:     Sepsis Risk Score Sepsis Risk Score: 0.99    Situation  Chief Complaint   Patient presents with    Dysphagia     Brief Description of Patient's Condition: trach, difficulty swallowing  Mental Status: oriented, alert, coherent, logical, thought processes intact, and able to concentrate and follow conversation  Arrived from:Home  Imaging:   CT SOFT TISSUE NECK WO CONTRAST   Final Result   1. Soft tissue thickening anterior to C3-C4. Correlation with surgical and   oncologic history is required. No comparison studies since 2022. No IV   contrast.   2. Post laryngectomy.        Abnormal labs:   Abnormal Labs Reviewed   BASIC METABOLIC PANEL - Abnormal; Notable for the following components:       Result Value    Sodium 134 (*)     Anion Gap 2 (*)     Glucose 139 (*)     All other components within normal limits       Background  Allergies: No Known Allergies  History:   Past Medical History:   Diagnosis Date    Anemia     Arthritis     Diabetes (HCC)     Hypercholesterolemia     Hypertension     Single kidney     Cr normal    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       Assessment  Vitals: MEWS Score: 1  Level of Consciousness: Alert (0)   Vitals:    24 1413 24 1445 24 1859 24 1914   BP: (!) 157/84 (!) 162/77 120/70 119/67   Pulse:       Resp:       Temp:       TempSrc:       SpO2: 100%  99% 100%     Deterioration Index (DI): Deterioration Index: 23.94  Deterioration Index (DI) Interventions Performed:    O2 Flow Rate:    O2 Device: O2 Device: None (Room air)  Cardiac Rhythm:    Critical Lab Results: [unfilled]  Cultures:   NIH Score: NIH     Active LDA's:    Active Central Lines:                          Active Wounds:

## 2024-01-21 NOTE — CARE COORDINATION
01/21/24 1653   Service Assessment   Patient Orientation Alert and Oriented   Cognition Alert   History Provided By Patient   Primary Caregiver Self   Support Systems Family Members   PCP Verified by CM Yes   Last Visit to PCP Within last 3 months   Prior Functional Level Independent in ADLs/IADLs   Current Functional Level Independent in ADLs/IADLs   Can patient return to prior living arrangement Yes   Ability to make needs known: Good   Family able to assist with home care needs: Yes   Would you like for me to discuss the discharge plan with any other family members/significant others, and if so, who? No   Financial Resources Medicare   Social/Functional History   Lives With Alone   ADL Assistance Independent   Discharge Planning   Type of Residence Group Home   Living Arrangements Other (Comment)   Current Services Prior To Admission None   Potential Assistance Needed N/A   Type of Home Care Services None   Patient expects to be discharged to: Group home     Advance Care Planning     General Advance Care Planning (ACP) Conversation    Date of Conversation: 1/20/2024  Conducted with: Patient with Decision Making Capacity    Healthcare Decision Maker:    Primary Decision Maker: Katiana Wynn - Other - [phone]    Secondary Decision Maker: Gayla Fan - Other - [phone]  Click here to complete Healthcare Decision Makers including selection of the Healthcare Decision Maker Relationship (ie \"Primary\").   Today we documented Decision Maker(s) consistent with Legal Next of Kin hierarchy.    Content/Action Overview:  Has ACP document(s) on file - reflects the patient's care preferences  Reviewed DNR/DNI and patient elects Full Code (Attempt Resuscitation)        Length of Voluntary ACP Conversation in minutes:  16 minutes    RAINA Jerry

## 2024-01-21 NOTE — CONSULTS
Gastroenterology Consult Note        Patient: Nando Mckenzie MRN: [id number]  SSN: [id number]    YOB: 1935  Age: 88 y.o.  Sex: male      Subjective:       88 y.o. male who is being seen for dysphagia.    88-year-old black male with history of throat cancer status post laryngectomy, and peg placement, he did well afterwards and the PEG tube was removed sometimes in July.  He uses a speaking device. Again,he presented with difficulty swallowing some of his pills. he feels like stuff comes through his nose.    He denies difficulty swallowing his food.  He denies any chest pain shortness of breath abdominal pain,  nausea,  vomiting,  diarrhea constipation.  Past Medical History:   Diagnosis Date    Anemia     Arthritis     Diabetes (HCC)     Hypercholesterolemia     Hypertension     Single kidney     Cr normal    Throat cancer (HCC)     s/p laryngectomy     Past Surgical History:   Procedure Laterality Date    CHOLECYSTECTOMY, LAPAROSCOPIC  12    By Dr. Manzanares    KIDNEY REMOVAL      Pt states does not know reason    LARYNGECTOMY      STOMACH SURGERY N/A 2023    REMOVAL PEG TUBE performed by Daphney Pope MD at [facility] ENDOSCOPY      No family history on file.  Social History     Tobacco Use    Smoking status: Former     Current packs/day: 0.00     Types: Cigarettes     Quit date: 2012     Years since quittin.5    Smokeless tobacco: Never   Substance Use Topics    Alcohol use: Yes     Alcohol/week: 17.5 standard drinks of alcohol      Current Facility-Administered Medications   Medication Dose Route Frequency Provider Last Rate Last Admin    aspirin EC tablet 81 mg  81 mg Oral Daily Kathy Teixeira MD   81 mg at 24 1010    levETIRAcetam (KEPPRA) tablet 750 mg  750 mg Oral BID Kathy Teixeira MD   750 mg at 24 1010    NIFEdipine (PROCARDIA XL) extended release tablet 30 mg  30 mg Oral Daily Kathy Teixeira MD   30 mg at 24 1010

## 2024-01-21 NOTE — H&P
Hematocrit 40.0 36.6 - 50.3 %    MCV 92.0 80.0 - 99.0 FL    MCH 30.6 26.0 - 34.0 PG    MCHC 33.3 30.0 - 36.5 g/dL    RDW 14.3 11.5 - 14.5 %    Platelets 239 150 - 400 K/uL    MPV 11.9 8.9 - 12.9 FL    Nucleated RBCs 0.0 0.0  WBC    nRBC 0.00 0.00 - 0.01 K/uL    Neutrophils % 66 32 - 75 %    Lymphocytes % 22 12 - 49 %    Monocytes % 10 5 - 13 %    Eosinophils % 1 0 - 7 %    Basophils % 1 0 - 1 %    Immature Granulocytes 0 0 - 0.5 %    Neutrophils Absolute 4.3 1.8 - 8.0 K/UL    Lymphocytes Absolute 1.4 0.8 - 3.5 K/UL    Monocytes Absolute 0.6 0.0 - 1.0 K/UL    Eosinophils Absolute 0.1 0.0 - 0.4 K/UL    Basophils Absolute 0.0 0.0 - 0.1 K/UL    Absolute Immature Granulocyte 0.0 0.00 - 0.04 K/UL    Differential Type AUTOMATED          Imaging/Diagnostics Last 24 Hours   CT SOFT TISSUE NECK WO CONTRAST    Result Date: 1/20/2024  NECK CT WITHOUT CONTRAST: 1/20/2024 3:46 PM INDICATION: Dysphagia. COMPARISON: 12/21/2022. TECHNIQUE: Axial images were obtained from the skull base to the level of the bogdan without contrast. Coronal and sagittal reconstructions were performed. CT dose reduction was achieved through use of a standardized protocol tailored for this examination and automatic exposure control for dose modulation. FINDINGS: No surgical or oncologic history is provided, and no recent comparison studies are available. The patient is post laryngectomy and tracheostomy. Lack of IV contrast decreases sensitivity of evaluation. Soft tissue thickening between the posterior wall of the hypopharynx and C3-C4 (207-83, 202-38) is indeterminate, but measures up to 14 mm anteroposteriorly. This is increased from 2022. There is debris in the hypopharynx. The pterygopalatine fossa and parapharyngeal fat are normal. The carotid-jugular spaces are unremarkable bilaterally. The parotid and submandibular glands are grossly normal. The left thyroid lobe is not visible. There is calcified granulomas disease. There is diffuse

## 2024-01-22 ENCOUNTER — ANESTHESIA EVENT (OUTPATIENT)
Facility: HOSPITAL | Age: 89
DRG: 392 | End: 2024-01-22
Payer: MEDICARE

## 2024-01-22 ENCOUNTER — ANESTHESIA (OUTPATIENT)
Facility: HOSPITAL | Age: 89
DRG: 392 | End: 2024-01-22
Payer: MEDICARE

## 2024-01-22 LAB
CA-I BLD-MCNC: 1.19 MMOL/L (ref 1.12–1.32)
CHLORIDE BLD-SCNC: 103 MMOL/L (ref 98–107)
GLUCOSE BLD STRIP.AUTO-MCNC: 150 MG/DL (ref 65–100)
POTASSIUM BLD-SCNC: 4.5 MMOL/L (ref 3.5–5.5)
SODIUM BLD-SCNC: 139 MMOL/L (ref 136–145)

## 2024-01-22 PROCEDURE — 2709999900 HC NON-CHARGEABLE SUPPLY: Performed by: INTERNAL MEDICINE

## 2024-01-22 PROCEDURE — 2500000003 HC RX 250 WO HCPCS: Performed by: NURSE ANESTHETIST, CERTIFIED REGISTERED

## 2024-01-22 PROCEDURE — 0DJ08ZZ INSPECTION OF UPPER INTESTINAL TRACT, VIA NATURAL OR ARTIFICIAL OPENING ENDOSCOPIC: ICD-10-PCS | Performed by: INTERNAL MEDICINE

## 2024-01-22 PROCEDURE — 3700000000 HC ANESTHESIA ATTENDED CARE: Performed by: INTERNAL MEDICINE

## 2024-01-22 PROCEDURE — 1100000000 HC RM PRIVATE

## 2024-01-22 PROCEDURE — 3600007502: Performed by: INTERNAL MEDICINE

## 2024-01-22 PROCEDURE — 80047 BASIC METABLC PNL IONIZED CA: CPT

## 2024-01-22 PROCEDURE — 3600007512: Performed by: INTERNAL MEDICINE

## 2024-01-22 PROCEDURE — 3700000001 HC ADD 15 MINUTES (ANESTHESIA): Performed by: INTERNAL MEDICINE

## 2024-01-22 PROCEDURE — 6370000000 HC RX 637 (ALT 250 FOR IP): Performed by: INTERNAL MEDICINE

## 2024-01-22 PROCEDURE — 2580000003 HC RX 258: Performed by: NURSE ANESTHETIST, CERTIFIED REGISTERED

## 2024-01-22 PROCEDURE — 6360000002 HC RX W HCPCS: Performed by: NURSE ANESTHETIST, CERTIFIED REGISTERED

## 2024-01-22 PROCEDURE — 92610 EVALUATE SWALLOWING FUNCTION: CPT

## 2024-01-22 PROCEDURE — 7100000010 HC PHASE II RECOVERY - FIRST 15 MIN: Performed by: INTERNAL MEDICINE

## 2024-01-22 PROCEDURE — 7100000011 HC PHASE II RECOVERY - ADDTL 15 MIN: Performed by: INTERNAL MEDICINE

## 2024-01-22 RX ORDER — PROPOFOL 10 MG/ML
INJECTION, EMULSION INTRAVENOUS PRN
Status: DISCONTINUED | OUTPATIENT
Start: 2024-01-22 | End: 2024-01-22 | Stop reason: SDUPTHER

## 2024-01-22 RX ORDER — SODIUM CHLORIDE, SODIUM LACTATE, POTASSIUM CHLORIDE, CALCIUM CHLORIDE 600; 310; 30; 20 MG/100ML; MG/100ML; MG/100ML; MG/100ML
INJECTION, SOLUTION INTRAVENOUS CONTINUOUS PRN
Status: DISCONTINUED | OUTPATIENT
Start: 2024-01-22 | End: 2024-01-22 | Stop reason: SDUPTHER

## 2024-01-22 RX ORDER — LIDOCAINE HYDROCHLORIDE 20 MG/ML
INJECTION, SOLUTION EPIDURAL; INFILTRATION; INTRACAUDAL; PERINEURAL PRN
Status: DISCONTINUED | OUTPATIENT
Start: 2024-01-22 | End: 2024-01-22 | Stop reason: SDUPTHER

## 2024-01-22 RX ADMIN — LEVETIRACETAM 750 MG: 250 TABLET, FILM COATED ORAL at 22:50

## 2024-01-22 RX ADMIN — LIDOCAINE HYDROCHLORIDE 60 MG: 20 INJECTION, SOLUTION EPIDURAL; INFILTRATION; INTRACAUDAL; PERINEURAL at 12:19

## 2024-01-22 RX ADMIN — NIFEDIPINE 30 MG: 30 TABLET, FILM COATED, EXTENDED RELEASE ORAL at 09:21

## 2024-01-22 RX ADMIN — ASPIRIN 81 MG: 81 TABLET, COATED ORAL at 15:04

## 2024-01-22 RX ADMIN — THERA TABS 1 TABLET: TAB at 15:04

## 2024-01-22 RX ADMIN — LEVETIRACETAM 750 MG: 250 TABLET, FILM COATED ORAL at 09:21

## 2024-01-22 RX ADMIN — SODIUM CHLORIDE, POTASSIUM CHLORIDE, SODIUM LACTATE AND CALCIUM CHLORIDE: 600; 310; 30; 20 INJECTION, SOLUTION INTRAVENOUS at 12:11

## 2024-01-22 RX ADMIN — PROPOFOL 50 MG: 10 INJECTION, EMULSION INTRAVENOUS at 12:19

## 2024-01-22 RX ADMIN — ATORVASTATIN CALCIUM 10 MG: 10 TABLET, FILM COATED ORAL at 22:50

## 2024-01-22 NOTE — CARE COORDINATION
CM reviewed chart.  Patient is pending possible MBS and upper Endo and physical therapy.      [facility] [place]  CM will follow up for PT recs.

## 2024-01-22 NOTE — THERAPY EVALUATION
Speech LAnguage Pathology Dysphagia EVALUATION/DISCHARGE    Patient: Nando Mckenzie (88 y.o. male)  Date: 1/22/2024  Primary Diagnosis: Dysphagia [R13.10]  Procedure(s) (LRB):  EGD ESOPHAGOGASTRODUODENOSCOPY (N/A) Day of Surgery   Precautions: aspiration, contact     DIET RECOMMENDATIONS: Minced and moist and thin liquids    SWALLOW SAFETY PRECAUTIONS:  aspiration and GERD precautions, alternate solids and liquids, double swallow meds crushed if able in applesauce or pudding or whole with thin if unable to be crushed      ASSESSMENT :  Based on the objective data described below, the patient presents with mild-mod oropharyngeal dysphagia consistent with hx of laryngectomy and previous MBS.     Pt was seen for bedside sw evaluation, alert, agreeable. Pt communicated via electrolarynx and mouthing words.  Pt admitted with difficulty swallowing pills. Pt is s/p EGD this date, results noted and discussed with Dr. Pope, includes:  mucosal nodule was found in the pharynx, with concern for possible malignancy per Dr. Pope.  ENT consult has been recommended.   Recent head CT results noted. CXR has not been completed this date.     Hx as noted below.     Oral motor function WNL.  Oral phase functional. Pharyngeal phase with reduced pharyngeal constriction suspected and consistent with underlying laryngectomy and previous MBS.  Atypical swallow sound present.  Pt with intermittent wet cough present, not related to swallow function. Pt reports he is able to clear soft solids and denies difficulty with trials provided.     CT soft tissues of neck results:  \"1. Soft tissue thickening anterior to C3-C4. Correlation with surgical and  oncologic history is required. No comparison studies since 12/21/2022. No IV  contrast.  2. Post laryngectomy.\"    MBS was completed 4/28/2023, results :   MBS 4- results:   \"Patient is s/p laryngectomy >15 years ago.  No evidence of fistula.   Oral phase c/b mild reduction in mastication s/t

## 2024-01-22 NOTE — ANESTHESIA PRE PROCEDURE
Applicable):   Lab Results   Component Value Date/Time    COVID19 Not Detected 01/13/2022 03:16 PM           Anesthesia Evaluation  Patient summary reviewed and Nursing notes reviewed   no history of anesthetic complications:   Airway: Mallampati: II  TM distance: >3 FB   Neck ROM: full  Mouth opening: > = 3 FB   Dental:    (+) edentulous      Pulmonary:normal exam  breath sounds clear to auscultation                            ROS comment: S/p total laryngectomy   Cardiovascular:    (+) hypertension:, hyperlipidemia        Rhythm: regular  Rate: normal                    Neuro/Psych:   (+) seizures:            GI/Hepatic/Renal:   (+) renal disease: CRI          Endo/Other:    (+) DiabetesType II DM.                 Abdominal:              PE comment: Deferred.   Vascular: negative vascular ROS.         Other Findings:             Anesthesia Plan      MAC and TIVA     ASA 3     (Standard ASA monitors: continuous EKG, BP, HR, pulse oximeter, and capnography.)  Induction: intravenous.  continuous noninvasive hemodynamic monitor  MIPS: Prophylactic antiemetics administered.  Anesthetic plan and risks discussed with patient (and family, if present.).      Plan discussed with CRNA.                    Sergei Apple Jr., MD   1/22/2024

## 2024-01-22 NOTE — PROGRESS NOTES
Progress Note  Date:2024       Room:[facility]  Patient Name:Nando Mckenzie     YOB: 1935     Age:88 y.o.        Subjective    Subjective uneventful night.  Tolerating full liquid diet.  No chest pain or shortness of breath.  No odynophagia  Review of Systems  Objective         Vitals Last 24 Hours:  TEMPERATURE:  Temp  Av.9 °F (36.6 °C)  Min: 97.5 °F (36.4 °C)  Max: 98.1 °F (36.7 °C)  RESPIRATIONS RANGE: Resp  Av.3  Min: 18  Max: 19  PULSE OXIMETRY RANGE: SpO2  Av.3 %  Min: 98 %  Max: 100 %  PULSE RANGE: Pulse  Av.8  Min: 63  Max: 67  BLOOD PRESSURE RANGE: Systolic (24hrs), Av , Min:111 , Max:151   ; Diastolic (24hrs), Av, Min:67, Max:77    I/O (24Hr):    Intake/Output Summary (Last 24 hours) at 2024 0927  Last data filed at 2024 0836  Gross per 24 hour   Intake 20 ml   Output 1350 ml   Net -1330 ml     Objective  Elderly black male lying in bed comfortably no distress.  HEENT normocephalic atraumatic anicteric sclera.  Neck thin with tracheostomy.   Lungs are clear bilaterally no wheeze  Heart S1-S2 regular  Abdomen soft positive bowel sounds nontender nondistended  Lower extremities no edema  CNS alert and oriented follows command.  Psych cooperative  Labs/Imaging/Diagnostics    Labs:  CBC:  Recent Labs     24  1643   WBC 6.4   RBC 4.35   HGB 13.3   HCT 40.0   MCV 92.0   RDW 14.3        CHEMISTRIES:  Recent Labs     24  1643   *   K Hemolyzed, Recollection Recommended      CO2 27   BUN 18   CREATININE 1.13   GLUCOSE 139*     PT/INR:No results for input(s): \"PROTIME\", \"INR\" in the last 72 hours.  APTT:No results for input(s): \"APTT\" in the last 72 hours.  LIVER PROFILE:No results for input(s): \"AST\", \"ALT\", \"BILIDIR\", \"BILITOT\", \"ALKPHOS\" in the last 72 hours.    Imaging Last 24 Hours:  CT SOFT TISSUE NECK WO CONTRAST    Result Date: 2024  NECK CT WITHOUT CONTRAST: 2024 3:46 PM INDICATION: Dysphagia. COMPARISON:

## 2024-01-22 NOTE — ANESTHESIA POSTPROCEDURE EVALUATION
Department of Anesthesiology  Postprocedure Note    Patient: Nando Mckenzie  MRN: [id number]  YOB: 1935  Date of evaluation: 1/22/2024    Procedure Summary     Date: 01/22/24 Room / Location: [facility] ENDO 03 / [facility] END[facility]    Anesthesia Start: 1217 Anesthesia Stop: 1227    Procedure: EGD ESOPHAGOGASTRODUODENOSCOPY (Upper GI Region) Diagnosis:       Dysphagia, unspecified type      (Dysphagia, unspecified type [R13.10])    Surgeons: Daphney Pope MD Responsible Provider: Sergei Apple Jr., MD    Anesthesia Type: MAC ASA Status: 3          Anesthesia Type: MAC    Keron Phase I:      Keron Phase II: Keron Score: 10    Anesthesia Post Evaluation    Patient location during evaluation: bedside  Patient participation: complete - patient cannot participate  Level of consciousness: sleepy but conscious  Pain score: 0  Airway patency: patent  Nausea & Vomiting: no vomiting and no nausea  Cardiovascular status: blood pressure returned to baseline  Respiratory status: acceptable  Hydration status: stable  Pain management: adequate        No notable events documented.

## 2024-01-23 PROCEDURE — 31575 DIAGNOSTIC LARYNGOSCOPY: CPT | Performed by: STUDENT IN AN ORGANIZED HEALTH CARE EDUCATION/TRAINING PROGRAM

## 2024-01-23 PROCEDURE — 6370000000 HC RX 637 (ALT 250 FOR IP): Performed by: INTERNAL MEDICINE

## 2024-01-23 PROCEDURE — 0CJS8ZZ INSPECTION OF LARYNX, VIA NATURAL OR ARTIFICIAL OPENING ENDOSCOPIC: ICD-10-PCS | Performed by: INTERNAL MEDICINE

## 2024-01-23 PROCEDURE — 97161 PT EVAL LOW COMPLEX 20 MIN: CPT

## 2024-01-23 PROCEDURE — 1100000000 HC RM PRIVATE

## 2024-01-23 PROCEDURE — 99223 1ST HOSP IP/OBS HIGH 75: CPT | Performed by: STUDENT IN AN ORGANIZED HEALTH CARE EDUCATION/TRAINING PROGRAM

## 2024-01-23 PROCEDURE — 97530 THERAPEUTIC ACTIVITIES: CPT

## 2024-01-23 RX ADMIN — ATORVASTATIN CALCIUM 10 MG: 10 TABLET, FILM COATED ORAL at 22:39

## 2024-01-23 RX ADMIN — LEVETIRACETAM 750 MG: 250 TABLET, FILM COATED ORAL at 22:39

## 2024-01-23 RX ADMIN — NIFEDIPINE 30 MG: 30 TABLET, FILM COATED, EXTENDED RELEASE ORAL at 09:18

## 2024-01-23 RX ADMIN — THERA TABS 1 TABLET: TAB at 09:18

## 2024-01-23 RX ADMIN — LEVETIRACETAM 750 MG: 250 TABLET, FILM COATED ORAL at 09:18

## 2024-01-23 RX ADMIN — ASPIRIN 81 MG: 81 TABLET, COATED ORAL at 09:18

## 2024-01-23 NOTE — PROGRESS NOTES
Patient family member is wanting to speak with  tomorrow,  is following case per note.  Gayla Fan : home [phone]  Cell: [phone]

## 2024-01-23 NOTE — PLAN OF CARE
PHYSICAL THERAPY EVALUATION  Patient: Nando Mckenzie (88 y.o. male)  Date: 1/23/2024  Primary Diagnosis: Dysphagia [R13.10]  Procedure(s) (LRB):  EGD ESOPHAGOGASTRODUODENOSCOPY (N/A) 1 Day Post-Op   Precautions: Fall Risk                Recommendations for nursing mobility: Out of bed to chair for meals, AD and gt belt for bed to chair , Amb to bathroom with AD and gait belt, and Assist x1    In place during session: External Catheter    ASSESSMENT  Pt is a 88 y.o. male admitted on 1/20/2024 for difficulty swallowing his pills; pt currently being treated for dysphagia with hx of throat cancer s/p laryngectomy and speaking device . Pt semi-supine upon PT arrival, agreeable to evaluation. Pt A&O x 4.  Pt reports 3 falls in the past 3 months    Based on the objective data described below, the patient currently presents with impaired functional mobility, impaired strength, decreased activity tolerance, and impaired balance. (See below for objective details and assist levels).     Overall pt tolerated session well today with no pain reported, but mild discomfort reported in B posterior legs (tightness pain) until he progressed with ambulation. Pt required CGA for bed mobility and transfers. Pt amb 200 feet with RW, gt belt and CGA; demonstrates slow faustina and decreased step length.  Pt was steady on his feet, but does experience some fatigue with ambulation requiring one standing rest break. Pt denies any dizziness or lightheadedness with ambulation today. Spent additional time speaking with pt's niece as she described pt's 3 recent falls.  Pt tends to fall when he legs all of a sudden give out on him.  Pt also with hx of seizures and experienced 2 of these recently. Pt educated on the importance of always using AD for safety and would recommend a rollator for home.  This will allow him to sit down if he begins feeling tired or unwell to hopefully avoid further falls.  Pt will benefit from continued skilled PT to

## 2024-01-23 NOTE — PROGRESS NOTES
Progress Note  Date:2024       Room:[facility]  Patient Name:Josie Mckenzie     YOB: 1935     Age:88 y.o.        Subjective    Subjective uneventful night.  Tolerating mechanical soft minced diet.  No fever chills chest pain shortness of breath.  Review of Systems  Objective         Vitals Last 24 Hours:  TEMPERATURE:  Temp  Av.9 °F (36.6 °C)  Min: 97.6 °F (36.4 °C)  Max: 98.1 °F (36.7 °C)  RESPIRATIONS RANGE: Resp  Av  Min: 16  Max: 18  PULSE OXIMETRY RANGE: SpO2  Av.3 %  Min: 98 %  Max: 100 %  PULSE RANGE: Pulse  Av  Min: 60  Max: 74  BLOOD PRESSURE RANGE: Systolic (24hrs), Av , Min:114 , Max:144   ; Diastolic (24hrs), Av, Min:62, Max:76    I/O (24Hr):    Intake/Output Summary (Last 24 hours) at 2024 0930  Last data filed at 2024 0515  Gross per 24 hour   Intake 540 ml   Output 1050 ml   Net -510 ml     Objective  Elderly black male comfortably no distress  HEENT normocephalic atraumatic  Neck status post lung ectomy  Lungs scanty rhonchi occasionally on the right  Heart S1-S2 regular  Abdomen Soft positive bowel sounds  Lower extremities without any edema  CNS alert and oriented x 3 follows command.      Labs/Imaging/Diagnostics    Labs:  CBC:  Recent Labs     24  1643   WBC 6.4   RBC 4.35   HGB 13.3   HCT 40.0   MCV 92.0   RDW 14.3        CHEMISTRIES:  Recent Labs     24  1643   *   K Hemolyzed, Recollection Recommended      CO2 27   BUN 18   CREATININE 1.13   GLUCOSE 139*     PT/INR:No results for input(s): \"PROTIME\", \"INR\" in the last 72 hours.  APTT:No results for input(s): \"APTT\" in the last 72 hours.  LIVER PROFILE:No results for input(s): \"AST\", \"ALT\", \"BILIDIR\", \"BILITOT\", \"ALKPHOS\" in the last 72 hours.    Imaging Last 24 Hours:  EGD    Result Date: 2024  [facility] Patient: JOSIE MCKENZIE MRN: [id number] : 1935 Account: [id number] Sex at Birth: Male Age: 88 Years Procedure: Upper GI endoscopy

## 2024-01-24 VITALS
DIASTOLIC BLOOD PRESSURE: 88 MMHG | RESPIRATION RATE: 18 BRPM | HEIGHT: 65 IN | SYSTOLIC BLOOD PRESSURE: 144 MMHG | TEMPERATURE: 97.9 F | OXYGEN SATURATION: 100 % | HEART RATE: 72 BPM | WEIGHT: 116.84 LBS | BODY MASS INDEX: 19.47 KG/M2

## 2024-01-24 PROCEDURE — 6370000000 HC RX 637 (ALT 250 FOR IP): Performed by: INTERNAL MEDICINE

## 2024-01-24 RX ADMIN — ASPIRIN 81 MG: 81 TABLET, COATED ORAL at 08:51

## 2024-01-24 RX ADMIN — THERA TABS 1 TABLET: TAB at 08:51

## 2024-01-24 RX ADMIN — NIFEDIPINE 30 MG: 30 TABLET, FILM COATED, EXTENDED RELEASE ORAL at 08:51

## 2024-01-24 RX ADMIN — LEVETIRACETAM 750 MG: 250 TABLET, FILM COATED ORAL at 08:51

## 2024-01-24 NOTE — ADT AUTH CERT
Kathy Teixeira MD  Physician  Internal Medicine  Progress Notes      Signed  Date of Service:  2024  9:27 AM     Signed                                                                                                                                                                                                                                                                              Progress Note  Date:2024       Room:[facility]  Patient Name:Nando Mckenzie     YOB: 1935     Age:88 y.o.           Subjective     Subjective uneventful night.  Tolerating full liquid diet.  No chest pain or shortness of breath.  No odynophagia  Review of Systems  Objective          Vitals Last 24 Hours:  TEMPERATURE:  Temp  Av.9 °F (36.6 °C)  Min: 97.5 °F (36.4 °C)  Max: 98.1 °F (36.7 °C)  RESPIRATIONS RANGE: Resp  Av.3  Min: 18  Max: 19  PULSE OXIMETRY RANGE: SpO2  Av.3 %  Min: 98 %  Max: 100 %  PULSE RANGE: Pulse  Av.8  Min: 63  Max: 67  BLOOD PRESSURE RANGE: Systolic (24hrs), Av , Min:111 , Max:151   ; Diastolic (24hrs), Av, Min:67, Max:77     I/O (24Hr):     Intake/Output Summary (Last 24 hours) at 2024 0927  Last data filed at 2024 0836      Gross per 24 hour   Intake 20 ml   Output 1350 ml   Net -1330 ml      Objective  Elderly black male lying in bed comfortably no distress.  HEENT normocephalic atraumatic anicteric sclera.  Neck thin with tracheostomy.   Lungs are clear bilaterally no wheeze  Heart S1-S2 regular  Abdomen soft positive bowel sounds nontender nondistended  Lower extremities no edema  CNS alert and oriented follows command.  Psych cooperative  Labs/Imaging/Diagnostics     Labs:  CBC:      Recent Labs     24  1643   WBC 6.4   RBC 4.35   HGB 13.3   HCT 40.0   MCV 92.0   RDW 14.3         CHEMISTRIES:      Recent Labs     24  1643   *   K Hemolyzed, Recollection Recommended      CO2 27   BUN 18   CREATININE 1.13

## 2024-01-24 NOTE — CONSULTS
Subjective:   Nando Mckenzie   88 y.o.   1935     Otolaryngology Consult Note:  Reason for consult: abnormal exam on EGD    History of Present Illness:  Nando Mckenzie is a 88 y.o. male with past medical history of total laryngectomy 20+ years ago at [facility], now with dysphagia.     Had a recent EGD. Dr. Pope was concerned for a mass on scope exam. ENT consulted for evaluation.     Patient normally follows with Dr. Martin:   5/31/2022  Context -patient has been seen in this practice before.  He had a laryngectomy over 15 years ago.  Done at [facility].  Has been doing well since that time no issues.  He does not use any method of artificial speech.  He swallows fine.  There is some concern for hearing loss.     5/31/2023  1 year follow-up -since last visit he was hospitalized earlier this year with food impaction.  He had a G-tube placed.  He has been working with dysphagia therapy and now with a modified softer diet he has been doing well without requiring his tube feeds.  He is interested in having feeding tube removed.  No throat pain.  Concerns for hearing loss.     12/8/2023  6-month follow-up, patient is overall been doing well.  He did have his G-tube removed.  He is tolerating a regular diet.  No real concerns with his stoma..     Review of Systems  Consitutional: denies fever, excessive weight gain or loss.  Eyes: denies diplopia, eye pain.  Integumentary: denies new concerning skin lesions.  Ears, Nose, Mouth, Throat: denies except as per HPI.  Endocrine: denies hot or cold intolerance, increased thirst.  Respiratory: denies cough, hemoptysis, wheezing  Gastrointestinal: denies trouble swallowing, nausea, emesis, regurgitation  Musculoskeletal: denies muscle weakness or wasting  Cardiovascular: denies chest pain, shortness of breath  Neurologic: denies seizures, numbness or tingling, syncope  Hematologic: denies easy bleeding or bruising       Past Medical History:   Diagnosis Date    Anemia     Arthritis

## 2024-01-24 NOTE — DISCHARGE SUMMARY
Status: Final  Lymphocytes %                                 Date: 01/20/2024  Value: 22          Ref range: 12 - 49 %          Status: Final  Monocytes %                                   Date: 01/20/2024  Value: 10          Ref range: 5 - 13 %           Status: Final  Eosinophils %                                 Date: 01/20/2024  Value: 1           Ref range: 0 - 7 %            Status: Final  Basophils %                                   Date: 01/20/2024  Value: 1           Ref range: 0 - 1 %            Status: Final  Immature Granulocytes                         Date: 01/20/2024  Value: 0           Ref range: 0 - 0.5 %          Status: Final  Neutrophils Absolute                          Date: 01/20/2024  Value: 4.3         Ref range: 1.8 - 8.0 K/UL     Status: Final  Lymphocytes Absolute                          Date: 01/20/2024  Value: 1.4         Ref range: 0.8 - 3.5 K/UL     Status: Final  Monocytes Absolute                            Date: 01/20/2024  Value: 0.6         Ref range: 0.0 - 1.0 K/UL     Status: Final  Eosinophils Absolute                          Date: 01/20/2024  Value: 0.1         Ref range: 0.0 - 0.4 K/UL     Status: Final  Basophils Absolute                            Date: 01/20/2024  Value: 0.0         Ref range: 0.0 - 0.1 K/UL     Status: Final  Absolute Immature Granulocyte                 Date: 01/20/2024  Value: 0.0         Ref range: 0.00 - 0.04 K/UL   Status: Final  Differential Type                             Date: 01/20/2024  Value: AUTOMATED   Ref range:                    Status: Final  POC Glucose                                   Date: 01/22/2024  Value: 150 (H)     Ref range: 65 - 100 mg/dL     Status: Final  eGFR, POC                                     Date: 01/22/2024  Value: Not calculated                     Ref range: >60 ml/min/1.73m2  Status: Final                Comment:    Pediatric calculator link:

## 2024-01-24 NOTE — CARE COORDINATION
Patient accepted with [facility] at discharge.  Patient aware.    Primary RN made aware.    Transition of Care Plan:    RUR: 13%  Prior Level of Functioning: Independent  Disposition: Home with HH  If SNF or IPR: Date FOC offered: N/A  Date FOC received: N/A  Accepting facility: N/A  Date authorization started with reference number: N/A  Date authorization received and expires: N/A  Follow up appointments: Per MD  DME needed: N/A  Transportation at discharge: Niece  IM/IMM Medicare/[facility] letter given: Yes  Is patient a [place] and connected with [facility]? N/A   If yes, was [place] transfer form completed and [facility] notified?   Caregiver Contact: N/A  Discharge Caregiver contacted prior to discharge? N/A  Care Conference needed? No  Barriers to discharge: None    1438: Patient's niece here and requesting rollator for patient instead the rolling walker that patient already has.   has sent referral to [facility].    1514: CM spoke with [facility].  Physician has to sign DME order.  Physician is aware and will sign in morning.   will then send order and Pedro will deliver to home.  Patient and niece aware and agreeable to discharging now and awaiting rollator at home.

## 2024-01-24 NOTE — PROGRESS NOTES
4 Eyes Skin Assessment     NAME:  Nando Mckenzie  YOB: 1935  MEDICAL RECORD NUMBER:  [id number]    The patient is being assessed for  Other Two person skin assessment    I agree that at least one RN has performed a thorough Head to Toe Skin Assessment on the patient. ALL assessment sites listed below have been assessed.      Areas assessed by both nurses:    Head, Face, Ears, Shoulders, Back, Chest, Arms, Elbows, Hands, Sacrum. Buttock, Coccyx, Ischium, Legs. Feet and Heels, and Under Medical Devices         Does the Patient have a Wound? Yes wound(s) were present on assessment. LDA wound assessment was Initiated and completed by RN. Patient has an abrasion to his left forehead and an abrasion to left elbow.       Tomás Prevention initiated by RN: No  Wound Care Orders initiated by RN: No    Pressure Injury (Stage 3,4, Unstageable, DTI, NWPT, and Complex wounds) if present, place Wound referral order by RN under : No    New Ostomies, if present place, Ostomy referral order under : No     Nurse 1 eSignature: Electronically signed by Xi Schafer RN on 1/24/24 at 1:29 AM EST    **SHARE this note so that the co-signing nurse can place an eSignature**    Nurse 2 eSignature: Electronically signed by Almaz Hector LPN on 1/24/24 at 2:43 AM EST

## 2024-01-24 NOTE — PROGRESS NOTES
Patient and niece given discharge instructions. Case management handled rollator that will be delivered. No new medications added. Discharge instructions understood.

## 2024-01-24 NOTE — PROGRESS NOTES
Notified niece  of pending discharge and home health will see patient tomorrow per case management. Informed by family she is already on her way with walker and clothes for patient.

## 2024-01-24 NOTE — PROGRESS NOTES
Discharge paperwork complete, printed and given to primary nurse. Family now requesting information about equipment. Primary nurse checking with [employer].

## 2024-01-24 NOTE — CARE COORDINATION
CM noted discharge order.  CM met with patient.  He is agreeable to PT recommendation for HH at discharge.  Patient states he already has rolling walker.      Choice letter signed and placed on chart.  Referrals sent.    CM will continue to follow up.

## 2024-02-08 ENCOUNTER — HOSPITAL ENCOUNTER (INPATIENT)
Facility: HOSPITAL | Age: 89
LOS: 5 days | Discharge: NURSING, REHAB OR LONG-TERM CARE | DRG: 392 | End: 2024-02-14
Attending: EMERGENCY MEDICINE | Admitting: INTERNAL MEDICINE
Payer: MEDICARE

## 2024-02-08 DIAGNOSIS — R13.12 OROPHARYNGEAL DYSPHAGIA: Primary | ICD-10-CM

## 2024-02-08 LAB
ANION GAP SERPL CALC-SCNC: 5 MMOL/L (ref 5–15)
BUN SERPL-MCNC: 18 MG/DL (ref 6–20)
BUN/CREAT SERPL: 18 (ref 12–20)
CA-I BLD-MCNC: 9.4 MG/DL (ref 8.5–10.1)
CHLORIDE SERPL-SCNC: 105 MMOL/L (ref 97–108)
CO2 SERPL-SCNC: 31 MMOL/L (ref 21–32)
CREAT SERPL-MCNC: 1 MG/DL (ref 0.7–1.3)
ERYTHROCYTE [DISTWIDTH] IN BLOOD BY AUTOMATED COUNT: 14.7 % (ref 11.5–14.5)
GLUCOSE SERPL-MCNC: 140 MG/DL (ref 65–100)
HCT VFR BLD AUTO: 35.3 % (ref 36.6–50.3)
HGB BLD-MCNC: 11.6 G/DL (ref 12.1–17)
MCH RBC QN AUTO: 30.8 PG (ref 26–34)
MCHC RBC AUTO-ENTMCNC: 32.9 G/DL (ref 30–36.5)
MCV RBC AUTO: 93.6 FL (ref 80–99)
NRBC # BLD: 0 K/UL (ref 0–0.01)
NRBC BLD-RTO: 0 PER 100 WBC
PLATELET # BLD AUTO: 103 K/UL (ref 150–400)
PMV BLD AUTO: 12 FL (ref 8.9–12.9)
POTASSIUM SERPL-SCNC: 4.5 MMOL/L (ref 3.5–5.1)
RBC # BLD AUTO: 3.77 M/UL (ref 4.1–5.7)
SODIUM SERPL-SCNC: 141 MMOL/L (ref 136–145)
WBC # BLD AUTO: 5.4 K/UL (ref 4.1–11.1)

## 2024-02-08 PROCEDURE — 85027 COMPLETE CBC AUTOMATED: CPT

## 2024-02-08 PROCEDURE — 80048 BASIC METABOLIC PNL TOTAL CA: CPT

## 2024-02-08 PROCEDURE — 2580000003 HC RX 258: Performed by: EMERGENCY MEDICINE

## 2024-02-08 PROCEDURE — G0378 HOSPITAL OBSERVATION PER HR: HCPCS

## 2024-02-08 PROCEDURE — 99285 EMERGENCY DEPT VISIT HI MDM: CPT

## 2024-02-08 PROCEDURE — 36415 COLL VENOUS BLD VENIPUNCTURE: CPT

## 2024-02-08 RX ORDER — SODIUM CHLORIDE 9 MG/ML
INJECTION, SOLUTION INTRAVENOUS CONTINUOUS
Status: DISCONTINUED | OUTPATIENT
Start: 2024-02-08 | End: 2024-02-10

## 2024-02-08 RX ADMIN — SODIUM CHLORIDE: 9 INJECTION, SOLUTION INTRAVENOUS at 22:12

## 2024-02-08 ASSESSMENT — PAIN - FUNCTIONAL ASSESSMENT: PAIN_FUNCTIONAL_ASSESSMENT: NONE - DENIES PAIN

## 2024-02-09 ENCOUNTER — ANESTHESIA EVENT (OUTPATIENT)
Facility: HOSPITAL | Age: 89
DRG: 392 | End: 2024-02-09
Payer: MEDICARE

## 2024-02-09 ENCOUNTER — ANESTHESIA (OUTPATIENT)
Facility: HOSPITAL | Age: 89
DRG: 392 | End: 2024-02-09
Payer: MEDICARE

## 2024-02-09 LAB
GLUCOSE BLD STRIP.AUTO-MCNC: 107 MG/DL (ref 65–100)
GLUCOSE BLD STRIP.AUTO-MCNC: 116 MG/DL (ref 65–100)
GLUCOSE BLD STRIP.AUTO-MCNC: 97 MG/DL (ref 65–100)
PERFORMED BY:: ABNORMAL
PERFORMED BY:: ABNORMAL
PERFORMED BY:: NORMAL

## 2024-02-09 PROCEDURE — 1100000000 HC RM PRIVATE

## 2024-02-09 PROCEDURE — 96374 THER/PROPH/DIAG INJ IV PUSH: CPT

## 2024-02-09 PROCEDURE — 82962 GLUCOSE BLOOD TEST: CPT

## 2024-02-09 PROCEDURE — 3700000001 HC ADD 15 MINUTES (ANESTHESIA): Performed by: INTERNAL MEDICINE

## 2024-02-09 PROCEDURE — 7100000010 HC PHASE II RECOVERY - FIRST 15 MIN: Performed by: INTERNAL MEDICINE

## 2024-02-09 PROCEDURE — 7100000011 HC PHASE II RECOVERY - ADDTL 15 MIN: Performed by: INTERNAL MEDICINE

## 2024-02-09 PROCEDURE — 3E0G76Z INTRODUCTION OF NUTRITIONAL SUBSTANCE INTO UPPER GI, VIA NATURAL OR ARTIFICIAL OPENING: ICD-10-PCS | Performed by: INTERNAL MEDICINE

## 2024-02-09 PROCEDURE — G0378 HOSPITAL OBSERVATION PER HR: HCPCS

## 2024-02-09 PROCEDURE — 2580000003 HC RX 258: Performed by: NURSE ANESTHETIST, CERTIFIED REGISTERED

## 2024-02-09 PROCEDURE — 2500000003 HC RX 250 WO HCPCS: Performed by: NURSE ANESTHETIST, CERTIFIED REGISTERED

## 2024-02-09 PROCEDURE — 3600007512: Performed by: INTERNAL MEDICINE

## 2024-02-09 PROCEDURE — 2709999900 HC NON-CHARGEABLE SUPPLY: Performed by: INTERNAL MEDICINE

## 2024-02-09 PROCEDURE — 6360000002 HC RX W HCPCS: Performed by: NURSE ANESTHETIST, CERTIFIED REGISTERED

## 2024-02-09 PROCEDURE — 0DC18ZZ EXTIRPATION OF MATTER FROM UPPER ESOPHAGUS, VIA NATURAL OR ARTIFICIAL OPENING ENDOSCOPIC: ICD-10-PCS | Performed by: INTERNAL MEDICINE

## 2024-02-09 PROCEDURE — 6360000002 HC RX W HCPCS: Performed by: INTERNAL MEDICINE

## 2024-02-09 PROCEDURE — 0DH63UZ INSERTION OF FEEDING DEVICE INTO STOMACH, PERCUTANEOUS APPROACH: ICD-10-PCS | Performed by: INTERNAL MEDICINE

## 2024-02-09 PROCEDURE — 3700000000 HC ANESTHESIA ATTENDED CARE: Performed by: INTERNAL MEDICINE

## 2024-02-09 PROCEDURE — 3600007502: Performed by: INTERNAL MEDICINE

## 2024-02-09 RX ORDER — HYDRALAZINE HYDROCHLORIDE 20 MG/ML
10 INJECTION INTRAMUSCULAR; INTRAVENOUS ONCE
Status: COMPLETED | OUTPATIENT
Start: 2024-02-09 | End: 2024-02-09

## 2024-02-09 RX ORDER — HEPARIN SODIUM 5000 [USP'U]/ML
5000 INJECTION, SOLUTION INTRAVENOUS; SUBCUTANEOUS EVERY 8 HOURS SCHEDULED
Status: DISCONTINUED | OUTPATIENT
Start: 2024-02-09 | End: 2024-02-14 | Stop reason: HOSPADM

## 2024-02-09 RX ORDER — PROPOFOL 10 MG/ML
INJECTION, EMULSION INTRAVENOUS PRN
Status: DISCONTINUED | OUTPATIENT
Start: 2024-02-09 | End: 2024-02-09 | Stop reason: SDUPTHER

## 2024-02-09 RX ORDER — HEPARIN SODIUM 5000 [USP'U]/ML
5000 INJECTION, SOLUTION INTRAVENOUS; SUBCUTANEOUS EVERY 8 HOURS SCHEDULED
Status: CANCELLED | OUTPATIENT
Start: 2024-02-09

## 2024-02-09 RX ORDER — LIDOCAINE HYDROCHLORIDE 20 MG/ML
INJECTION, SOLUTION EPIDURAL; INFILTRATION; INTRACAUDAL; PERINEURAL PRN
Status: DISCONTINUED | OUTPATIENT
Start: 2024-02-09 | End: 2024-02-09 | Stop reason: SDUPTHER

## 2024-02-09 RX ORDER — LEVETIRACETAM 500 MG/5ML
1000 INJECTION, SOLUTION, CONCENTRATE INTRAVENOUS EVERY 12 HOURS
Status: DISCONTINUED | OUTPATIENT
Start: 2024-02-09 | End: 2024-02-10

## 2024-02-09 RX ORDER — SODIUM CHLORIDE, SODIUM LACTATE, POTASSIUM CHLORIDE, CALCIUM CHLORIDE 600; 310; 30; 20 MG/100ML; MG/100ML; MG/100ML; MG/100ML
INJECTION, SOLUTION INTRAVENOUS CONTINUOUS PRN
Status: DISCONTINUED | OUTPATIENT
Start: 2024-02-09 | End: 2024-02-09 | Stop reason: SDUPTHER

## 2024-02-09 RX ORDER — CEFAZOLIN SODIUM 1 G/3ML
INJECTION, POWDER, FOR SOLUTION INTRAMUSCULAR; INTRAVENOUS PRN
Status: DISCONTINUED | OUTPATIENT
Start: 2024-02-09 | End: 2024-02-09 | Stop reason: SDUPTHER

## 2024-02-09 RX ADMIN — CEFAZOLIN SODIUM 1 G: 1 INJECTION, POWDER, FOR SOLUTION INTRAMUSCULAR; INTRAVENOUS at 12:35

## 2024-02-09 RX ADMIN — LEVETIRACETAM 1000 MG: 100 INJECTION, SOLUTION INTRAVENOUS at 20:58

## 2024-02-09 RX ADMIN — PROPOFOL 50 MG: 10 INJECTION, EMULSION INTRAVENOUS at 12:19

## 2024-02-09 RX ADMIN — SODIUM CHLORIDE, SODIUM LACTATE, POTASSIUM CHLORIDE, CALCIUM CHLORIDE: 600; 310; 30; 20 INJECTION, SOLUTION INTRAVENOUS at 12:19

## 2024-02-09 RX ADMIN — PROPOFOL 30 MG: 10 INJECTION, EMULSION INTRAVENOUS at 12:22

## 2024-02-09 RX ADMIN — LEVETIRACETAM 1000 MG: 100 INJECTION, SOLUTION INTRAVENOUS at 10:26

## 2024-02-09 RX ADMIN — HYDRALAZINE HYDROCHLORIDE 10 MG: 20 INJECTION, SOLUTION INTRAMUSCULAR; INTRAVENOUS at 16:26

## 2024-02-09 RX ADMIN — LIDOCAINE HYDROCHLORIDE 50 MG: 20 INJECTION, SOLUTION EPIDURAL; INFILTRATION; INTRACAUDAL; PERINEURAL at 12:19

## 2024-02-09 RX ADMIN — HEPARIN SODIUM 5000 UNITS: 5000 INJECTION INTRAVENOUS; SUBCUTANEOUS at 21:37

## 2024-02-09 RX ADMIN — HEPARIN SODIUM 5000 UNITS: 5000 INJECTION INTRAVENOUS; SUBCUTANEOUS at 16:26

## 2024-02-09 ASSESSMENT — PAIN SCALES - GENERAL
PAINLEVEL_OUTOF10: 0

## 2024-02-09 NOTE — PROGRESS NOTES
4 Eyes Skin Assessment     NAME:  Nando Mckenzie  YOB: 1935  MEDICAL RECORD NUMBER:  [id number]    The patient is being assessed for  Admission    I agree that at least one RN has performed a thorough Head to Toe Skin Assessment on the patient. ALL assessment sites listed below have been assessed.      Areas assessed by both nurses:    Head, Face, Ears, Shoulders, Back, Chest, Arms, Elbows, Hands, Sacrum. Buttock, Coccyx, Ischium, Legs. Feet and Heels, and Under Medical Devices         Does the Patient have a Wound? No noted wound(s) old scars to left forehead, bilateral knees, mid abd, left elbow       Tomás Prevention initiated by RN: Yes  Wound Care Orders initiated by RN: No    Pressure Injury (Stage 3,4, Unstageable, DTI, NWPT, and Complex wounds) if present, place Wound referral order by RN under : No    New Ostomies, if present place, Ostomy referral order under : No     Nurse 1 eSignature: Electronically signed by Jennifer Bettencourt RN on 2/9/24 at 3:53 AM EST    **SHARE this note so that the co-signing nurse can place an eSignature**    Nurse 2 eSignature: Electronically signed by Charlene Taylor RN on 2/9/24 at 4:16 AM EST

## 2024-02-09 NOTE — ANESTHESIA POSTPROCEDURE EVALUATION
Department of Anesthesiology  Postprocedure Note    Patient: Nando Mckenzie  MRN: [id number]  YOB: 1935  Date of evaluation: 2/9/2024    Procedure Summary     Date: 02/09/24 Room / Location: [facility] 02 / [facility] ENDOSCOPY    Anesthesia Start: 1210 Anesthesia Stop: 1238    Procedure: EGD PEG TUBE PLACEMENT (Upper GI Region) Diagnosis:       Dysphagia, unspecified type      (Dysphagia, unspecified type [R13.10])    Surgeons: Daphney Pope MD Responsible Provider: Sergei Apple Jr., MD    Anesthesia Type: MAC ASA Status: 3          Anesthesia Type: MAC    Keron Phase I:      Keron Phase II:      Anesthesia Post Evaluation    Patient location during evaluation: bedside  Patient participation: complete - patient participated  Level of consciousness: lethargic  Pain score: 0  Airway patency: patent  Nausea & Vomiting: no nausea and no vomiting  Cardiovascular status: hemodynamically stable  Respiratory status: acceptable  Hydration status: stable  Comments: VSS. Report to RN. Remains on bed  Pain management: adequate        No notable events documented.

## 2024-02-09 NOTE — CONSULTS
Gastroenterology Consult Note        Patient: Nando Mckenzie MRN: [id number]  SSN: [id number]    YOB: 1935  Age: 88 y.o.  Sex: male      Subjective:      Nando Mckenzie is a 88 y.o. male who is being seen for dysphagia, PEG tube placement, history from medical records, patient was here a couple weeks ago with similar issue, difficult to swallow food, food regurgitated back to nose after eating, with choking,.  He had laryngeal carcinoma status post radiation, had a tracheostomy.  Had eval by ENT recently, no recurrent cancer..    Past Medical History:   Diagnosis Date    Anemia     Arthritis     Diabetes (HCC)     Hypercholesterolemia     Hypertension     Single kidney     Cr normal    Throat cancer (HCC)     s/p laryngectomy     Past Surgical History:   Procedure Laterality Date    CHOLECYSTECTOMY, LAPAROSCOPIC  12    By Dr. Manzanares    KIDNEY REMOVAL      Pt states does not know reason    LARYNGECTOMY      STOMACH SURGERY N/A 2023    REMOVAL PEG TUBE performed by Daphney Pope MD at [facility] ENDOSCOPY    UPPER GASTROINTESTINAL ENDOSCOPY N/A 2024    EGD ESOPHAGOGASTRODUODENOSCOPY performed by Daphney Pope MD at [facility] ENDOSCOPY      History reviewed. No pertinent family history.  Social History     Tobacco Use    Smoking status: Former     Current packs/day: 0.00     Types: Cigarettes     Quit date: 2012     Years since quittin.5    Smokeless tobacco: Never   Substance Use Topics    Alcohol use: Yes     Alcohol/week: 17.5 standard drinks of alcohol      Current Facility-Administered Medications   Medication Dose Route Frequency Provider Last Rate Last Admin    levETIRAcetam (KEPPRA) injection 1,000 mg  1,000 mg IntraVENous Q12H Kathy Teixeira MD   1,000 mg at 24 1026    heparin (porcine) injection 5,000 Units  5,000 Units SubCUTAneous 3 times per day Kathy Teixeira MD        0.9 % sodium chloride infusion   IntraVENous Continuous Jonathan,  MD Omar 75 mL/hr at 02/08/24 2212 New Bag at 02/08/24 2212        No Known Allergies    Review of Systems:  Review of Systems   Unable to perform ROS: Patient nonverbal          Objective:     Vitals:    02/09/24 0000 02/09/24 0200 02/09/24 0258 02/09/24 0816   BP: (!) 140/80 (!) 159/82 (!) 148/77 (!) 168/74   Pulse: 64 67 61 60   Resp: 20 15 18 18   Temp:   97.5 °F (36.4 °C) 97.8 °F (36.6 °C)   TempSrc:   Oral Oral   SpO2: 99% 100% 100% 100%   Weight:       Height:            Physical Exam:  Physical Exam  Constitutional:       Appearance: He is ill-appearing.   HENT:      Left Ear: Tympanic membrane normal.   Neck:      Comments: Tracheostomy  Cardiovascular:      Rate and Rhythm: Rhythm irregular.      Heart sounds: Normal heart sounds.   Pulmonary:      Breath sounds: Normal breath sounds.   Abdominal:      Palpations: Abdomen is soft.   Skin:     General: Skin is warm.   Psychiatric:         Mood and Affect: Mood normal.        Recent Results (from the past 24 hour(s))   CBC    Collection Time: 02/08/24  9:31 PM   Result Value Ref Range    WBC 5.4 4.1 - 11.1 K/uL    RBC 3.77 (L) 4.10 - 5.70 M/uL    Hemoglobin 11.6 (L) 12.1 - 17.0 g/dL    Hematocrit 35.3 (L) 36.6 - 50.3 %    MCV 93.6 80.0 - 99.0 FL    MCH 30.8 26.0 - 34.0 PG    MCHC 32.9 30.0 - 36.5 g/dL    RDW 14.7 (H) 11.5 - 14.5 %    Platelets 103 (L) 150 - 400 K/uL    MPV 12.0 8.9 - 12.9 FL    Nucleated RBCs 0.0 0.0  WBC    nRBC 0.00 0.00 - 0.01 K/uL   Basic Metabolic Panel    Collection Time: 02/08/24  9:45 PM   Result Value Ref Range    Sodium 141 136 - 145 mmol/L    Potassium 4.5 3.5 - 5.1 mmol/L    Chloride 105 97 - 108 mmol/L    CO2 31 21 - 32 mmol/L    Anion Gap 5 5 - 15 mmol/L    Glucose 140 (H) 65 - 100 mg/dL    BUN 18 6 - 20 mg/dL    Creatinine 1.00 0.70 - 1.30 mg/dL    Bun/Cre Ratio 18 12 - 20      Est, Glom Filt Rate >60 >60 ml/min/1.73m2    Calcium 9.4 8.5 - 10.1 mg/dL   POCT Glucose    Collection Time: 02/09/24  8:15 AM   Result Value

## 2024-02-09 NOTE — H&P
HISTORY AND PHYSICAL             Date: 2/9/2024        Patient Name: Nando Mckenzie     YOB: 1935      Age:  88 y.o.    Chief Complaint     Chief Complaint   Patient presents with    Dysphagia          History Obtained From   Chart, patient    History of Present Illness   This is an 88-year-old black male who was recently discharged from the hospital on 1/24/2024 following an episode of dysphagia and food regurgitating through the nose.  He had had extensive workup to include CT scan of the head and neck, EGD by gastroenterologist and ENT consultation with direct laryngoscopy.  He also had modified barium swallow and his food consistency was changed to puréed.  He presented again with food regurgitating through his nose.  He denies any chest pain shortness of breath fever or chills.  He has had an episode with food particle impaction in the upper head and neck area with pneumonia respiratory failure necessitating PEG tube in the past with the PEG tube removed after several months.  He is not having any shortness of breath or chest pain.  He is admitted for further care.    Past Medical History     Past Medical History:   Diagnosis Date    Anemia     Arthritis     Diabetes (HCC)     Hypercholesterolemia     Hypertension     Single kidney     Cr normal    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    1.  Acute hypoxic respiratory failure transient due to aspiration of stomach contents/aspiration pneumonitis  #2.  Wide-complex tachycardia  #3.  Hypertension now okay without blood pressure medicine  #4.  Diabetes  #5.  Osteoarthritis  #6.  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  History of nephrectomy  #8.  Dysphonia/dysphagia  #9.  Acute respiratory distress from tracheostomy stoma plugging now status post removal of foreign body by ENT  History of emphysema with scattered pleuroparenchymal scarring and calcified granuloma    Past Surgical History     Past Surgical

## 2024-02-09 NOTE — ED NOTES
ED TO INPATIENT SBAR HANDOFF    Patient Name: Nando Mckenzie   Preferred Name: Nando  : 1935  88 y.o.   Family/Caregiver Present: no   Code Status Order: Prior  PO Status: NPO  Telemetry Order: No  C-SSRS: Risk of Suicide: No Risk  Sitter no  Restraints:     Sepsis Risk Score Sepsis Risk Score: 1.54    Situation  Chief Complaint   Patient presents with    Dysphagia     Brief Description of Patient's Condition: Patient came in due to having issues swallowing again. Patient was seen for this a few weeks ago and admitted for same thing. He was at his senior living facility and was having issues with swallowing his crushed pills with apple sauce, and having it come out of his nose. Patient failed swallow screen and had the same thing happen here, but is not choking or coughing when this happens. Provider is aware of this. Patient has a history of laryngeal carcinoma and has trach in place.    Mental Status: oriented, alert, coherent, logical, thought processes intact, and able to concentrate and follow conversation  Arrived from:Assisted Living  Imaging:   No orders to display     Abnormal labs:   Abnormal Labs Reviewed   CBC - Abnormal; Notable for the following components:       Result Value    RBC 3.77 (*)     Hemoglobin 11.6 (*)     Hematocrit 35.3 (*)     RDW 14.7 (*)     Platelets 103 (*)     All other components within normal limits   BASIC METABOLIC PANEL - Abnormal; Notable for the following components:    Glucose 140 (*)     All other components within normal limits       Background  Allergies: No Known Allergies  History:   Past Medical History:   Diagnosis Date    Anemia     Arthritis     Diabetes (HCC)     Hypercholesterolemia     Hypertension     Single kidney     Cr normal    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       Assessment  Vitals:        Vitals:    247 24 2337 24 0000   BP: (!) 122/93  (!) 140/80   Pulse: 84 61 64   Resp: 18 23 20   Temp: 97.6 °F (36.4 °C)

## 2024-02-09 NOTE — PROGRESS NOTES
Dr. Teixeira informed of patients elevated blood pressure. MD requested that a one time order of hydralazine be entered. Order entered.

## 2024-02-09 NOTE — PROGRESS NOTES
Consult received, chart reviewed. Pt is s/p PEG placement this date. Will hold evaluation and plan for assessment 2- for PO for pleasure as indicated and as tolerated by pt. Pt is known to SLP from previous acute admissions. Pt is s/p laryngectomy greater than 15 years ago.  Most recent SLP assessment with recs for mm5/thin liquids diet dated 1-: \"Aspiration is not of concern at this time as pt's trachea is no longer connected to pharynx/swallowing due to laryngectomy. Recommend RD consult for nutritional assessment and ONS due to dysphagia associated with laryngectomy.  Repeat MBS is not indicated at this time. (See above for previous MBS results).  If concerns for fistula are present, will defer to GI for further assessment. Agree with ENT consult due to EGD findings as noted above.\"  Will cont to follow.

## 2024-02-09 NOTE — ED TRIAGE NOTES
Pt brought in from senior living facility with c/o \"food\" coming out of his nose when he swallows. Pt was recently admitted for the same issue and was evaluated by SLP. Pt is having increased saliva in triage without difficulty swallowing. Pt denies SOB and chest pain.

## 2024-02-09 NOTE — ANESTHESIA PRE PROCEDURE
Department of Anesthesiology  Preprocedure Note       Name:  Nando Mckenzie   Age:  88 y.o.  :  1935                                          MRN:  [id number]         Date:  2024      Surgeon: Surgeon(s):  Daphney Pope MD    Procedure: Procedure(s):  EGD PEG TUBE PLACEMENT    Medications prior to admission:   Prior to Admission medications    Medication Sig Start Date End Date Taking? Authorizing Provider   daily jens multivitamin/iron (TAB-A-JENS) TABS tablet Take 1 tablet by mouth daily    Provider, Historical, MD   Saw Stanley 500 MG CAPS Take 450 mg by mouth daily    Farzaneh Sullivan MD   LEVEMIR FLEXPEN 100 UNIT/ML injection pen Inject 15 Units into the skin nightly 23   Farzaneh Sullivan MD   NIFEdipine (PROCARDIA XL) 30 MG extended release tablet Take 1 tablet by mouth daily 23   Farzaneh Sullivan MD   VAN ASPIRIN PO Take 81 mg by mouth daily    Automatic Reconciliation, Ar   atorvastatin (LIPITOR) 10 MG tablet Take 1 tablet by mouth nightly    Automatic Reconciliation, Ar   clopidogrel (PLAVIX) 75 MG tablet ceived the following from [facility]: Outside name: clopidogreL (PLAVIX) 75 mg tab 5/3/22   Automatic Reconciliation, Ar   levETIRAcetam (KEPPRA) 1000 MG tablet Take 750 mg by mouth 2 times daily    Automatic Reconciliation, Ar   SITagliptin (JANUVIA) 25 MG tablet Take 1 tablet by mouth daily    Automatic Reconciliation, Ar       Current medications:    Current Facility-Administered Medications   Medication Dose Route Frequency Provider Last Rate Last Admin    levETIRAcetam (KEPPRA) injection 1,000 mg  1,000 mg IntraVENous Q12H Kathy Teixeira MD   1,000 mg at 24 1026    heparin (porcine) injection 5,000 Units  5,000 Units SubCUTAneous 3 times per day Kathy Teixeira MD        0.9 % sodium chloride infusion   IntraVENous Continuous Omar Castillo MD 75 mL/hr at 24 221 New Bag at 24       Allergies:  No Known

## 2024-02-09 NOTE — CARE COORDINATION
02/09/24 1555   Service Assessment   Patient Orientation Alert and Oriented   Cognition Alert   History Provided By Child/Family   Primary Caregiver Family   Support Systems Children;Family Members   Patient's Healthcare Decision Maker is: Legal Next of Kin   PCP Verified by CM Yes   Last Visit to PCP Within last 3 months   Prior Functional Level Housework;Feeding;Assistance with the following:;Bathing;Dressing;Toileting;Cooking;Shopping;Mobility   Current Functional Level Bathing;Assistance with the following:;Dressing;Toileting;Feeding;Cooking;Housework;Shopping;Mobility   Can patient return to prior living arrangement Unknown at present   Ability to make needs known: Good   Family able to assist with home care needs: No   Would you like for me to discuss the discharge plan with any other family members/significant others, and if so, who? Yes  (Daughter)   Financial Resources Medicare   Social/Functional History   Lives With Alone   Type of Home Senior housing apartment   Home Equipment Walker, rolling;Rollator   Receives Help From Family   ADL Assistance Needs assistance   Discharge Planning   Type of Residence Other (Comment)  (Senior Living Apartment)   Living Arrangements Alone   Current Services Prior To Admission Durable Medical Equipment   Patient expects to be discharged to: Skilled nursing facility   Services At/After Discharge   Mode of Transport at Discharge BLS     Patient lives at a Senior Living Apartment alone.  Patient uses a rollator and needs assistance with ADLs.  Patient was recently in the hospital for similar medical issues.  Patient likely to receive a PEG tub this admission.  ELEAZAR spoke with patient's sister, Gayla Fan, she states she is not sure that patient will be able to return home because she does not think patient can handle the PEG tube on his own and she is not able to assist him with the PEG tube.  Ms. Fan feels he needs SNF placement.  Patient does not have LTC  placement benefits, only rehab.  Ms. Fan told CM to call patient's daughter that is out of state.  CM called daughter and had to leave a message for daughter.  Patient will likely need PT/OT eval when appropriate.  CM will continue to follow.  Current Dispo: TBD.    Readmission Assessment  Number of Days since last admission?: 8-30 days  Previous Disposition: Home with Home Health  Who is being Interviewed: Caregiver  What was the patient's/caregiver's perception as to why they think they needed to return back to the hospital?: Other (Comment) (Swallowing issues got worse)  Did you visit your Primary Care Physician after you left the hospital, before you returned this time?: Yes  Did you see a specialist, such as Cardiac, Pulmonary, Orthopedic Physician, etc. after you left the hospital?: No  Who advised the patient to return to the hospital?: Self-referral  Does the patient report anything that got in the way of taking their medications?: No  In our efforts to provide the best possible care to you and others like you, can you think of anything that we could have done to help you after you left the hospital the first time, so that you might not have needed to return so soon?: Other (Comment) (N/A)    Advance Care Planning     General Advance Care Planning (ACP) Conversation    Date of Conversation: 2/9/2024  Conducted with: Patient with Decision Making Capacity    Healthcare Decision Maker:    Primary Decision Maker: Katiana Wynn - Other - [phone]    Secondary Decision Maker: Gayla Fan - Other - [phone]  Click here to complete Healthcare Decision Makers including selection of the Healthcare Decision Maker Relationship (ie \"Primary\").   Today we documented Decision Maker(s) consistent with Legal Next of Kin hierarchy.    Content/Action Overview:  Has NO ACP documents-Information provided  Reviewed DNR/DNI and patient elects Full Code (Attempt Resuscitation)        Length of Voluntary ACP

## 2024-02-09 NOTE — ED PROVIDER NOTES
Allergies    PCP: Kathy Teixeira MD    Current Meds:   Current Facility-Administered Medications   Medication Dose Route Frequency Provider Last Rate Last Admin    0.9 % sodium chloride infusion   IntraVENous Continuous Omar Castillo MD         Current Outpatient Medications   Medication Sig Dispense Refill    daily jens multivitamin/iron (TAB-A-JENS) TABS tablet Take 1 tablet by mouth daily      Saw [place] 500 MG CAPS Take 450 mg by mouth daily      LEVEMIR FLEXPEN 100 UNIT/ML injection pen Inject 15 Units into the skin nightly      NIFEdipine (PROCARDIA XL) 30 MG extended release tablet Take 1 tablet by mouth daily      VAN ASPIRIN PO Take 81 mg by mouth daily      atorvastatin (LIPITOR) 10 MG tablet Take 1 tablet by mouth nightly      clopidogrel (PLAVIX) 75 MG tablet ceived the following from [facility]: Outside name: clopidogreL (PLAVIX) 75 mg tab      levETIRAcetam (KEPPRA) 1000 MG tablet Take 750 mg by mouth 2 times daily      SITagliptin (JANUVIA) 25 MG tablet Take 1 tablet by mouth daily         Social Determinants of Health:   Social Determinants of Health     Tobacco Use: Medium Risk (2/8/2024)    Patient History     Smoking Tobacco Use: Former     Smokeless Tobacco Use: Never     Passive Exposure: Not on file   Alcohol Use: Not At Risk (1/20/2024)    AUDIT-C     Frequency of Alcohol Consumption: Never     Average Number of Drinks: Patient does not drink     Frequency of Binge Drinking: Never   Financial Resource Strain: Not on file   Food Insecurity: No Food Insecurity (1/20/2024)    Hunger Vital Sign     Worried About Running Out of Food in the Last Year: Never true     Ran Out of Food in the Last Year: Never true   Transportation Needs: No Transportation Needs (1/20/2024)    PRAPARE - Transportation     Lack of Transportation (Medical): No     Lack of Transportation (Non-Medical): No   Physical Activity: Not on file   Stress: Not on file   Social Connections: Not on file          SCREENINGS                   LAB, EKG AND DIAGNOSTIC RESULTS   Labs:  No results found for this or any previous visit (from the past 12 hour(s)).    EKG:.Not Applicable    Radiologic Studies:  Non-plain film images such as CT, Ultrasound and MRI are read by the radiologist. Plain radiographic images are visualized and preliminarily interpreted by the ED Provider with the following findings: Not Applicable.    Interpretation per the Radiologist below, if available at the time of this note:  No orders to display        EMERGENCY DEPARTMENT COURSE and DIFFERENTIAL DIAGNOSIS/MDM   9:38 PM DDx, ED Course, and Reassessment: Patient presenting for dysphagia.  Patient reportedly having difficulty swallowing and going through his nose.  Reviewing the discharge summary, patient had an EGD which showed no signs of strictures or obstruction.  Unsure why exactly this is happening.  Will do a swallow assessment here    Clinical Management Tools:  Not Applicable    Records Reviewed (source and summary of external notes): Prior medical records and Nursing notes    Vitals:    Vitals:    02/08/24 2027   BP: (!) 122/93   Pulse: 84   Resp: 18   Temp: 97.6 °F (36.4 °C)   TempSrc: Oral   SpO2: 98%   Weight: 59.4 kg (131 lb)   Height: 1.651 m (5' 5\")        ED COURSE  ED Course as of 02/08/24 2138   Thu Feb 08, 2024 2047 Patient failed swallow screen with water.  With me and 2 nurses at bedside, patient first took a drink of water and was able to swallow a small sip however when he took another sip of water all of it came out of his nose [JS]   2127 Spoke with Dr. Teixeira, who agrees that this is very strange and plan will be to admit to observation status and he will consult ENT [JS]      ED Course User Index  [JS] Omar Castillo MD       Sepsis Reassessment: Sepsis reassessment not applicable    Disposition Considerations (Tests not done, Shared Decision Making, Pt Expectation of Test or Treatment.): See ED

## 2024-02-09 NOTE — ED NOTES
Swallow screen completed with Dr. Boyce. Pt drank one sip of water and swallowed appropriately. Pt took a second sip of water and writer with ED MD witnessed the water come out of the pt's nose. Pt did not choke or cough either time.

## 2024-02-10 LAB
GLUCOSE BLD STRIP.AUTO-MCNC: 123 MG/DL (ref 65–100)
GLUCOSE BLD STRIP.AUTO-MCNC: 154 MG/DL (ref 65–100)
GLUCOSE BLD STRIP.AUTO-MCNC: 81 MG/DL (ref 65–100)
GLUCOSE BLD STRIP.AUTO-MCNC: 98 MG/DL (ref 65–100)
PERFORMED BY:: ABNORMAL
PERFORMED BY:: ABNORMAL
PERFORMED BY:: NORMAL
PERFORMED BY:: NORMAL

## 2024-02-10 PROCEDURE — 2580000003 HC RX 258: Performed by: EMERGENCY MEDICINE

## 2024-02-10 PROCEDURE — 6360000002 HC RX W HCPCS: Performed by: INTERNAL MEDICINE

## 2024-02-10 PROCEDURE — 92610 EVALUATE SWALLOWING FUNCTION: CPT

## 2024-02-10 PROCEDURE — 6370000000 HC RX 637 (ALT 250 FOR IP): Performed by: INTERNAL MEDICINE

## 2024-02-10 PROCEDURE — 92526 ORAL FUNCTION THERAPY: CPT

## 2024-02-10 PROCEDURE — 82962 GLUCOSE BLOOD TEST: CPT

## 2024-02-10 PROCEDURE — 1100000000 HC RM PRIVATE

## 2024-02-10 PROCEDURE — 6370000000 HC RX 637 (ALT 250 FOR IP): Performed by: HOSPITALIST

## 2024-02-10 RX ORDER — LEVETIRACETAM 100 MG/ML
750 SOLUTION ORAL 2 TIMES DAILY
Status: DISCONTINUED | OUTPATIENT
Start: 2024-02-10 | End: 2024-02-14 | Stop reason: HOSPADM

## 2024-02-10 RX ORDER — CLOPIDOGREL BISULFATE 75 MG/1
75 TABLET ORAL DAILY
Status: CANCELLED | OUTPATIENT
Start: 2024-02-10

## 2024-02-10 RX ORDER — MULTIVITAMIN,THER AND MINERALS
1 TABLET ORAL DAILY
Status: CANCELLED | OUTPATIENT
Start: 2024-02-10

## 2024-02-10 RX ORDER — CLOPIDOGREL BISULFATE 75 MG/1
75 TABLET ORAL DAILY
Status: DISCONTINUED | OUTPATIENT
Start: 2024-02-10 | End: 2024-02-14 | Stop reason: HOSPADM

## 2024-02-10 RX ORDER — NIFEDIPINE 30 MG/1
30 TABLET, EXTENDED RELEASE ORAL DAILY
Status: CANCELLED | OUTPATIENT
Start: 2024-02-10

## 2024-02-10 RX ORDER — ATORVASTATIN CALCIUM 10 MG/1
10 TABLET, FILM COATED ORAL NIGHTLY
Status: CANCELLED | OUTPATIENT
Start: 2024-02-10

## 2024-02-10 RX ORDER — ACETAMINOPHEN 325 MG/1
650 TABLET ORAL ONCE
Status: COMPLETED | OUTPATIENT
Start: 2024-02-10 | End: 2024-02-10

## 2024-02-10 RX ORDER — CARVEDILOL 3.12 MG/1
6.25 TABLET ORAL 2 TIMES DAILY WITH MEALS
Status: CANCELLED | OUTPATIENT
Start: 2024-02-10

## 2024-02-10 RX ADMIN — HEPARIN SODIUM 5000 UNITS: 5000 INJECTION INTRAVENOUS; SUBCUTANEOUS at 06:08

## 2024-02-10 RX ADMIN — LEVETIRACETAM 1000 MG: 100 INJECTION, SOLUTION INTRAVENOUS at 09:49

## 2024-02-10 RX ADMIN — CLOPIDOGREL BISULFATE 75 MG: 75 TABLET ORAL at 14:14

## 2024-02-10 RX ADMIN — HEPARIN SODIUM 5000 UNITS: 5000 INJECTION INTRAVENOUS; SUBCUTANEOUS at 14:14

## 2024-02-10 RX ADMIN — ACETAMINOPHEN 650 MG: 325 TABLET ORAL at 00:51

## 2024-02-10 RX ADMIN — SODIUM CHLORIDE: 9 INJECTION, SOLUTION INTRAVENOUS at 00:57

## 2024-02-10 RX ADMIN — LEVETIRACETAM 750 MG: 500 SOLUTION ORAL at 21:30

## 2024-02-10 RX ADMIN — HEPARIN SODIUM 5000 UNITS: 5000 INJECTION INTRAVENOUS; SUBCUTANEOUS at 21:39

## 2024-02-10 ASSESSMENT — PAIN SCALES - GENERAL: PAINLEVEL_OUTOF10: 0

## 2024-02-10 NOTE — PLAN OF CARE
Speech LAnguage Pathology Dysphagia EVALUATION    Patient: Nando Mckenzie (88 y.o. male)  Date: 2/10/2024  Primary Diagnosis: Oropharyngeal dysphagia [R13.12]  Dysphagia [R13.10]  Procedure(s) (LRB):  EGD PEG TUBE PLACEMENT (N/A) 1 Day Post-Op   Precautions: Aspiration                    ASSESSMENT :  Based on the objective data described below, the patient presents with mild oropharyngeal dysphagia. Patient NPO with laryngectomy. However, last swallow evaluation completed 1/22/24 he was recommended for a minced and moist diet with thin liquids.     Patient positioned upright, alert, oriented x3, and agreeable to swallow evaluation. He stated, he was thirsty and wanted water. PO trials: ice water via straw, fig bar, pudding, and applesauce. Oral phase: edentulous, able to masticate fig bar slowly but functional. Pharyngeal phase: x1 cough noted during fig bar trial once he took a sip of water, he was able to stop cough.  Good HLE via digital palpation. No overt s/sx aspiration noted.    Patient will benefit from skilled intervention to address the above impairments.     PLAN :  Recommendations and Planned Interventions:  Diet: Minced and moist and thin liquids  Compensatory strategies; Staff to provide feeding assistance.     Acute SLP Services: Yes, patient will be followed by speech-language pathology 5x/week to address goals. Patient's rehabilitation potential is considered to be Good.    Discharge Recommendations: To Be Determined     SUBJECTIVE:   I want water.    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 Manzanares    GASTROSTOMY TUBE PLACEMENT N/A 2/9/2024    EGD PEG TUBE PLACEMENT performed by Daphney Pope MD at [facility] ENDOSCOPY     KIDNEY REMOVAL      Pt states does not know reason    LARYNGECTOMY      STOMACH SURGERY N/A 7/5/2023    REMOVAL PEG TUBE performed by Daphney Pope MD at [facility] ENDOSCOPY    UPPER GASTROINTESTINAL ENDOSCOPY N/A 1/22/2024    EGD ESOPHAGOGASTRODUODENOSCOPY performed by Daphney Pope MD at [facility] ENDOSCOPY     Prior Level of Function/Home Situation:   Social/Functional History  Lives With: Alone  Type of Home: Senior housing apartment  Home Equipment: Walker, rolling, Rollator  Receives Help From: Family  ADL Assistance: Needs assistance    Cognitive and Communication Status:  Neurologic State: Alert  Orientation Level: Oriented to person, Oriented to place, and Oriented to situation  Cognition: Appropriate safety awareness and Follows commands    Hearing: WFL    Oral Assessment:  Oral Motor   Labial: No impairment  Dentition: Edentulous  Oral Hygiene: Moist  Lingual: No impairment  Velum: No Impairment  Mandible: No impairment  Gag: No Impairment    After Treatment:  Patient left in no apparent distress in bed, Call bell left within reach, and Nursing notified     Pain:  VAS (numerical) 0/10    COMMUNICATION/EDUCATION:   Aspiration precautions, Diet recommendations, and Prognosis and SLP POC education provided to Patient and Nurse via explanation, all questions/concerns addressed. Patient verbalizes understanding and requires reinforcement.    The patient's plan of care including recommendations, planned interventions, and recommended diet changes were discussed with: Registered nurse and Physician.    Patient/family have participated as able in goal setting and plan of care    Thank you,  Helen Vu SLP.D., CCC-SLP  Minutes: 25     Problem: SLP Adult - Impaired Swallowing  Goal: By Discharge: Advance to least restrictive diet without signs or symptoms of aspiration for planned discharge setting.  See evaluation for individualized goals.  Description: Speech Therapy Swallow Goals  Initiated 2/10/2024  -Patient

## 2024-02-10 NOTE — PROGRESS NOTES
Progress Note  Date:2/10/2024       Room:[facility]  Patient Name:Nando Mckenzie     YOB: 1935     Age:88 y.o.        Subjective    Subjective uneventful night.  Episode of hypotension needing IV antihypertensive.  Patient is status post PEG tube placement.  He looks comfortable.  No fever or chills.  Review of Systems  Objective         Vitals Last 24 Hours:  TEMPERATURE:  Temp  Av.8 °F (37.1 °C)  Min: 97.9 °F (36.6 °C)  Max: 100.6 °F (38.1 °C)  RESPIRATIONS RANGE: Resp  Av.5  Min: 13  Max: 20  PULSE OXIMETRY RANGE: SpO2  Av.5 %  Min: 98 %  Max: 100 %  PULSE RANGE: Pulse  Av.6  Min: 60  Max: 125  BLOOD PRESSURE RANGE: Systolic (24hrs), Av , Min:115 , Max:187   ; Diastolic (24hrs), Av, Min:65, Max:82    I/O (24Hr):    Intake/Output Summary (Last 24 hours) at 2/10/2024 1006  Last data filed at 2/10/2024 0611  Gross per 24 hour   Intake 150 ml   Output 950 ml   Net -800 ml     Objective elderly black male lying in bed comfortably no distress  HEENT normocephalic atraumatic.  Neck with tracheostomy.    Lungs are clear bilaterally no wheeze or crackles  Heart S1-S2 regular  Abdomen soft positive bowel sounds.  Midline old surgical scar.  New PEG tube placement with abdominal binder overlying.  Lower extremities without edema  CNS awake alert follows command.  Speaks with a voicebox device    Labs/Imaging/Diagnostics    Labs:  CBC:  Recent Labs     24  2131   WBC 5.4   RBC 3.77*   HGB 11.6*   HCT 35.3*   MCV 93.6   RDW 14.7*   *     CHEMISTRIES:  Recent Labs     24  2145      K 4.5      CO2 31   BUN 18   CREATININE 1.00   GLUCOSE 140*     PT/INR:No results for input(s): \"PROTIME\", \"INR\" in the last 72 hours.  APTT:No results for input(s): \"APTT\" in the last 72 hours.  LIVER PROFILE:No results for input(s): \"AST\", \"ALT\", \"BILIDIR\", \"BILITOT\", \"ALKPHOS\" in the last 72 hours.    Imaging Last 24 Hours:  EGD    Result Date: 2024  [facility]

## 2024-02-11 LAB
GLUCOSE BLD STRIP.AUTO-MCNC: 156 MG/DL (ref 65–100)
GLUCOSE BLD STRIP.AUTO-MCNC: 158 MG/DL (ref 65–100)
GLUCOSE BLD STRIP.AUTO-MCNC: 160 MG/DL (ref 65–100)
GLUCOSE BLD STRIP.AUTO-MCNC: 189 MG/DL (ref 65–100)
PERFORMED BY:: ABNORMAL

## 2024-02-11 PROCEDURE — 1100000000 HC RM PRIVATE

## 2024-02-11 PROCEDURE — 6360000002 HC RX W HCPCS: Performed by: INTERNAL MEDICINE

## 2024-02-11 PROCEDURE — 92526 ORAL FUNCTION THERAPY: CPT

## 2024-02-11 PROCEDURE — 82962 GLUCOSE BLOOD TEST: CPT

## 2024-02-11 PROCEDURE — 6370000000 HC RX 637 (ALT 250 FOR IP): Performed by: INTERNAL MEDICINE

## 2024-02-11 RX ORDER — CARVEDILOL 3.12 MG/1
6.25 TABLET ORAL 2 TIMES DAILY WITH MEALS
Status: DISCONTINUED | OUTPATIENT
Start: 2024-02-11 | End: 2024-02-14 | Stop reason: HOSPADM

## 2024-02-11 RX ORDER — NIFEDIPINE 30 MG/1
30 TABLET, EXTENDED RELEASE ORAL DAILY
Status: DISCONTINUED | OUTPATIENT
Start: 2024-02-11 | End: 2024-02-13

## 2024-02-11 RX ADMIN — CLOPIDOGREL BISULFATE 75 MG: 75 TABLET ORAL at 09:51

## 2024-02-11 RX ADMIN — LEVETIRACETAM 750 MG: 500 SOLUTION ORAL at 20:41

## 2024-02-11 RX ADMIN — HEPARIN SODIUM 5000 UNITS: 5000 INJECTION INTRAVENOUS; SUBCUTANEOUS at 14:32

## 2024-02-11 RX ADMIN — HEPARIN SODIUM 5000 UNITS: 5000 INJECTION INTRAVENOUS; SUBCUTANEOUS at 22:21

## 2024-02-11 RX ADMIN — CARVEDILOL 6.25 MG: 3.12 TABLET, FILM COATED ORAL at 09:59

## 2024-02-11 RX ADMIN — NIFEDIPINE 30 MG: 30 TABLET, FILM COATED, EXTENDED RELEASE ORAL at 09:50

## 2024-02-11 RX ADMIN — HEPARIN SODIUM 5000 UNITS: 5000 INJECTION INTRAVENOUS; SUBCUTANEOUS at 05:44

## 2024-02-11 RX ADMIN — CARVEDILOL 6.25 MG: 3.12 TABLET, FILM COATED ORAL at 18:46

## 2024-02-11 RX ADMIN — LEVETIRACETAM 750 MG: 500 SOLUTION ORAL at 09:51

## 2024-02-11 NOTE — PLAN OF CARE
care.    Discharge Recommendations: To Be Determined     SUBJECTIVE:   I want to be able to eat oatmeal and cream of wheat.    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 Manzanares    GASTROSTOMY TUBE PLACEMENT N/A 2/9/2024    EGD PEG TUBE PLACEMENT performed by Daphney Pope MD at [facility] ENDOSCOPY    KIDNEY REMOVAL      Pt states does not know reason    LARYNGECTOMY      STOMACH SURGERY N/A 7/5/2023    REMOVAL PEG TUBE performed by Daphney Pope MD at [facility] ENDOSCOPY    UPPER GASTROINTESTINAL ENDOSCOPY N/A 1/22/2024    EGD ESOPHAGOGASTRODUODENOSCOPY performed by Daphney Pope MD at [facility] ENDOSCOPY     Prior Level of Function/Home Situation:   Social/Functional History  Lives With: Alone  Type of Home: Senior housing apartment  Home Equipment: Walker, rolling, Rollator  Receives Help From: Family  ADL Assistance: Needs assistance    Cognitive and Communication Status:  Neurologic State: Alert  Orientation Level: Oriented to person, Oriented to place, and Oriented to situation  Cognition: Appropriate decision making and Follows commands    Hearing: WFL    After Treatment:  Patient left in no apparent distress in bed, Call bell left within reach, and Nursing notified     Pain:  VAS (numerical) 0/10    COMMUNICATION/EDUCATION:   Swallow safety precautions and Diet recommendations education provided to Patient and Nurse via explanation, all questions/concerns addressed. Patient verbalizes understanding and requires reinforcement.    The patient's plan of care including recommendations, planned interventions, and recommended diet changes were discussed with: Registered nurse and Physician.    Patient/family have participated as able in goal setting and  plan of care    Thank you,  Helen Vu, SLP.D., CCC-SLP  Minutes: 15     Problem: SLP Adult - Impaired Swallowing  Goal: By Discharge: Advance to least restrictive diet without signs or symptoms of aspiration for planned discharge setting.  See evaluation for individualized goals.  Description: Speech Therapy Swallow Goals  Initiated 2/10/2024  -Patient will tolerate minced and moist diet with thin liquids without clinical indicators of aspiration given verbal cues within 7 day(s).        -Patient will tolerate PO trials without clinical indicators of aspiration given verbal cues within 7 day(s).      -Patient will demonstrate understanding of swallow safety precautions and aspiration precautions, diet recs with verbal cues within 7day(s).    -Patient/caregiver goal: I want water.          Outcome: Not Progressing

## 2024-02-11 NOTE — PROGRESS NOTES
Progress Note  Date:2024       Room:[facility]  Patient Name:Josie Mckenzie     YOB: 1935     Age:88 y.o.        Subjective    Subjective uneventful night.  Blood pressure slightly high.  No chest pain or shortness of breath.  Patient looks comfortable.  Review of Systems  Objective         Vitals Last 24 Hours:  TEMPERATURE:  Temp  Av.3 °F (36.8 °C)  Min: 98 °F (36.7 °C)  Max: 98.7 °F (37.1 °C)  RESPIRATIONS RANGE: Resp  Av.8  Min: 18  Max: 20  PULSE OXIMETRY RANGE: SpO2  Av.8 %  Min: 98 %  Max: 100 %  PULSE RANGE: Pulse  Av.8  Min: 61  Max: 71  BLOOD PRESSURE RANGE: Systolic (24hrs), Av , Min:139 , Max:162   ; Diastolic (24hrs), Av, Min:70, Max:94    I/O (24Hr):    Intake/Output Summary (Last 24 hours) at 2024 0938  Last data filed at 2024 0628  Gross per 24 hour   Intake 225 ml   Output --   Net 225 ml     Objective elderly black male lying in bed appears comfortable in no distress  HEENT normocephalic atraumatic  Neck with inspissated white secretion around the ostomy  Lungs clear bilaterally no wheeze  Heart S1-S2 regular  Abdomen soft nontender nondistended positive bowel sounds.  PEG tube in place  Lower extremities without any cyanosis or edema  CNS alert and oriented follows command moves extremities  Labs/Imaging/Diagnostics    Labs:  CBC:  Recent Labs     24  2131   WBC 5.4   RBC 3.77*   HGB 11.6*   HCT 35.3*   MCV 93.6   RDW 14.7*   *     CHEMISTRIES:  Recent Labs     24  2145      K 4.5      CO2 31   BUN 18   CREATININE 1.00   GLUCOSE 140*     PT/INR:No results for input(s): \"PROTIME\", \"INR\" in the last 72 hours.  APTT:No results for input(s): \"APTT\" in the last 72 hours.  LIVER PROFILE:No results for input(s): \"AST\", \"ALT\", \"BILIDIR\", \"BILITOT\", \"ALKPHOS\" in the last 72 hours.    Imaging Last 24 Hours:  EGD    Result Date: 2024  [facility] Patient: JOSIE MCKENZIE MRN: [id number] : 1935

## 2024-02-12 LAB
GLUCOSE BLD STRIP.AUTO-MCNC: 118 MG/DL (ref 65–100)
GLUCOSE BLD STRIP.AUTO-MCNC: 141 MG/DL (ref 65–100)
GLUCOSE BLD STRIP.AUTO-MCNC: 149 MG/DL (ref 65–100)
GLUCOSE BLD STRIP.AUTO-MCNC: 169 MG/DL (ref 65–100)
PERFORMED BY:: ABNORMAL

## 2024-02-12 PROCEDURE — 6360000002 HC RX W HCPCS: Performed by: INTERNAL MEDICINE

## 2024-02-12 PROCEDURE — 97161 PT EVAL LOW COMPLEX 20 MIN: CPT

## 2024-02-12 PROCEDURE — 82962 GLUCOSE BLOOD TEST: CPT

## 2024-02-12 PROCEDURE — 97530 THERAPEUTIC ACTIVITIES: CPT

## 2024-02-12 PROCEDURE — 6370000000 HC RX 637 (ALT 250 FOR IP): Performed by: INTERNAL MEDICINE

## 2024-02-12 PROCEDURE — 97116 GAIT TRAINING THERAPY: CPT

## 2024-02-12 PROCEDURE — 1100000000 HC RM PRIVATE

## 2024-02-12 PROCEDURE — 97165 OT EVAL LOW COMPLEX 30 MIN: CPT

## 2024-02-12 RX ADMIN — HEPARIN SODIUM 5000 UNITS: 5000 INJECTION INTRAVENOUS; SUBCUTANEOUS at 12:11

## 2024-02-12 RX ADMIN — CARVEDILOL 6.25 MG: 3.12 TABLET, FILM COATED ORAL at 12:09

## 2024-02-12 RX ADMIN — CLOPIDOGREL BISULFATE 75 MG: 75 TABLET ORAL at 12:09

## 2024-02-12 RX ADMIN — NIFEDIPINE 30 MG: 30 TABLET, FILM COATED, EXTENDED RELEASE ORAL at 12:08

## 2024-02-12 RX ADMIN — LEVETIRACETAM 750 MG: 500 SOLUTION ORAL at 20:30

## 2024-02-12 RX ADMIN — CARVEDILOL 6.25 MG: 3.12 TABLET, FILM COATED ORAL at 18:45

## 2024-02-12 RX ADMIN — LEVETIRACETAM 750 MG: 500 SOLUTION ORAL at 12:20

## 2024-02-12 RX ADMIN — HEPARIN SODIUM 5000 UNITS: 5000 INJECTION INTRAVENOUS; SUBCUTANEOUS at 06:04

## 2024-02-12 RX ADMIN — HEPARIN SODIUM 5000 UNITS: 5000 INJECTION INTRAVENOUS; SUBCUTANEOUS at 20:30

## 2024-02-12 NOTE — PROGRESS NOTES
OCCUPATIONAL THERAPY EVALUATION  Patient: Nando Mckenzie (88 y.o. male)  Date: 2/12/2024  Primary Diagnosis: Oropharyngeal dysphagia [R13.12]  Dysphagia [R13.10]  Procedure(s) (LRB):  EGD PEG TUBE PLACEMENT (N/A) 3 Days Post-Op   Precautions: Fall Risk                Recommendations for nursing mobility: AD and gt belt for bed to chair , Amb to bathroom with AD and gait belt, and Assist x1    In place during session:External Catheter  ASSESSMENT  Pt is a 88 y.o. male presenting to [facility] with c/o dysphagia, admitted 2/8 and currently being treated for oropharyngeal dysphagia. Pt is s/p PEG tube placement; POD #3 Pt received semi-supine in bed upon arrival, AXO x4, and agreeable to OT evaluation. Pt speaks w/ electrolarynx. Niece at bedside and remained w/ permission.     Based on current observations, pt presents with decreased  functional mobility, independence in ADLs, ROM, strength, activity tolerance, endurance, balance, increased pain levels (see below for objective details and assist levels).     Overall, pt tolerates session fair w/out c/o pain, dizziness, or SOB. Pt req'd CGA for STS using RW and ambulation from chair<>commode; cues for hand placement and lining self up w/ surfaces for safe descent. Pt able to complete seated ADLs w/ set up and anticipate req'ing CGA for standing ADLs d/t standing balance during ambulation. No LOB noted. Pt w/ limited B shoulder flexion (~100-110 degrees); OT edcuated pt on B shoulder flexion, B shoulder abduction, and bicep curls. Pt able to complete 5 reps of each exs w/ good understanding. Pt will benefit from continued skilled OT services to address current impairments and improve IND and safety with self cares and functional transfers/mobility. Potential barriers for safe discharge: pt lives alone and pt is a high fall risk. Current OT d/c recommendation Skilled Nursing Facilityonce medically appropriate.    GOALS:    Problem: Occupational Therapy - Adult  Goal: By  Contact-guard assistance;Assist X1  Stand to Sit: Contact-guard assistance;Assist X1  Stand Pivot Transfers: Contact-guard assistance;Assist X1  Toilet Transfer: Contact-guard assistance;Assist X1      Balance:  Balance  Sitting: Intact  Standing: Impaired  Standing - Static: Good;Constant support  Standing - Dynamic: Fair;Constant support      ADL Assessment:                  Toileting: Setup  Toileting Skilled Clinical Factors: posterior pericare on commode               Functional Measure:    [facility] AM-PACTM \"6 Clicks\"                                                       Daily Activity Inpatient Short Form  How much help from another person does the patient currently need... Total; A Lot A Little None   1.  Putting on and taking off regular lower body clothing? []  1 []  2 [x]  3 []  4   2.  Bathing (including washing, rinsing, drying)? []  1 []  2 [x]  3 []  4   3.  Toileting, which includes using toilet, bedpan or urinal? [] 1 []  2 [x]  3 []  4   4.  Putting on and taking off regular upper body clothing? []  1 []  2 [x]  3 []  4   5.  Taking care of personal grooming such as brushing teeth? []  1 []  2 [x]  3 []  4   6.  Eating meals? []  1 []  2 []  3 [x]  4   © 2007, Trustees of [facility], under license to [employer]. All rights reserved     Score: 19/24     Interpretation of Tool:  Represents clinically-significant functional categories (i.e. Activities of daily living).  Percentage of Impairment CH    0%   CI    1-19% CJ    20-39% CK    40-59% CL    60-79% CM    80-99% CN     100%   [facility]  Score 6-24 24 23 20-22 15-19 10-14 7-9 6     Occupational Therapy Evaluation Charge Determination   History Examination Decision-Making   LOW Complexity : Brief history review  LOW Complexity: 1-3 Performance deficits relating to physical, cognitive, or psychosocial skills that result in activity limitations and/or participation restrictions MEDIUM Complexity: Patient may present with comorbidities

## 2024-02-12 NOTE — PROGRESS NOTES
Progress Note  Date:2024       Room:[facility]  Patient Name:Nando Mckenzie     YOB: 1935     Age:88 y.o.        Subjective    Subjective uneventful night.  Doing well.  No chest pain or shortness of breath.  Tolerating tube feeding.  Review of Systems  Objective         Vitals Last 24 Hours:  TEMPERATURE:  Temp  Av.6 °F (37 °C)  Min: 98.2 °F (36.8 °C)  Max: 99.1 °F (37.3 °C)  RESPIRATIONS RANGE: Resp  Av.8  Min: 16  Max: 19  PULSE OXIMETRY RANGE: SpO2  Av %  Min: 96 %  Max: 100 %  PULSE RANGE: Pulse  Av  Min: 61  Max: 63  BLOOD PRESSURE RANGE: Systolic (24hrs), Av , Min:112 , Max:137   ; Diastolic (24hrs), Av, Min:62, Max:79    I/O (24Hr):    Intake/Output Summary (Last 24 hours) at 2024 0988  Last data filed at 2024 0531  Gross per 24 hour   Intake 600 ml   Output 700 ml   Net -100 ml     Objective  Elderly black male lying in bed comfortably no distress  HEENT normocephalic atraumatic anicteric sclera  Neck with ostomy site covered  Lungs are clear anteriorly bilaterally no wheeze  Heart S1-S2 regular  Abdomen soft positive bowel sounds PEG tube in place  Lower extremities without any cyanosis or edema  CNS alert and oriented follows command  Psych cooperative  Labs/Imaging/Diagnostics    Labs:  CBC:No results for input(s): \"WBC\", \"RBC\", \"HGB\", \"HCT\", \"MCV\", \"RDW\", \"PLT\" in the last 72 hours.  CHEMISTRIES:No results for input(s): \"NA\", \"K\", \"CL\", \"CO2\", \"BUN\", \"CREATININE\", \"GLUCOSE\", \"CA\", \"PHOS\", \"MG\" in the last 72 hours.  PT/INR:No results for input(s): \"PROTIME\", \"INR\" in the last 72 hours.  APTT:No results for input(s): \"APTT\" in the last 72 hours.  LIVER PROFILE:No results for input(s): \"AST\", \"ALT\", \"BILIDIR\", \"BILITOT\", \"ALKPHOS\" in the last 72 hours.    Imaging Last 24 Hours:  No results found.  Assessment//Plan           Hospital Problems             Last Modified POA    * (Principal) Dysphagia 2024 Yes   Oropharyngeal dysmotility with

## 2024-02-12 NOTE — CARE COORDINATION
CM reviewed chart and noted PT recs for SNF.  Patient also just had PEG tube placed.    CM met with patient at bedside.  He was agreeable to SNF.  Choice letter signed for [facility] and placed on chart.  Referral sent.     CM updated patient's niece, Ms. Fan, to give her an update.    CM will continue to follow.

## 2024-02-12 NOTE — PLAN OF CARE
PHYSICAL THERAPY EVALUATION  Patient: Nando Mckenzie (88 y.o. male)  Date: 2/12/2024  Primary Diagnosis: Oropharyngeal dysphagia [R13.12]  Dysphagia [R13.10]  Procedure(s) (LRB):  EGD PEG TUBE PLACEMENT (N/A) 3 Days Post-Op   Precautions: Fall Risk                Recommendations for nursing mobility: Out of bed to chair for meals, AD and gt belt for bed to chair , Amb to bathroom with AD and gait belt, and Assist x1    In place during session: Peripheral IV    ASSESSMENT  Pt is a 88 y.o. male admitted on 2/8/2024 for dysphagia now s/p PEG POD3; currently presents to PT with decreased bed mob, transfers, LE strength, gt, balance, activity tolerance, and overall functional mobility . Pt semi supine in bed upon PT arrival, agreeable to evaluation. Pt A&O x 4.  Pt speaks with electrolarynx.    Based on the objective data described below, the patient currently presents with impaired functional mobility, decreased ROM, impaired strength, decreased activity tolerance, and impaired balance. (See below for objective details and assist levels).     Overall pt tolerated session fair today with overall CGA with bed mob and OOB transfers.  Pt was able to amb approx 8ft with RW and CGAx1, shuffling gt pattern, slow faustina.  Pt was assisted to recliner chair in room post amb.  Pt will benefit from continued skilled PT to address above deficits and return to PLOF.Current PT DC recommendation Skilled Nursing Facility once medically appropriate.      GOALS:  FUNCTIONAL STATUS PRIOR TO ADMISSION: pt lives alone in senior living apartment, uses rollator for amb      Physical Therapy Goals  Initiated 2/12/2024  Pt stated goal: to go home    I with LE HEP x7 days  Mod I with bed mob x7 days  SBA with all transfers x7 days  Amb 50-75ft with RW and CGAX1 x7 days         PLAN :  Recommendations and Planned Interventions: bed mobility training, transfer training, gait training, therapeutic exercises, patient and family training/education,

## 2024-02-13 LAB
GLUCOSE BLD STRIP.AUTO-MCNC: 125 MG/DL (ref 65–100)
GLUCOSE BLD STRIP.AUTO-MCNC: 139 MG/DL (ref 65–100)
GLUCOSE BLD STRIP.AUTO-MCNC: 141 MG/DL (ref 65–100)
GLUCOSE BLD STRIP.AUTO-MCNC: 206 MG/DL (ref 65–100)
GLUCOSE BLD STRIP.AUTO-MCNC: 214 MG/DL (ref 65–100)
PERFORMED BY:: ABNORMAL

## 2024-02-13 PROCEDURE — 6360000002 HC RX W HCPCS: Performed by: INTERNAL MEDICINE

## 2024-02-13 PROCEDURE — 82962 GLUCOSE BLOOD TEST: CPT

## 2024-02-13 PROCEDURE — 6370000000 HC RX 637 (ALT 250 FOR IP): Performed by: INTERNAL MEDICINE

## 2024-02-13 PROCEDURE — 1100000000 HC RM PRIVATE

## 2024-02-13 RX ORDER — LEVETIRACETAM 100 MG/ML
750 SOLUTION ORAL 2 TIMES DAILY
Qty: 450 ML | Refills: 0 | Status: SHIPPED | OUTPATIENT
Start: 2024-02-13 | End: 2024-03-14

## 2024-02-13 RX ORDER — CARVEDILOL 6.25 MG/1
6.25 TABLET ORAL 2 TIMES DAILY WITH MEALS
Qty: 60 TABLET | Refills: 3 | Status: SHIPPED | OUTPATIENT
Start: 2024-02-13

## 2024-02-13 RX ADMIN — CARVEDILOL 6.25 MG: 3.12 TABLET, FILM COATED ORAL at 17:24

## 2024-02-13 RX ADMIN — LEVETIRACETAM 750 MG: 500 SOLUTION ORAL at 21:59

## 2024-02-13 RX ADMIN — HEPARIN SODIUM 5000 UNITS: 5000 INJECTION INTRAVENOUS; SUBCUTANEOUS at 15:14

## 2024-02-13 RX ADMIN — CARVEDILOL 6.25 MG: 3.12 TABLET, FILM COATED ORAL at 10:11

## 2024-02-13 RX ADMIN — HEPARIN SODIUM 5000 UNITS: 5000 INJECTION INTRAVENOUS; SUBCUTANEOUS at 21:59

## 2024-02-13 RX ADMIN — HEPARIN SODIUM 5000 UNITS: 5000 INJECTION INTRAVENOUS; SUBCUTANEOUS at 06:30

## 2024-02-13 RX ADMIN — CLOPIDOGREL BISULFATE 75 MG: 75 TABLET ORAL at 10:11

## 2024-02-13 RX ADMIN — LEVETIRACETAM 750 MG: 500 SOLUTION ORAL at 10:11

## 2024-02-13 ASSESSMENT — PAIN SCALES - GENERAL
PAINLEVEL_OUTOF10: 0

## 2024-02-13 NOTE — DISCHARGE SUMMARY
Discharge Summary    Date: 2/14/2024  Patient Name: Nando Mckenzie    YOB: 1935     Age: 88 y.o.    Admit Date: 2/8/2024  Discharge Date: 2/14/2024  Discharge Condition: Stable    Admission Diagnosis  Oropharyngeal dysphagia [R13.12];Dysphagia [R13.10]      Discharge Diagnosis  Principal Problem:    Dysphagia  Resolved Problems:    * No resolved hospital problems. 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    Hospital course.  Patient was admitted to the hospital.  He was kept NPO.  He had no fever, chills, shortness of breath.  GI was consulted again.  Patient underwent upper endoscopy with PEG tube placement.  I did get a side consult with ENT who suggested perhaps the upper part of the esophagus can be dilated but with discussion with GI he states there was no stricture.  Patient has been started on tube feeding.  He is comfortable.  He will be needing rehab and [facility] has been chosen and authorization has been requested.  He is hemodynamically stable will be discharged when bed is available.    He has had PT OT and speech therapy.     Discharge diagnoses #1  Dysphagia  #2.  Oropharyngeal dysmotility with reflux  #3.  History of seizure  #4.  Arthritis  #5.  Hypertension  #6.  Diabetes  #7.  History of laryngeal cancer status post laryngectomy  #8.  History of pacemaker placement      Consultants:  IP CONSULT TO GI  IP CONSULT TO CASE MANAGEMENT    Surgeries/procedures Performed:  GI consultation  Upper endoscopy with PEG tube placement    PT OT and speech consultation     Treatments:            Discharge Plan/Disposition:  Home    Hospital/Incidental Findings Requiring Follow Up:    Patient  List        Current Discharge Medication List    CONTINUE these medications which have NOT CHANGED    daily jens multivitamin/iron (TAB-A-JENS) TABS tablet  Take 1 tablet by mouth daily    Saw Palmetto 500 MG CAPS  Take 450 mg by mouth daily    LEVEMIR FLEXPEN 100 UNIT/ML injection pen  Inject 15 Units into the skin nightly    VAN ASPIRIN PO  Take 81 mg by mouth daily    atorvastatin (LIPITOR) 10 MG tablet  Take 1 tablet by mouth nightly    clopidogrel (PLAVIX) 75 MG tablet  ceived the following from [facility]: Outside name: clopidogreL (PLAVIX) 75 mg tab    SITagliptin (JANUVIA) 25 MG tablet  Take 1 tablet by mouth daily          Current Discharge Medication List    STOP taking these medications    NIFEdipine (PROCARDIA XL) 30 MG extended release tablet  Comments:  Reason for Stopping:    levETIRAcetam (KEPPRA) 1000 MG tablet  Comments:  Reason for Stopping:          Time Spent on Discharge:  minutes were spent in patient examination, evaluation, counseling as well as medication reconciliation, prescriptions for required medications, discharge plan, and follow up.    Electronically signed by Kathy Teixeira MD on 2/14/24 at 9:15 AM EST

## 2024-02-13 NOTE — PROGRESS NOTES
Comprehensive Nutrition Assessment    Type and Reason for Visit:  Initial    Nutrition Recommendations/Plan:   Advanced diet to MM5/thin liquids per SLP rec'd as medically able  Considering adjusting DM medications to promote euglycemia  Consider anti-reflux medications as needed  Adjust TF to Jevity 1.5 via PEG Bolus 250ml 5x/day   Flush water 90ml before and after bolus  Provides 1875kcals (100%), 80g Pro (100%), 1850ml water (100%)  Monitor wt, Bms, TF tolerance and document in I/Os     Malnutrition Assessment:  Malnutrition Status:  Moderate malnutrition (02/13/24 1358)    Context:  Acute Illness     Findings of the 6 clinical characteristics of malnutrition:  Energy Intake:  Mild decrease in energy intake (Comment) (PO intake decreased begining 2/8 r/t dysphagia and food reflux)  Weight Loss:  5% over 1 month     Body Fat Loss:  Mild body fat loss Triceps   Muscle Mass Loss:  Moderate muscle mass loss Temples (temporalis), Clavicles (pectoralis & deltoids), Hand (interosseous)  Fluid Accumulation:  No significant fluid accumulation     Strength:  Not Performed    Nutrition Assessment:    Admitted for dysphagia w/ food reflux through nose. Pt recently d/c on 1/24 for same issue, no concerns noted s/p larygnoscopy, mild gastritis noted on upper endoscopy. SLP rec'd MM5/Thin liquid diet and feeding assistance w/ meals. PEG placed on 2/9 and TF regimen started on 2/10. Screened for TF Rec's. Per pt and pts niece PTA pt recently began MM5/Thins diet following SLP d/c rec's and was tolerating well until 2/8 when food reflux began. Pts -36# and #- 5% wt loss noted in past month. Per pts niece pt lives alone and she prepares his meals in advanced and checks in throughout the week.  Current TF regimen Glucerna 1.5 via PEG Bolus 150ml 4x per day w/ 100ml water flush Q4h- inadequate for pts needs. New TF rec's above to better meet pts nutritional needs. Rec'd beginning MM5/thin liquid diet as medically

## 2024-02-13 NOTE — CARE COORDINATION
0847: CM reviewed chart and received a call from patient's daughter.  She stated she spoke with patient and patient's niece yesterday evening and they reviewed the SNF choice list together.  Patient and family would now like patient to go to [facility].    Referral has been sent.  Patient accepted.  CM has requested that they start authorization.    1428: CM noted discharge order. Patient has authorization to discharge to [facility], but auth does not start until tomorrow, 2/14/24.      CM will follow up in the morning with [facility].  They will then have a room number and discharge can be completed.

## 2024-02-13 NOTE — PROGRESS NOTES
Left message for Dr. 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  Requested alternative.  Awaiting response.

## 2024-02-13 NOTE — PROGRESS NOTES
Comprehensive Nutrition Assessment    Type and Reason for Visit:  Initial    Nutrition Recommendations/Plan:   Advanced diet to MM5/thin liquids per SLP rec'd as medically able  Considering adjusting DM medications to promote euglycemia  Adjust TF to Jevity 1.5 via PEG Bolus 250ml 5x/day   Flush water 90ml before and after bolus  Provides 1875kcals (100%), 80g Pro (100%), 1850ml water (100%)  Monitor wt, Bms, TF tolerance and document in I/Os     Malnutrition Assessment:  Malnutrition Status:  Moderate malnutrition (02/13/24 1358)    Context:  Acute Illness     Findings of the 6 clinical characteristics of malnutrition:  Energy Intake:  Mild decrease in energy intake (Comment) (PO intake decreased begining 2/8 r/t dysphagia and food reflux)  Weight Loss:  5% over 1 month     Body Fat Loss:  Mild body fat loss Triceps   Muscle Mass Loss:  Moderate muscle mass loss Temples (temporalis), Clavicles (pectoralis & deltoids), Hand (interosseous)  Fluid Accumulation:  No significant fluid accumulation     Strength:  Not Performed    Nutrition Assessment:    Admitted for dysphagia w/ food reflux through nose. Pt recently d/c on 1/24 for same issue, no concerns noted s/p larygnoscopy, mild gastritis noted on upper endoscopy. SLP rec'd MM5/Thin liquid diet and feeding assistance w/ meals. PEG placed on 2/9 and TF regimen started on 2/10. Screened for TF Rec's. Per pt and pts niece PTA pt recently began MM5/Thins diet following SLP d/c rec's and was tolerating well until 2/8 when food reflux began. Pts -36# and #- 5% wt loss noted in past month. Per pts niece pt lives alone and she prepares his meals in advanced and checks in throughout the week.  Current TF regimen Glucerna 1.5 via PEG Bolus 150ml 4x per day w/ 100ml water flush Q4h- inadequate for pts needs. New TF rec's above to better meet pts nutritional needs. Rec'd beginning MM5/thin liquid diet as medically able. Labs: POC Glucose 118-214x24-hrs. Meds:

## 2024-02-14 VITALS
SYSTOLIC BLOOD PRESSURE: 138 MMHG | BODY MASS INDEX: 21.83 KG/M2 | HEART RATE: 68 BPM | TEMPERATURE: 98.5 F | OXYGEN SATURATION: 99 % | HEIGHT: 65 IN | RESPIRATION RATE: 16 BRPM | WEIGHT: 131 LBS | DIASTOLIC BLOOD PRESSURE: 71 MMHG

## 2024-02-14 LAB
GLUCOSE BLD STRIP.AUTO-MCNC: 264 MG/DL (ref 65–100)
GLUCOSE BLD STRIP.AUTO-MCNC: 270 MG/DL (ref 65–100)
PERFORMED BY:: ABNORMAL
PERFORMED BY:: ABNORMAL

## 2024-02-14 PROCEDURE — 82962 GLUCOSE BLOOD TEST: CPT

## 2024-02-14 PROCEDURE — 6370000000 HC RX 637 (ALT 250 FOR IP): Performed by: INTERNAL MEDICINE

## 2024-02-14 PROCEDURE — 6360000002 HC RX W HCPCS: Performed by: INTERNAL MEDICINE

## 2024-02-14 RX ORDER — INSULIN GLARGINE 100 [IU]/ML
15 INJECTION, SOLUTION SUBCUTANEOUS NIGHTLY
Qty: 10 ML | Refills: 3 | Status: SHIPPED | OUTPATIENT
Start: 2024-02-14

## 2024-02-14 RX ORDER — INSULIN GLARGINE 100 [IU]/ML
15 INJECTION, SOLUTION SUBCUTANEOUS NIGHTLY
Status: DISCONTINUED | OUTPATIENT
Start: 2024-02-14 | End: 2024-02-14 | Stop reason: HOSPADM

## 2024-02-14 RX ADMIN — LEVETIRACETAM 750 MG: 500 SOLUTION ORAL at 10:03

## 2024-02-14 RX ADMIN — CLOPIDOGREL BISULFATE 75 MG: 75 TABLET ORAL at 10:03

## 2024-02-14 RX ADMIN — CARVEDILOL 6.25 MG: 3.12 TABLET, FILM COATED ORAL at 10:03

## 2024-02-14 RX ADMIN — HEPARIN SODIUM 5000 UNITS: 5000 INJECTION INTRAVENOUS; SUBCUTANEOUS at 05:40

## 2024-02-14 ASSESSMENT — PAIN SCALES - GENERAL: PAINLEVEL_OUTOF10: 0

## 2024-02-14 NOTE — CARE COORDINATION
Patients insurance has approved for patient to go to [facility] skilled nursing facility.  CM messaged [facility] this morning to see if they have a bed available for the patient today.  CM awaiting response at this time.

## 2024-02-14 NOTE — CARE COORDINATION
Patient has been approved to admit to [facility] today.  CM is awaiting bed assignment at the SNF at this time.  CM met with patient at bedside. Patient is agreeable to discharge today.

## 2024-02-14 NOTE — CARE COORDINATION
Transition of Care Plan:    RUR: 14  Prior Level of Functioning:   Disposition: SNF  If SNF or IPR: Date FOC offered:   Date FOC received:   Accepting [facility]  Date authorization started with reference number:   Date authorization received and expires:   Follow up appointments:   DME needed:   Transportation at discharge: Family to transport at 1pm  IM/IMM Medicare/ letter given: yes  Is patient a  and connected with [facility]?    If yes, was  transfer form completed and [facility] notified?   Caregiver Contact: See note below  Discharge Caregiver contacted prior to discharge? yes  Care Conference needed?   Barriers to discharge:     Patient has been accepted to admit to [facility] located at [address] [place] [address].  Patient will go into room 109. Nurse to call report to [phone].  CM notified patient at bedside, patient is agreeable to discharge today.  CM called Katiana Wynn [phone] and Anjali Zhang [phone] to notify them of discharge and they agree.  CM called sister Gayla Fan [phone] and she will be here to  patient at 1pm to take him to SNF.  Primary nurse has been made aware of discharge time today.

## 2024-02-14 NOTE — PROGRESS NOTES
Placement verified by aspiration of stomach contents. Amount was <5 mL. 90 mL H2O flush administered, 250 mL of Jevity 1.5 given followed by an additional 90 mL of H2O for flush. Patient denied any discomfort during this time and tolerated well. Patient placed at 35 degrees during and after . Call light in reach.

## 2024-02-14 NOTE — PROGRESS NOTES
4 Eyes Skin Assessment     NAME:  Nando Mckenzie  YOB: 1935  MEDICAL RECORD NUMBER:  [id number]    The patient is being assessed for  Other weekly.    I agree that at least one RN has performed a thorough Head to Toe Skin Assessment on the patient. ALL assessment sites listed below have been assessed.      Areas assessed by both nurses:    Head, Face, Ears, Shoulders, Back, Chest, Arms, Elbows, Hands, Sacrum. Buttock, Coccyx, Ischium, Legs. Feet and Heels, and Under Medical Devices         Does the Patient have a Wound? No noted wound(s)       Tomás Prevention initiated by RN: No  Wound Care Orders initiated by RN: No    Pressure Injury (Stage 3,4, Unstageable, DTI, NWPT, and Complex wounds) if present, place Wound referral order by RN under : No    New Ostomies, if present place, Ostomy referral order under : No     Nurse 1 eSignature: Electronically signed by Charlene Taylor RN on 2/14/24 at 6:01 AM EST    **SHARE this note so that the co-signing nurse can place an eSignature**    Nurse 2 eSignature: Electronically signed by Ester Banks LPN on 2/14/24 at 6:37 AM EST

## 2024-02-14 NOTE — PROGRESS NOTES
Progress Note  Date:2024       Room:[facility]  Patient Name:Nando Mckenzie     YOB: 1935     Age:88 y.o.        Subjective    Subjective thick inspissated whitish secretion in the ostomy.  Patient appears comfortable no distress.  No chest pain or shortness of breath.  No diarrhea.  Tolerating tube feeding.      Review of Systems  Objective         Vitals Last 24 Hours:  TEMPERATURE:  Temp  Av.4 °F (36.9 °C)  Min: 98 °F (36.7 °C)  Max: 98.6 °F (37 °C)  RESPIRATIONS RANGE: Resp  Av  Min: 18  Max: 18  PULSE OXIMETRY RANGE: SpO2  Av.8 %  Min: 96 %  Max: 100 %  PULSE RANGE: Pulse  Av.9  Min: 60  Max: 65  BLOOD PRESSURE RANGE: Systolic (24hrs), Av , Min:104 , Max:149   ; Diastolic (24hrs), Av, Min:50, Max:76    I/O (24Hr):    Intake/Output Summary (Last 24 hours) at 2024 0904  Last data filed at 2024 0548  Gross per 24 hour   Intake 1500 ml   Output 1100 ml   Net 400 ml     Objective elderly black male lying in bed comfortably no distress  HEENT normocephalic atraumatic anicteric sclera  Neck with thick white sputum at the ostomy site  Lungs are clear bilaterally no wheeze  Heart S1-S2 regular  Abdomen soft nontender nondistended.  PEG tube in place.    Lower extremities without edema  CNS alert and oriented.  Follows command answers questions appropriately.    Labs/Imaging/Diagnostics    Labs:  CBC:No results for input(s): \"WBC\", \"RBC\", \"HGB\", \"HCT\", \"MCV\", \"RDW\", \"PLT\" in the last 72 hours.  CHEMISTRIES:No results for input(s): \"NA\", \"K\", \"CL\", \"CO2\", \"BUN\", \"CREATININE\", \"GLUCOSE\", \"CA\", \"PHOS\", \"MG\" in the last 72 hours.  PT/INR:No results for input(s): \"PROTIME\", \"INR\" in the last 72 hours.  APTT:No results for input(s): \"APTT\" in the last 72 hours.  LIVER PROFILE:No results for input(s): \"AST\", \"ALT\", \"BILIDIR\", \"BILITOT\", \"ALKPHOS\" in the last 72 hours.    Imaging Last 24 Hours:  No results found.  Assessment//Plan           Hospital Problems              Last Modified POA    * (Principal) Dysphagia 2/8/2024 Yes   Oropharyngeal dysmotility with reflux  History of seizure  Arthritis  Hypertension  Diabetes mellitus  History of laryngeal cancer status post laryngectomy  History of pacemaker placement    Assessment & Plan  Stable for discharge to nursing home.  Continue physical therapy.  Discussed with nurse to suction patient and do tracheostomy care frequently.  Reinstate nighttime insulin, for some reason he was not on patient's profile, hence blood sugar elevation.  Electronically signed by Kathy Teixeira MD on 2/14/24 at 9:04 AM EST

## 2024-02-26 ENCOUNTER — OFFICE VISIT (OUTPATIENT)
Age: 89
End: 2024-02-26
Payer: MEDICARE

## 2024-02-26 VITALS
WEIGHT: 139 LBS | BODY MASS INDEX: 23.13 KG/M2 | OXYGEN SATURATION: 98 % | HEART RATE: 81 BPM | SYSTOLIC BLOOD PRESSURE: 118 MMHG | DIASTOLIC BLOOD PRESSURE: 78 MMHG

## 2024-02-26 DIAGNOSIS — R13.14 PHARYNGOESOPHAGEAL DYSPHAGIA: ICD-10-CM

## 2024-02-26 DIAGNOSIS — Z85.21 PERSONAL HISTORY OF MALIGNANT NEOPLASM OF LARYNX: ICD-10-CM

## 2024-02-26 DIAGNOSIS — Z90.02 STATUS POST LARYNGECTOMY: Primary | ICD-10-CM

## 2024-02-26 PROCEDURE — G8484 FLU IMMUNIZE NO ADMIN: HCPCS | Performed by: STUDENT IN AN ORGANIZED HEALTH CARE EDUCATION/TRAINING PROGRAM

## 2024-02-26 PROCEDURE — 99213 OFFICE O/P EST LOW 20 MIN: CPT | Performed by: STUDENT IN AN ORGANIZED HEALTH CARE EDUCATION/TRAINING PROGRAM

## 2024-02-26 PROCEDURE — 1036F TOBACCO NON-USER: CPT | Performed by: STUDENT IN AN ORGANIZED HEALTH CARE EDUCATION/TRAINING PROGRAM

## 2024-02-26 PROCEDURE — G8427 DOCREV CUR MEDS BY ELIG CLIN: HCPCS | Performed by: STUDENT IN AN ORGANIZED HEALTH CARE EDUCATION/TRAINING PROGRAM

## 2024-02-26 PROCEDURE — 1111F DSCHRG MED/CURRENT MED MERGE: CPT | Performed by: STUDENT IN AN ORGANIZED HEALTH CARE EDUCATION/TRAINING PROGRAM

## 2024-02-26 PROCEDURE — 1123F ACP DISCUSS/DSCN MKR DOCD: CPT | Performed by: STUDENT IN AN ORGANIZED HEALTH CARE EDUCATION/TRAINING PROGRAM

## 2024-02-26 PROCEDURE — G8420 CALC BMI NORM PARAMETERS: HCPCS | Performed by: STUDENT IN AN ORGANIZED HEALTH CARE EDUCATION/TRAINING PROGRAM

## 2024-02-26 PROCEDURE — 31575 DIAGNOSTIC LARYNGOSCOPY: CPT | Performed by: STUDENT IN AN ORGANIZED HEALTH CARE EDUCATION/TRAINING PROGRAM

## 2024-02-26 NOTE — PROGRESS NOTES
same    Procedure: Flexible Fiberoptic Laryngoscopy    Anesthesia: Topical 4% Lidocaine, Oxymetazoline    Description of Procedure: Verbal informed consent was obtained. After application of topical anesthetic and decongestant, the flexible fiberoptic endoscope was introduced to the patient's nare. It was passed through the nose and into the pharynx. The scope was then withdrawn and repeated on the opposing nare. The patient tolerated the procedure well.     Findings:   Nasal Cavity: Normal nasal cavity, no polyposis or purulence.  Middle meatus clear bilaterally.   Nasopharynx: No overt masses or lesions.   Base of tongue: Vallecula & epiglottis unremarkable. Clear pyriform sinus.   Surgically absent larynx   Fabian-pharynx well healed. No mucosal lesions noted.     Assessment/Plan:   Assessment:   Nando Mckenzie is a 88 y.o. male with with Hx of total laryngectomy now with recurrent dysphagia. No suspicious lesions worth biopsying noted on scope exam. Now s/p PEG tube.     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 The risks and benefits of the considered procedures were discussed with the patient. All patient questions were answered.     Teri Dodd MD   [facility]  [address] [place] [address] [address]  [place] [address]  Office Phone: [phone]

## 2024-03-03 ENCOUNTER — HOSPITAL ENCOUNTER (EMERGENCY)
Facility: HOSPITAL | Age: 89
Discharge: HOME | End: 2024-03-03
Attending: STUDENT IN AN ORGANIZED HEALTH CARE EDUCATION/TRAINING PROGRAM
Payer: MEDICARE

## 2024-03-03 ENCOUNTER — APPOINTMENT (OUTPATIENT)
Facility: HOSPITAL | Age: 89
End: 2024-03-03
Payer: MEDICARE

## 2024-03-03 VITALS
WEIGHT: 139 LBS | TEMPERATURE: 98.6 F | SYSTOLIC BLOOD PRESSURE: 133 MMHG | BODY MASS INDEX: 23.16 KG/M2 | HEIGHT: 65 IN | OXYGEN SATURATION: 100 % | DIASTOLIC BLOOD PRESSURE: 79 MMHG | HEART RATE: 90 BPM | RESPIRATION RATE: 13 BRPM

## 2024-03-03 DIAGNOSIS — R40.4 TRANSIENT ALTERATION OF AWARENESS: Primary | ICD-10-CM

## 2024-03-03 LAB
ALBUMIN SERPL-MCNC: 3.7 G/DL (ref 3.5–5)
ALBUMIN/GLOB SERPL: 0.8 (ref 1.1–2.2)
ALP SERPL-CCNC: 155 U/L (ref 45–117)
ALT SERPL-CCNC: 57 U/L (ref 12–78)
ANION GAP SERPL CALC-SCNC: 0 MMOL/L (ref 5–15)
AST SERPL W P-5'-P-CCNC: 34 U/L (ref 15–37)
BASOPHILS # BLD: 0 K/UL (ref 0–0.1)
BASOPHILS NFR BLD: 0 % (ref 0–1)
BILIRUB SERPL-MCNC: 0.9 MG/DL (ref 0.2–1)
BUN SERPL-MCNC: 26 MG/DL (ref 6–20)
BUN/CREAT SERPL: 23 (ref 12–20)
CA-I BLD-MCNC: 10 MG/DL (ref 8.5–10.1)
CHLORIDE SERPL-SCNC: 107 MMOL/L (ref 97–108)
CO2 SERPL-SCNC: 31 MMOL/L (ref 21–32)
CREAT SERPL-MCNC: 1.13 MG/DL (ref 0.7–1.3)
DIFFERENTIAL METHOD BLD: ABNORMAL
EOSINOPHIL # BLD: 0.1 K/UL (ref 0–0.4)
EOSINOPHIL NFR BLD: 1 % (ref 0–7)
ERYTHROCYTE [DISTWIDTH] IN BLOOD BY AUTOMATED COUNT: 14.9 % (ref 11.5–14.5)
GLOBULIN SER CALC-MCNC: 4.7 G/DL (ref 2–4)
GLUCOSE SERPL-MCNC: 144 MG/DL (ref 65–100)
HCT VFR BLD AUTO: 37.7 % (ref 36.6–50.3)
HGB BLD-MCNC: 12 G/DL (ref 12.1–17)
IMM GRANULOCYTES # BLD AUTO: 0 K/UL (ref 0–0.04)
IMM GRANULOCYTES NFR BLD AUTO: 0 % (ref 0–0.5)
LACTATE BLD-SCNC: 1.51 MMOL/L (ref 0.4–2)
LYMPHOCYTES # BLD: 1.6 K/UL (ref 0.8–3.5)
LYMPHOCYTES NFR BLD: 22 % (ref 12–49)
MAGNESIUM SERPL-MCNC: 2.5 MG/DL (ref 1.6–2.4)
MCH RBC QN AUTO: 30.6 PG (ref 26–34)
MCHC RBC AUTO-ENTMCNC: 31.8 G/DL (ref 30–36.5)
MCV RBC AUTO: 96.2 FL (ref 80–99)
MONOCYTES # BLD: 0.7 K/UL (ref 0–1)
MONOCYTES NFR BLD: 10 % (ref 5–13)
NEUTS SEG # BLD: 4.9 K/UL (ref 1.8–8)
NEUTS SEG NFR BLD: 67 % (ref 32–75)
NRBC # BLD: 0 K/UL (ref 0–0.01)
NRBC BLD-RTO: 0 PER 100 WBC
PERFORMED BY:: NORMAL
PLATELET # BLD AUTO: 203 K/UL (ref 150–400)
PMV BLD AUTO: 11.9 FL (ref 8.9–12.9)
POTASSIUM SERPL-SCNC: 5.4 MMOL/L (ref 3.5–5.1)
PROT SERPL-MCNC: 8.4 G/DL (ref 6.4–8.2)
RBC # BLD AUTO: 3.92 M/UL (ref 4.1–5.7)
SODIUM SERPL-SCNC: 138 MMOL/L (ref 136–145)
WBC # BLD AUTO: 7.2 K/UL (ref 4.1–11.1)

## 2024-03-03 PROCEDURE — 36415 COLL VENOUS BLD VENIPUNCTURE: CPT

## 2024-03-03 PROCEDURE — 80053 COMPREHEN METABOLIC PANEL: CPT

## 2024-03-03 PROCEDURE — 70450 CT HEAD/BRAIN W/O DYE: CPT

## 2024-03-03 PROCEDURE — 96374 THER/PROPH/DIAG INJ IV PUSH: CPT

## 2024-03-03 PROCEDURE — 2580000003 HC RX 258: Performed by: STUDENT IN AN ORGANIZED HEALTH CARE EDUCATION/TRAINING PROGRAM

## 2024-03-03 PROCEDURE — 93005 ELECTROCARDIOGRAM TRACING: CPT | Performed by: STUDENT IN AN ORGANIZED HEALTH CARE EDUCATION/TRAINING PROGRAM

## 2024-03-03 PROCEDURE — 80177 DRUG SCRN QUAN LEVETIRACETAM: CPT

## 2024-03-03 PROCEDURE — 6360000002 HC RX W HCPCS: Performed by: STUDENT IN AN ORGANIZED HEALTH CARE EDUCATION/TRAINING PROGRAM

## 2024-03-03 PROCEDURE — 99284 EMERGENCY DEPT VISIT MOD MDM: CPT

## 2024-03-03 PROCEDURE — 83605 ASSAY OF LACTIC ACID: CPT

## 2024-03-03 PROCEDURE — 85025 COMPLETE CBC W/AUTO DIFF WBC: CPT

## 2024-03-03 PROCEDURE — 83735 ASSAY OF MAGNESIUM: CPT

## 2024-03-03 RX ORDER — LEVETIRACETAM 500 MG/5ML
1500 INJECTION, SOLUTION, CONCENTRATE INTRAVENOUS ONCE
Status: COMPLETED | OUTPATIENT
Start: 2024-03-03 | End: 2024-03-03

## 2024-03-03 RX ORDER — 0.9 % SODIUM CHLORIDE 0.9 %
1000 INTRAVENOUS SOLUTION INTRAVENOUS ONCE
Status: COMPLETED | OUTPATIENT
Start: 2024-03-03 | End: 2024-03-03

## 2024-03-03 RX ADMIN — SODIUM CHLORIDE 1000 ML: 9 INJECTION, SOLUTION INTRAVENOUS at 12:16

## 2024-03-03 RX ADMIN — LEVETIRACETAM 1500 MG: 100 INJECTION, SOLUTION INTRAVENOUS at 12:16

## 2024-03-03 ASSESSMENT — PAIN SCALES - GENERAL: PAINLEVEL_OUTOF10: 0

## 2024-03-03 ASSESSMENT — PAIN - FUNCTIONAL ASSESSMENT
PAIN_FUNCTIONAL_ASSESSMENT: NONE - DENIES PAIN
PAIN_FUNCTIONAL_ASSESSMENT: 0-10

## 2024-03-03 NOTE — ED NOTES
Writer called pts daughter-in-law, Monica to  pt, Monica reports her and  godfrey are out of town but gave information to contact ronda Shukla at [phone] or [phone].     Writer contacted ronda, ronda will be here to  pt around 1900.

## 2024-03-03 NOTE — DISCHARGE INSTRUCTIONS
Thank you!  Thank you for allowing me to care for you in the emergency department. It is my goal to provide you with excellent care.  Please fill out the survey that will come to you by mail or email since we listen to your feedback!     Below you will find a list of your tests from today's visit.  Should you have any questions, please do not hesitate to call the emergency department.    Labs  Recent Results (from the past 12 hour(s))   CBC with Auto Differential    Collection Time: 03/03/24 11:47 AM   Result Value Ref Range    WBC 7.2 4.1 - 11.1 K/uL    RBC 3.92 (L) 4.10 - 5.70 M/uL    Hemoglobin 12.0 (L) 12.1 - 17.0 g/dL    Hematocrit 37.7 36.6 - 50.3 %    MCV 96.2 80.0 - 99.0 FL    MCH 30.6 26.0 - 34.0 PG    MCHC 31.8 30.0 - 36.5 g/dL    RDW 14.9 (H) 11.5 - 14.5 %    Platelets 203 150 - 400 K/uL    MPV 11.9 8.9 - 12.9 FL    Nucleated RBCs 0.0 0.0  WBC    nRBC 0.00 0.00 - 0.01 K/uL    Neutrophils % 67 32 - 75 %    Lymphocytes % 22 12 - 49 %    Monocytes % 10 5 - 13 %    Eosinophils % 1 0 - 7 %    Basophils % 0 0 - 1 %    Immature Granulocytes 0 0 - 0.5 %    Neutrophils Absolute 4.9 1.8 - 8.0 K/UL    Lymphocytes Absolute 1.6 0.8 - 3.5 K/UL    Monocytes Absolute 0.7 0.0 - 1.0 K/UL    Eosinophils Absolute 0.1 0.0 - 0.4 K/UL    Basophils Absolute 0.0 0.0 - 0.1 K/UL    Absolute Immature Granulocyte 0.0 0.00 - 0.04 K/UL    Differential Type AUTOMATED     CMP    Collection Time: 03/03/24 11:47 AM   Result Value Ref Range    Sodium 138 136 - 145 mmol/L    Potassium 5.4 (H) 3.5 - 5.1 mmol/L    Chloride 107 97 - 108 mmol/L    CO2 31 21 - 32 mmol/L    Anion Gap 0 (L) 5 - 15 mmol/L    Glucose 144 (H) 65 - 100 mg/dL    BUN 26 (H) 6 - 20 mg/dL    Creatinine 1.13 0.70 - 1.30 mg/dL    Bun/Cre Ratio 23 (H) 12 - 20      Est, Glom Filt Rate >60 >60 ml/min/1.73m2    Calcium 10.0 8.5 - 10.1 mg/dL    Total Bilirubin 0.9 0.2 - 1.0 mg/dL    AST 34 15 - 37 U/L    ALT 57 12 - 78 U/L    Alk Phosphatase 155 (H) 45 - 117 U/L

## 2024-03-03 NOTE — ED TRIAGE NOTES
Pt from home alone. Per family, patient was eating breakfast when he had episode where he went altered and leaned to left. Pt was back to baseline when EMS arrived. Same episode happened with EMS.     Grace camarillo

## 2024-03-03 NOTE — PROGRESS NOTES
Spiritual Care Assessment/Progress Note  [facility]    Name: Nando Mckenzie MRN: [id number]    Age: 88 y.o.     Sex: male   Language: English     Date: 3/3/2024            Total Time Calculated: 7 min              Spiritual Assessment begun in [facility] EMERGENCY DEPT  Service Provided For:: Patient and family together  Referral/Consult From:: Other (comment) (Family request for chair)  Encounter Overview/Reason : Initial Encounter    Spiritual beliefs:      [x] Involved in a stanton tradition/spiritual practice:      [] Supported by a stanton community:      [] Claims no spiritual orientation:      [] Seeking spiritual identity:           [] Adheres to an individual form of spirituality:      [] Not able to assess:                Identified resources for coping and support system:           [] Prayer                  [] Devotional reading               [] Music                  [] Guided Imagery     [] Pet visits                                        [] Other: (COMMENT)     Specific area/focus of visit   Encounter:    Crisis:    Spiritual/Emotional needs: Type: Spiritual Support  Ritual, Rites and Sacraments: Type: Blessings  Grief, Loss, and Adjustments:    Ethics/Mediation:    Behavioral Health:    Palliative Care:    Advance Care Planning:      Plan/Referrals: Other (Comment) ( is available if needed)    Narrative:  was approached by family member asking for a chair.  provided a chair as requested. Pt is lying in bed. Pt reports he is cold.  provides a blanket for the pt and refreshments for family members. Pt reports he is fine. Pt/family express their appreciation for the support. Family shares they believe the  was a Godsend.     met pt/family requests. Invited pt/family to articulate their feelings/concerns. Provided support and ministry of presence.    Please contact Spiritual Care for any emotional/spiritual needs and/or further referrals. Thank

## 2024-03-04 LAB
EKG ATRIAL RATE: 61 BPM
EKG DIAGNOSIS: NORMAL
EKG P AXIS: -15 DEGREES
EKG P-R INTERVAL: 288 MS
EKG Q-T INTERVAL: 438 MS
EKG QRS DURATION: 130 MS
EKG QTC CALCULATION (BAZETT): 440 MS
EKG R AXIS: -46 DEGREES
EKG T AXIS: -7 DEGREES
EKG VENTRICULAR RATE: 61 BPM

## 2024-03-04 NOTE — ED PROVIDER NOTES
[facility] EMERGENCY DEPT  EMERGENCY DEPARTMENT HISTORY AND PHYSICAL EXAM      Date: 3/3/2024  Patient Name: Nando Mckenzie  MRN: [id number]  Birthdate 1935  Date of evaluation: 3/3/2024  Provider: Jermaine Duran MD   Note Started: 7:26 AM EST 3/4/24    HISTORY OF PRESENT ILLNESS     Chief Complaint   Patient presents with    Altered Mental Status       History Provided By: Patient, EMS    HPI: Nando Mckenzie is a 88 y.o. male with past medical history as reviewed below presents for evaluation of altered mental status.  Family noted 2 events today for patient seem to be asleep or not responding and leaning to the left.  Episodes were short-lived with no witnessed seizure-like activity and no period of confusion afterward.  He is at his neurologic baseline and has no focal motor weakness or loss of sensation.  He reports feeling well recently with no sick symptoms, no changes to his medications, no recent trauma    PAST MEDICAL HISTORY   Past Medical History:  Past Medical History:   Diagnosis Date    Anemia     Arthritis     Diabetes (HCC)     Hypercholesterolemia     Hypertension     Single kidney     Cr normal    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       Past Surgical History:  Past Surgical History:   Procedure Laterality Date    CHOLECYSTECTOMY, LAPAROSCOPIC  12-19-12    By Dr. Manzanares    GASTROSTOMY TUBE PLACEMENT N/A 2/9/2024    EGD PEG TUBE PLACEMENT performed by Daphney Pope MD at [facility] ENDOSCOPY    KIDNEY REMOVAL      Pt states does not know reason    LARYNGECTOMY      STOMACH SURGERY N/A 7/5/2023    REMOVAL PEG TUBE performed by Daphney Pope MD at [facility]    UPPER GASTROINTESTINAL ENDOSCOPY N/A 1/22/2024    EGD ESOPHAGOGASTRODUODENOSCOPY performed by Daphney Pope MD at [facility] ENDOS[facility]       Family History:  History reviewed. No pertinent family history.    Social History:  Social History     Tobacco Use    Smoking status: Former     Current packs/day: 0.00     Types: Cigarettes     Quit

## 2024-03-06 LAB — LEVETIRACETAM SERPL-MCNC: 14.8 UG/ML (ref 10–40)

## 2024-03-14 ENCOUNTER — HOSPITAL ENCOUNTER (EMERGENCY)
Facility: HOSPITAL | Age: 89
Discharge: HOME | End: 2024-03-14
Attending: STUDENT IN AN ORGANIZED HEALTH CARE EDUCATION/TRAINING PROGRAM
Payer: MEDICARE

## 2024-03-14 VITALS
TEMPERATURE: 97.9 F | SYSTOLIC BLOOD PRESSURE: 114 MMHG | BODY MASS INDEX: 21.66 KG/M2 | OXYGEN SATURATION: 100 % | RESPIRATION RATE: 14 BRPM | DIASTOLIC BLOOD PRESSURE: 62 MMHG | WEIGHT: 130 LBS | HEIGHT: 65 IN | HEART RATE: 62 BPM

## 2024-03-14 DIAGNOSIS — R56.9 SEIZURES (HCC): Primary | ICD-10-CM

## 2024-03-14 LAB
ALBUMIN SERPL-MCNC: 3.6 G/DL (ref 3.5–5)
ALBUMIN/GLOB SERPL: 0.9 (ref 1.1–2.2)
ALP SERPL-CCNC: 174 U/L (ref 45–117)
ALT SERPL-CCNC: 38 U/L (ref 12–78)
ANION GAP SERPL CALC-SCNC: 4 MMOL/L (ref 5–15)
AST SERPL W P-5'-P-CCNC: ABNORMAL U/L (ref 15–37)
BASOPHILS # BLD: 0 K/UL (ref 0–0.1)
BASOPHILS NFR BLD: 0 % (ref 0–1)
BILIRUB SERPL-MCNC: 1.1 MG/DL (ref 0.2–1)
BUN SERPL-MCNC: 29 MG/DL (ref 6–20)
BUN/CREAT SERPL: 25 (ref 12–20)
CA-I BLD-MCNC: 9.5 MG/DL (ref 8.5–10.1)
CHLORIDE SERPL-SCNC: 103 MMOL/L (ref 97–108)
CO2 SERPL-SCNC: 28 MMOL/L (ref 21–32)
CREAT SERPL-MCNC: 1.17 MG/DL (ref 0.7–1.3)
DIFFERENTIAL METHOD BLD: ABNORMAL
EKG ATRIAL RATE: 63 BPM
EKG DIAGNOSIS: NORMAL
EKG P-R INTERVAL: 210 MS
EKG Q-T INTERVAL: 458 MS
EKG QRS DURATION: 128 MS
EKG QTC CALCULATION (BAZETT): 468 MS
EKG R AXIS: -43 DEGREES
EKG T AXIS: -14 DEGREES
EKG VENTRICULAR RATE: 63 BPM
EOSINOPHIL # BLD: 0 K/UL (ref 0–0.4)
EOSINOPHIL NFR BLD: 0 % (ref 0–7)
ERYTHROCYTE [DISTWIDTH] IN BLOOD BY AUTOMATED COUNT: 14 % (ref 11.5–14.5)
GLOBULIN SER CALC-MCNC: 3.9 G/DL (ref 2–4)
GLUCOSE SERPL-MCNC: 262 MG/DL (ref 65–100)
HCT VFR BLD AUTO: 35.2 % (ref 36.6–50.3)
HGB BLD-MCNC: 11.6 G/DL (ref 12.1–17)
IMM GRANULOCYTES # BLD AUTO: 0 K/UL (ref 0–0.04)
IMM GRANULOCYTES NFR BLD AUTO: 0 % (ref 0–0.5)
LYMPHOCYTES # BLD: 1.1 K/UL (ref 0.8–3.5)
LYMPHOCYTES NFR BLD: 14 % (ref 12–49)
MCH RBC QN AUTO: 30.9 PG (ref 26–34)
MCHC RBC AUTO-ENTMCNC: 33 G/DL (ref 30–36.5)
MCV RBC AUTO: 93.9 FL (ref 80–99)
MONOCYTES # BLD: 0.8 K/UL (ref 0–1)
MONOCYTES NFR BLD: 10 % (ref 5–13)
NEUTS SEG # BLD: 5.8 K/UL (ref 1.8–8)
NEUTS SEG NFR BLD: 76 % (ref 32–75)
NRBC # BLD: 0 K/UL (ref 0–0.01)
NRBC BLD-RTO: 0 PER 100 WBC
PLATELET # BLD AUTO: 182 K/UL (ref 150–400)
PMV BLD AUTO: 12 FL (ref 8.9–12.9)
POTASSIUM SERPL-SCNC: ABNORMAL MMOL/L (ref 3.5–5.1)
PROT SERPL-MCNC: 7.5 G/DL (ref 6.4–8.2)
RBC # BLD AUTO: 3.75 M/UL (ref 4.1–5.7)
SODIUM SERPL-SCNC: 135 MMOL/L (ref 136–145)
WBC # BLD AUTO: 7.8 K/UL (ref 4.1–11.1)

## 2024-03-14 PROCEDURE — 80053 COMPREHEN METABOLIC PANEL: CPT

## 2024-03-14 PROCEDURE — 80177 DRUG SCRN QUAN LEVETIRACETAM: CPT

## 2024-03-14 PROCEDURE — 93005 ELECTROCARDIOGRAM TRACING: CPT | Performed by: STUDENT IN AN ORGANIZED HEALTH CARE EDUCATION/TRAINING PROGRAM

## 2024-03-14 PROCEDURE — 36415 COLL VENOUS BLD VENIPUNCTURE: CPT

## 2024-03-14 PROCEDURE — 99284 EMERGENCY DEPT VISIT MOD MDM: CPT

## 2024-03-14 PROCEDURE — 85025 COMPLETE CBC W/AUTO DIFF WBC: CPT

## 2024-03-14 ASSESSMENT — PAIN SCALES - GENERAL
PAINLEVEL_OUTOF10: 0
PAINLEVEL_OUTOF10: 0

## 2024-03-14 ASSESSMENT — PAIN - FUNCTIONAL ASSESSMENT
PAIN_FUNCTIONAL_ASSESSMENT: 0-10
PAIN_FUNCTIONAL_ASSESSMENT: 0-10

## 2024-03-14 NOTE — DISCHARGE INSTRUCTIONS
Thank you!  Thank you for allowing me to care for you in the emergency department. It is my goal to provide you with excellent care.  Please fill out the survey that will come to you by mail or email since we listen to your feedback!     Below you will find a list of your tests from today's visit.  Should you have any questions, please do not hesitate to call the emergency department.    Labs  Recent Results (from the past 12 hour(s))   CBC with Auto Differential    Collection Time: 03/14/24 10:44 AM   Result Value Ref Range    WBC 7.8 4.1 - 11.1 K/uL    RBC 3.75 (L) 4.10 - 5.70 M/uL    Hemoglobin 11.6 (L) 12.1 - 17.0 g/dL    Hematocrit 35.2 (L) 36.6 - 50.3 %    MCV 93.9 80.0 - 99.0 FL    MCH 30.9 26.0 - 34.0 PG    MCHC 33.0 30.0 - 36.5 g/dL    RDW 14.0 11.5 - 14.5 %    Platelets 182 150 - 400 K/uL    MPV 12.0 8.9 - 12.9 FL    Nucleated RBCs 0.0 0.0  WBC    nRBC 0.00 0.00 - 0.01 K/uL    Neutrophils % 76 (H) 32 - 75 %    Lymphocytes % 14 12 - 49 %    Monocytes % 10 5 - 13 %    Eosinophils % 0 0 - 7 %    Basophils % 0 0 - 1 %    Immature Granulocytes 0 0 - 0.5 %    Neutrophils Absolute 5.8 1.8 - 8.0 K/UL    Lymphocytes Absolute 1.1 0.8 - 3.5 K/UL    Monocytes Absolute 0.8 0.0 - 1.0 K/UL    Eosinophils Absolute 0.0 0.0 - 0.4 K/UL    Basophils Absolute 0.0 0.0 - 0.1 K/UL    Absolute Immature Granulocyte 0.0 0.00 - 0.04 K/UL    Differential Type AUTOMATED     Comprehensive Metabolic Panel    Collection Time: 03/14/24 10:44 AM   Result Value Ref Range    Sodium 135 (L) 136 - 145 mmol/L    Potassium Hemolyzed, Recollection Recommended 3.5 - 5.1 mmol/L    Chloride 103 97 - 108 mmol/L    CO2 28 21 - 32 mmol/L    Anion Gap 4 (L) 5 - 15 mmol/L    Glucose 262 (H) 65 - 100 mg/dL    BUN 29 (H) 6 - 20 mg/dL    Creatinine 1.17 0.70 - 1.30 mg/dL    Bun/Cre Ratio 25 (H) 12 - 20      Est, Glom Filt Rate 60 (L) >60 ml/min/1.73m2    Calcium 9.5 8.5 - 10.1 mg/dL    Total Bilirubin 1.1 (H) 0.2 - 1.0 mg/dL    AST Hemolyzed,

## 2024-03-14 NOTE — ED PROVIDER NOTES
Transportation     Lack of Transportation (Medical): No     Lack of Transportation (Non-Medical): No   Physical Activity: Not on file   Stress: Not on file   Social Connections: Not on file   Intimate Partner Violence: Not on file   Depression: Not on file   Housing Stability: Low Risk  (2/9/2024)    Housing Stability Vital Sign     Unable to Pay for Housing in the Last Year: No     Number of Places Lived in the Last Year: 1     Unstable Housing in the Last Year: No   Interpersonal Safety: Not At Risk (2/9/2024)    Interpersonal Safety ([facility] HRSN)     How often does anyone, including family and friends, physically hurt you?: Never     How often does anyone, including family and friends, scream or curse at you?: Not on file     How often does anyone, including family and friends, insult or talk down to you?: Not on file     How often does anyone, including family and friends, threaten you with harm?: Not on file   Utilities: Not At Risk (2/9/2024)    [facility] Utilities     Threatened with loss of utilities: No       PHYSICAL EXAM   Physical Exam  Constitutional:       General: He is not in acute distress.  HENT:      Mouth/Throat:      Mouth: Mucous membranes are moist.      Pharynx: Oropharynx is clear.   Eyes:      Extraocular Movements: Extraocular movements intact.      Conjunctiva/sclera: Conjunctivae normal.      Pupils: Pupils are equal, round, and reactive to light.   Cardiovascular:      Rate and Rhythm: Regular rhythm.      Pulses: Normal pulses.   Pulmonary:      Effort: Pulmonary effort is normal. No respiratory distress.      Breath sounds: Normal breath sounds.   Abdominal:      General: Abdomen is flat. There is no distension.      Palpations: Abdomen is soft.      Tenderness: There is no abdominal tenderness.      Comments: G-tube in place   Musculoskeletal:      Cervical back: Neck supple.      Right lower leg: No edema.      Left lower leg: No edema.   Neurological:      General: No focal deficit present.

## 2024-03-14 NOTE — ED TRIAGE NOTES
EMS was called by family for a pt who was down on the ground; pt is from an independent living facility [address] [place] [address]; pt has Hx of seizures and DM; EMS reports that pt usually refuses to come to ER however upon their arrival hs BP was 88/50 and pt was a bit out of it

## 2024-03-16 LAB — LEVETIRACETAM SERPL-MCNC: 69.9 UG/ML (ref 10–40)

## 2024-03-22 NOTE — PROGRESS NOTES
Problem: Pressure Injury - Risk of  Goal: *Prevention of pressure injury  Description: Document Jabier Scale and appropriate interventions in the flowsheet. Outcome: Progressing Towards Goal  Note: Pressure Injury Interventions:  Sensory Interventions: Assess changes in LOC    Moisture Interventions: Absorbent underpads, Internal/External urinary devices    Activity Interventions: Chair cushion, Increase time out of bed    Mobility Interventions: Assess need for specialty bed, Chair cushion, Float heels, HOB 30 degrees or less    Nutrition Interventions: Document food/fluid/supplement intake    Friction and Shear Interventions: Apply protective barrier, creams and emollients, Feet elevated on foot rest, Lift sheet, Lift team/patient mobility team, HOB 30 degrees or less, Foam dressings/transparent film/skin sealants                Problem: Patient Education: Go to Patient Education Activity  Goal: Patient/Family Education  Outcome: Progressing Towards Goal     Problem: Falls - Risk of  Goal: *Absence of Falls  Description: Document Mikaela Fall Risk and appropriate interventions in the flowsheet.   Outcome: Progressing Towards Goal  Note: Fall Risk Interventions:  Mobility Interventions: Bed/chair exit alarm, Patient to call before getting OOB, Strengthening exercises (ROM-active/passive), Utilize walker, cane, or other assistive device    Mentation Interventions: Adequate sleep, hydration, pain control    Medication Interventions: Bed/chair exit alarm    Elimination Interventions: Bed/chair exit alarm, Call light in reach, Patient to call for help with toileting needs    History of Falls Interventions: Bed/chair exit alarm         Problem: Patient Education: Go to Patient Education Activity  Goal: Patient/Family Education  Outcome: Progressing Towards Goal Shoulder Scope RCR follow Up 1st Visit      Patient: Colette Whitlock        YOB: 1965      Chief Complaints: shoulder pain right      History of Present Illness: Pt is here f/u shoulder arthroscopy rotator cuff repair on the right she is doing well she is really off her pain medicine or only taking 1 a day if she is doing much better she is minding her restrictions        Allergies:   Allergies   Allergen Reactions    Penicillin G Unknown (See Comments)     Told this as a child       Medications:   Home Medications:  Current Outpatient Medications on File Prior to Visit   Medication Sig    acetaminophen (TYLENOL) 500 MG tablet Take 1 tablet by mouth Every 6 (Six) Hours As Needed for Mild Pain.    amoxicillin (AMOXIL) 500 MG capsule Take 2 capsules by mouth 2 (Two) Times a Day.    Diclofenac Sodium (Voltaren Arthritis Pain) 1 % gel gel Apply 4 g topically to the appropriate area as directed 2 (Two) Times a Day. (Patient taking differently: Apply 4 g topically to the appropriate area as directed Every 6 (Six) Hours As Needed. FOLLOW MD GUIDELINES ON HOLDING FOR DOS)    ketotifen (ZADITOR) 0.025 % ophthalmic solution Apply 1 drop to eye(s) as directed by provider Every 12 (Twelve) Hours As Needed.    montelukast (SINGULAIR) 10 MG tablet Take 1 tablet by mouth every night at bedtime. (Patient taking differently: Take 1 tablet by mouth At Night As Needed.)    ondansetron (Zofran) 4 MG tablet Take 1 tablet by mouth Every 8 (Eight) Hours As Needed for Nausea or Vomiting.    oxyCODONE-acetaminophen (PERCOCET) 5-325 MG per tablet Take 1-2 tablets by mouth Every 4 (Four) Hours As Needed for Moderate Pain (1 tablet for moderate pain or 2 tablets for severe pain).    psyllium (Metamucil Smooth Texture) 58.6 % powder 1 large tablespoon mixed in 12-15 ounces of water every morning. (Patient taking differently: Take 1 packet by mouth As Needed. 1 large tablespoon mixed in 12-15 ounces of water every morning.)      No current facility-administered medications on file prior to visit.     Current Medications:  Scheduled Meds:  Continuous Infusions:No current facility-administered medications for this visit.    PRN Meds:.          Physical Exam: 59 y.o. female  General Appearance:    Alert, cooperative, in no acute distress                 There were no vitals filed for this visit.   Patient is alert and oriented ×3 no acute distress normal mood physical exam.  Physical exam of the shoulder, incisions looked good there is no erythema,no signs or sx of infection.        Assessment  S/P shoulder scope.  I did review intraoperative findings and arthroscopic pictures with the patient.  We are going to remove her sutures today place Steri-Strips we reiterated restrictions I showed her how to start passively moving this we will start her into physical therapy I will send another prescription in for her pain medicine although it sounds like she is doing very good on that I will see her back in 4 weeks

## 2024-03-25 ENCOUNTER — HOSPITAL ENCOUNTER (INPATIENT)
Facility: HOSPITAL | Age: 89
LOS: 2 days | Discharge: NURSING, REHAB OR LONG-TERM CARE | DRG: 641 | End: 2024-03-28
Attending: STUDENT IN AN ORGANIZED HEALTH CARE EDUCATION/TRAINING PROGRAM | Admitting: INTERNAL MEDICINE
Payer: MEDICARE

## 2024-03-25 ENCOUNTER — APPOINTMENT (OUTPATIENT)
Facility: HOSPITAL | Age: 89
DRG: 641 | End: 2024-03-25
Payer: MEDICARE

## 2024-03-25 DIAGNOSIS — R73.9 HYPERGLYCEMIA: Primary | ICD-10-CM

## 2024-03-25 DIAGNOSIS — R29.6 FREQUENT FALLS: ICD-10-CM

## 2024-03-25 LAB
ALBUMIN SERPL-MCNC: 3.5 G/DL (ref 3.5–5)
ALBUMIN/GLOB SERPL: 0.9 (ref 1.1–2.2)
ALP SERPL-CCNC: 339 U/L (ref 45–117)
ALT SERPL-CCNC: 32 U/L (ref 12–78)
ANION GAP SERPL CALC-SCNC: 3 MMOL/L (ref 5–15)
AST SERPL W P-5'-P-CCNC: 22 U/L (ref 15–37)
BASOPHILS # BLD: 0 K/UL (ref 0–0.1)
BASOPHILS NFR BLD: 0 % (ref 0–1)
BILIRUB SERPL-MCNC: 0.7 MG/DL (ref 0.2–1)
BUN SERPL-MCNC: 44 MG/DL (ref 6–20)
BUN/CREAT SERPL: 30 (ref 12–20)
CA-I BLD-MCNC: 9.3 MG/DL (ref 8.5–10.1)
CHLORIDE SERPL-SCNC: 94 MMOL/L (ref 97–108)
CO2 SERPL-SCNC: 31 MMOL/L (ref 21–32)
CREAT SERPL-MCNC: 1.47 MG/DL (ref 0.7–1.3)
DIFFERENTIAL METHOD BLD: ABNORMAL
EOSINOPHIL # BLD: 0 K/UL (ref 0–0.4)
EOSINOPHIL NFR BLD: 0 % (ref 0–7)
ERYTHROCYTE [DISTWIDTH] IN BLOOD BY AUTOMATED COUNT: 13.7 % (ref 11.5–14.5)
GLOBULIN SER CALC-MCNC: 3.7 G/DL (ref 2–4)
GLUCOSE SERPL-MCNC: 615 MG/DL (ref 65–100)
HCT VFR BLD AUTO: 30.8 % (ref 36.6–50.3)
HGB BLD-MCNC: 10.2 G/DL (ref 12.1–17)
IMM GRANULOCYTES # BLD AUTO: 0 K/UL (ref 0–0.04)
IMM GRANULOCYTES NFR BLD AUTO: 0 % (ref 0–0.5)
INR PPP: 1 (ref 0.9–1.1)
LYMPHOCYTES # BLD: 1.3 K/UL (ref 0.8–3.5)
LYMPHOCYTES NFR BLD: 16 % (ref 12–49)
MCH RBC QN AUTO: 30.6 PG (ref 26–34)
MCHC RBC AUTO-ENTMCNC: 33.1 G/DL (ref 30–36.5)
MCV RBC AUTO: 92.5 FL (ref 80–99)
MONOCYTES # BLD: 0.8 K/UL (ref 0–1)
MONOCYTES NFR BLD: 10 % (ref 5–13)
NEUTS SEG # BLD: 5.8 K/UL (ref 1.8–8)
NEUTS SEG NFR BLD: 74 % (ref 32–75)
NRBC # BLD: 0 K/UL (ref 0–0.01)
NRBC BLD-RTO: 0 PER 100 WBC
PLATELET # BLD AUTO: 199 K/UL (ref 150–400)
PMV BLD AUTO: 12.6 FL (ref 8.9–12.9)
POTASSIUM SERPL-SCNC: 5.3 MMOL/L (ref 3.5–5.1)
PROT SERPL-MCNC: 7.2 G/DL (ref 6.4–8.2)
PROTHROMBIN TIME: 13.6 SEC (ref 11.9–14.6)
RBC # BLD AUTO: 3.33 M/UL (ref 4.1–5.7)
SODIUM SERPL-SCNC: 128 MMOL/L (ref 136–145)
TROPONIN I SERPL HS-MCNC: 21 NG/L (ref 0–76)
WBC # BLD AUTO: 8 K/UL (ref 4.1–11.1)

## 2024-03-25 PROCEDURE — 96361 HYDRATE IV INFUSION ADD-ON: CPT

## 2024-03-25 PROCEDURE — 36415 COLL VENOUS BLD VENIPUNCTURE: CPT

## 2024-03-25 PROCEDURE — 96374 THER/PROPH/DIAG INJ IV PUSH: CPT

## 2024-03-25 PROCEDURE — 70450 CT HEAD/BRAIN W/O DYE: CPT

## 2024-03-25 PROCEDURE — 84484 ASSAY OF TROPONIN QUANT: CPT

## 2024-03-25 PROCEDURE — 93005 ELECTROCARDIOGRAM TRACING: CPT | Performed by: STUDENT IN AN ORGANIZED HEALTH CARE EDUCATION/TRAINING PROGRAM

## 2024-03-25 PROCEDURE — 85610 PROTHROMBIN TIME: CPT

## 2024-03-25 PROCEDURE — 80053 COMPREHEN METABOLIC PANEL: CPT

## 2024-03-25 PROCEDURE — 2580000003 HC RX 258: Performed by: STUDENT IN AN ORGANIZED HEALTH CARE EDUCATION/TRAINING PROGRAM

## 2024-03-25 PROCEDURE — 72125 CT NECK SPINE W/O DYE: CPT

## 2024-03-25 PROCEDURE — 99285 EMERGENCY DEPT VISIT HI MDM: CPT

## 2024-03-25 PROCEDURE — 6370000000 HC RX 637 (ALT 250 FOR IP): Performed by: STUDENT IN AN ORGANIZED HEALTH CARE EDUCATION/TRAINING PROGRAM

## 2024-03-25 PROCEDURE — 71045 X-RAY EXAM CHEST 1 VIEW: CPT

## 2024-03-25 PROCEDURE — 85025 COMPLETE CBC W/AUTO DIFF WBC: CPT

## 2024-03-25 RX ORDER — SODIUM CHLORIDE, SODIUM LACTATE, POTASSIUM CHLORIDE, AND CALCIUM CHLORIDE .6; .31; .03; .02 G/100ML; G/100ML; G/100ML; G/100ML
1000 INJECTION, SOLUTION INTRAVENOUS ONCE
Status: COMPLETED | OUTPATIENT
Start: 2024-03-25 | End: 2024-03-26

## 2024-03-25 RX ADMIN — INSULIN HUMAN 8 UNITS: 100 INJECTION, SOLUTION PARENTERAL at 22:46

## 2024-03-25 RX ADMIN — SODIUM CHLORIDE, POTASSIUM CHLORIDE, SODIUM LACTATE AND CALCIUM CHLORIDE 1000 ML: 600; 310; 30; 20 INJECTION, SOLUTION INTRAVENOUS at 22:47

## 2024-03-26 PROBLEM — R73.9 HYPERGLYCEMIA: Status: ACTIVE | Noted: 2024-03-26

## 2024-03-26 LAB
ALBUMIN SERPL-MCNC: 3.4 G/DL (ref 3.5–5)
ANION GAP SERPL CALC-SCNC: 0 MMOL/L (ref 5–15)
BUN SERPL-MCNC: 35 MG/DL (ref 6–20)
BUN/CREAT SERPL: 35 (ref 12–20)
CA-I BLD-MCNC: 9.3 MG/DL (ref 8.5–10.1)
CHLORIDE SERPL-SCNC: 104 MMOL/L (ref 97–108)
CO2 SERPL-SCNC: 31 MMOL/L (ref 21–32)
CREAT SERPL-MCNC: 0.99 MG/DL (ref 0.7–1.3)
EKG ATRIAL RATE: 214 BPM
EKG DIAGNOSIS: NORMAL
EKG Q-T INTERVAL: 356 MS
EKG QRS DURATION: 136 MS
EKG QTC CALCULATION (BAZETT): 442 MS
EKG R AXIS: -52 DEGREES
EKG T AXIS: 13 DEGREES
EKG VENTRICULAR RATE: 93 BPM
GLUCOSE BLD STRIP.AUTO-MCNC: 122 MG/DL (ref 65–100)
GLUCOSE BLD STRIP.AUTO-MCNC: 147 MG/DL (ref 65–100)
GLUCOSE BLD STRIP.AUTO-MCNC: 157 MG/DL (ref 65–100)
GLUCOSE BLD STRIP.AUTO-MCNC: 168 MG/DL (ref 65–100)
GLUCOSE BLD STRIP.AUTO-MCNC: 244 MG/DL (ref 65–100)
GLUCOSE SERPL-MCNC: 127 MG/DL (ref 65–100)
PERFORMED BY:: ABNORMAL
PHOSPHATE SERPL-MCNC: 3 MG/DL (ref 2.6–4.7)
POTASSIUM SERPL-SCNC: 4.5 MMOL/L (ref 3.5–5.1)
SODIUM SERPL-SCNC: 135 MMOL/L (ref 136–145)

## 2024-03-26 PROCEDURE — 36415 COLL VENOUS BLD VENIPUNCTURE: CPT

## 2024-03-26 PROCEDURE — 97162 PT EVAL MOD COMPLEX 30 MIN: CPT

## 2024-03-26 PROCEDURE — 6370000000 HC RX 637 (ALT 250 FOR IP): Performed by: INTERNAL MEDICINE

## 2024-03-26 PROCEDURE — 80069 RENAL FUNCTION PANEL: CPT

## 2024-03-26 PROCEDURE — 97165 OT EVAL LOW COMPLEX 30 MIN: CPT

## 2024-03-26 PROCEDURE — 6360000002 HC RX W HCPCS: Performed by: INTERNAL MEDICINE

## 2024-03-26 PROCEDURE — 82962 GLUCOSE BLOOD TEST: CPT

## 2024-03-26 PROCEDURE — 97530 THERAPEUTIC ACTIVITIES: CPT

## 2024-03-26 PROCEDURE — 1100000000 HC RM PRIVATE

## 2024-03-26 PROCEDURE — 2580000003 HC RX 258: Performed by: INTERNAL MEDICINE

## 2024-03-26 RX ORDER — LEVETIRACETAM 100 MG/ML
750 SOLUTION ORAL 2 TIMES DAILY
Status: DISCONTINUED | OUTPATIENT
Start: 2024-03-26 | End: 2024-03-28 | Stop reason: HOSPADM

## 2024-03-26 RX ORDER — MULTIVITAMIN WITH IRON
1 TABLET ORAL DAILY
Status: DISCONTINUED | OUTPATIENT
Start: 2024-03-26 | End: 2024-03-28 | Stop reason: HOSPADM

## 2024-03-26 RX ORDER — INSULIN GLARGINE 100 [IU]/ML
15 INJECTION, SOLUTION SUBCUTANEOUS NIGHTLY
Status: DISCONTINUED | OUTPATIENT
Start: 2024-03-26 | End: 2024-03-27

## 2024-03-26 RX ORDER — ALOGLIPTIN 6.25 MG/1
6.25 TABLET, FILM COATED ORAL DAILY
Status: DISCONTINUED | OUTPATIENT
Start: 2024-03-26 | End: 2024-03-28 | Stop reason: HOSPADM

## 2024-03-26 RX ORDER — DEXTROSE MONOHYDRATE 100 MG/ML
INJECTION, SOLUTION INTRAVENOUS CONTINUOUS PRN
Status: DISCONTINUED | OUTPATIENT
Start: 2024-03-26 | End: 2024-03-28 | Stop reason: HOSPADM

## 2024-03-26 RX ORDER — ASPIRIN 81 MG/1
81 TABLET ORAL DAILY
Status: DISCONTINUED | OUTPATIENT
Start: 2024-03-26 | End: 2024-03-28 | Stop reason: HOSPADM

## 2024-03-26 RX ORDER — GUAIFENESIN/PHENYLPROPANOLAMIN
450 EXPECTORANT ORAL DAILY
Status: DISCONTINUED | OUTPATIENT
Start: 2024-03-26 | End: 2024-03-26 | Stop reason: CLARIF

## 2024-03-26 RX ORDER — SODIUM CHLORIDE 9 MG/ML
INJECTION, SOLUTION INTRAVENOUS CONTINUOUS
Status: DISCONTINUED | OUTPATIENT
Start: 2024-03-26 | End: 2024-03-28 | Stop reason: HOSPADM

## 2024-03-26 RX ORDER — CLOPIDOGREL BISULFATE 75 MG/1
75 TABLET ORAL ONCE
Status: COMPLETED | OUTPATIENT
Start: 2024-03-26 | End: 2024-03-26

## 2024-03-26 RX ORDER — ATORVASTATIN CALCIUM 10 MG/1
10 TABLET, FILM COATED ORAL NIGHTLY
Status: DISCONTINUED | OUTPATIENT
Start: 2024-03-26 | End: 2024-03-28 | Stop reason: HOSPADM

## 2024-03-26 RX ORDER — CARVEDILOL 3.12 MG/1
6.25 TABLET ORAL 2 TIMES DAILY WITH MEALS
Status: DISCONTINUED | OUTPATIENT
Start: 2024-03-26 | End: 2024-03-28 | Stop reason: HOSPADM

## 2024-03-26 RX ORDER — GLUCAGON 1 MG/ML
1 KIT INJECTION PRN
Status: DISCONTINUED | OUTPATIENT
Start: 2024-03-26 | End: 2024-03-28 | Stop reason: HOSPADM

## 2024-03-26 RX ORDER — HEPARIN SODIUM 5000 [USP'U]/ML
5000 INJECTION, SOLUTION INTRAVENOUS; SUBCUTANEOUS 2 TIMES DAILY
Status: DISCONTINUED | OUTPATIENT
Start: 2024-03-26 | End: 2024-03-28 | Stop reason: HOSPADM

## 2024-03-26 RX ADMIN — INSULIN GLARGINE 15 UNITS: 100 INJECTION, SOLUTION SUBCUTANEOUS at 21:56

## 2024-03-26 RX ADMIN — SODIUM CHLORIDE: 9 INJECTION, SOLUTION INTRAVENOUS at 22:10

## 2024-03-26 RX ADMIN — ASPIRIN 81 MG: 81 TABLET, COATED ORAL at 11:28

## 2024-03-26 RX ADMIN — LEVETIRACETAM 750 MG: 500 SOLUTION ORAL at 21:55

## 2024-03-26 RX ADMIN — HEPARIN SODIUM 5000 UNITS: 5000 INJECTION INTRAVENOUS; SUBCUTANEOUS at 11:27

## 2024-03-26 RX ADMIN — CARVEDILOL 6.25 MG: 3.12 TABLET, FILM COATED ORAL at 11:28

## 2024-03-26 RX ADMIN — SODIUM CHLORIDE: 9 INJECTION, SOLUTION INTRAVENOUS at 11:28

## 2024-03-26 RX ADMIN — THERA TABS 1 TABLET: TAB at 11:28

## 2024-03-26 RX ADMIN — LEVETIRACETAM 750 MG: 500 SOLUTION ORAL at 11:27

## 2024-03-26 RX ADMIN — HEPARIN SODIUM 5000 UNITS: 5000 INJECTION INTRAVENOUS; SUBCUTANEOUS at 22:05

## 2024-03-26 RX ADMIN — ATORVASTATIN CALCIUM 10 MG: 10 TABLET, FILM COATED ORAL at 21:55

## 2024-03-26 RX ADMIN — ALOGLIPTIN 6.25 MG: 6.25 TABLET, FILM COATED ORAL at 11:28

## 2024-03-26 RX ADMIN — CLOPIDOGREL BISULFATE 75 MG: 75 TABLET ORAL at 11:28

## 2024-03-26 ASSESSMENT — PAIN SCALES - GENERAL: PAINLEVEL_OUTOF10: 0

## 2024-03-26 NOTE — ED NOTES
ED TO INPATIENT SBAR HANDOFF    Patient Name: Nando Mckenzie   Preferred Name: Nando  : 1935  88 y.o.   Family/Caregiver Present: no   Code Status Order: Prior  PO Status: No admission orders entered  Telemetry Order: no  C-SSRS: Risk of Suicide: No Risk  Sitter no   Restraints:   no  Sepsis Risk Score Sepsis Risk Score: 1.12    Situation  Chief Complaint   Patient presents with    Fall     Brief Description of Patient's Condition: Niece brought patient to ER for frequent falls. Nonverbal at baseline due to throat cancer, able to follow commands. Hyperglycemia on labs, fluids and insulin given,  at last check @0836 3/26/2024  Mental Status: able to concentrate and follow conversation  Arrived from:Home  Imaging:   CT Head W/O Contrast   Final Result   No acute findings. Cerebral atrophy and chronic microvascular white   matter ischemic change. Intracranial atherosclerosis.      CT CSpine W/O Contrast   Final Result   No fracture   Multilevel neural foraminal stenosis    Central canal stenosis C3-4 and C2-3.      XR CHEST PORTABLE   Final Result      No acute process on portable chest.           Abnormal labs:   Abnormal Labs Reviewed   CBC WITH AUTO DIFFERENTIAL - Abnormal; Notable for the following components:       Result Value    RBC 3.33 (*)     Hemoglobin 10.2 (*)     Hematocrit 30.8 (*)     All other components within normal limits   COMPREHENSIVE METABOLIC PANEL - Abnormal; Notable for the following components:    Sodium 128 (*)     Potassium 5.3 (*)     Chloride 94 (*)     Anion Gap 3 (*)     Glucose 615 (*)     BUN 44 (*)     Creatinine 1.47 (*)     Bun/Cre Ratio 30 (*)     Est, Glom Filt Rate 46 (*)     Alk Phosphatase 339 (*)     Albumin/Globulin Ratio 0.9 (*)     All other components within normal limits   POCT GLUCOSE - Abnormal; Notable for the following components:    POC Glucose 168 (*)     All other components within normal limits   POCT GLUCOSE - Abnormal; Notable for the following  components:    POC Glucose 122 (*)     All other components within normal limits       Background  Allergies: No Known Allergies  History:   Past Medical History:   Diagnosis Date    Anemia     Arthritis     Diabetes (HCC)     Hypercholesterolemia     Hypertension     Single kidney     Cr normal    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       Assessment  Vitals:    Level of Consciousness: Alert (0)   Vitals:    03/26/24 0640 03/26/24 0652 03/26/24 0700 03/26/24 0800   BP:   97/62 136/87   Pulse: 64 65 60 63   Resp: 18 20 16 13   Temp:  98 °F (36.7 °C)     TempSrc:  Oral     SpO2: 97% 97% 99% 100%   Weight:       Height:         Deterioration Index (DI): Deterioration Index: (!) 52.7  Deterioration Index (DI) Interventions Performed: Deterioration Index RN Interventions Performed : Charting Corrected  O2 Flow Rate:    O2 Device: O2 Device: None (Room air)  Cardiac Rhythm:    Critical Lab Results: [unfilled]  Cultures: Cultures:None  NIH Score: NIH NIH Stroke Scale  Interval: Hand-off/Transfer  Level of Consciousness (1a): Alert  LOC Questions (1b): Answers both correctly (pt does not speak)  LOC Commands (1c): Performs both tasks correctly  Best Gaze (2): Normal  Visual (3): No visual loss  Facial Palsy (4): (!) Minor paralysis  Motor Arm, Left (5a): No drift  Motor Arm, Right (5b): No drift  Motor Leg, Left (6a): No drift  Motor Leg, Right (6b): No drift  Limb Ataxia (7): Absent  Sensory (8): Normal  Best Language (9): Mute (pt does not speak due to throat cancer)  Dysarthria (10): Intubated or other physical barrier (comment) (throat cancer preventing from speaking)  Extinction and Inattention (11): No abnormality  Total: 4   Active LDA's:   Peripheral IV 03/25/24 Right Forearm (Active)     Active Central Lines:                          Active Wounds:    Active Butt's:    Active Feeding Tubes:      Administered Medications:   Medications   lactated ringers bolus 1,000 mL (0 mLs IntraVENous Stopped 3/26/24 0102)

## 2024-03-26 NOTE — PROGRESS NOTES
Herbal and Nutritional Product Restrictions      The following herbal, alternative, and/or nutritional/dietary supplement product(s) has been discontinued per [facility] approved policy:    Saw palmetto 450 mg daily    Please reorder upon discharge if appropriate.    Thank you,  Shirin Molina formerly [facility]  3/26/2024 9:42 AM

## 2024-03-26 NOTE — CARE COORDINATION
03/26/24 1358   Service Assessment   Patient Orientation Alert and Oriented   Cognition Alert   History Provided By Patient   Primary Caregiver Self   Support Systems Children;Family Members   Patient's Healthcare Decision Maker is: Legal Next of Kin   PCP Verified by CM Yes   Last Visit to PCP Within last 3 months   Prior Functional Level Assistance with the following:;Feeding   Current Functional Level Feeding;Assistance with the following:   Can patient return to prior living arrangement Yes   Ability to make needs known: Good   Family able to assist with home care needs: Yes   Would you like for me to discuss the discharge plan with any other family members/significant others, and if so, who? Yes     Advance Care Planning     General Advance Care Planning (ACP) Conversation    Date of Conversation: 3/26/2024  Conducted with: Patient with Decision Making Capacity    Healthcare Decision Maker:    Primary Decision Maker: Katiana Wynn - Other - [phone]    Secondary Decision Maker: Gayla Fan - Other - [phone]  Click here to complete Healthcare Decision Makers including selection of the Healthcare Decision Maker Relationship (ie \"Primary\").   Today we documented Decision Maker(s) consistent with Legal Next of Kin hierarchy.    Length of Voluntary ACP Conversation in minutes:  <16 minutes (Non-Billable)    Ryan Siegel RN CM met with patient at bedside to discuss discharge planning assessment. Patient lives at home alone, he states he has a nurse that comes 3 times a day to give him his feedings.  He is [facility] with [facility].  Patient has been seen by PT and recommendations are for SNF.  Patient is agreeable, Freedom of choice given for [facility].  CM sent referral. Patient will need insurance authorization.  Patient has a rollator at home.

## 2024-03-26 NOTE — ED NOTES
Pt put into hospital gown at this time. Removed soiled brief and placed a primo fit. Repositioned in the bed.

## 2024-03-26 NOTE — H&P
HISTORY AND PHYSICAL             Date: 3/26/2024        Patient Name: Nando Mckenzie     YOB: 1935      Age:  88 y.o.    Chief Complaint     Chief Complaint   Patient presents with    Fall          History Obtained From   Chart    History of Present Illness   This is an 88-year-old frail black male recently in the hospital last month when he presented 2 times with dysphagia eventually undergoing PEG tube placement.  He has history of diabetes throat cancer status post laryngectomy where he uses a voice device to talk.  He insisted on living at home by himself.  He has limited help with respect to tube feeding and insulin administration.  Apparently he was brought in because he had been falling.  The emergency room his blood sugar was markedly elevated in the 600.  There is no reported head trauma or pain.  His CAT scan of the head and neck without any acute fracture or bleed.  He does have multilevel DJD as expected in a man of his age.  He is lying in bed comfortable in no respiratory distress.  He does not have his voice device so he nods yes and no only.    Past Medical History     Past Medical History:   Diagnosis Date    Anemia     Arthritis     Diabetes (HCC)     Hypercholesterolemia     Hypertension     Single kidney     Cr normal    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      1.  Acute hypoxic respiratory failure transient due to aspiration of stomach contents/aspiration pneumonitis  #2.  Wide-complex tachycardia  #3.  Hypertension now okay without blood pressure medicine  #4.  Diabetes  #5.  Osteoarthritis  #6.  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  History of nephrectomy  #8.  Dysphonia/dysphagia  #9.  Acute respiratory distress from tracheostomy stoma plugging now status post removal of foreign body by ENT  History of emphysema with scattered pleuroparenchymal scarring and calcified granuloma  Past Surgical History     Past Surgical History:   Procedure  COMPARISON: CT 3/3/2024. CONTRAST: None. TECHNIQUE: Unenhanced CT of the head was performed using 5 mm images. Brain and bone windows were generated. Coronal and sagittal reformats. CT dose reduction was achieved through use of a standardized protocol tailored for this examination and automatic exposure control for dose modulation.  FINDINGS: The ventricles and sulci are unchanged in size, shape and configuration with diffuse cerebral atrophy redemonstrated. There is no significant change in periventricular and subcortical white matter hypodensity. There is no intracranial hemorrhage, extra-axial collection, or mass effect. The basilar cisterns are open. No CT evidence of acute infarct. Atherosclerotic calcifications affect the carotid siphons and vertebrobasilar system. The bone windows demonstrate no abnormalities. The visualized portions of the paranasal sinuses and mastoid air cells are clear.     No acute findings. Cerebral atrophy and chronic microvascular white matter ischemic change. Intracranial atherosclerosis.    XR CHEST PORTABLE    Result Date: 3/25/2024  EXAM:  XR CHEST PORTABLE INDICATION: fall, r/o pneumothorax COMPARISON: Chest x-ray 12/9/2023 and CT 12/21/2022. TECHNIQUE: Single portable chest AP view FINDINGS: Cardiac monitoring leads are noted. Pacemaker generator body projects over the left chest wall with intact appearing leads traversing in expected course. The cardiac silhouette is within normal limits. The pulmonary vasculature is within normal limits. The lungs and pleural spaces are clear. The visualized bones and upper abdomen are age-appropriate with redemonstration of healed appearing right rib fracture deformity.     No acute process on portable chest.     CT CSpine W/O Contrast    Result Date: 3/25/2024  EXAM:  CT CERVICAL SPINE WITHOUT CONTRAST INDICATION: fall, r/o c spine injury. Reason for exam:->fall, r/o c spine injury COMPARISON: None. CONTRAST:  None. TECHNIQUE: Multislice

## 2024-03-26 NOTE — ED PROVIDER NOTES
[facility] EMERGENCY DEPT  EMERGENCY DEPARTMENT HISTORY AND PHYSICAL EXAM      Date: 3/25/2024  Patient Name: Nando Mckenzie  MRN: [id number]  YOB: 1935  Date of evaluation: 3/25/2024  Provider: Kaiser Morgan MD   Note Started: 11:41 PM EDT 3/25/24    HISTORY OF PRESENT ILLNESS     Chief Complaint   Patient presents with    Fall       History Provided By: Patient, niece     HPI: Nando Mckenzie is a 88 y.o. male presents to the emergency department for multiple falls.  Patient lives in group home, as per patient's family patient has been found on the floor multiple times over the last several days.  Patient states that he has felt weak, occasionally trips on objects.  Difficult to obtain detailed history from patient, patient is throat cancer, has underwent laryngectomy.   denies any chest pain, shortness of breath, abdominal pain nausea or vomiting.   PAST MEDICAL HISTORY   Past Medical History:  Past Medical History:   Diagnosis Date    Anemia     Arthritis     Diabetes (HCC)     Hypercholesterolemia     Hypertension     Single kidney     Cr normal    Throat cancer (HCC)     s/p laryngectomy       Past Surgical History:  Past Surgical History:   Procedure Laterality Date    CHOLECYSTECTOMY, LAPAROSCOPIC  12    By Dr. Manzanares    GASTROSTOMY TUBE PLACEMENT N/A 2024    EGD PEG TUBE PLACEMENT performed by Daphney Pope MD at [facility] ENDOSCOPY    KIDNEY REMOVAL      Pt states does not know reason    LARYNGECTOMY      STOMACH SURGERY N/A 2023    REMOVAL PEG TUBE performed by Daphney Pope MD at [facility] ENDOSCOPY    UPPER GASTROINTESTINAL ENDOSCOPY N/A 2024    EGD ESOPHAGOGASTRODUODENOSCOPY performed by Daphney Pope MD at [facility] ENDOSCOPY       Family History:  History reviewed. No pertinent family history.    Social History:  Social History     Tobacco Use    Smoking status: Former     Current packs/day: 0.00     Types: Cigarettes     Quit date: 2012     Years since quittin.6    Smokeless

## 2024-03-26 NOTE — PLAN OF CARE
PHYSICAL THERAPY EVALUATION  Patient: Nando Mckenzie (88 y.o. male)  Date: 3/26/2024  Primary Diagnosis: Hyperglycemia [R73.9]  Frequent falls [R29.6]       Precautions: Fall Risk                      Recommendations for nursing mobility: Out of bed to chair for meals, Encourage HEP in prep for ADLs/mobility; see handout for details, Frequent repositioning to prevent skin breakdown, and Use of bed/chair alarm for safety    In place during session: Peripheral IV and EKG/telemetry     ASSESSMENT  Pt is a 88 y.o. male admitted on 3/25/2024 for fall; pt currently being treated for hyperglycemia .recently in the hospital last month when he presented 2 times with dysphagia eventually undergoing PEG tube placement. He has history of diabetes throat cancer status post laryngectomy where he uses a voice device to talk.  Pt semi supine  upon PT arrival, agreeable to evaluation. Pt A&O x 4.  Patient was indep at home.Having frequent fall and unable to self care.    Based on the objective data described below, the patient currently presents with impaired ability to perform high-level IADLs, impaired strength, decreased activity tolerance, impaired balance, and impaired posture. (See below for objective details and assist levels).     Overall pt tolerated session fair/good today with PT. Pt required cg  for bed mobility and transfers. Pt amb 3 feet with , gt belt and cg /min assist; 10 marching steps at eob,demonstrates posterior lean. Pt will benefit from continued skilled PT to address above deficits and return to PLOF. Potential barriers for safe discharge: . Current PT DC recommendation Skilled Nursing Facility once medically appropriate.      GOALS:    Problem: Physical Therapy - Adult  Goal: By Discharge: Performs mobility at highest level of function for planned discharge setting.  See evaluation for individualized goals.  Description: FUNCTIONAL STATUS PRIOR TO ADMISSION: Patient was modified independent using a  [employer]™ “6 Clicks”         Basic Mobility Inpatient Short Form  How much difficulty does the patient currently have... Unable A Lot A Little None   1.  Turning over in bed (including adjusting bedclothes, sheets and blankets)?   [] 1   [] 2   [x] 3   [] 4   2.  Sitting down on and standing up from a chair with arms ( e.g., wheelchair, bedside commode, etc.)   [] 1   [x] 2   [] 3   [] 4   3.  Moving from lying on back to sitting on the side of the bed?   [] 1   [] 2   [x] 3   [] 4          How much help from another person does the patient currently need... Total A Lot A Little None   4.  Moving to and from a bed to a chair (including a wheelchair)?   [] 1   [x] 2   [] 3   [] 4   5.  Need to walk in hospital room?   [] 1   [x] 2   [] 3   [] 4   6.  Climbing 3-5 steps with a railing?   [] 1   [x] 2   [] 3   [] 4   © , Trustees of [facility], under license to [employer] All rights reserved     Score:  Initial:  Most Recent: X (Date: 3/26/2024 )   Interpretation of Tool:  Represents activities that are increasingly more difficult (i.e. Bed mobility, Transfers, Gait).  Score 24 23 22-20 19-15 14-10 9-7 6   Modifier CH CI CJ CK CL CM CN         Physical Therapy Evaluation Charge Determination   History Examination Presentation Decision-Making   LOW Complexity : Zero comorbidities / personal factors that will impact the outcome / POC HIGH Complexity : 4+ Standardized tests and measures addressing body structure, function, activity limitation and / or participation in recreation  HIGH Complexity : Unstable and unpredictable characteristics  Other outcome measures [facility] 6  HIGH      Based on the above components, the patient evaluation is determined to be of the following complexity level: HIGH    Pain Ratin/10   Pain Intervention(s):       Activity Tolerance:   Good    After treatment patient left in no apparent distress:   Bed locked and in lowest position Patient left in no apparent

## 2024-03-26 NOTE — ED TRIAGE NOTES
Pts niece states that pt has had 3 falls in 2 days. When asked if he has hit his head at all he shakes his head up and down. Pt unable to speak due to throat cancer. Denies LOC. States he takes blood thinners. Hypotensive in triage.

## 2024-03-26 NOTE — PROGRESS NOTES
RRT Clinical Rounding Nurse Progress Report      SUBJECTIVE: Patient assessed secondary to elevated Deterioration Index Score.      Vitals:    03/26/24 0652 03/26/24 0700 03/26/24 0800 03/26/24 0900   BP:  97/62 136/87 108/62   Pulse: 65 60 63 60   Resp: 20 16 13 13   Temp: 98 °F (36.7 °C)      TempSrc: Oral      SpO2: 97% 99% 100% 100%   Weight:       Height:            DETERIORATION INDEX SCORE: 52    ASSESSMENT: Patient just got to the floor from the ER. GCS is 11 due to the patient being nonverbal    PLAN:   Notified Maribel Stallworth RN, Will assess    Maria E Guzman RN

## 2024-03-26 NOTE — PROGRESS NOTES
4 Eyes Skin Assessment     NAME:  Nando Mckenzie  YOB: 1935  MEDICAL RECORD NUMBER:  [id number]    The patient is being assessed for  Admission    I agree that at least one RN has performed a thorough Head to Toe Skin Assessment on the patient. ALL assessment sites listed below have been assessed.      Areas assessed by both nurses:    Head, Face, Ears, Shoulders, Back, Chest, Arms, Elbows, Hands, Sacrum. Buttock, Coccyx, Ischium, Legs. Feet and Heels, and Under Medical Devices         Does the Patient have a Wound? Yes wound(s) were present on assessment. LDA wound assessment was Initiated and completed by RN       Tomás Prevention initiated by RN: Yes  Wound Care Orders initiated by RN: Yes    Pressure Injury (Stage 3,4, Unstageable, DTI, NWPT, and Complex wounds) if present, place Wound referral order by RN under : Yes    New Ostomies, if present place, Ostomy referral order under : No     Nurse 1 eSignature: Electronically signed by Paula Cook RN on 3/26/24 at 9:41 AM EDT    **SHARE this note so that the co-signing nurse can place an eSignature**    Nurse 2 eSignature: {Esignature:[id number]}

## 2024-03-26 NOTE — PROGRESS NOTES
OCCUPATIONAL THERAPY EVALUATION  Patient: Nando Mckenzie (88 y.o. male)  Date: 3/26/2024  Primary Diagnosis: Hyperglycemia [R73.9]  Frequent falls [R29.6]       Precautions: Fall Risk                Recommendations for nursing mobility: AD and gt belt for bed to chair , Amb to bathroom with AD and gait belt, and Assist x1    In place during session:Peripheral IV, External Catheter, and PEG Tube  ASSESSMENT  Pt is a 88 y.o. male presenting to [facility] s/p fall, admitted 3/25 and currently being treated for hyperglycemia and frequent falls. Pt received semi-supine in bed upon arrival, AXO x4, and agreeable to OT evaluation.     Based on current observations, pt presents with decreased  functional mobility, independence in ADLs, ROM, strength, activity tolerance, endurance, balance (see below for objective details and assist levels).     Overall, pt tolerates session fair w/out c/o pain, dizziness, or SOB. Pt req'd CGA and additional time for bed mobility and CGA-min A for OOB mobility using RW; cues for hand placement and aligning self w/ commode. Pt slightly unsteady w/ bed>commode ambulation; noted slight LOB when turning. Pt able to anteriorly flex at hips to complete LB dressing w/ SBA and additional time; unable to complete cross leg method. Pt will benefit from continued skilled OT services to address current impairments and improve IND and safety with self cares and functional transfers/mobility. Potential barriers for safe discharge: pt is a high fall risk. Current OT d/c recommendation Skilled Nursing Facility once medically appropriate.    GOALS:  Problem: Occupational Therapy - Adult  Goal: By Discharge: Performs self-care activities at highest level of function for planned discharge setting.  See evaluation for individualized goals.  Description: FUNCTIONAL STATUS PRIOR TO ADMISSION:  Pt reports he was mod I using rollator and IND w/ ADLs; he has an aide come in ~3x/day to A w/ tube feeding. Pt denies falls

## 2024-03-27 LAB
GLUCOSE BLD STRIP.AUTO-MCNC: 205 MG/DL (ref 65–100)
GLUCOSE BLD STRIP.AUTO-MCNC: 224 MG/DL (ref 65–100)
GLUCOSE BLD STRIP.AUTO-MCNC: 261 MG/DL (ref 65–100)
GLUCOSE BLD STRIP.AUTO-MCNC: 283 MG/DL (ref 65–100)
PERFORMED BY:: ABNORMAL

## 2024-03-27 PROCEDURE — 1100000000 HC RM PRIVATE

## 2024-03-27 PROCEDURE — 6370000000 HC RX 637 (ALT 250 FOR IP): Performed by: INTERNAL MEDICINE

## 2024-03-27 PROCEDURE — 82962 GLUCOSE BLOOD TEST: CPT

## 2024-03-27 PROCEDURE — 97116 GAIT TRAINING THERAPY: CPT

## 2024-03-27 PROCEDURE — 6360000002 HC RX W HCPCS: Performed by: INTERNAL MEDICINE

## 2024-03-27 RX ORDER — CLOPIDOGREL BISULFATE 75 MG/1
75 TABLET ORAL DAILY
Status: DISCONTINUED | OUTPATIENT
Start: 2024-03-27 | End: 2024-03-28 | Stop reason: HOSPADM

## 2024-03-27 RX ORDER — INSULIN GLARGINE 100 [IU]/ML
25 INJECTION, SOLUTION SUBCUTANEOUS NIGHTLY
Status: DISCONTINUED | OUTPATIENT
Start: 2024-03-27 | End: 2024-03-28 | Stop reason: HOSPADM

## 2024-03-27 RX ADMIN — ATORVASTATIN CALCIUM 10 MG: 10 TABLET, FILM COATED ORAL at 22:41

## 2024-03-27 RX ADMIN — CLOPIDOGREL BISULFATE 75 MG: 75 TABLET ORAL at 11:10

## 2024-03-27 RX ADMIN — LEVETIRACETAM 750 MG: 500 SOLUTION ORAL at 22:42

## 2024-03-27 RX ADMIN — ALOGLIPTIN 6.25 MG: 6.25 TABLET, FILM COATED ORAL at 08:30

## 2024-03-27 RX ADMIN — CARVEDILOL 6.25 MG: 3.12 TABLET, FILM COATED ORAL at 16:08

## 2024-03-27 RX ADMIN — CARVEDILOL 6.25 MG: 3.12 TABLET, FILM COATED ORAL at 08:30

## 2024-03-27 RX ADMIN — INSULIN GLARGINE 25 UNITS: 100 INJECTION, SOLUTION SUBCUTANEOUS at 22:42

## 2024-03-27 RX ADMIN — THERA TABS 1 TABLET: TAB at 08:30

## 2024-03-27 RX ADMIN — HEPARIN SODIUM 5000 UNITS: 5000 INJECTION INTRAVENOUS; SUBCUTANEOUS at 08:30

## 2024-03-27 RX ADMIN — ASPIRIN 81 MG: 81 TABLET, COATED ORAL at 08:30

## 2024-03-27 RX ADMIN — HEPARIN SODIUM 5000 UNITS: 5000 INJECTION INTRAVENOUS; SUBCUTANEOUS at 22:41

## 2024-03-27 RX ADMIN — LEVETIRACETAM 750 MG: 500 SOLUTION ORAL at 08:30

## 2024-03-27 ASSESSMENT — PAIN SCALES - GENERAL
PAINLEVEL_OUTOF10: 0
PAINLEVEL_OUTOF10: 0

## 2024-03-27 NOTE — CARE COORDINATION
[facility] has started insurance authorization this morning auth reference number is [id number].

## 2024-03-27 NOTE — CARE COORDINATION
Patient has been accepted at [facility].   PT/OT notes have been sent to the SNF and CM has asked them to start auth this morning.

## 2024-03-27 NOTE — PROGRESS NOTES
IP WOUND CONSULT    Nando Mckenzie  MEDICAL RECORD NUMBER:  [id number]  AGE: 88 y.o.   GENDER: male  : 1935  TODAY'S DATE:  3/27/2024    GENERAL     [] Follow-up   [x] New Consult    Nando Mckenzie is a 88 y.o. male referred by:   [] Physician  [x] Nursing  [] Other:         PAST MEDICAL HISTORY    Past Medical History:   Diagnosis Date    Anemia     Arthritis     Diabetes (HCC)     Hypercholesterolemia     Hypertension     Single kidney     Cr normal    Throat cancer (HCC)     s/p laryngectomy        PAST SURGICAL HISTORY    Past Surgical History:   Procedure Laterality Date    CHOLECYSTECTOMY, LAPAROSCOPIC  12    By Dr. Manzanares    GASTROSTOMY TUBE PLACEMENT N/A 2024    EGD PEG TUBE PLACEMENT performed by Daphney Pope MD at [facility] ENDOSCOPY    KIDNEY REMOVAL      Pt states does not know reason    LARYNGECTOMY      STOMACH SURGERY N/A 2023    REMOVAL PEG TUBE performed by Daphney Pope MD at [facility] ENDOSCOPY    UPPER GASTROINTESTINAL ENDOSCOPY N/A 2024    EGD ESOPHAGOGASTRODUODENOSCOPY performed by Daphney Pope MD at [facility] ENDOSCOPY       FAMILY HISTORY    History reviewed. No pertinent family history.      ALLERGIES    No Known Allergies    MEDICATIONS    No current facility-administered medications on file prior to encounter.     Current Outpatient Medications on File Prior to Encounter   Medication Sig Dispense Refill    insulin glargine (LANTUS) 100 UNIT/ML injection vial Inject 15 Units into the skin nightly 10 mL 3    levETIRAcetam (KEPPRA) 100 MG/ML oral solution Take 7.5 mLs by mouth 2 times daily 450 mL 0    carvedilol (COREG) 6.25 MG tablet Take 1 tablet by mouth 2 times daily (with meals) 60 tablet 3    daily jens multivitamin/iron (TAB-A-JENS) TABS tablet Take 1 tablet by mouth daily      Saw Palmetto 500 MG CAPS Take 450 mg by mouth daily      LEVEMIR FLEXPEN 100 UNIT/ML injection pen Inject 15 Units into the skin nightly      VAN ASPIRIN PO Take 81 mg by mouth daily          -Able to turn independently (Y/N): Y      PLAN     Skin Care & Pressure Relief Recommendations  Minimize Layers of Linen, Turn/reposition approximately every 2 hours, Manage incontinence, Promote continence; Skin protective lotion/cream to buttocks and sacrum daily and as needed with incontinence care, and Floating heels    Tomás: Dylan    Nutritionist Consulted: Yes  Nutrition Status: Mild malnutrition    Support Surface: Gel    Physician/Provider notified:   Recommendations:   -For gluteal cleft: apply finger-tip amount of zinc paste and cover with a sacral foam daily and prn for soiling.  If soiled, remove top soiled layer only and reapply.  Use Remedy Phytoplex Barrier Cream wipes for gentle cleansing.  To completely remove, use Remedy Foaming Cleanser or soap and water.    Prevention:  Apply Optifoam Border Heel foam dressing to both heels every three days to redistribute pressure from bony prominence and prevent pressure and friction injury to skin.  Rn is to gently peel back foam dressing for shift integumentary assessment and re-secure.  Maintain the the external  incontinence management system to manage  incontinence.  Ensure patient turns/repositions q2h at approximate 30 degree angle to offload sacrum and buttocks to prevent pressure related skin injury.  Float heels with 1-2 pillows lengthwise while in bed for offloading of the heels.  Maintain HOB at 30 degrees or less, if not contraindicated, to reduce pressure to buttocks and sacrum.  Raise foot of bed to help prevent friction and shear injury from sliding down in the bed.  Re-consult [facility] for other skin / wound care concerns.     Discharge Wound Care Needs: see above    Teaching completed with:   [] Patient           [] Family member       [] Caregiver          [] Nursing  [] Other    Patient/Caregiver Teaching:  Level of patient/caregiver understanding able to:   [] Indicates understanding       [] Needs reinforcement  [] Unsuccessful      []

## 2024-03-27 NOTE — DISCHARGE SUMMARY
Hydration and Insulin           Discharge Plan/Disposition:  Home     Hospital/Incidental Findings Requiring Follow Up:     Patient Instructions:     Diet:     Activity:Activity as Tolerated  For number of days (if applicable):       Other Instructions: PT OT.  Fall precautions.  Tube feeding.     Provider Follow-Up:   No follow-ups on file.      Significant Diagnostic Studies:     Recent Labs:  Admission on 03/25/2024  WBC                                           Date: 03/25/2024  Value: 8.0         Ref range: 4.1 - 11.1 K/uL    Status: Final  RBC                                           Date: 03/25/2024  Value: 3.33 (L)    Ref range: 4.10 - 5.70 M/uL   Status: Final  Hemoglobin                                    Date: 03/25/2024  Value: 10.2 (L)    Ref range: 12.1 - 17.0 g/dL   Status: Final  Hematocrit                                    Date: 03/25/2024  Value: 30.8 (L)    Ref range: 36.6 - 50.3 %      Status: Final  MCV                                           Date: 03/25/2024  Value: 92.5        Ref range: 80.0 - 99.0 FL     Status: Final  MCH                                           Date: 03/25/2024  Value: 30.6        Ref range: 26.0 - 34.0 PG     Status: Final  MCHC                                          Date: 03/25/2024  Value: 33.1        Ref range: 30.0 - 36.5 g/dL   Status: Final  RDW                                           Date: 03/25/2024  Value: 13.7        Ref range: 11.5 - 14.5 %      Status: Final  Platelets                                     Date: 03/25/2024  Value: 199         Ref range: 150 - 400 K/uL     Status: Final  MPV                                           Date: 03/25/2024  Value: 12.6        Ref range: 8.9 - 12.9 FL      Status: Final  Nucleated RBCs                                Date: 03/25/2024  Value: 0.0         Ref range: 0.0  WBC    Status: Final  nRBC                                          Date: 03/25/2024  Value: 0.00        Ref range: 0.00 - 0.01 K/uL    setting.  Critical thinking skills are necessary to determine a potentially critically ill patients status prior to using a glucometer.    Performed by:                                 Date: 03/26/2024  Value: Vanna Pamela                       Status: Final  POC Glucose                                   Date: 03/27/2024  Value: 283 (H)     Ref range: 65 - 100 mg/dL     Status: Final                Comment: The Accu-Chek Inform II glucometer is not FDA cleared for critically ill patient use. A study was performed validating the equivalence of glucometer and clinical laboratory results on this patient population.  Despite the study, use of glucometers with capillary specimens from critically ill patients, regardless of their location, makes the test high complexity and requires the performing individual to comply with CLIA requirements more stringent than those for waived testing in the hospital setting.  Critical thinking skills are necessary to determine a potentially critically ill patients status prior to using a glucometer.    Performed by:                                 Date: 03/27/2024  Value: Dakota Croft (Float)                       Status: Final  ------------     Radiology last 7 days:  CT Head W/O Contrast    Result Date: 3/26/2024  No acute findings. Cerebral atrophy and chronic microvascular white matter ischemic change. Intracranial atherosclerosis.    XR CHEST PORTABLE    Result Date: 3/25/2024  No acute process on portable chest.     CT CSpine W/O Contrast    Result Date: 3/25/2024  No fracture Multilevel neural foraminal stenosis Central canal stenosis C3-4 and C2-3.        Pending Labs     Order Current Status    Sample possible blood bank testing In process         Discharge Medications   Current Discharge Medication List          Current Discharge Medication List          Current Discharge Medication List    CONTINUE these medications which have NOT CHANGED    insulin glargine (LANTUS) 100

## 2024-03-27 NOTE — PROGRESS NOTES
4 Eyes Skin Assessment     NAME:  Nando Mckenzie  YOB: 1935  MEDICAL RECORD NUMBER:  [id number]    The patient is being assessed for  Other weekly    I agree that at least one RN has performed a thorough Head to Toe Skin Assessment on the patient. ALL assessment sites listed below have been assessed.      Areas assessed by both nurses:    Head, Face, Ears, Shoulders, Back, Chest, Arms, Elbows, Hands, Sacrum. Buttock, Coccyx, Ischium, Legs. Feet and Heels, and Under Medical Devices         Does the Patient have a Wound? Yes wound(s) were present on assessment. LDA wound assessment was Initiated and completed by RN       Tomás Prevention initiated by RN: Yes  Wound Care Orders initiated by RN: Yes    Pressure Injury (Stage 3,4, Unstageable, DTI, NWPT, and Complex wounds) if present, place Wound referral order by RN under : Yes    New Ostomies, if present place, Ostomy referral order under : No     Nurse 1 eSignature: Electronically signed by KACIE CAUSEY RN on 3/27/24 at 12:19 PM EDT    **SHARE this note so that the co-signing nurse can place an eSignature**    Nurse 2 eSignature: Electronically signed by Ellie James RN on 3/27/24 at 4:01 PM EDT

## 2024-03-27 NOTE — PROGRESS NOTES
Comprehensive Nutrition Assessment    Type and Reason for Visit:  Positive Nutrition Screen (TF)    Nutrition Recommendations/Plan:   NPO  Rec continue TF, add kimberly to support wound healing:  Glucerna 1.5 (diabetic) via PEG at goal rate of 45mL/hr  Flush 100mL q4hrs  +2pkts kimberly/day  Provides 1800kcal (100%), 94g protein (103%), 1420mL H2O (93%)  Monitor and record TF rate, flushes, and Bms in I/Os     Malnutrition Assessment:  Malnutrition Status:  Mild malnutrition (03/27/24 1406)    Context:  Acute Illness     Findings of the 6 clinical characteristics of malnutrition:  Energy Intake:  No significant decrease in energy intake  Weight Loss:  No significant weight loss     Body Fat Loss:  No significant body fat loss     Muscle Mass Loss:  Mild muscle mass loss Temples (temporalis), Clavicles (pectoralis & deltoids), Scapula (trapezius)  Fluid Accumulation:  No significant fluid accumulation     Strength:  Not Performed    Nutrition Assessment:    Admitted for hyperglycemia. Pt with PEG placed 1 month ago. Limited interview with voicebox but reports tolerating TF - rec's above. Labs: Na 135, K 4.5, BUN 35, Creat 0.99, Gluc 261. Meds: atorvastatin, insulin glargine, MVI, 0.9%NaCl    Nutrition Related Findings:    NFPE with mild wasting. No n/v, d/c, tolerating TFs (sole source PEG). No edema. BM 3/26. Wound Type: Pressure Injury (coccyx)       Current Nutrition Intake & Therapies:    Average Meal Intake: NPO  Average Supplements Intake: NPO  ADULT TUBE FEEDING; PEG; Diabetic; Continuous; 45; No; 100; Q 4 hours    Anthropometric Measures:  Height: 165.1 cm (5' 5\")  Ideal Body Weight (IBW): 136 lbs (62 kg)    Current Body Weight: 60.8 kg (134 lb 0.6 oz), 98.6 % IBW. Weight Source: Bed Scale  Current BMI (kg/m2): 22.3  BMI Categories: Normal Weight (BMI 22.0 to 24.9) age over 65    Estimated Daily Nutrient Needs:  Energy Requirements Based On: Kcal/kg  Weight Used for Energy Requirements: Current  Energy  (kcal/day): 1520-1824kcal (25-30kcal/kg)  Weight Used for Protein Requirements: Current  Protein (g/day): 73-91g (1.2-1.5g/kg wounds)  Method Used for Fluid Requirements: 1 ml/kcal  Fluid (ml/day): 1520-1824kcal    Nutrition Diagnosis:   Inadequate oral intake related to altered GI structure as evidenced by NPO or clear liquid status due to medical condition, nutrition support - enteral nutrition    Nutrition Interventions:   Food and/or Nutrient Delivery: Continue NPO, Continue Current Tube Feeding  Nutrition Education/Counseling: No recommendation at this time  Coordination of Nutrition Care: Continue to monitor while inpatient    Goals:  Goals: Tolerate nutrition support at goal rate, by next RD assessment    Nutrition Monitoring and Evaluation:   Behavioral-Environmental Outcomes: None Identified  Food/Nutrient Intake Outcomes: Enteral Nutrition Intake/Tolerance  Physical Signs/Symptoms Outcomes: GI Status, Weight    Discharge Planning:    Too soon to determine     Tammy Le RD  Contact: [phone]

## 2024-03-27 NOTE — PLAN OF CARE
Problem: Discharge Planning  Goal: Discharge to home or other facility with appropriate resources  Outcome: Progressing  Flowsheets (Taken 3/26/2024 2000 by Pamela Carballo, RN)  Discharge to home or other facility with appropriate resources: Identify barriers to discharge with patient and caregiver

## 2024-03-27 NOTE — PLAN OF CARE
PHYSICAL THERAPY TREATMENT     Patient: Nando Mckenzie (88 y.o. male)  Date: 3/27/2024  Diagnosis: Hyperglycemia [R73.9]  Frequent falls [R29.6] Hyperglycemia      Precautions: Fall Risk                      Recommendations for nursing mobility: Out of bed to chair for meals, Encourage HEP in prep for ADLs/mobility; see handout for details, Frequent repositioning to prevent skin breakdown, and Assist x1    In place during session: External Catheter  Chart, physical therapy assessment, plan of care and goals were reviewed.  ASSESSMENT  Patient continues with skilled PT services and is progressing towards goals. Pt sitting up in bed upon PT arrival, agreeable to session. Pt A&O x 4. (See below for objective details and assist levels).     Overall pt tolerated session well today.  Pt very motivated for therapy session today and was able to improve with gait. Patient CGA for bed mobility and CGA/Demetra for transfers. Patient demos no LOB during ambulation but noted with post lean initially upon standing requiring cues to correct prior to gait. Pt amb 15 ft in the room then completed seated LE therex then amb another 80 ft with with no overt LOB note. Pt amb with L foot drag at times. Pt then assisted a few minutes after leaving to the bathroom as pt pressed call light and needed to amb to bathroom, so PTA assisted from chair to bathroom and notified nursing and pt left with call light cord to pull. Will continue to benefit from skilled PT services, and will continue to progress as tolerated. Potential barriers for safe discharge: pt is a high fall risk, pt is not safe to be alone, and concern for pt safely navigating or managing the home environment. Current PT DC recommendation Skilled Nursing Facility once medically appropriate.     Start of Session End of Session   SPO2 (%) 99 98   Heart Rate (BPM) 60 62     GOALS:    Problem: Physical Therapy - Adult  Goal: By Discharge: Performs mobility at highest level  of function for planned discharge setting.  See evaluation for individualized goals.  Description: FUNCTIONAL STATUS PRIOR TO ADMISSION: Patient was modified independent using a rolling walker for functional mobility.    HOME SUPPORT PRIOR TO ADMISSION: The patient lived alone with no local support.    Physical Therapy Goals  Initiated 3/26/2024  Pt stated goal: get better  Pt will be I with LE HEP in 7 days.  Pt will perform bed mobility with Stand by Assist in 7 days.  Pt will perform transfers with Stand by Assist in 7 days.   Pt will amb 40 feet with LRAD safely with Stand by Assist in 7 days.  Pt will verbalize and demonstrate compliance with fall precautions  in 7 days.   Pt will demonstrate improvement in standing balance from poor/fair to fair in 7 days.    Outcome: Progressing          PLAN :  Patient continues to benefit from skilled intervention to address functional impairments. Continue treatment per established plan of care to address goals.    Recommendation for discharge: (in order for the patient to meet his/her long term goals)  Skilled Nursing Facility    IF patient discharges home will need the following DME:continuing to assess with progress     SUBJECTIVE:   Patient stated “the bed takes your strength”    OBJECTIVE DATA SUMMARY:   Critical Behavior:     Cognition  Overall Cognitive Status: WFL    Functional Mobility Training:  Bed Mobility:  Bed Mobility Training  Rolling: Contact-guard assistance  Supine to Sit: Contact-guard assistance  Scooting: Contact-guard assistance  Transfers:  Transfer Training  Interventions: Safety awareness training;Verbal cues;Demonstration  Sit to Stand: Minimum assistance;Assist X1  Stand to Sit: Minimum assistance;Assist X1  Balance:  Balance  Sitting: Intact  Standing: Impaired  Standing - Static: Constant support;Fair  Standing - Dynamic: Fair;Constant support    Ambulation/Gait Training:    Gait  Gait Training: Yes  Overall Level of Assistance: Contact-guard

## 2024-03-28 ENCOUNTER — ANESTHESIA (OUTPATIENT)
Facility: HOSPITAL | Age: 89
End: 2024-03-28

## 2024-03-28 ENCOUNTER — ANESTHESIA EVENT (OUTPATIENT)
Facility: HOSPITAL | Age: 89
End: 2024-03-28

## 2024-03-28 ENCOUNTER — APPOINTMENT (OUTPATIENT)
Facility: HOSPITAL | Age: 89
DRG: 641 | End: 2024-03-28
Payer: MEDICARE

## 2024-03-28 VITALS
DIASTOLIC BLOOD PRESSURE: 83 MMHG | BODY MASS INDEX: 22.33 KG/M2 | TEMPERATURE: 98.4 F | SYSTOLIC BLOOD PRESSURE: 163 MMHG | HEIGHT: 65 IN | OXYGEN SATURATION: 99 % | RESPIRATION RATE: 16 BRPM | HEART RATE: 69 BPM | WEIGHT: 134 LBS

## 2024-03-28 LAB
GLUCOSE BLD STRIP.AUTO-MCNC: 55 MG/DL (ref 65–100)
GLUCOSE BLD STRIP.AUTO-MCNC: 61 MG/DL (ref 65–100)
GLUCOSE BLD STRIP.AUTO-MCNC: 62 MG/DL (ref 65–100)
GLUCOSE BLD STRIP.AUTO-MCNC: 66 MG/DL (ref 65–100)
GLUCOSE BLD STRIP.AUTO-MCNC: 76 MG/DL (ref 65–100)
GLUCOSE BLD STRIP.AUTO-MCNC: 93 MG/DL (ref 65–100)
GLUCOSE BLD STRIP.AUTO-MCNC: 98 MG/DL (ref 65–100)
PERFORMED BY:: ABNORMAL
PERFORMED BY:: NORMAL

## 2024-03-28 PROCEDURE — 2709999900 HC NON-CHARGEABLE SUPPLY: Performed by: INTERNAL MEDICINE

## 2024-03-28 PROCEDURE — 82962 GLUCOSE BLOOD TEST: CPT

## 2024-03-28 PROCEDURE — 6360000002 HC RX W HCPCS: Performed by: INTERNAL MEDICINE

## 2024-03-28 PROCEDURE — 0D20XUZ CHANGE FEEDING DEVICE IN UPPER INTESTINAL TRACT, EXTERNAL APPROACH: ICD-10-PCS | Performed by: INTERNAL MEDICINE

## 2024-03-28 PROCEDURE — 74018 RADEX ABDOMEN 1 VIEW: CPT

## 2024-03-28 PROCEDURE — 2580000003 HC RX 258: Performed by: INTERNAL MEDICINE

## 2024-03-28 PROCEDURE — 6360000004 HC RX CONTRAST MEDICATION: Performed by: INTERNAL MEDICINE

## 2024-03-28 PROCEDURE — 3600007512: Performed by: INTERNAL MEDICINE

## 2024-03-28 PROCEDURE — 3600007502: Performed by: INTERNAL MEDICINE

## 2024-03-28 RX ADMIN — DEXTROSE MONOHYDRATE 125 ML: 100 INJECTION, SOLUTION INTRAVENOUS at 08:30

## 2024-03-28 RX ADMIN — SODIUM CHLORIDE: 9 INJECTION, SOLUTION INTRAVENOUS at 00:17

## 2024-03-28 RX ADMIN — DEXTROSE MONOHYDRATE 125 ML: 100 INJECTION, SOLUTION INTRAVENOUS at 11:50

## 2024-03-28 RX ADMIN — DIATRIZOATE MEGLUMINE AND DIATRIZOATE SODIUM 30 ML: 660; 100 LIQUID ORAL; RECTAL at 16:03

## 2024-03-28 RX ADMIN — HEPARIN SODIUM 5000 UNITS: 5000 INJECTION INTRAVENOUS; SUBCUTANEOUS at 10:39

## 2024-03-28 NOTE — PROGRESS NOTES
OT tx attempted at 1003 however requesting therapist return later secondary wanting to wait for MD visit first. Will continue to follow and re-attempt OT at a later time. Thank you.

## 2024-03-28 NOTE — CARE COORDINATION
Transition of Care Plan:    RUR: 22%  Prior Level of Functioning:   Disposition: SNF  If SNF or IPR: Date FOC offered: 3/27/2024  Date FOC received: 3/27/2024  Accepting facility: [facility].  Date authorization started with reference number:   Date authorization received and expires:   Follow up appointments:   DME needed:   Transportation at discharge: Sister to transport  IM/IMM Medicare/ letter given: Yes on 3/26/2024  Is patient a [place] and connected with [facility]?    If yes, was [facility] transfer form completed and [facility] notified?   Caregiver Contact: Gayla Fan [phone]  Discharge Caregiver contacted prior to discharge? Yes.  Care Conference needed?   Barriers to discharge:     Patient has been accepted to admit to [facility] located at [address] [place] [address].  Patient will go into room 200.  Nurse to call report to [phone].  CM called Patients sister and she will be here at 5pm to  patient.  Patient is pending a KUB for proper PEG placement. ELEAZAR notified primary nurse.  Patient is agreeable to discharge today.

## 2024-03-28 NOTE — PLAN OF CARE
Problem: Discharge Planning  Goal: Discharge to home or other facility with appropriate resources  Outcome: Progressing  Flowsheets (Taken 3/27/2024 2003)  Discharge to home or other facility with appropriate resources: Identify barriers to discharge with patient and caregiver     Problem: Safety - Adult  Goal: Free from fall injury  Outcome: Progressing     Problem: Physical Therapy - Adult  Goal: By Discharge: Performs mobility at highest level of function for planned discharge setting.  See evaluation for individualized goals.  Description: FUNCTIONAL STATUS PRIOR TO ADMISSION: Patient was modified independent using a rolling walker for functional mobility.    HOME SUPPORT PRIOR TO ADMISSION: The patient lived alone with no local support.    Physical Therapy Goals  Initiated 3/26/2024  Pt stated goal: get better  Pt will be I with LE HEP in 7 days.  Pt will perform bed mobility with Stand by Assist in 7 days.  Pt will perform transfers with Stand by Assist in 7 days.   Pt will amb 40 feet with LRAD safely with Stand by Assist in 7 days.  Pt will verbalize and demonstrate compliance with fall precautions  in 7 days.   Pt will demonstrate improvement in standing balance from poor/fair to fair in 7 days.    3/27/2024 1530 by Charlene Hagan, [facility]  Outcome: Progressing     Problem: ABCDS Injury Assessment  Goal: Absence of physical injury  Recent Flowsheet Documentation  Taken 3/27/2024 2003 by Beatrice Davis RN  Absence of Physical Injury: Implement safety measures based on patient assessment     Problem: Chronic Conditions and Co-morbidities  Goal: Patient's chronic conditions and co-morbidity symptoms are monitored and maintained or improved  Recent Flowsheet Documentation  Taken 3/27/2024 2003 by Beatrice Davis, RN  Care Plan - Patient's Chronic Conditions and Co-Morbidity Symptoms are Monitored and Maintained or Improved: Monitor and assess patient's chronic conditions and comorbid symptoms for

## 2024-03-28 NOTE — PROCEDURES
Patient was identified risks and benefits of the procedure were discussed with the patient and informed consent was obtained.  Previously dislodged gastrostomy tube site had a small catheter in it this catheter was removed and a new 18 Sami balloon type PEG tube was inserted through the same opening and was secured with bumper at 3.5 cm I would recommend Gastrografin study before using the PEG tube.  Patient tolerated the procedure very well and there were no immediate postprocedure complications.

## 2024-03-28 NOTE — PROGRESS NOTES
Progress Note  Date:3/28/2024       Room:ENDO/PL  Patient Name:Nando Mckenzie     YOB: 1935     Age:88 y.o.        Subjective    Subjective back from PEG tube replacement.  Uneventful procedure time.  Patient is comfortable.  He denies any chest pain or abdominal pain.  Review of Systems  Objective         Vitals Last 24 Hours:  TEMPERATURE:  Temp  Av.9 °F (36.6 °C)  Min: 97.7 °F (36.5 °C)  Max: 98.1 °F (36.7 °C)  RESPIRATIONS RANGE: Resp  Av  Min: 16  Max: 18  PULSE OXIMETRY RANGE: SpO2  Av.2 %  Min: 98 %  Max: 100 %  PULSE RANGE: Pulse  Av  Min: 63  Max: 114  BLOOD PRESSURE RANGE: Systolic (24hrs), Av , Min:110 , Max:184   ; Diastolic (24hrs), Av, Min:76, Max:100    I/O (24Hr):    Intake/Output Summary (Last 24 hours) at 3/28/2024 1442  Last data filed at 3/28/2024 1305  Gross per 24 hour   Intake 350 ml   Output 1600 ml   Net -1250 ml     Objective  Elderly black male frail looking comfortably no distress  HEENT normocephalic atraumatic anicteric sclera  Neck no distended neck vein  Lungs are clear bilaterally no wheeze or crackles  Heart S1-S2 regular  Abdomen soft positive bowel sounds.  PEG tube dressing clean dry and intact.  Lower extremities without any cyanosis or edema.  Right wrist with bony prominence stiff  CNS alert and oriented follows command      Labs/Imaging/Diagnostics    Labs:  CBC:  Recent Labs     24   WBC 8.0   RBC 3.33*   HGB 10.2*   HCT 30.8*   MCV 92.5   RDW 13.7        CHEMISTRIES:  Recent Labs     24  1140   * 135*   K 5.3* 4.5   CL 94* 104   CO2 31 31   BUN 44* 35*   CREATININE 1.47* 0.99   GLUCOSE 615* 127*   PHOS  --  3.0     PT/INR:  Recent Labs     24   PROTIME 13.6   INR 1.0     APTT:No results for input(s): \"APTT\" in the last 72 hours.  LIVER PROFILE:  Recent Labs     24  2123   AST 22   ALT 32   BILITOT 0.7   ALKPHOS 339*       Imaging Last 24 Hours:  No results

## 2024-03-29 ENCOUNTER — APPOINTMENT (OUTPATIENT)
Facility: HOSPITAL | Age: 89
DRG: 871 | End: 2024-03-29
Payer: MEDICARE

## 2024-03-29 ENCOUNTER — HOSPITAL ENCOUNTER (INPATIENT)
Facility: HOSPITAL | Age: 89
LOS: 1 days | DRG: 871 | End: 2024-03-30
Attending: STUDENT IN AN ORGANIZED HEALTH CARE EDUCATION/TRAINING PROGRAM | Admitting: INTERNAL MEDICINE
Payer: MEDICARE

## 2024-03-29 DIAGNOSIS — A41.9 SEPSIS WITH ACUTE HYPOXIC RESPIRATORY FAILURE AND SEPTIC SHOCK, DUE TO UNSPECIFIED ORGANISM (HCC): Primary | ICD-10-CM

## 2024-03-29 DIAGNOSIS — R65.21 SEPSIS WITH ACUTE HYPOXIC RESPIRATORY FAILURE AND SEPTIC SHOCK, DUE TO UNSPECIFIED ORGANISM (HCC): Primary | ICD-10-CM

## 2024-03-29 DIAGNOSIS — J96.01 SEPSIS WITH ACUTE HYPOXIC RESPIRATORY FAILURE AND SEPTIC SHOCK, DUE TO UNSPECIFIED ORGANISM (HCC): Primary | ICD-10-CM

## 2024-03-29 DIAGNOSIS — I95.9 HYPOTENSION, UNSPECIFIED HYPOTENSION TYPE: ICD-10-CM

## 2024-03-29 DIAGNOSIS — N28.9 RENAL INSUFFICIENCY: ICD-10-CM

## 2024-03-29 LAB
ALBUMIN SERPL-MCNC: 2.5 G/DL (ref 3.5–5)
ALBUMIN/GLOB SERPL: 0.7 (ref 1.1–2.2)
ALP SERPL-CCNC: 94 U/L (ref 45–117)
ALT SERPL-CCNC: 34 U/L (ref 12–78)
ANION GAP SERPL CALC-SCNC: 7 MMOL/L (ref 5–15)
AST SERPL W P-5'-P-CCNC: 30 U/L (ref 15–37)
BASE DEFICIT BLD-SCNC: 6.2 MMOL/L
BASOPHILS # BLD: 0 K/UL (ref 0–0.1)
BASOPHILS NFR BLD: 0 % (ref 0–1)
BILIRUB SERPL-MCNC: 1.6 MG/DL (ref 0.2–1)
BUN SERPL-MCNC: 36 MG/DL (ref 6–20)
BUN/CREAT SERPL: 12 (ref 12–20)
CA-I BLD-MCNC: 0.98 MMOL/L (ref 1.12–1.32)
CA-I BLD-MCNC: 8.5 MG/DL (ref 8.5–10.1)
CHLORIDE BLD-SCNC: 104 MMOL/L (ref 98–107)
CHLORIDE SERPL-SCNC: 103 MMOL/L (ref 97–108)
CO2 BLD-SCNC: 18 MMOL/L
CO2 SERPL-SCNC: 24 MMOL/L (ref 21–32)
CREAT SERPL-MCNC: 2.97 MG/DL (ref 0.7–1.3)
CREAT UR-MCNC: 2.28 MG/DL (ref 0.6–1.3)
DIFFERENTIAL METHOD BLD: ABNORMAL
EOSINOPHIL # BLD: 0 K/UL (ref 0–0.4)
EOSINOPHIL NFR BLD: 0 % (ref 0–7)
ERYTHROCYTE [DISTWIDTH] IN BLOOD BY AUTOMATED COUNT: 14.3 % (ref 11.5–14.5)
GLOBULIN SER CALC-MCNC: 3.5 G/DL (ref 2–4)
GLUCOSE BLD STRIP.AUTO-MCNC: 146 MG/DL (ref 65–100)
GLUCOSE SERPL-MCNC: 157 MG/DL (ref 65–100)
HCO3 BLD-SCNC: 18.7 MMOL/L (ref 19–28)
HCT VFR BLD AUTO: 40.2 % (ref 36.6–50.3)
HGB BLD-MCNC: 13 G/DL (ref 12.1–17)
IMM GRANULOCYTES # BLD AUTO: 0 K/UL
IMM GRANULOCYTES NFR BLD AUTO: 0 %
LACTATE BLD-SCNC: 6.06 MMOL/L (ref 0.4–2)
LYMPHOCYTES # BLD: 0.7 K/UL (ref 0.8–3.5)
LYMPHOCYTES NFR BLD: 18 % (ref 12–49)
MCH RBC QN AUTO: 30.7 PG (ref 26–34)
MCHC RBC AUTO-ENTMCNC: 32.3 G/DL (ref 30–36.5)
MCV RBC AUTO: 95 FL (ref 80–99)
MONOCYTES # BLD: 0.3 K/UL (ref 0–1)
MONOCYTES NFR BLD: 9 % (ref 5–13)
NEUTS SEG # BLD: 2.7 K/UL (ref 1.8–8)
NEUTS SEG NFR BLD: 73 % (ref 32–75)
NRBC # BLD: 0 K/UL (ref 0–0.01)
NRBC BLD-RTO: 0 PER 100 WBC
PCO2 BLD: 34.5 MMHG (ref 35–45)
PERFORMED BY:: ABNORMAL
PH BLD: 7.34 (ref 7.35–7.45)
PLATELET # BLD AUTO: 204 K/UL (ref 150–400)
PMV BLD AUTO: 12.4 FL (ref 8.9–12.9)
PO2 BLD: 37 MMHG (ref 75–100)
POTASSIUM BLD-SCNC: 6 MMOL/L (ref 3.5–5.5)
POTASSIUM SERPL-SCNC: 5.5 MMOL/L (ref 3.5–5.1)
PROCALCITONIN SERPL-MCNC: 31.64 NG/ML
PROT SERPL-MCNC: 6 G/DL (ref 6.4–8.2)
RBC # BLD AUTO: 4.23 M/UL (ref 4.1–5.7)
RBC MORPH BLD: ABNORMAL
SAO2 % BLD: 67 %
SODIUM BLD-SCNC: 133 MMOL/L (ref 136–145)
SODIUM SERPL-SCNC: 134 MMOL/L (ref 136–145)
SPECIMEN SITE: ABNORMAL
TROPONIN I SERPL HS-MCNC: 59 NG/L (ref 0–76)
WBC # BLD AUTO: 3.7 K/UL (ref 4.1–11.1)

## 2024-03-29 PROCEDURE — 84132 ASSAY OF SERUM POTASSIUM: CPT

## 2024-03-29 PROCEDURE — 84295 ASSAY OF SERUM SODIUM: CPT

## 2024-03-29 PROCEDURE — 02HV33Z INSERTION OF INFUSION DEVICE INTO SUPERIOR VENA CAVA, PERCUTANEOUS APPROACH: ICD-10-PCS | Performed by: INTERNAL MEDICINE

## 2024-03-29 PROCEDURE — 82330 ASSAY OF CALCIUM: CPT

## 2024-03-29 PROCEDURE — 80053 COMPREHEN METABOLIC PANEL: CPT

## 2024-03-29 PROCEDURE — 84484 ASSAY OF TROPONIN QUANT: CPT

## 2024-03-29 PROCEDURE — 84145 PROCALCITONIN (PCT): CPT

## 2024-03-29 PROCEDURE — 96375 TX/PRO/DX INJ NEW DRUG ADDON: CPT

## 2024-03-29 PROCEDURE — 82803 BLOOD GASES ANY COMBINATION: CPT

## 2024-03-29 PROCEDURE — 96365 THER/PROPH/DIAG IV INF INIT: CPT

## 2024-03-29 PROCEDURE — 93005 ELECTROCARDIOGRAM TRACING: CPT | Performed by: STUDENT IN AN ORGANIZED HEALTH CARE EDUCATION/TRAINING PROGRAM

## 2024-03-29 PROCEDURE — 3E043XZ INTRODUCTION OF VASOPRESSOR INTO CENTRAL VEIN, PERCUTANEOUS APPROACH: ICD-10-PCS | Performed by: INTERNAL MEDICINE

## 2024-03-29 PROCEDURE — 6360000002 HC RX W HCPCS: Performed by: STUDENT IN AN ORGANIZED HEALTH CARE EDUCATION/TRAINING PROGRAM

## 2024-03-29 PROCEDURE — 99291 CRITICAL CARE FIRST HOUR: CPT

## 2024-03-29 PROCEDURE — 2580000003 HC RX 258: Performed by: STUDENT IN AN ORGANIZED HEALTH CARE EDUCATION/TRAINING PROGRAM

## 2024-03-29 PROCEDURE — 36415 COLL VENOUS BLD VENIPUNCTURE: CPT

## 2024-03-29 PROCEDURE — 71045 X-RAY EXAM CHEST 1 VIEW: CPT

## 2024-03-29 PROCEDURE — 87040 BLOOD CULTURE FOR BACTERIA: CPT

## 2024-03-29 PROCEDURE — 85025 COMPLETE CBC W/AUTO DIFF WBC: CPT

## 2024-03-29 PROCEDURE — 82947 ASSAY GLUCOSE BLOOD QUANT: CPT

## 2024-03-29 PROCEDURE — 83605 ASSAY OF LACTIC ACID: CPT

## 2024-03-29 RX ORDER — NOREPINEPHRINE BITARTRATE 0.06 MG/ML
INJECTION, SOLUTION INTRAVENOUS
Status: COMPLETED
Start: 2024-03-29 | End: 2024-03-30

## 2024-03-29 RX ORDER — LEVOFLOXACIN 5 MG/ML
750 INJECTION, SOLUTION INTRAVENOUS ONCE
Status: COMPLETED | OUTPATIENT
Start: 2024-03-29 | End: 2024-03-30

## 2024-03-29 RX ORDER — 0.9 % SODIUM CHLORIDE 0.9 %
30 INTRAVENOUS SOLUTION INTRAVENOUS ONCE
Status: COMPLETED | OUTPATIENT
Start: 2024-03-29 | End: 2024-03-30

## 2024-03-29 RX ORDER — NOREPINEPHRINE BITARTRATE 0.06 MG/ML
1-100 INJECTION, SOLUTION INTRAVENOUS CONTINUOUS
Status: DISCONTINUED | OUTPATIENT
Start: 2024-03-29 | End: 2024-03-30 | Stop reason: HOSPADM

## 2024-03-29 RX ADMIN — LEVOFLOXACIN 750 MG: 5 INJECTION, SOLUTION INTRAVENOUS at 22:24

## 2024-03-29 RX ADMIN — CEFTRIAXONE SODIUM 1000 MG: 1 INJECTION, POWDER, FOR SOLUTION INTRAMUSCULAR; INTRAVENOUS at 22:23

## 2024-03-29 RX ADMIN — SODIUM CHLORIDE 1836 ML: 9 INJECTION, SOLUTION INTRAVENOUS at 22:33

## 2024-03-29 ASSESSMENT — PAIN - FUNCTIONAL ASSESSMENT: PAIN_FUNCTIONAL_ASSESSMENT: FACE, LEGS, ACTIVITY, CRY, AND CONSOLABILITY (FLACC)

## 2024-03-29 ASSESSMENT — LIFESTYLE VARIABLES
HOW OFTEN DO YOU HAVE A DRINK CONTAINING ALCOHOL: NEVER
HOW MANY STANDARD DRINKS CONTAINING ALCOHOL DO YOU HAVE ON A TYPICAL DAY: PATIENT DOES NOT DRINK

## 2024-03-30 ENCOUNTER — APPOINTMENT (OUTPATIENT)
Facility: HOSPITAL | Age: 89
DRG: 871 | End: 2024-03-30
Payer: MEDICARE

## 2024-03-30 VITALS
SYSTOLIC BLOOD PRESSURE: 88 MMHG | HEART RATE: 104 BPM | BODY MASS INDEX: 22.41 KG/M2 | TEMPERATURE: 98.8 F | OXYGEN SATURATION: 97 % | DIASTOLIC BLOOD PRESSURE: 59 MMHG | WEIGHT: 134.48 LBS | HEIGHT: 65 IN

## 2024-03-30 PROBLEM — A41.9 SEPSIS (HCC): Status: ACTIVE | Noted: 2024-03-30

## 2024-03-30 LAB
ALBUMIN SERPL-MCNC: 1.9 G/DL (ref 3.5–5)
ANION GAP SERPL CALC-SCNC: 8 MMOL/L (ref 5–15)
APPEARANCE UR: ABNORMAL
APPEARANCE UR: ABNORMAL
ARTERIAL PATENCY WRIST A: YES
BACTERIA URNS QL MICRO: ABNORMAL /HPF
BACTERIA URNS QL MICRO: NEGATIVE /HPF
BASE DEFICIT BLDA-SCNC: 17.7 MMOL/L
BASOPHILS # BLD: 0 K/UL (ref 0–0.1)
BASOPHILS NFR BLD: 0 % (ref 0–1)
BDY SITE: ABNORMAL
BILIRUB UR QL: NEGATIVE
BILIRUB UR QL: NEGATIVE
BODY TEMPERATURE: 97
BUN SERPL-MCNC: 51 MG/DL (ref 6–20)
BUN/CREAT SERPL: 14 (ref 12–20)
CA-I BLD-MCNC: 1.09 MMOL/L (ref 1.13–1.32)
CA-I BLD-MCNC: 7.6 MG/DL (ref 8.5–10.1)
CHLORIDE SERPL-SCNC: 110 MMOL/L (ref 97–108)
CO2 SERPL-SCNC: 19 MMOL/L (ref 21–32)
COHGB MFR BLD: 0.3 % (ref 1–2)
COLOR UR: ABNORMAL
COLOR UR: ABNORMAL
CREAT SERPL-MCNC: 3.57 MG/DL (ref 0.7–1.3)
DIFFERENTIAL METHOD BLD: ABNORMAL
EOSINOPHIL # BLD: 0 K/UL (ref 0–0.4)
EOSINOPHIL NFR BLD: 0 % (ref 0–7)
EPITH CASTS URNS QL MICRO: ABNORMAL /LPF
EPITH CASTS URNS QL MICRO: ABNORMAL /LPF
ERYTHROCYTE [DISTWIDTH] IN BLOOD BY AUTOMATED COUNT: 14.3 % (ref 11.5–14.5)
FIO2 ON VENT: 64 %
GLUCOSE BLD STRIP.AUTO-MCNC: 125 MG/DL (ref 65–100)
GLUCOSE BLD STRIP.AUTO-MCNC: 126 MG/DL (ref 65–100)
GLUCOSE BLD STRIP.AUTO-MCNC: 60 MG/DL (ref 65–100)
GLUCOSE BLD STRIP.AUTO-MCNC: 95 MG/DL (ref 65–100)
GLUCOSE BLD STRIP.AUTO-MCNC: 98 MG/DL (ref 65–100)
GLUCOSE SERPL-MCNC: 130 MG/DL (ref 65–100)
GLUCOSE UR STRIP.AUTO-MCNC: 50 MG/DL
GLUCOSE UR STRIP.AUTO-MCNC: NEGATIVE MG/DL
HCO3 BLDA-SCNC: 12 MMOL/L (ref 22–26)
HCT VFR BLD AUTO: 39 % (ref 36.6–50.3)
HGB BLD-MCNC: 11.9 G/DL (ref 12.1–17)
HGB UR QL STRIP: ABNORMAL
HGB UR QL STRIP: ABNORMAL
IMM GRANULOCYTES # BLD AUTO: 0 K/UL
IMM GRANULOCYTES NFR BLD AUTO: 0 %
KETONES UR QL STRIP.AUTO: NEGATIVE MG/DL
KETONES UR QL STRIP.AUTO: NEGATIVE MG/DL
LACTATE BLD-SCNC: 5.11 MMOL/L (ref 0.4–2)
LEUKOCYTE ESTERASE UR QL STRIP.AUTO: NEGATIVE
LEUKOCYTE ESTERASE UR QL STRIP.AUTO: NEGATIVE
LYMPHOCYTES # BLD: 0.7 K/UL (ref 0.8–3.5)
LYMPHOCYTES NFR BLD: 8 % (ref 12–49)
MCH RBC QN AUTO: 30.4 PG (ref 26–34)
MCHC RBC AUTO-ENTMCNC: 30.5 G/DL (ref 30–36.5)
MCV RBC AUTO: 99.5 FL (ref 80–99)
METAMYELOCYTES NFR BLD MANUAL: 26 %
METHGB MFR BLD: 0.5 % (ref 0–1.4)
MONOCYTES # BLD: 0.3 K/UL (ref 0–1)
MONOCYTES NFR BLD: 3 % (ref 5–13)
MRSA DNA SPEC QL NAA+PROBE: NOT DETECTED
MYELOCYTES NFR BLD MANUAL: 2 %
NEUTS BAND NFR BLD MANUAL: 32 % (ref 0–6)
NEUTS SEG # BLD: 5.1 K/UL (ref 1.8–8)
NEUTS SEG NFR BLD: 29 % (ref 32–75)
NITRITE UR QL STRIP.AUTO: NEGATIVE
NITRITE UR QL STRIP.AUTO: NEGATIVE
NRBC # BLD: 0 K/UL (ref 0–0.01)
NRBC BLD-RTO: 0 PER 100 WBC
OXYHGB MFR BLD: 90.2 % (ref 95–99)
PCO2 BLDA: 39 MMHG (ref 35–45)
PERFORMED BY:: ABNORMAL
PERFORMED BY:: NORMAL
PERFORMED BY:: NORMAL
PH BLDA: 7.09 (ref 7.35–7.45)
PH UR STRIP: 5 (ref 5–8)
PH UR STRIP: 7 (ref 5–8)
PHOSPHATE SERPL-MCNC: 5.8 MG/DL (ref 2.6–4.7)
PLATELET # BLD AUTO: 253 K/UL (ref 150–400)
PMV BLD AUTO: 12.3 FL (ref 8.9–12.9)
PO2 BLDA: 74 MMHG (ref 80–100)
POTASSIUM SERPL-SCNC: 6.5 MMOL/L (ref 3.5–5.1)
PROT UR STRIP-MCNC: 30 MG/DL
PROT UR STRIP-MCNC: 30 MG/DL
RBC # BLD AUTO: 3.92 M/UL (ref 4.1–5.7)
RBC #/AREA URNS HPF: >100 /HPF (ref 0–5)
RBC #/AREA URNS HPF: >100 /HPF (ref 0–5)
RBC MORPH BLD: ABNORMAL
SAO2 % BLD: 91 % (ref 95–99)
SAO2% DEVICE SAO2% SENSOR NAME: ABNORMAL
SERVICE CMNT-IMP: ABNORMAL
SODIUM SERPL-SCNC: 137 MMOL/L (ref 136–145)
SP GR UR REFRACTOMETRY: 1.01 (ref 1–1.03)
SP GR UR REFRACTOMETRY: 1.02 (ref 1–1.03)
SPECIMEN SITE: ABNORMAL
TROPONIN I SERPL HS-MCNC: 56 NG/L (ref 0–76)
URINE CULTURE IF INDICATED: ABNORMAL
URINE CULTURE IF INDICATED: ABNORMAL
UROBILINOGEN UR QL STRIP.AUTO: 0.1 EU/DL (ref 0.1–1)
UROBILINOGEN UR QL STRIP.AUTO: 0.1 EU/DL (ref 0.1–1)
WBC # BLD AUTO: 8.4 K/UL (ref 4.1–11.1)
WBC MORPH BLD: ABNORMAL
WBC URNS QL MICRO: ABNORMAL /HPF (ref 0–4)
WBC URNS QL MICRO: ABNORMAL /HPF (ref 0–4)

## 2024-03-30 PROCEDURE — 36600 WITHDRAWAL OF ARTERIAL BLOOD: CPT

## 2024-03-30 PROCEDURE — 80069 RENAL FUNCTION PANEL: CPT

## 2024-03-30 PROCEDURE — 6370000000 HC RX 637 (ALT 250 FOR IP): Performed by: INTERNAL MEDICINE

## 2024-03-30 PROCEDURE — 96365 THER/PROPH/DIAG IV INF INIT: CPT

## 2024-03-30 PROCEDURE — 82962 GLUCOSE BLOOD TEST: CPT

## 2024-03-30 PROCEDURE — 81001 URINALYSIS AUTO W/SCOPE: CPT

## 2024-03-30 PROCEDURE — 87641 MR-STAPH DNA AMP PROBE: CPT

## 2024-03-30 PROCEDURE — 71045 X-RAY EXAM CHEST 1 VIEW: CPT

## 2024-03-30 PROCEDURE — 3E033XZ INTRODUCTION OF VASOPRESSOR INTO PERIPHERAL VEIN, PERCUTANEOUS APPROACH: ICD-10-PCS | Performed by: INTERNAL MEDICINE

## 2024-03-30 PROCEDURE — 87086 URINE CULTURE/COLONY COUNT: CPT

## 2024-03-30 PROCEDURE — 51798 US URINE CAPACITY MEASURE: CPT

## 2024-03-30 PROCEDURE — 2000000000 HC ICU R&B

## 2024-03-30 PROCEDURE — 2580000003 HC RX 258: Performed by: INTERNAL MEDICINE

## 2024-03-30 PROCEDURE — 2500000003 HC RX 250 WO HCPCS

## 2024-03-30 PROCEDURE — 6360000002 HC RX W HCPCS: Performed by: INTERNAL MEDICINE

## 2024-03-30 PROCEDURE — 83605 ASSAY OF LACTIC ACID: CPT

## 2024-03-30 PROCEDURE — 84484 ASSAY OF TROPONIN QUANT: CPT

## 2024-03-30 PROCEDURE — 2700000000 HC OXYGEN THERAPY PER DAY

## 2024-03-30 PROCEDURE — 96366 THER/PROPH/DIAG IV INF ADDON: CPT

## 2024-03-30 PROCEDURE — 82330 ASSAY OF CALCIUM: CPT

## 2024-03-30 PROCEDURE — 82533 TOTAL CORTISOL: CPT

## 2024-03-30 PROCEDURE — 2500000003 HC RX 250 WO HCPCS: Performed by: INTERNAL MEDICINE

## 2024-03-30 PROCEDURE — 85025 COMPLETE CBC W/AUTO DIFF WBC: CPT

## 2024-03-30 PROCEDURE — 82803 BLOOD GASES ANY COMBINATION: CPT

## 2024-03-30 PROCEDURE — 36556 INSERT NON-TUNNEL CV CATH: CPT

## 2024-03-30 PROCEDURE — 2500000003 HC RX 250 WO HCPCS: Performed by: STUDENT IN AN ORGANIZED HEALTH CARE EDUCATION/TRAINING PROGRAM

## 2024-03-30 RX ORDER — DEXTROSE MONOHYDRATE 100 MG/ML
INJECTION, SOLUTION INTRAVENOUS CONTINUOUS PRN
Status: DISCONTINUED | OUTPATIENT
Start: 2024-03-30 | End: 2024-03-30 | Stop reason: HOSPADM

## 2024-03-30 RX ORDER — GLUCAGON 1 MG/ML
1 KIT INJECTION PRN
Status: DISCONTINUED | OUTPATIENT
Start: 2024-03-30 | End: 2024-03-30 | Stop reason: HOSPADM

## 2024-03-30 RX ORDER — 0.9 % SODIUM CHLORIDE 0.9 %
500 INTRAVENOUS SOLUTION INTRAVENOUS ONCE
Status: COMPLETED | OUTPATIENT
Start: 2024-03-30 | End: 2024-03-30

## 2024-03-30 RX ORDER — ONDANSETRON 4 MG/1
4 TABLET, ORALLY DISINTEGRATING ORAL EVERY 8 HOURS PRN
Status: DISCONTINUED | OUTPATIENT
Start: 2024-03-30 | End: 2024-03-30 | Stop reason: HOSPADM

## 2024-03-30 RX ORDER — ACETAMINOPHEN 325 MG/1
650 TABLET ORAL EVERY 6 HOURS PRN
Status: DISCONTINUED | OUTPATIENT
Start: 2024-03-30 | End: 2024-03-30 | Stop reason: HOSPADM

## 2024-03-30 RX ORDER — ENOXAPARIN SODIUM 100 MG/ML
30 INJECTION SUBCUTANEOUS DAILY
Status: DISCONTINUED | OUTPATIENT
Start: 2024-03-30 | End: 2024-03-30 | Stop reason: HOSPADM

## 2024-03-30 RX ORDER — ASPIRIN 81 MG/1
81 TABLET ORAL DAILY
Status: DISCONTINUED | OUTPATIENT
Start: 2024-03-30 | End: 2024-03-30 | Stop reason: HOSPADM

## 2024-03-30 RX ORDER — POLYETHYLENE GLYCOL 3350 17 G/17G
17 POWDER, FOR SOLUTION ORAL DAILY PRN
Status: DISCONTINUED | OUTPATIENT
Start: 2024-03-30 | End: 2024-03-30 | Stop reason: HOSPADM

## 2024-03-30 RX ORDER — SODIUM CHLORIDE 9 MG/ML
INJECTION, SOLUTION INTRAVENOUS PRN
Status: DISCONTINUED | OUTPATIENT
Start: 2024-03-30 | End: 2024-03-30 | Stop reason: HOSPADM

## 2024-03-30 RX ORDER — 0.9 % SODIUM CHLORIDE 0.9 %
1000 INTRAVENOUS SOLUTION INTRAVENOUS ONCE
Status: COMPLETED | OUTPATIENT
Start: 2024-03-30 | End: 2024-03-30

## 2024-03-30 RX ORDER — ONDANSETRON 2 MG/ML
4 INJECTION INTRAMUSCULAR; INTRAVENOUS EVERY 6 HOURS PRN
Status: DISCONTINUED | OUTPATIENT
Start: 2024-03-30 | End: 2024-03-30 | Stop reason: HOSPADM

## 2024-03-30 RX ORDER — INSULIN LISPRO 100 [IU]/ML
0-16 INJECTION, SOLUTION INTRAVENOUS; SUBCUTANEOUS
Status: DISCONTINUED | OUTPATIENT
Start: 2024-03-30 | End: 2024-03-30 | Stop reason: HOSPADM

## 2024-03-30 RX ORDER — ATORVASTATIN CALCIUM 20 MG/1
10 TABLET, FILM COATED ORAL NIGHTLY
Status: DISCONTINUED | OUTPATIENT
Start: 2024-03-30 | End: 2024-03-30 | Stop reason: HOSPADM

## 2024-03-30 RX ORDER — BISACODYL 10 MG
10 SUPPOSITORY, RECTAL RECTAL ONCE
Status: COMPLETED | OUTPATIENT
Start: 2024-03-30 | End: 2024-03-30

## 2024-03-30 RX ORDER — PHENYLEPHRINE HCL IN 0.9% NACL 50MG/250ML
10-300 PLASTIC BAG, INJECTION (ML) INTRAVENOUS CONTINUOUS
Status: DISCONTINUED | OUTPATIENT
Start: 2024-03-30 | End: 2024-03-30

## 2024-03-30 RX ORDER — LEVETIRACETAM 100 MG/ML
750 SOLUTION ORAL 2 TIMES DAILY
Status: DISCONTINUED | OUTPATIENT
Start: 2024-03-30 | End: 2024-03-30 | Stop reason: HOSPADM

## 2024-03-30 RX ORDER — SODIUM CHLORIDE 0.9 % (FLUSH) 0.9 %
5-40 SYRINGE (ML) INJECTION PRN
Status: DISCONTINUED | OUTPATIENT
Start: 2024-03-30 | End: 2024-03-30 | Stop reason: HOSPADM

## 2024-03-30 RX ORDER — LORAZEPAM 2 MG/ML
1 INJECTION INTRAMUSCULAR EVERY 4 HOURS
Status: DISCONTINUED | OUTPATIENT
Start: 2024-03-30 | End: 2024-03-30 | Stop reason: HOSPADM

## 2024-03-30 RX ORDER — MORPHINE SULFATE 2 MG/ML
2 INJECTION, SOLUTION INTRAMUSCULAR; INTRAVENOUS EVERY 4 HOURS PRN
Status: DISCONTINUED | OUTPATIENT
Start: 2024-03-30 | End: 2024-03-30 | Stop reason: HOSPADM

## 2024-03-30 RX ORDER — SODIUM CHLORIDE 9 MG/ML
INJECTION, SOLUTION INTRAVENOUS CONTINUOUS
Status: DISCONTINUED | OUTPATIENT
Start: 2024-03-30 | End: 2024-03-30

## 2024-03-30 RX ORDER — INSULIN LISPRO 100 [IU]/ML
0-4 INJECTION, SOLUTION INTRAVENOUS; SUBCUTANEOUS NIGHTLY
Status: DISCONTINUED | OUTPATIENT
Start: 2024-03-30 | End: 2024-03-30 | Stop reason: HOSPADM

## 2024-03-30 RX ORDER — ACETAMINOPHEN 650 MG/1
650 SUPPOSITORY RECTAL EVERY 6 HOURS PRN
Status: DISCONTINUED | OUTPATIENT
Start: 2024-03-30 | End: 2024-03-30 | Stop reason: HOSPADM

## 2024-03-30 RX ORDER — MULTIVITAMIN WITH IRON
1 TABLET ORAL DAILY
Status: DISCONTINUED | OUTPATIENT
Start: 2024-03-30 | End: 2024-03-30 | Stop reason: HOSPADM

## 2024-03-30 RX ORDER — ALOGLIPTIN 6.25 MG/1
6.25 TABLET, FILM COATED ORAL DAILY
Status: DISCONTINUED | OUTPATIENT
Start: 2024-03-30 | End: 2024-03-30 | Stop reason: HOSPADM

## 2024-03-30 RX ORDER — CLOPIDOGREL BISULFATE 75 MG/1
75 TABLET ORAL ONCE
Status: DISCONTINUED | OUTPATIENT
Start: 2024-03-30 | End: 2024-03-30 | Stop reason: HOSPADM

## 2024-03-30 RX ORDER — SODIUM CHLORIDE 0.9 % (FLUSH) 0.9 %
5-40 SYRINGE (ML) INJECTION EVERY 12 HOURS SCHEDULED
Status: DISCONTINUED | OUTPATIENT
Start: 2024-03-30 | End: 2024-03-30 | Stop reason: HOSPADM

## 2024-03-30 RX ADMIN — LEVETIRACETAM 750 MG: 500 SOLUTION ORAL at 09:04

## 2024-03-30 RX ADMIN — PIPERACILLIN AND TAZOBACTAM 3375 MG: 3; .375 INJECTION, POWDER, LYOPHILIZED, FOR SOLUTION INTRAVENOUS at 12:20

## 2024-03-30 RX ADMIN — PHENYLEPHRINE HYDROCHLORIDE 300 MCG/MIN: 10 INJECTION INTRAVENOUS at 13:24

## 2024-03-30 RX ADMIN — VASOPRESSIN 0.03 UNITS/MIN: 20 INJECTION INTRAVENOUS at 12:26

## 2024-03-30 RX ADMIN — SODIUM BICARBONATE 50 MEQ: 84 INJECTION, SOLUTION INTRAVENOUS at 18:08

## 2024-03-30 RX ADMIN — Medication 90 MCG/MIN: at 04:18

## 2024-03-30 RX ADMIN — Medication 100 MCG/MIN: at 13:51

## 2024-03-30 RX ADMIN — PIPERACILLIN AND TAZOBACTAM 4500 MG: 4; .5 INJECTION, POWDER, FOR SOLUTION INTRAVENOUS at 04:24

## 2024-03-30 RX ADMIN — Medication 100 MCG/MIN: at 16:31

## 2024-03-30 RX ADMIN — LORAZEPAM 1 MG: 2 INJECTION INTRAMUSCULAR; INTRAVENOUS at 19:16

## 2024-03-30 RX ADMIN — ENOXAPARIN SODIUM 30 MG: 100 INJECTION SUBCUTANEOUS at 10:38

## 2024-03-30 RX ADMIN — HYDROCORTISONE SODIUM SUCCINATE 100 MG: 100 INJECTION, POWDER, FOR SOLUTION INTRAMUSCULAR; INTRAVENOUS at 05:05

## 2024-03-30 RX ADMIN — ACETAMINOPHEN 650 MG: 325 TABLET ORAL at 09:04

## 2024-03-30 RX ADMIN — THERA TABS 1 TABLET: TAB at 09:04

## 2024-03-30 RX ADMIN — SODIUM CHLORIDE: 9 INJECTION, SOLUTION INTRAVENOUS at 05:22

## 2024-03-30 RX ADMIN — HYDROCORTISONE SODIUM SUCCINATE 100 MG: 100 INJECTION, POWDER, FOR SOLUTION INTRAMUSCULAR; INTRAVENOUS at 16:36

## 2024-03-30 RX ADMIN — Medication 100 MCG/MIN: at 10:36

## 2024-03-30 RX ADMIN — POLYETHYLENE GLYCOL 3350 17 G: 17 POWDER, FOR SOLUTION ORAL at 09:04

## 2024-03-30 RX ADMIN — Medication 10 MCG/MIN: at 00:06

## 2024-03-30 RX ADMIN — SODIUM CHLORIDE 500 ML: 9 INJECTION, SOLUTION INTRAVENOUS at 08:19

## 2024-03-30 RX ADMIN — SODIUM CHLORIDE 1000 ML: 9 INJECTION, SOLUTION INTRAVENOUS at 01:31

## 2024-03-30 RX ADMIN — HYDROCORTISONE SODIUM SUCCINATE 50 MG: 100 INJECTION, POWDER, FOR SOLUTION INTRAMUSCULAR; INTRAVENOUS at 09:00

## 2024-03-30 RX ADMIN — ASPIRIN 81 MG: 81 TABLET, COATED ORAL at 09:04

## 2024-03-30 RX ADMIN — MORPHINE SULFATE 2 MG: 2 INJECTION, SOLUTION INTRAMUSCULAR; INTRAVENOUS at 19:17

## 2024-03-30 RX ADMIN — SODIUM BICARBONATE: 84 INJECTION, SOLUTION INTRAVENOUS at 18:30

## 2024-03-30 RX ADMIN — PHENYLEPHRINE HYDROCHLORIDE 30 MCG/MIN: 10 INJECTION INTRAVENOUS at 09:37

## 2024-03-30 RX ADMIN — SODIUM CHLORIDE, PRESERVATIVE FREE 10 ML: 5 INJECTION INTRAVENOUS at 09:04

## 2024-03-30 RX ADMIN — PHENYLEPHRINE HYDROCHLORIDE 300 MCG/MIN: 10 INJECTION INTRAVENOUS at 16:30

## 2024-03-30 RX ADMIN — SODIUM CHLORIDE 1000 ML: 9 INJECTION, SOLUTION INTRAVENOUS at 05:45

## 2024-03-30 RX ADMIN — MORPHINE SULFATE 2 MG: 2 INJECTION, SOLUTION INTRAMUSCULAR; INTRAVENOUS at 19:18

## 2024-03-30 RX ADMIN — BISACODYL 10 MG: 10 SUPPOSITORY RECTAL at 09:04

## 2024-03-30 ASSESSMENT — PAIN SCALES - GENERAL
PAINLEVEL_OUTOF10: 0
PAINLEVEL_OUTOF10: 0

## 2024-03-30 NOTE — PROGRESS NOTES
Patient turned for perineal care and repositioning. Large brown emesis occurred upon turned. 300 ml bile suctioned from mouth.

## 2024-03-30 NOTE — PROGRESS NOTES
1210: Spoke with Dr. Teixeira via call regarding status of pt. RR 38 MAP 50 BP 74/30  and asked for a third vasopressor to assist patient's BP. Also Dr. Teixeira informed me that he would reach out to patient's daughter to provide update.    1231: Spoke with pt daughter, Anjali Zhang, briefly discussed pt requiring pressor #3 and that he was not doing well at this time. I confirmed with her that she has not spoken to Dr. Teixeira today. Anjali Zhang confirmed that she will call patient's son and sister for them to come see him but that she is not local.

## 2024-03-30 NOTE — PROGRESS NOTES
0730 - Attending provider notified of hypotension and tachycardia. Patient receiving max dose of levophed. See orders.     0930 - Attending provider notified of hypotension and tachycardia. See orders.     1050 - Provider notified of patient's increased rate and work of breathing. Provider does not want an ABG or pulmonary consult at this time. Stat chest xray ordered and normal saline infusion decreased to 50 ml/hr.     1207 - Provider notified that patient receiving max dose of sharonda-synephrine.     1226 - Patient started on vasopressin.     1244 - Patient's code status discussed with daughter Anjali. Family notified that patient receiving maximum dose of three blood pressure medications. Patient code status changed to DNR.

## 2024-03-30 NOTE — PROGRESS NOTES
All medications stopped, per daughters wishes.    I have personally performed a face to face diagnostic evaluation on this patient. I have reviewed the ACP note and agree with the history, exam and plan of care, except as noted.

## 2024-03-30 NOTE — H&P
HISTORY AND PHYSICAL             Date: 3/30/2024        Patient Name: Nando Mckenzie     YOB: 1935      Age:  88 y.o.    Chief Complaint     Chief Complaint   Patient presents with    Respiratory Distress          History Obtained From   Chart/ED doctor    History of Present Illness   This is an 88-year-old Black male recently discharged from the hospital to the nursing home presents in respiratory distress, hypotension and unresponsiveness..  He had came into the hospital because of unsteady gait weakness and fall found to have hyperglycemia.  While in the hospital he removed these PEG tube and this was replaced by Dr. Alfaro and eventually patient was discharged to [facility] and rehab.  According to the ED doctor patient was found to be minimally responsive and in distress so EMS was called.  When EMS got the he was noted to be hypoxic and tachypneic saturating 100% on 2 L supplemental oxygen.  On arrival he was awake did not appear to be tachypneic.  In the emergency room he had right femoral central line was started on Levophed and fluid resuscitation.  He did get antibiotics.  In the ICU he was still hypotensive I ordered more fluid and started him on Solu-Cortef.  He is also placed in Trendelenburg.  The nurse aspirated 500 cc of residual tube feeding from the stomach because his stomach was full in my presence.  Patient was not in any respiratory distress and was minimally responsive.    Past Medical History     Past Medical History:   Diagnosis Date    Anemia     Arthritis     Diabetes (HCC)     Hypercholesterolemia     Hypertension     Single kidney     Cr normal    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   Unsteady gait  Frequent falls  Diabetes with hyperglycemia recently  History of seizure    Past Surgical History     Past Surgical History:   Procedure Laterality Date    CHOLECYSTECTOMY, LAPAROSCOPIC  12-19-12    By Dr. Manzanares    GASTROSTOMY TUBE CHANGE N/A 3/28/2024  are unremarkable.     No pneumothorax. Left basilar airspace disease.    XR CHEST PORTABLE    Result Date: 3/29/2024  INDICATION: Sepsis  EXAM:  AP CHEST RADIOGRAPH COMPARISON: March 25, 2024 FINDINGS: AP portable view of the chest demonstrates left subclavian pacemaker. Heart size is normal. Left basilar airspace disease suspected. No pulmonary edema, pleural effusion, or pneumothorax. The osseous structures are unremarkable.     Suspect left basilar airspace disease, recommend PA and lateral views when possible as follow-up.    XR ABDOMEN (KUB) (SINGLE AP VIEW)    Result Date: 3/28/2024  EXAM: XR ABDOMEN (KUB) (SINGLE AP VIEW) ACC#: [id number] INDICATION: peg tube placement COMPARISON: None. TECHNIQUE: Limited abdominal radiograph is obtained. Following administration of Gastrografin through the PEG tube, additional abdominal radiograph was obtained. FINDINGS: PEG tube tip overlies the epigastric region. Following the administration of contrast, there is opacification of the stomach. There is no evidence of oral contrast extravasation. There is a nonobstructive bowel gas pattern with a moderate amount of stool in the colon. There is a large amount of stool in the rectum.     PEG tube tip is in the stomach.      Assessment      Hospital Problems             Last Modified POA    * (Principal) Sepsis (HCC) 3/30/2024 Yes   Septic shock probably from aspiration pneumonitis  #2.  Significant PEG tube feeding residual  #3.  History of seizure  #4.  Diabetes  #5.  Generalized weakness with unsteady gait /fall  #6.  Hypertension    7.  History of pacemaker placement  #8.  History of laryngeal cancer status post laryngectomy  #9.  History of dysphagia needing PEG tube placement  #10.  Arthritis generalized  #11.  Constipation with large amount of stool in the cecum.  Probably causing bowel firmness and distention.  Plan   Admit to ICU.  Patient has been started on pressors.  Will continue fluid resuscitation aggressively.

## 2024-03-30 NOTE — ED NOTES
Unable to obtain timely blood cultures and lactate due to difficulty obtaining blood and IV access. Discussion between RN and ED provider, at bedside, IV access obtained. Discussing central line

## 2024-03-30 NOTE — ED PROVIDER NOTES
[facility] EMERGENCY DEPT  EMERGENCY DEPARTMENT HISTORY AND PHYSICAL EXAM      Date: 3/29/2024  Patient Name: Nando Mckenzie  MRN: [id number]  YOB: 1935  Date of evaluation: 3/29/2024  Provider: Kaiser Morgan MD   Note Started: 10:54 PM EDT 3/29/24    HISTORY OF PRESENT ILLNESS     Chief Complaint   Patient presents with    Respiratory Distress       History Provided By: Patient    HPI: Nando Mckenzie is a 88 y.o. male presents from nursing home for evaluation of unresponsiveness, difficulty breathing, hypotension.  Patient has a history of laryngeal cancer, status post laryngectomy, PEG tube dependent, was recently admitted to hospital for respiratory failure, PEG tube dislodgment, discharged 3/27 to nursing home.  Nursing home staff noted patient to be minimally responsive and tachypneic, EMS called.  EMS noted patient to be hypoxic tachypneic, saturating 100% with O2 supplementing laryngeal stoma.  On my assessment patient awake, does appear tachypneic, does not follow commands.    PAST MEDICAL HISTORY   Past Medical History:  Past Medical History:   Diagnosis Date    Anemia     Arthritis     Diabetes (HCC)     Hypercholesterolemia     Hypertension     Single kidney     Cr normal    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       Past Surgical History:  Past Surgical History:   Procedure Laterality Date    CHOLECYSTECTOMY, LAPAROSCOPIC  12-19-12    By Dr. Manzanares    GASTROSTOMY TUBE CHANGE N/A 3/28/2024    GASTROSTOMY/PERCUTANEOUS ENDOSCOPIC GASTROSTOMY TUBE CHANGE performed by Corey Alfaro MD at [facility] ENDOSCOPY    GASTROSTOMY TUBE PLACEMENT N/A 2/9/2024    EGD PEG TUBE PLACEMENT performed by Daphney Pope MD at [facility] ENDOSCOPY    KIDNEY REMOVAL      Pt states does not know reason    LARYNGECTOMY      STOMACH SURGERY N/A 7/5/2023    REMOVAL PEG TUBE performed by Daphney Pope MD at [facility] ENDOSCOPY    UPPER GASTROINTESTINAL ENDOSCOPY N/A 1/22/2024    EGD ESOPHAGOGASTRODUODENOSCOPY performed by Daphney Pope MD  resuscitation.    Clinical Management Tools:  Not Applicable    Patient was given the following medications:  Medications   norepinephrine (LEVOPHED) 16 mg in sodium chloride 0.9 % 250 mL infusion (15 mcg/min IntraVENous Rate/Dose Change 3/30/24 0028)   sodium chloride 0.9 % bolus 1,836 mL (0 mLs IntraVENous Stopped 3/30/24 0028)   cefTRIAXone (ROCEPHIN) 1,000 mg in sterile water 10 mL IV syringe (1,000 mg IntraVENous Given 3/29/24 2223)     And   levoFLOXacin (LEVAQUIN) 750 MG/150ML infusion 750 mg (0 mg IntraVENous Stopped 3/30/24 0028)       CONSULTS: See ED Course/MDM for further details.  None     Social Determinants affecting Diagnosis/Treatment: None    Smoking Cessation: Not Applicable    PROCEDURES   Unless otherwise noted above, none.  Central Line    Date/Time: 3/30/2024 12:30 AM    Performed by: Kaiser Morgan MD  Authorized by: Kaiser Morgan MD    Consent:     Consent obtained:  Emergent situation  Pre-procedure details:     Indication(s): central venous access      Hand hygiene: Hand hygiene performed prior to insertion      Sterile barrier technique: All elements of maximal sterile technique followed      Skin preparation:  Chlorhexidine    Skin preparation agent: Skin preparation agent completely dried prior to procedure    Anesthesia:     Anesthesia method:  None  Procedure details:     Location:  L internal jugular and R femoral    Site selection rationale:  Initially attempted left internal jugular after multiple failed attempts converted to right femoral    Patient position:  Supine    Procedural supplies:  Single lumen and triple lumen    Catheter size:  7 Fr    Landmarks identified: yes      Ultrasound guidance: yes      Ultrasound guidance timing: real time      Sterile ultrasound techniques: Sterile gel and sterile probe covers were used      Number of attempts:  3    Successful placement: yes    Post-procedure details:     Assessment:  Free fluid flow and blood return through all  15 Units into the skin nightly     Levemir FlexPen 100 UNIT/ML injection pen  Generic drug: insulin detemir     levETIRAcetam 100 MG/ML oral solution  Commonly known as: KEPPRA  Take 7.5 mLs by mouth 2 times daily     Saw Palmetto 500 MG Caps     SITagliptin 25 MG tablet  Commonly known as: JANUVIA                DISCONTINUED MEDICATIONS:  Current Discharge Medication List          I am the Primary Clinician of Record: Kaiser Morgan MD (electronically signed)    (Please note that parts of this dictation were completed with voice recognition software. Quite often unanticipated grammatical, syntax, homophones, and other interpretive errors are inadvertently transcribed by the computer software. Please disregards these errors. Please excuse any errors that have escaped final proofreading.)     Kaiser Morgan MD  03/30/24 0033

## 2024-03-30 NOTE — PROGRESS NOTES
Spoke with patient's daughter, son, and attending provider. All in agreement to proceed with comfort measures.

## 2024-03-30 NOTE — ED TRIAGE NOTES
EMS reports initial call was for unresponsiveness. Upon arrival pt was tachycardic and hypotensive. Unable to get accurate pulse ox. Upon arrival pt awake, IO placed by EMS.   Sepsis alert called upon arrival.

## 2024-03-30 NOTE — PROGRESS NOTES
Piperacillin-tazobactam (Zosyn) Extended-Infusion Dosing/Monitoring  Current regimen: 3.375 g IV every 6 hours    Recent Labs     03/29/24  2159 03/29/24  2222   CREATININE 2.28* 2.97*   BUN  --  36*     Estimated CrCl: 15 mL/min    Plan: Change to 4.5 g IV x 1 over 30 minutes followed by 3.375 g IV over 240 minutes every 12 hours per [facility] P&T Committee Protocol with respect to extended-infusion ?-lactam antibiotics. Pharmacy will continue to monitor patient daily and will make dosage adjustments based upon changing renal function.

## 2024-03-31 LAB
BACTERIA SPEC CULT: NORMAL
CORTIS SERPL-MCNC: >150 UG/DL
Lab: NORMAL

## 2024-03-31 NOTE — PROGRESS NOTES
19:43 -Notified  time of death .  1946 -[employer] approved release of body,coordinator's name Kesha Zhang.

## 2024-03-31 NOTE — PROGRESS NOTES
Spiritual Care Assessment/Progress Note  [facility]    Name: Nando Mckenzie MRN: [id number]    Age: 88 y.o.     Sex: male   Language: English     Date: 3/30/2024            Total Time Calculated: 25 min              Spiritual Assessment begun in [address] ICU  Service Provided For:: Patient not available  Referral/Consult From:: Nurse  Encounter Overview/Reason : Initial Encounter, Grief, Loss, and Adjustments    Spiritual beliefs:      [] Involved in a st[place] tradition/spiritual practice:      [] Supported by a [place] community:      [] Claims no spiritual orientation:      [] Seeking spiritual identity:           [] Adheres to an individual form of spirituality:      [x] Not able to assess:                Identified resources for coping and support system:   Support System: Family members       [] Prayer                  [] Devotional reading               [] Music                  [] Guided Imagery     [] Pet visits                                        [] Other: (COMMENT)     Specific area/focus of visit   Encounter:    Crisis:    Spiritual/Emotional needs: Type: Spiritual Support  Ritual, Rites and Sacraments:    Grief, Loss, and Adjustments: Type: Death  Ethics/Mediation:    Behavioral Health:    Palliative Care:    Advance Care Planning:      Plan/Referrals: Other (Comment) ( is available if needed)    Narrative:  responded to a page for family care at end of life for pt Nando Mckenzie on ICU. Pt's chart reviewed. Family had departed upon 's arrival after pt's death.  conferred with GLO Real.  offered words of comfort and compassionate presence at bedside with GLO Razo.    Please contact Spiritual Health with any emotional/spiritual needs or referrals. Thank you.    Lobito Paredes, Chaplain Resident, M.Div

## 2024-04-01 LAB
BACTERIA SPEC CULT: NORMAL
EKG ATRIAL RATE: 93 BPM
EKG DIAGNOSIS: NORMAL
EKG Q-T INTERVAL: 452 MS
EKG QRS DURATION: 128 MS
EKG QTC CALCULATION (BAZETT): 586 MS
EKG R AXIS: -47 DEGREES
EKG T AXIS: 23 DEGREES
EKG VENTRICULAR RATE: 101 BPM
Lab: NORMAL

## 2024-04-05 LAB
BACTERIA SPEC CULT: NORMAL
BACTERIA SPEC CULT: NORMAL
Lab: NORMAL
Lab: NORMAL

## 2024-04-17 NOTE — DISCHARGE SUMMARY
Discharge summary on [facility]    Date of discharge 3/30/2024    Patient   [place] [facility]    Discharge diagnosis  Acute respiratory failure  Septic shock.  Aspiration pneumonitis  History of seizure  History of diabetes  Arthritis  History of laryngeal cancer status post laryngectomy  History of dysphagia  History of unsteady gait  History of frequent falls  LYNDSAY    HPI.  See H&P for full details.  Briefly this is an 88-year-old black male with history of seizure, diabetes, laryngeal cancer status post laryngectomy recently discharged from the hospital following PEG tube dislodgment room presents from the nursing home with respiratory failure.    Hospital course.  Patient was admitted to the intensive care unit.  He was started on IV antibiotics, steroid, pressor support, oxygen, IV fluid.  He was aggressively resuscitated.  Despite pressor support stress dose steroid antibiotics and numerous calls to daughter and family keeping them abreast of information,  Daughter elected to proceed with comfort care measures and terminate aggressive care and patient  shortly thereafter.  Family was counseled.  Appropriate documentation done.  Patient  comfortably.

## (undated) DEVICE — SYRINGE IRRIG 60ML SFT PLIABLE BLB EZ TO GRP 1 HND USE W/

## (undated) DEVICE — GLOVE SURG SZ 6 THK91MIL LTX FREE SYN POLYISOPRENE ANTI

## (undated) DEVICE — SUTURE VCRL + SZ 3-0 L18IN ABSRB UD SH 1/2 CIR TAPERCUT NDL VCP864D

## (undated) DEVICE — FORCEPS BX 240CM 2.4MM L NDL RAD JAW 4 M00513334

## (undated) DEVICE — AGENT HEMSTAT W4XL8IN OXIDIZED REGENERATED CELOS ABSRB

## (undated) DEVICE — ENDOVIVE SFT PEG KIT PULL WENFIT 20F BX2

## (undated) DEVICE — APPLIER CLP L9.375IN APER 2.1MM CLS L3.8MM 20 SM TI CLP

## (undated) DEVICE — FORCEPS BIPOLAR 8.25IN .75MM STRAIGHT BAYONET

## (undated) DEVICE — SOLUTION IRRIG 500ML 0.9% SOD CHL USP POUR PLAS BTL

## (undated) DEVICE — ENDO KIT 1: Brand: MEDLINE INDUSTRIES, INC.

## (undated) DEVICE — SOUTHSIDE TURNOVER: Brand: MEDLINE INDUSTRIES, INC.

## (undated) DEVICE — CANNULA NSL O2 AD 7 FT END-TIDAL CARBON DIOX VENTFLO

## (undated) DEVICE — INSULATED BLADE ELECTRODE: Brand: EDGE

## (undated) DEVICE — MINOR GENERAL PACK: Brand: MEDLINE INDUSTRIES, INC.

## (undated) DEVICE — INTENDED FOR TISSUE SEPARATION, AND OTHER PROCEDURES THAT REQUIRE A SHARP SURGICAL BLADE TO PUNCTURE OR CUT.: Brand: BARD-PARKER ® CARBON RIB-BACK BLADES

## (undated) DEVICE — LINE SAMPLING AD NSL O2 TBNG MICROSTREAM ADV FILTERLINE MVAO

## (undated) DEVICE — DEVICE ENDO L200CM SHTH OD1.9MM GRSP HYBRID JAW

## (undated) DEVICE — SNARE POLYP SM W13MMXL240CM SHTH DIA2.4MM OVL FLX DISP

## (undated) DEVICE — TUBE ET DIA5MM ORAL NSL CUF MURPHY EYE HI LO RADPQ LN DISP

## (undated) DEVICE — MOUTHPIECE ENDOSCP L CTRL OPN AND SIDE PORTS DISP

## (undated) DEVICE — KIT ENDOSCOPIC  PROC VIA

## (undated) DEVICE — Device

## (undated) DEVICE — DEVICE GRSP SHTH L160CM DIA2.5MM S STL 4 PRNG W/ INWARD

## (undated) DEVICE — ENDOSCOPIC KIT 1.1+ DE BOWL

## (undated) DEVICE — GARMENT,MEDLINE,DVT,INT,CALF,MED, GEN2: Brand: MEDLINE

## (undated) DEVICE — BITE BLOCK ENDOSCP AD 60 FR W/ ADJ STRP PLAS GRN BLOX

## (undated) DEVICE — SHEET, DRAPE, SPLIT, STERILE: Brand: MEDLINE

## (undated) DEVICE — DRESSING,GAUZE,XEROFORM,CURAD,5"X9",ST: Brand: CURAD

## (undated) DEVICE — KIT GASTMY PERC PEG PULL 20FR -- ENDOVIVE BX/2

## (undated) DEVICE — YANKAUER,BULB TIP,W/O VENT,RIGID,STERILE: Brand: MEDLINE

## (undated) DEVICE — SPONGE: SPECIALTY PEANUT XR 100/CS: Brand: MEDICAL ACTION INDUSTRIES

## (undated) DEVICE — CORD BPLR 12FT SGL USE CLR

## (undated) DEVICE — DRAPE,TOP,102X53,STERILE: Brand: MEDLINE

## (undated) DEVICE — CANNULA NASAL ADULT 10FT ETCO2/CO2 VENTFLO